# Patient Record
Sex: FEMALE | Race: WHITE | NOT HISPANIC OR LATINO | Employment: FULL TIME | URBAN - METROPOLITAN AREA
[De-identification: names, ages, dates, MRNs, and addresses within clinical notes are randomized per-mention and may not be internally consistent; named-entity substitution may affect disease eponyms.]

---

## 2017-01-03 ENCOUNTER — OFFICE VISIT - HEALTHEAST (OUTPATIENT)
Dept: PHYSICAL THERAPY | Facility: CLINIC | Age: 42
End: 2017-01-03

## 2017-01-03 DIAGNOSIS — G44.321 INTRACTABLE CHRONIC POST-TRAUMATIC HEADACHE: ICD-10-CM

## 2017-01-03 DIAGNOSIS — M54.2 NECK PAIN: ICD-10-CM

## 2017-01-03 DIAGNOSIS — R60.9 EDEMA, UNSPECIFIED TYPE: ICD-10-CM

## 2017-01-03 DIAGNOSIS — R51.9 HEAD ACHE: ICD-10-CM

## 2017-01-03 DIAGNOSIS — M54.2 CERVICALGIA: ICD-10-CM

## 2017-01-03 DIAGNOSIS — G89.29 CHRONIC BILATERAL LOW BACK PAIN WITHOUT SCIATICA: ICD-10-CM

## 2017-01-03 DIAGNOSIS — M54.50 CHRONIC BILATERAL LOW BACK PAIN WITHOUT SCIATICA: ICD-10-CM

## 2017-01-12 ENCOUNTER — RECORDS - HEALTHEAST (OUTPATIENT)
Dept: ADMINISTRATIVE | Facility: OTHER | Age: 42
End: 2017-01-12

## 2017-01-18 ENCOUNTER — OFFICE VISIT - HEALTHEAST (OUTPATIENT)
Dept: PHYSICAL THERAPY | Facility: CLINIC | Age: 42
End: 2017-01-18

## 2017-01-18 DIAGNOSIS — G44.321 INTRACTABLE CHRONIC POST-TRAUMATIC HEADACHE: ICD-10-CM

## 2017-01-18 DIAGNOSIS — M54.2 NECK PAIN: ICD-10-CM

## 2017-01-18 DIAGNOSIS — M54.2 CERVICALGIA: ICD-10-CM

## 2017-01-25 ENCOUNTER — OFFICE VISIT - HEALTHEAST (OUTPATIENT)
Dept: PHYSICAL THERAPY | Facility: CLINIC | Age: 42
End: 2017-01-25

## 2017-01-25 DIAGNOSIS — G44.321 INTRACTABLE CHRONIC POST-TRAUMATIC HEADACHE: ICD-10-CM

## 2017-01-25 DIAGNOSIS — M54.2 NECK PAIN: ICD-10-CM

## 2017-01-25 DIAGNOSIS — M54.2 CERVICALGIA: ICD-10-CM

## 2017-02-01 ENCOUNTER — OFFICE VISIT - HEALTHEAST (OUTPATIENT)
Dept: PHYSICAL THERAPY | Facility: CLINIC | Age: 42
End: 2017-02-01

## 2017-02-01 DIAGNOSIS — M54.2 NECK PAIN: ICD-10-CM

## 2017-02-01 DIAGNOSIS — M54.2 CERVICALGIA: ICD-10-CM

## 2017-02-01 DIAGNOSIS — G44.321 INTRACTABLE CHRONIC POST-TRAUMATIC HEADACHE: ICD-10-CM

## 2017-02-06 ENCOUNTER — OFFICE VISIT - HEALTHEAST (OUTPATIENT)
Dept: PHYSICAL THERAPY | Facility: CLINIC | Age: 42
End: 2017-02-06

## 2017-02-06 ENCOUNTER — COMMUNICATION - HEALTHEAST (OUTPATIENT)
Dept: FAMILY MEDICINE | Facility: CLINIC | Age: 42
End: 2017-02-06

## 2017-02-06 DIAGNOSIS — M54.2 CERVICALGIA: ICD-10-CM

## 2017-02-06 DIAGNOSIS — G89.29 CHRONIC BILATERAL LOW BACK PAIN WITHOUT SCIATICA: ICD-10-CM

## 2017-02-06 DIAGNOSIS — F41.9 ANXIETY: ICD-10-CM

## 2017-02-06 DIAGNOSIS — G44.221 CHRONIC TENSION-TYPE HEADACHE, INTRACTABLE: ICD-10-CM

## 2017-02-06 DIAGNOSIS — G44.321 INTRACTABLE CHRONIC POST-TRAUMATIC HEADACHE: ICD-10-CM

## 2017-02-06 DIAGNOSIS — M54.50 CHRONIC BILATERAL LOW BACK PAIN WITHOUT SCIATICA: ICD-10-CM

## 2017-02-06 DIAGNOSIS — M54.2 NECK PAIN: ICD-10-CM

## 2017-02-08 ENCOUNTER — OFFICE VISIT - HEALTHEAST (OUTPATIENT)
Dept: PHYSICAL THERAPY | Facility: CLINIC | Age: 42
End: 2017-02-08

## 2017-02-08 DIAGNOSIS — M54.2 CERVICALGIA: ICD-10-CM

## 2017-02-08 DIAGNOSIS — G44.321 INTRACTABLE CHRONIC POST-TRAUMATIC HEADACHE: ICD-10-CM

## 2017-02-08 DIAGNOSIS — M54.2 NECK PAIN: ICD-10-CM

## 2017-02-15 ENCOUNTER — OFFICE VISIT - HEALTHEAST (OUTPATIENT)
Dept: PHYSICAL THERAPY | Facility: CLINIC | Age: 42
End: 2017-02-15

## 2017-02-15 DIAGNOSIS — M54.2 NECK PAIN: ICD-10-CM

## 2017-02-15 DIAGNOSIS — G44.321 INTRACTABLE CHRONIC POST-TRAUMATIC HEADACHE: ICD-10-CM

## 2017-02-15 DIAGNOSIS — M54.2 CERVICALGIA: ICD-10-CM

## 2017-02-17 ENCOUNTER — RECORDS - HEALTHEAST (OUTPATIENT)
Dept: ADMINISTRATIVE | Facility: OTHER | Age: 42
End: 2017-02-17

## 2017-02-20 ENCOUNTER — OFFICE VISIT - HEALTHEAST (OUTPATIENT)
Dept: PHYSICAL THERAPY | Facility: CLINIC | Age: 42
End: 2017-02-20

## 2017-02-20 DIAGNOSIS — G44.221 CHRONIC TENSION-TYPE HEADACHE, INTRACTABLE: ICD-10-CM

## 2017-02-20 DIAGNOSIS — G44.321 INTRACTABLE CHRONIC POST-TRAUMATIC HEADACHE: ICD-10-CM

## 2017-02-20 DIAGNOSIS — M54.2 NECK PAIN: ICD-10-CM

## 2017-02-20 DIAGNOSIS — M54.2 CERVICALGIA: ICD-10-CM

## 2017-02-20 DIAGNOSIS — R60.9 EDEMA, UNSPECIFIED TYPE: ICD-10-CM

## 2017-02-27 ENCOUNTER — RECORDS - HEALTHEAST (OUTPATIENT)
Dept: ADMINISTRATIVE | Facility: OTHER | Age: 42
End: 2017-02-27

## 2017-02-27 ENCOUNTER — OFFICE VISIT - HEALTHEAST (OUTPATIENT)
Dept: PHYSICAL THERAPY | Facility: CLINIC | Age: 42
End: 2017-02-27

## 2017-02-27 DIAGNOSIS — G44.221 CHRONIC TENSION-TYPE HEADACHE, INTRACTABLE: ICD-10-CM

## 2017-02-27 DIAGNOSIS — R60.9 EDEMA, UNSPECIFIED TYPE: ICD-10-CM

## 2017-02-27 DIAGNOSIS — M54.2 CERVICALGIA: ICD-10-CM

## 2017-02-27 DIAGNOSIS — M54.2 NECK PAIN: ICD-10-CM

## 2017-02-28 ENCOUNTER — OFFICE VISIT - HEALTHEAST (OUTPATIENT)
Dept: FAMILY MEDICINE | Facility: CLINIC | Age: 42
End: 2017-02-28

## 2017-02-28 DIAGNOSIS — F32.4 MAJOR DEPRESSION SINGLE EPISODE, IN PARTIAL REMISSION (H): ICD-10-CM

## 2017-02-28 DIAGNOSIS — F41.1 ANXIETY STATE: ICD-10-CM

## 2017-02-28 DIAGNOSIS — M54.2 NECK PAIN: ICD-10-CM

## 2017-02-28 DIAGNOSIS — J30.9 ALLERGIC RHINITIS: ICD-10-CM

## 2017-02-28 DIAGNOSIS — Z86.79 HISTORY OF INTRACRANIAL ANEURYSM: ICD-10-CM

## 2017-02-28 DIAGNOSIS — G43.009 MIGRAINE WITHOUT AURA: ICD-10-CM

## 2017-02-28 ASSESSMENT — MIFFLIN-ST. JEOR: SCORE: 1466.14

## 2017-03-19 ENCOUNTER — RECORDS - HEALTHEAST (OUTPATIENT)
Dept: ADMINISTRATIVE | Facility: OTHER | Age: 42
End: 2017-03-19

## 2017-03-20 ENCOUNTER — OFFICE VISIT - HEALTHEAST (OUTPATIENT)
Dept: FAMILY MEDICINE | Facility: CLINIC | Age: 42
End: 2017-03-20

## 2017-03-20 DIAGNOSIS — R11.0 NAUSEA: ICD-10-CM

## 2017-03-20 DIAGNOSIS — R10.9 ABDOMINAL PAIN: ICD-10-CM

## 2017-03-21 ENCOUNTER — HOSPITAL ENCOUNTER (OUTPATIENT)
Dept: CT IMAGING | Facility: CLINIC | Age: 42
Discharge: HOME | End: 2017-03-21
Attending: FAMILY MEDICINE

## 2017-03-21 ENCOUNTER — COMMUNICATION - HEALTHEAST (OUTPATIENT)
Dept: FAMILY MEDICINE | Facility: CLINIC | Age: 42
End: 2017-03-21

## 2017-03-21 ENCOUNTER — OFFICE VISIT - HEALTHEAST (OUTPATIENT)
Dept: FAMILY MEDICINE | Facility: CLINIC | Age: 42
End: 2017-03-21

## 2017-03-21 DIAGNOSIS — R10.9 LEFT FLANK PAIN: ICD-10-CM

## 2017-03-21 DIAGNOSIS — R10.32 LLQ ABDOMINAL PAIN: ICD-10-CM

## 2017-03-21 ASSESSMENT — MIFFLIN-ST. JEOR: SCORE: 1482.29

## 2017-04-03 ENCOUNTER — COMMUNICATION - HEALTHEAST (OUTPATIENT)
Dept: FAMILY MEDICINE | Facility: CLINIC | Age: 42
End: 2017-04-03

## 2017-04-03 DIAGNOSIS — K42.9 UMBILICAL HERNIA: ICD-10-CM

## 2017-04-10 ENCOUNTER — OFFICE VISIT - HEALTHEAST (OUTPATIENT)
Dept: SURGERY | Facility: CLINIC | Age: 42
End: 2017-04-10

## 2017-04-10 DIAGNOSIS — R10.9 ABDOMINAL PAIN: ICD-10-CM

## 2017-04-10 ASSESSMENT — MIFFLIN-ST. JEOR: SCORE: 1491.37

## 2017-04-11 ENCOUNTER — HOSPITAL ENCOUNTER (OUTPATIENT)
Dept: RADIOLOGY | Facility: CLINIC | Age: 42
Discharge: HOME | End: 2017-04-11
Attending: SURGERY

## 2017-04-11 DIAGNOSIS — R10.9 ABDOMINAL PAIN: ICD-10-CM

## 2017-04-12 ENCOUNTER — COMMUNICATION - HEALTHEAST (OUTPATIENT)
Dept: SURGERY | Facility: CLINIC | Age: 42
End: 2017-04-12

## 2017-04-13 ENCOUNTER — AMBULATORY - HEALTHEAST (OUTPATIENT)
Dept: SURGERY | Facility: CLINIC | Age: 42
End: 2017-04-13

## 2017-04-13 DIAGNOSIS — K21.00 REFLUX ESOPHAGITIS: ICD-10-CM

## 2017-04-14 ENCOUNTER — AMBULATORY - HEALTHEAST (OUTPATIENT)
Dept: SURGERY | Facility: CLINIC | Age: 42
End: 2017-04-14

## 2017-04-24 ENCOUNTER — OFFICE VISIT - HEALTHEAST (OUTPATIENT)
Dept: SURGERY | Facility: CLINIC | Age: 42
End: 2017-04-24

## 2017-04-24 DIAGNOSIS — K21.9 HIATAL HERNIA WITH GERD: ICD-10-CM

## 2017-04-24 DIAGNOSIS — K44.9 HIATAL HERNIA WITH GERD: ICD-10-CM

## 2017-04-26 ENCOUNTER — AMBULATORY - HEALTHEAST (OUTPATIENT)
Dept: SURGERY | Facility: CLINIC | Age: 42
End: 2017-04-26

## 2017-04-26 ENCOUNTER — RECORDS - HEALTHEAST (OUTPATIENT)
Dept: ADMINISTRATIVE | Facility: OTHER | Age: 42
End: 2017-04-26

## 2017-05-02 ENCOUNTER — RECORDS - HEALTHEAST (OUTPATIENT)
Dept: ADMINISTRATIVE | Facility: OTHER | Age: 42
End: 2017-05-02

## 2017-05-02 ENCOUNTER — AMBULATORY - HEALTHEAST (OUTPATIENT)
Dept: SURGERY | Facility: CLINIC | Age: 42
End: 2017-05-02

## 2017-05-15 ENCOUNTER — COMMUNICATION - HEALTHEAST (OUTPATIENT)
Dept: FAMILY MEDICINE | Facility: CLINIC | Age: 42
End: 2017-05-15

## 2017-05-15 DIAGNOSIS — F41.9 ANXIETY: ICD-10-CM

## 2017-05-27 ENCOUNTER — COMMUNICATION - HEALTHEAST (OUTPATIENT)
Dept: FAMILY MEDICINE | Facility: CLINIC | Age: 42
End: 2017-05-27

## 2017-05-27 DIAGNOSIS — F32.4 MAJOR DEPRESSION SINGLE EPISODE, IN PARTIAL REMISSION (H): ICD-10-CM

## 2017-06-08 ENCOUNTER — RECORDS - HEALTHEAST (OUTPATIENT)
Dept: ADMINISTRATIVE | Facility: OTHER | Age: 42
End: 2017-06-08

## 2017-06-19 ENCOUNTER — COMMUNICATION - HEALTHEAST (OUTPATIENT)
Dept: SCHEDULING | Facility: CLINIC | Age: 42
End: 2017-06-19

## 2017-06-20 ENCOUNTER — RECORDS - HEALTHEAST (OUTPATIENT)
Dept: ADMINISTRATIVE | Facility: OTHER | Age: 42
End: 2017-06-20

## 2017-06-21 ENCOUNTER — COMMUNICATION - HEALTHEAST (OUTPATIENT)
Dept: FAMILY MEDICINE | Facility: CLINIC | Age: 42
End: 2017-06-21

## 2017-06-21 DIAGNOSIS — F32.4 MAJOR DEPRESSION SINGLE EPISODE, IN PARTIAL REMISSION (H): ICD-10-CM

## 2017-07-11 ENCOUNTER — COMMUNICATION - HEALTHEAST (OUTPATIENT)
Dept: FAMILY MEDICINE | Facility: CLINIC | Age: 42
End: 2017-07-11

## 2017-07-11 ENCOUNTER — OFFICE VISIT - HEALTHEAST (OUTPATIENT)
Dept: FAMILY MEDICINE | Facility: CLINIC | Age: 42
End: 2017-07-11

## 2017-07-11 ENCOUNTER — COMMUNICATION - HEALTHEAST (OUTPATIENT)
Dept: SCHEDULING | Facility: CLINIC | Age: 42
End: 2017-07-11

## 2017-07-11 DIAGNOSIS — F41.9 ANXIETY: ICD-10-CM

## 2017-07-11 DIAGNOSIS — F32.4 MAJOR DEPRESSION SINGLE EPISODE, IN PARTIAL REMISSION (H): ICD-10-CM

## 2017-07-11 ASSESSMENT — MIFFLIN-ST. JEOR: SCORE: 1461.88

## 2017-07-12 ENCOUNTER — AMBULATORY - HEALTHEAST (OUTPATIENT)
Dept: MULTI SPECIALTY CLINIC | Facility: CLINIC | Age: 42
End: 2017-07-12

## 2017-07-12 LAB — PAP SMEAR - HIM PATIENT REPORTED: NORMAL

## 2017-10-04 ENCOUNTER — AMBULATORY - HEALTHEAST (OUTPATIENT)
Dept: NURSING | Facility: CLINIC | Age: 42
End: 2017-10-04

## 2017-10-04 DIAGNOSIS — Z23 NEED FOR IMMUNIZATION AGAINST INFLUENZA: ICD-10-CM

## 2017-10-19 ENCOUNTER — RECORDS - HEALTHEAST (OUTPATIENT)
Dept: ADMINISTRATIVE | Facility: OTHER | Age: 42
End: 2017-10-19

## 2017-11-08 ENCOUNTER — COMMUNICATION - HEALTHEAST (OUTPATIENT)
Dept: FAMILY MEDICINE | Facility: CLINIC | Age: 42
End: 2017-11-08

## 2017-12-13 ENCOUNTER — HOSPITAL ENCOUNTER (OUTPATIENT)
Dept: CT IMAGING | Facility: CLINIC | Age: 42
Discharge: HOME | End: 2017-12-13
Attending: FAMILY MEDICINE

## 2017-12-13 ENCOUNTER — OFFICE VISIT - HEALTHEAST (OUTPATIENT)
Dept: FAMILY MEDICINE | Facility: CLINIC | Age: 42
End: 2017-12-13

## 2017-12-13 DIAGNOSIS — R10.9 ABDOMINAL PAIN: ICD-10-CM

## 2017-12-27 ENCOUNTER — OFFICE VISIT - HEALTHEAST (OUTPATIENT)
Dept: FAMILY MEDICINE | Facility: CLINIC | Age: 42
End: 2017-12-27

## 2017-12-27 DIAGNOSIS — G89.29 CHRONIC BILATERAL LOW BACK PAIN WITHOUT SCIATICA: ICD-10-CM

## 2017-12-27 DIAGNOSIS — M54.50 CHRONIC BILATERAL LOW BACK PAIN WITHOUT SCIATICA: ICD-10-CM

## 2017-12-27 DIAGNOSIS — R10.9 ABDOMINAL PAIN, UNSPECIFIED ABDOMINAL LOCATION: ICD-10-CM

## 2017-12-27 ASSESSMENT — MIFFLIN-ST. JEOR: SCORE: 1477.76

## 2017-12-29 ENCOUNTER — COMMUNICATION - HEALTHEAST (OUTPATIENT)
Dept: SURGERY | Facility: CLINIC | Age: 42
End: 2017-12-29

## 2018-01-03 ENCOUNTER — OFFICE VISIT - HEALTHEAST (OUTPATIENT)
Dept: PHYSICAL THERAPY | Facility: REHABILITATION | Age: 43
End: 2018-01-03

## 2018-01-03 DIAGNOSIS — M54.50 CHRONIC BILATERAL LOW BACK PAIN WITHOUT SCIATICA: ICD-10-CM

## 2018-01-03 DIAGNOSIS — R19.8 ABDOMINAL WEAKNESS: ICD-10-CM

## 2018-01-03 DIAGNOSIS — M79.18 PAIN IN ABDOMINAL MUSCLE OF LEFT FLANK: ICD-10-CM

## 2018-01-03 DIAGNOSIS — M79.18 MUSCULAR ABDOMINAL PAIN IN RIGHT FLANK: ICD-10-CM

## 2018-01-03 DIAGNOSIS — G89.29 CHRONIC BILATERAL LOW BACK PAIN WITHOUT SCIATICA: ICD-10-CM

## 2018-01-09 ENCOUNTER — OFFICE VISIT - HEALTHEAST (OUTPATIENT)
Dept: PHYSICAL THERAPY | Facility: REHABILITATION | Age: 43
End: 2018-01-09

## 2018-01-09 DIAGNOSIS — G89.29 CHRONIC BILATERAL LOW BACK PAIN WITHOUT SCIATICA: ICD-10-CM

## 2018-01-09 DIAGNOSIS — M79.18 PAIN IN ABDOMINAL MUSCLE OF LEFT FLANK: ICD-10-CM

## 2018-01-09 DIAGNOSIS — R60.9 EDEMA, UNSPECIFIED TYPE: ICD-10-CM

## 2018-01-09 DIAGNOSIS — R19.8 ABDOMINAL WEAKNESS: ICD-10-CM

## 2018-01-09 DIAGNOSIS — M79.18 MUSCULAR ABDOMINAL PAIN IN RIGHT FLANK: ICD-10-CM

## 2018-01-09 DIAGNOSIS — M54.2 CERVICALGIA: ICD-10-CM

## 2018-01-09 DIAGNOSIS — G44.321 INTRACTABLE CHRONIC POST-TRAUMATIC HEADACHE: ICD-10-CM

## 2018-01-09 DIAGNOSIS — M54.50 CHRONIC BILATERAL LOW BACK PAIN WITHOUT SCIATICA: ICD-10-CM

## 2018-01-09 DIAGNOSIS — G44.221 CHRONIC TENSION-TYPE HEADACHE, INTRACTABLE: ICD-10-CM

## 2018-01-09 DIAGNOSIS — M54.2 NECK PAIN: ICD-10-CM

## 2018-01-16 ENCOUNTER — OFFICE VISIT - HEALTHEAST (OUTPATIENT)
Dept: PHYSICAL THERAPY | Facility: REHABILITATION | Age: 43
End: 2018-01-16

## 2018-01-16 DIAGNOSIS — K42.9 UMBILICAL HERNIA: ICD-10-CM

## 2018-01-16 DIAGNOSIS — Z86.79 HISTORY OF INTRACRANIAL ANEURYSM: ICD-10-CM

## 2018-01-16 DIAGNOSIS — G43.009 MIGRAINE WITHOUT AURA: ICD-10-CM

## 2018-01-16 DIAGNOSIS — N92.0 MENORRHAGIA: ICD-10-CM

## 2018-01-16 DIAGNOSIS — F32.4 MAJOR DEPRESSION SINGLE EPISODE, IN PARTIAL REMISSION (H): ICD-10-CM

## 2018-01-16 DIAGNOSIS — Z98.890 S/P CRANIOTOMY: ICD-10-CM

## 2018-01-18 ENCOUNTER — OFFICE VISIT - HEALTHEAST (OUTPATIENT)
Dept: PHYSICAL THERAPY | Facility: REHABILITATION | Age: 43
End: 2018-01-18

## 2018-01-18 DIAGNOSIS — M54.50 CHRONIC BILATERAL LOW BACK PAIN WITHOUT SCIATICA: ICD-10-CM

## 2018-01-18 DIAGNOSIS — G89.29 CHRONIC BILATERAL LOW BACK PAIN WITHOUT SCIATICA: ICD-10-CM

## 2018-01-23 ENCOUNTER — RECORDS - HEALTHEAST (OUTPATIENT)
Dept: ADMINISTRATIVE | Facility: OTHER | Age: 43
End: 2018-01-23

## 2018-01-24 ENCOUNTER — COMMUNICATION - HEALTHEAST (OUTPATIENT)
Dept: FAMILY MEDICINE | Facility: CLINIC | Age: 43
End: 2018-01-24

## 2018-01-29 ENCOUNTER — OFFICE VISIT - HEALTHEAST (OUTPATIENT)
Dept: SURGERY | Facility: CLINIC | Age: 43
End: 2018-01-29

## 2018-01-29 DIAGNOSIS — K21.00 GASTROESOPHAGEAL REFLUX DISEASE WITH ESOPHAGITIS: ICD-10-CM

## 2018-01-29 DIAGNOSIS — Z48.89 POSTOPERATIVE VISIT: ICD-10-CM

## 2018-01-29 ASSESSMENT — MIFFLIN-ST. JEOR: SCORE: 1468.69

## 2018-02-06 ENCOUNTER — COMMUNICATION - HEALTHEAST (OUTPATIENT)
Dept: SURGERY | Facility: CLINIC | Age: 43
End: 2018-02-06

## 2018-02-06 DIAGNOSIS — K21.00 REFLUX ESOPHAGITIS: ICD-10-CM

## 2018-02-26 ENCOUNTER — COMMUNICATION - HEALTHEAST (OUTPATIENT)
Dept: SURGERY | Facility: CLINIC | Age: 43
End: 2018-02-26

## 2018-03-01 ENCOUNTER — ANESTHESIA - HEALTHEAST (OUTPATIENT)
Dept: SURGERY | Facility: HOSPITAL | Age: 43
End: 2018-03-01

## 2018-03-01 ENCOUNTER — SURGERY - HEALTHEAST (OUTPATIENT)
Dept: SURGERY | Facility: HOSPITAL | Age: 43
End: 2018-03-01

## 2018-03-01 ASSESSMENT — MIFFLIN-ST. JEOR
SCORE: 1486.83
SCORE: 1486.83

## 2018-03-02 ENCOUNTER — AMBULATORY - HEALTHEAST (OUTPATIENT)
Dept: SURGERY | Facility: CLINIC | Age: 43
End: 2018-03-02

## 2018-03-19 ENCOUNTER — OFFICE VISIT - HEALTHEAST (OUTPATIENT)
Dept: SURGERY | Facility: CLINIC | Age: 43
End: 2018-03-19

## 2018-03-19 DIAGNOSIS — Z48.89 POSTOPERATIVE VISIT: ICD-10-CM

## 2018-04-13 ENCOUNTER — OFFICE VISIT - HEALTHEAST (OUTPATIENT)
Dept: SURGERY | Facility: CLINIC | Age: 43
End: 2018-04-13

## 2018-04-13 DIAGNOSIS — Z48.89 POSTOPERATIVE VISIT: ICD-10-CM

## 2018-04-27 ENCOUNTER — COMMUNICATION - HEALTHEAST (OUTPATIENT)
Dept: SCHEDULING | Facility: CLINIC | Age: 43
End: 2018-04-27

## 2018-05-31 ENCOUNTER — OFFICE VISIT - HEALTHEAST (OUTPATIENT)
Dept: FAMILY MEDICINE | Facility: CLINIC | Age: 43
End: 2018-05-31

## 2018-05-31 DIAGNOSIS — K44.9 HIATAL HERNIA WITH GERD: ICD-10-CM

## 2018-05-31 DIAGNOSIS — J35.1 SWOLLEN TONSIL: ICD-10-CM

## 2018-05-31 DIAGNOSIS — Z98.890 S/P CRANIOTOMY: ICD-10-CM

## 2018-05-31 DIAGNOSIS — K21.9 HIATAL HERNIA WITH GERD: ICD-10-CM

## 2018-05-31 LAB
BASOPHILS # BLD AUTO: 0.1 THOU/UL (ref 0–0.2)
BASOPHILS NFR BLD AUTO: 1 % (ref 0–2)
DEPRECATED S PYO AG THROAT QL EIA: NORMAL
EOSINOPHIL # BLD AUTO: 0.2 THOU/UL (ref 0–0.4)
EOSINOPHIL NFR BLD AUTO: 2 % (ref 0–6)
ERYTHROCYTE [DISTWIDTH] IN BLOOD BY AUTOMATED COUNT: 13 % (ref 11–14.5)
HCT VFR BLD AUTO: 35.6 % (ref 35–47)
HGB BLD-MCNC: 11.8 G/DL (ref 12–16)
LYMPHOCYTES # BLD AUTO: 2.8 THOU/UL (ref 0.8–4.4)
LYMPHOCYTES NFR BLD AUTO: 29 % (ref 20–40)
MCH RBC QN AUTO: 27.5 PG (ref 27–34)
MCHC RBC AUTO-ENTMCNC: 33.1 G/DL (ref 32–36)
MCV RBC AUTO: 83 FL (ref 80–100)
MONOCYTES # BLD AUTO: 0.5 THOU/UL (ref 0–0.9)
MONOCYTES NFR BLD AUTO: 6 % (ref 2–10)
MONOCYTES NFR BLD AUTO: NEGATIVE %
NEUTROPHILS # BLD AUTO: 6 THOU/UL (ref 2–7.7)
NEUTROPHILS NFR BLD AUTO: 63 % (ref 50–70)
PLATELET # BLD AUTO: 318 THOU/UL (ref 140–440)
PMV BLD AUTO: 6.9 FL (ref 7–10)
RBC # BLD AUTO: 4.28 MILL/UL (ref 3.8–5.4)
WBC: 9.5 THOU/UL (ref 4–11)

## 2018-06-02 LAB — GROUP A STREP BY PCR: NORMAL

## 2018-06-07 ENCOUNTER — COMMUNICATION - HEALTHEAST (OUTPATIENT)
Dept: FAMILY MEDICINE | Facility: CLINIC | Age: 43
End: 2018-06-07

## 2018-06-07 DIAGNOSIS — D64.9 ANEMIA: ICD-10-CM

## 2018-06-08 ENCOUNTER — AMBULATORY - HEALTHEAST (OUTPATIENT)
Dept: LAB | Facility: CLINIC | Age: 43
End: 2018-06-08

## 2018-06-08 DIAGNOSIS — D64.9 ANEMIA: ICD-10-CM

## 2018-06-08 LAB
FERRITIN SERPL-MCNC: 64 NG/ML (ref 10–130)
HGB BLD-MCNC: 12.1 G/DL (ref 12–16)
IRON SATN MFR SERPL: 22 % (ref 20–50)
IRON SERPL-MCNC: 56 UG/DL (ref 42–175)
TIBC SERPL-MCNC: 259 UG/DL (ref 313–563)
TRANSFERRIN SERPL-MCNC: 207 MG/DL (ref 212–360)

## 2018-06-11 ENCOUNTER — COMMUNICATION - HEALTHEAST (OUTPATIENT)
Dept: FAMILY MEDICINE | Facility: CLINIC | Age: 43
End: 2018-06-11

## 2018-06-14 ENCOUNTER — OFFICE VISIT - HEALTHEAST (OUTPATIENT)
Dept: OTOLARYNGOLOGY | Facility: CLINIC | Age: 43
End: 2018-06-14

## 2018-06-14 DIAGNOSIS — J35.8 ASYMMETRIC TONSILS: ICD-10-CM

## 2018-06-26 ENCOUNTER — RECORDS - HEALTHEAST (OUTPATIENT)
Dept: ADMINISTRATIVE | Facility: OTHER | Age: 43
End: 2018-06-26

## 2018-08-02 ENCOUNTER — OFFICE VISIT - HEALTHEAST (OUTPATIENT)
Dept: OTOLARYNGOLOGY | Facility: CLINIC | Age: 43
End: 2018-08-02

## 2018-08-02 DIAGNOSIS — J35.8 ASYMMETRIC TONSILS: ICD-10-CM

## 2018-08-13 ENCOUNTER — COMMUNICATION - HEALTHEAST (OUTPATIENT)
Dept: FAMILY MEDICINE | Facility: CLINIC | Age: 43
End: 2018-08-13

## 2018-08-13 DIAGNOSIS — F41.9 ANXIETY: ICD-10-CM

## 2018-08-17 ENCOUNTER — RECORDS - HEALTHEAST (OUTPATIENT)
Dept: ADMINISTRATIVE | Facility: OTHER | Age: 43
End: 2018-08-17

## 2018-08-30 ENCOUNTER — OFFICE VISIT - HEALTHEAST (OUTPATIENT)
Dept: OTOLARYNGOLOGY | Facility: CLINIC | Age: 43
End: 2018-08-30

## 2018-08-30 DIAGNOSIS — J35.8 ASYMMETRIC TONSILS: ICD-10-CM

## 2018-09-19 ENCOUNTER — RECORDS - HEALTHEAST (OUTPATIENT)
Dept: ADMINISTRATIVE | Facility: OTHER | Age: 43
End: 2018-09-19

## 2018-09-25 ENCOUNTER — OFFICE VISIT - HEALTHEAST (OUTPATIENT)
Dept: FAMILY MEDICINE | Facility: CLINIC | Age: 43
End: 2018-09-25

## 2018-09-25 DIAGNOSIS — B96.89 BV (BACTERIAL VAGINOSIS): ICD-10-CM

## 2018-09-25 DIAGNOSIS — N76.0 BV (BACTERIAL VAGINOSIS): ICD-10-CM

## 2018-09-25 DIAGNOSIS — N39.0 UTI (URINARY TRACT INFECTION): ICD-10-CM

## 2018-09-25 LAB
ALBUMIN UR-MCNC: NEGATIVE MG/DL
APPEARANCE UR: CLEAR
BACTERIA #/AREA URNS HPF: ABNORMAL HPF
BILIRUB UR QL STRIP: NEGATIVE
CLUE CELLS: ABNORMAL
COLOR UR AUTO: YELLOW
GLUCOSE UR STRIP-MCNC: NEGATIVE MG/DL
HGB UR QL STRIP: ABNORMAL
KETONES UR STRIP-MCNC: NEGATIVE MG/DL
LEUKOCYTE ESTERASE UR QL STRIP: ABNORMAL
NITRATE UR QL: NEGATIVE
PH UR STRIP: 5.5 [PH] (ref 5–8)
RBC #/AREA URNS AUTO: ABNORMAL HPF
SP GR UR STRIP: <=1.005 (ref 1–1.03)
SQUAMOUS #/AREA URNS AUTO: ABNORMAL LPF
TRICHOMONAS, WET PREP: ABNORMAL
UROBILINOGEN UR STRIP-ACNC: ABNORMAL
WBC #/AREA URNS AUTO: ABNORMAL HPF
YEAST, WET PREP: ABNORMAL

## 2018-09-26 LAB
C TRACH DNA SPEC QL PROBE+SIG AMP: NEGATIVE
N GONORRHOEA DNA SPEC QL NAA+PROBE: NEGATIVE

## 2018-09-27 ENCOUNTER — COMMUNICATION - HEALTHEAST (OUTPATIENT)
Dept: FAMILY MEDICINE | Facility: CLINIC | Age: 43
End: 2018-09-27

## 2018-09-28 ENCOUNTER — RECORDS - HEALTHEAST (OUTPATIENT)
Dept: ADMINISTRATIVE | Facility: OTHER | Age: 43
End: 2018-09-28

## 2018-09-28 LAB — BACTERIA SPEC CULT: ABNORMAL

## 2018-10-01 ENCOUNTER — OFFICE VISIT - HEALTHEAST (OUTPATIENT)
Dept: FAMILY MEDICINE | Facility: CLINIC | Age: 43
End: 2018-10-01

## 2018-10-01 ENCOUNTER — COMMUNICATION - HEALTHEAST (OUTPATIENT)
Dept: SCHEDULING | Facility: CLINIC | Age: 43
End: 2018-10-01

## 2018-10-01 DIAGNOSIS — R11.0 NAUSEA: ICD-10-CM

## 2018-10-01 DIAGNOSIS — R11.10 VOMITING, INTRACTABILITY OF VOMITING NOT SPECIFIED, PRESENCE OF NAUSEA NOT SPECIFIED, UNSPECIFIED VOMITING TYPE: ICD-10-CM

## 2018-10-01 LAB
ALBUMIN UR-MCNC: NEGATIVE MG/DL
APPEARANCE UR: CLEAR
BILIRUB UR QL STRIP: NEGATIVE
COLOR UR AUTO: YELLOW
GLUCOSE UR STRIP-MCNC: NEGATIVE MG/DL
HGB UR QL STRIP: NEGATIVE
KETONES UR STRIP-MCNC: NEGATIVE MG/DL
LEUKOCYTE ESTERASE UR QL STRIP: NEGATIVE
NITRATE UR QL: NEGATIVE
PH UR STRIP: 6 [PH] (ref 5–8)
SP GR UR STRIP: 1.01 (ref 1–1.03)
UROBILINOGEN UR STRIP-ACNC: NORMAL

## 2018-10-02 LAB — BACTERIA SPEC CULT: NO GROWTH

## 2018-10-29 ENCOUNTER — HOSPITAL ENCOUNTER (OUTPATIENT)
Dept: MAMMOGRAPHY | Facility: CLINIC | Age: 43
Discharge: HOME | End: 2018-10-29
Attending: FAMILY MEDICINE

## 2018-10-29 DIAGNOSIS — Z12.31 ENCOUNTER FOR SCREENING MAMMOGRAM FOR BREAST CANCER: ICD-10-CM

## 2018-11-14 ENCOUNTER — OFFICE VISIT - HEALTHEAST (OUTPATIENT)
Dept: FAMILY MEDICINE | Facility: CLINIC | Age: 43
End: 2018-11-14

## 2018-11-14 ENCOUNTER — COMMUNICATION - HEALTHEAST (OUTPATIENT)
Dept: SCHEDULING | Facility: CLINIC | Age: 43
End: 2018-11-14

## 2018-11-14 DIAGNOSIS — T14.8XXA BRUISING: ICD-10-CM

## 2018-11-16 ENCOUNTER — HOSPITAL ENCOUNTER (OUTPATIENT)
Dept: MRI IMAGING | Facility: CLINIC | Age: 43
Discharge: HOME | End: 2018-11-16
Attending: FAMILY MEDICINE

## 2018-11-16 ENCOUNTER — COMMUNICATION - HEALTHEAST (OUTPATIENT)
Dept: FAMILY MEDICINE | Facility: CLINIC | Age: 43
End: 2018-11-16

## 2018-11-16 DIAGNOSIS — T14.8XXA BRUISING: ICD-10-CM

## 2018-11-19 ENCOUNTER — RECORDS - HEALTHEAST (OUTPATIENT)
Dept: ADMINISTRATIVE | Facility: OTHER | Age: 43
End: 2018-11-19

## 2018-11-28 ENCOUNTER — COMMUNICATION - HEALTHEAST (OUTPATIENT)
Dept: FAMILY MEDICINE | Facility: CLINIC | Age: 43
End: 2018-11-28

## 2018-12-22 ENCOUNTER — COMMUNICATION - HEALTHEAST (OUTPATIENT)
Dept: SCHEDULING | Facility: CLINIC | Age: 43
End: 2018-12-22

## 2018-12-23 ENCOUNTER — OFFICE VISIT - HEALTHEAST (OUTPATIENT)
Dept: FAMILY MEDICINE | Facility: CLINIC | Age: 43
End: 2018-12-23

## 2018-12-23 DIAGNOSIS — R42 DIZZINESS: ICD-10-CM

## 2018-12-23 DIAGNOSIS — G43.809 OTHER MIGRAINE WITHOUT STATUS MIGRAINOSUS, NOT INTRACTABLE: ICD-10-CM

## 2018-12-23 LAB
ANION GAP SERPL CALCULATED.3IONS-SCNC: 14 MMOL/L (ref 5–18)
BUN SERPL-MCNC: 16 MG/DL (ref 8–22)
CHLORIDE BLD-SCNC: 100 MMOL/L (ref 98–107)
CO2 SERPL-SCNC: 26 MMOL/L (ref 22–31)
CREAT SERPL-MCNC: 0.8 MG/DL (ref 0.7–1.3)
ERYTHROCYTE [DISTWIDTH] IN BLOOD BY AUTOMATED COUNT: 12.4 % (ref 11–14.5)
GLUCOSE BLD-MCNC: 88 MG/DL (ref 70–125)
HCT VFR BLD AUTO: 41.4 % (ref 35–47)
HGB BLD-MCNC: 13.7 G/DL (ref 12–16)
MCH RBC QN AUTO: 27.7 PG (ref 27–34)
MCHC RBC AUTO-ENTMCNC: 33 G/DL (ref 32–36)
MCV RBC AUTO: 84 FL (ref 80–100)
PLATELET # BLD AUTO: 296 THOU/UL (ref 140–440)
PMV BLD AUTO: 7.3 FL (ref 7–10)
POTASSIUM BLD-SCNC: 4.3 MMOL/L (ref 3.5–5.5)
RBC # BLD AUTO: 4.93 MILL/UL (ref 3.8–5.4)
SODIUM SERPL-SCNC: 140 MMOL/L (ref 136–145)
WBC: 8.6 THOU/UL (ref 4–11)

## 2019-03-28 ENCOUNTER — OFFICE VISIT - HEALTHEAST (OUTPATIENT)
Dept: FAMILY MEDICINE | Facility: CLINIC | Age: 44
End: 2019-03-28

## 2019-03-28 DIAGNOSIS — R10.2 PELVIC PAIN IN FEMALE: ICD-10-CM

## 2019-03-28 DIAGNOSIS — S29.019D ACUTE THORACIC MYOFASCIAL STRAIN, SUBSEQUENT ENCOUNTER: ICD-10-CM

## 2019-03-28 DIAGNOSIS — N64.4 BREAST TENDERNESS IN FEMALE: ICD-10-CM

## 2019-03-28 DIAGNOSIS — G43.019 INTRACTABLE MIGRAINE WITHOUT AURA AND WITHOUT STATUS MIGRAINOSUS: ICD-10-CM

## 2019-03-28 LAB
ESTRADIOL SERPL-MCNC: 433 PG/ML
FSH SERPL-ACNC: <3 MIU/ML
T4 FREE SERPL-MCNC: 1 NG/DL (ref 0.7–1.8)
TSH SERPL DL<=0.005 MIU/L-ACNC: 1.35 UIU/ML (ref 0.3–5)

## 2019-03-28 RX ORDER — DIAZEPAM 5 MG
5 TABLET ORAL EVERY 6 HOURS PRN
Qty: 30 TABLET | Refills: 1 | Status: SHIPPED | OUTPATIENT
Start: 2019-03-28 | End: 2021-10-12

## 2019-03-28 ASSESSMENT — MIFFLIN-ST. JEOR: SCORE: 1488.82

## 2019-04-05 ENCOUNTER — COMMUNICATION - HEALTHEAST (OUTPATIENT)
Dept: FAMILY MEDICINE | Facility: CLINIC | Age: 44
End: 2019-04-05

## 2019-04-12 ENCOUNTER — RECORDS - HEALTHEAST (OUTPATIENT)
Dept: ADMINISTRATIVE | Facility: OTHER | Age: 44
End: 2019-04-12

## 2019-07-02 ENCOUNTER — COMMUNICATION - HEALTHEAST (OUTPATIENT)
Dept: SCHEDULING | Facility: CLINIC | Age: 44
End: 2019-07-02

## 2019-07-02 ENCOUNTER — OFFICE VISIT - HEALTHEAST (OUTPATIENT)
Dept: FAMILY MEDICINE | Facility: CLINIC | Age: 44
End: 2019-07-02

## 2019-07-02 DIAGNOSIS — F32.4 MAJOR DEPRESSION SINGLE EPISODE, IN PARTIAL REMISSION (H): ICD-10-CM

## 2019-07-02 DIAGNOSIS — G43.019 INTRACTABLE MIGRAINE WITHOUT AURA AND WITHOUT STATUS MIGRAINOSUS: ICD-10-CM

## 2019-07-02 DIAGNOSIS — B37.0 THRUSH: ICD-10-CM

## 2019-07-02 ASSESSMENT — MIFFLIN-ST. JEOR: SCORE: 1482.29

## 2019-07-22 ENCOUNTER — COMMUNICATION - HEALTHEAST (OUTPATIENT)
Dept: SCHEDULING | Facility: CLINIC | Age: 44
End: 2019-07-22

## 2019-07-22 ENCOUNTER — OFFICE VISIT - HEALTHEAST (OUTPATIENT)
Dept: FAMILY MEDICINE | Facility: CLINIC | Age: 44
End: 2019-07-22

## 2019-07-22 ENCOUNTER — AMBULATORY - HEALTHEAST (OUTPATIENT)
Dept: FAMILY MEDICINE | Facility: CLINIC | Age: 44
End: 2019-07-22

## 2019-07-22 DIAGNOSIS — B37.0 THRUSH: ICD-10-CM

## 2019-07-22 LAB
BASOPHILS # BLD AUTO: 0 THOU/UL (ref 0–0.2)
BASOPHILS NFR BLD AUTO: 1 % (ref 0–2)
EOSINOPHIL # BLD AUTO: 0.1 THOU/UL (ref 0–0.4)
EOSINOPHIL NFR BLD AUTO: 2 % (ref 0–6)
ERYTHROCYTE [DISTWIDTH] IN BLOOD BY AUTOMATED COUNT: 12.2 % (ref 11–14.5)
HCT VFR BLD AUTO: 38.8 % (ref 35–47)
HGB BLD-MCNC: 13 G/DL (ref 12–16)
LYMPHOCYTES # BLD AUTO: 2.2 THOU/UL (ref 0.8–4.4)
LYMPHOCYTES NFR BLD AUTO: 33 % (ref 20–40)
MCH RBC QN AUTO: 28.2 PG (ref 27–34)
MCHC RBC AUTO-ENTMCNC: 33.6 G/DL (ref 32–36)
MCV RBC AUTO: 84 FL (ref 80–100)
MONOCYTES # BLD AUTO: 0.5 THOU/UL (ref 0–0.9)
MONOCYTES NFR BLD AUTO: 7 % (ref 2–10)
NEUTROPHILS # BLD AUTO: 3.9 THOU/UL (ref 2–7.7)
NEUTROPHILS NFR BLD AUTO: 58 % (ref 50–70)
PLATELET # BLD AUTO: 274 THOU/UL (ref 140–440)
PMV BLD AUTO: 7.6 FL (ref 7–10)
RBC # BLD AUTO: 4.62 MILL/UL (ref 3.8–5.4)
WBC: 6.8 THOU/UL (ref 4–11)

## 2019-07-22 ASSESSMENT — MIFFLIN-ST. JEOR: SCORE: 1491.37

## 2019-07-31 ENCOUNTER — COMMUNICATION - HEALTHEAST (OUTPATIENT)
Dept: FAMILY MEDICINE | Facility: CLINIC | Age: 44
End: 2019-07-31

## 2019-07-31 ENCOUNTER — AMBULATORY - HEALTHEAST (OUTPATIENT)
Dept: FAMILY MEDICINE | Facility: CLINIC | Age: 44
End: 2019-07-31

## 2019-07-31 DIAGNOSIS — B37.0 THRUSH: ICD-10-CM

## 2019-08-26 ENCOUNTER — AMBULATORY - HEALTHEAST (OUTPATIENT)
Dept: FAMILY MEDICINE | Facility: CLINIC | Age: 44
End: 2019-08-26

## 2019-08-26 DIAGNOSIS — B37.0 THRUSH: ICD-10-CM

## 2019-08-26 DIAGNOSIS — K13.79 MOUTH SORE: ICD-10-CM

## 2019-08-28 ENCOUNTER — OFFICE VISIT - HEALTHEAST (OUTPATIENT)
Dept: OTOLARYNGOLOGY | Facility: CLINIC | Age: 44
End: 2019-08-28

## 2019-08-28 DIAGNOSIS — B37.0 THRUSH: ICD-10-CM

## 2019-10-15 ENCOUNTER — RECORDS - HEALTHEAST (OUTPATIENT)
Dept: ADMINISTRATIVE | Facility: OTHER | Age: 44
End: 2019-10-15

## 2019-10-17 ENCOUNTER — COMMUNICATION - HEALTHEAST (OUTPATIENT)
Dept: FAMILY MEDICINE | Facility: CLINIC | Age: 44
End: 2019-10-17

## 2019-10-17 DIAGNOSIS — F41.1 ANXIETY STATE: ICD-10-CM

## 2020-01-15 ENCOUNTER — RECORDS - HEALTHEAST (OUTPATIENT)
Dept: ADMINISTRATIVE | Facility: OTHER | Age: 45
End: 2020-01-15

## 2020-01-20 ENCOUNTER — RECORDS - HEALTHEAST (OUTPATIENT)
Dept: ADMINISTRATIVE | Facility: OTHER | Age: 45
End: 2020-01-20

## 2020-01-30 ENCOUNTER — RECORDS - HEALTHEAST (OUTPATIENT)
Dept: ADMINISTRATIVE | Facility: OTHER | Age: 45
End: 2020-01-30

## 2020-02-17 ENCOUNTER — OFFICE VISIT - HEALTHEAST (OUTPATIENT)
Dept: FAMILY MEDICINE | Facility: CLINIC | Age: 45
End: 2020-02-17

## 2020-02-17 DIAGNOSIS — R42 DIZZINESS: ICD-10-CM

## 2020-02-17 DIAGNOSIS — Z12.31 VISIT FOR SCREENING MAMMOGRAM: ICD-10-CM

## 2020-02-17 DIAGNOSIS — R53.83 FATIGUE, UNSPECIFIED TYPE: ICD-10-CM

## 2020-02-17 LAB
BASOPHILS # BLD AUTO: 0 THOU/UL (ref 0–0.2)
BASOPHILS NFR BLD AUTO: 0 % (ref 0–2)
EOSINOPHIL # BLD AUTO: 0.2 THOU/UL (ref 0–0.4)
EOSINOPHIL NFR BLD AUTO: 3 % (ref 0–6)
ERYTHROCYTE [DISTWIDTH] IN BLOOD BY AUTOMATED COUNT: 12.4 % (ref 11–14.5)
HCT VFR BLD AUTO: 37.1 % (ref 35–47)
HGB BLD-MCNC: 12.4 G/DL (ref 12–16)
LYMPHOCYTES # BLD AUTO: 2.6 THOU/UL (ref 0.8–4.4)
LYMPHOCYTES NFR BLD AUTO: 32 % (ref 20–40)
MCH RBC QN AUTO: 28.5 PG (ref 27–34)
MCHC RBC AUTO-ENTMCNC: 33.4 G/DL (ref 32–36)
MCV RBC AUTO: 85 FL (ref 80–100)
MONOCYTES # BLD AUTO: 0.5 THOU/UL (ref 0–0.9)
MONOCYTES NFR BLD AUTO: 6 % (ref 2–10)
NEUTROPHILS # BLD AUTO: 4.7 THOU/UL (ref 2–7.7)
NEUTROPHILS NFR BLD AUTO: 59 % (ref 50–70)
PLATELET # BLD AUTO: 306 THOU/UL (ref 140–440)
PMV BLD AUTO: 7.2 FL (ref 7–10)
RBC # BLD AUTO: 4.35 MILL/UL (ref 3.8–5.4)
WBC: 7.9 THOU/UL (ref 4–11)

## 2020-02-17 ASSESSMENT — MIFFLIN-ST. JEOR: SCORE: 1523.69

## 2020-02-21 ENCOUNTER — COMMUNICATION - HEALTHEAST (OUTPATIENT)
Dept: ADMINISTRATIVE | Facility: CLINIC | Age: 45
End: 2020-02-21

## 2020-03-31 ENCOUNTER — NURSE TRIAGE (OUTPATIENT)
Dept: NURSING | Facility: CLINIC | Age: 45
End: 2020-03-31

## 2020-03-31 NOTE — TELEPHONE ENCOUNTER
"Pt has had a cough since Tuesday 3/24/20. Starting Saturday 3/28/20 started with nausea and gagging at times. Feels like there is mucous going into her stomach causing this. She had hiatal surgery 1 1/2 to 2 years ago and can't actually vomit anything, but goes through the motions of vomiting. A couple of hours ago felt more nausea and had an empty stomach so though she would eat a sandwich to see if that would help, but became more nausea this past hour. Denies abdominal pain. I advised she speak with a  now to try to get her a tele-visit or virtual visit with her clinic this afternoon. She wants to try and rest for awhile and will call back if not feeling better.     Janneth Mendez RN  [facility]  Triage Nurse Advisors      Additional Information    Negative: Shock suspected (e.g., cold/pale/clammy skin, too weak to stand, low BP, rapid pulse)    Negative: Difficult to awaken or acting confused (e.g., disoriented, slurred speech)    Negative: Sounds like a life-threatening emergency to the triager    Negative: Vomiting occurs only while coughing    Negative: [1] Pregnant < 20 Weeks AND [2] nausea/vomiting began in early pregnancy (i.e., 4-8 weeks pregnant)    Negative: Chest pain    Negative: Headache is main symptom    Negative: Vomiting (or Nausea) in a cancer patient who is currently (or recently) receiving chemotherapy or radiation therapy, or cancer patient who has metastatic or end-stage cancer and is receiving palliative care    Negative: [1] Vomiting AND [2] contains red blood or black (\"coffee ground\") material  (Exception: few red streaks in vomit that only happened once)    Negative: Severe pain in one eye    Negative: Recent head injury (within last 3 days)    Negative: Recent abdominal injury (within last 3 days)    Negative: [1] Insulin-dependent diabetes (Type I) AND [2] glucose > 400 mg/dl (22 mmol/l)    Negative: [1] SEVERE vomiting (e.g., 6 or more times/day) AND [2] present > 8 " hours    Negative: [1] MODERATE vomiting (e.g., 3 - 5 times/day) AND [2] age > 60    Negative: Severe headache (e.g., excruciating) (Exception: similar to previous migraines)    Negative: High-risk adult (e.g., diabetes mellitus, brain tumor, V-P shunt, hernia)    Negative: [1] Drinking very little AND [2] dehydration suspected (e.g., no urine > 12 hours, very dry mouth, very lightheaded)    Negative: Patient sounds very sick or weak to the triager    Negative: [1] Vomiting AND [2] abdomen looks much more swollen than usual    Negative: [1] Vomiting AND [2] contains bile (green color)    Negative: [1] Constant abdominal pain AND [2] present > 2 hours    Negative: [1] Fever > 103 F (39.4 C) AND [2] not able to get the fever down using Fever Care Advice    Negative: [1] Fever > 100.0 F (37.8 C) AND [2] bedridden (e.g., nursing home patient, CVA, chronic illness, recovering from surgery)    Negative: [1] Fever > 100.0 F (37.8 C) AND [2] weak immune system (e.g., HIV positive, cancer chemo, splenectomy, organ transplant, chronic steroids)    Negative: Taking any of the following medications: digoxin (Lanoxin), lithium, theophylline, phenytoin (Dilantin)    Negative: [1] Fever > 101 F (38.3 C) AND [2] age > 60    Negative: [1] MILD or MODERATE vomiting AND [2] present > 48 hours (2 days) (Exception: mild vomiting with associated diarrhea)    Negative: Fever present > 3 days (72 hours)    Negative: Vomiting a prescription medication    Negative: [1] MILD vomiting with diarrhea AND [2] present > 5 days    Negative: Alcohol or drug abuse, known or suspected    Negative: Vomiting is a chronic symptom (recurrent or ongoing AND present > 4 weeks)    Negative: [1] SEVERE vomiting (e.g., 6 or more times/day, vomits everything) BUT [2] hydrated    MILD or MODERATE vomiting (e.g., 1 - 5 times / day)    Protocols used: VOMITING-A-AH

## 2020-04-01 ENCOUNTER — OFFICE VISIT - HEALTHEAST (OUTPATIENT)
Dept: FAMILY MEDICINE | Facility: CLINIC | Age: 45
End: 2020-04-01

## 2020-04-01 DIAGNOSIS — R11.0 NAUSEA: ICD-10-CM

## 2020-04-01 DIAGNOSIS — R06.2 WHEEZING: ICD-10-CM

## 2020-04-01 DIAGNOSIS — Z20.822 SUSPECTED COVID-19 VIRUS INFECTION: ICD-10-CM

## 2020-04-01 DIAGNOSIS — R05.9 COUGH: ICD-10-CM

## 2020-04-01 ASSESSMENT — ANXIETY QUESTIONNAIRES
1. FEELING NERVOUS, ANXIOUS, OR ON EDGE: SEVERAL DAYS
6. BECOMING EASILY ANNOYED OR IRRITABLE: NOT AT ALL
2. NOT BEING ABLE TO STOP OR CONTROL WORRYING: NOT AT ALL
IF YOU CHECKED OFF ANY PROBLEMS ON THIS QUESTIONNAIRE, HOW DIFFICULT HAVE THESE PROBLEMS MADE IT FOR YOU TO DO YOUR WORK, TAKE CARE OF THINGS AT HOME, OR GET ALONG WITH OTHER PEOPLE: NOT DIFFICULT AT ALL
GAD7 TOTAL SCORE: 2
5. BEING SO RESTLESS THAT IT IS HARD TO SIT STILL: NOT AT ALL
4. TROUBLE RELAXING: NOT AT ALL
7. FEELING AFRAID AS IF SOMETHING AWFUL MIGHT HAPPEN: SEVERAL DAYS
3. WORRYING TOO MUCH ABOUT DIFFERENT THINGS: NOT AT ALL

## 2020-04-01 ASSESSMENT — PATIENT HEALTH QUESTIONNAIRE - PHQ9: SUM OF ALL RESPONSES TO PHQ QUESTIONS 1-9: 2

## 2020-04-21 ENCOUNTER — OFFICE VISIT - HEALTHEAST (OUTPATIENT)
Dept: FAMILY MEDICINE | Facility: CLINIC | Age: 45
End: 2020-04-21

## 2020-04-21 ENCOUNTER — COMMUNICATION - HEALTHEAST (OUTPATIENT)
Dept: SCHEDULING | Facility: CLINIC | Age: 45
End: 2020-04-21

## 2020-04-21 DIAGNOSIS — Z98.890 S/P CRANIOTOMY: ICD-10-CM

## 2020-04-21 DIAGNOSIS — J01.00 ACUTE NON-RECURRENT MAXILLARY SINUSITIS: ICD-10-CM

## 2020-04-21 DIAGNOSIS — G43.009 MIGRAINE WITHOUT AURA AND WITHOUT STATUS MIGRAINOSUS, NOT INTRACTABLE: ICD-10-CM

## 2020-05-04 ENCOUNTER — COMMUNICATION - HEALTHEAST (OUTPATIENT)
Dept: FAMILY MEDICINE | Facility: CLINIC | Age: 45
End: 2020-05-04

## 2020-05-05 ENCOUNTER — OFFICE VISIT - HEALTHEAST (OUTPATIENT)
Dept: FAMILY MEDICINE | Facility: CLINIC | Age: 45
End: 2020-05-05

## 2020-05-05 DIAGNOSIS — Z98.890 STATUS POST LAPAROSCOPIC NISSEN FUNDOPLICATION: ICD-10-CM

## 2020-05-05 DIAGNOSIS — R05.9 COUGH: ICD-10-CM

## 2020-05-05 DIAGNOSIS — R09.A2 GLOBUS SENSATION: ICD-10-CM

## 2020-05-05 DIAGNOSIS — Z20.822 SUSPECTED COVID-19 VIRUS INFECTION: ICD-10-CM

## 2020-05-05 DIAGNOSIS — R13.10 DYSPHAGIA, UNSPECIFIED TYPE: ICD-10-CM

## 2020-05-11 ENCOUNTER — COMMUNICATION - HEALTHEAST (OUTPATIENT)
Dept: SCHEDULING | Facility: CLINIC | Age: 45
End: 2020-05-11

## 2020-05-11 ENCOUNTER — COMMUNICATION - HEALTHEAST (OUTPATIENT)
Dept: SURGERY | Facility: CLINIC | Age: 45
End: 2020-05-11

## 2020-05-11 ENCOUNTER — VIRTUAL VISIT (OUTPATIENT)
Dept: FAMILY MEDICINE | Facility: OTHER | Age: 45
End: 2020-05-11

## 2020-05-11 NOTE — PROGRESS NOTES
"Date: 2020 10:54:00  Clinician: Richie Rhodes  Clinician NPI: [id number]  Patient: Angelia Stallings  Patient : 1975  Patient Address: [address] [place] [address]  Patient Phone: [phone]  Visit Protocol: URI  Patient Summary:  Angelia is a 44 year old ( : 1975 ) female who initiated a Visit for COVID-19 (Coronavirus) evaluation and screening. When asked the question \"Please sign me up to receive news, health information and promotions from [employer].\", Angelia responded \"No\".    Angelia states her symptoms started suddenly 1 month or more ago. After her symptoms started, they improved and then got worse again.   Her symptoms consist of myalgia, facial pain or pressure, a cough, nasal congestion, ear pain, a headache, and malaise. She is experiencing mild difficulty breathing with activities but can speak normally in full sentences. Angelia also feels feverish.   Symptom details     Nasal secretions: The color of her mucus is white.    Cough: Angelia coughs a few times an hour and her cough is not more bothersome at night. Phlegm comes into her throat when she coughs. She does not believe her cough is caused by post-nasal drip. The color of the phlegm is white.     Temperature: Her current temperature is 99.3 degrees Fahrenheit.     Facial pain or pressure: She has not had the facial pain or pressure for 12 weeks or more. The facial pain or pressure feels worse when bending over or leaning forward.     Headache: Angelia has not had the headache for 12 weeks or more. She states the headache is severe (7-9 on a 10 point pain scale).      Angelia denies having rhinitis, sore throat, vomiting, nausea, teeth pain, ageusia, anosmia, diarrhea, wheezing, enlarged lymph nodes, and chills. She also denies having a sinus infection within the past year and having recent facial or sinus surgery in the past 60 days.   Precipitating events  She has not recently been exposed to " someone with influenza. Angelia has not been in close contact with any high risk individuals.   Pertinent COVID-19 (Coronavirus) information  Angelia does not work or volunteer as healthcare worker or a  and does not work or volunteer in a healthcare facility.   She does not live with a healthcare worker.   Angelia has not had a close contact with a laboratory-confirmed COVID-19 patient within 14 days of symptom onset. She also has not had a close contact with a suspected COVID-19 patient within 14 days of symptom onset.   Pertinent medical history  Angelia has taken an antibiotic medication in the past month. Antibiotic details as reported by the patient (free text): Azithromycin 250 mg 6 tablet, taken two prescriptions over the past month.  The last dose taken on Thursday, 5/7/2020.  I was prescribed the medication because I've had an ongoing cough with chest pain, upper back pain, occasional wheezing, and feeling as though something is stuck in my esophagus or chest, but food is still going down as well as liquids.   Angelia does not get yeast infections when she takes antibiotics.   Angelia needs a return to work/school note.   Weight: 182 lbs   Angelia does not smoke or use smokeless tobacco.   She denies pregnancy and denies breastfeeding. She does not menstruate.   Additional information as reported by the patient (free text): I've had 3 over-the-phone visits with [facility] regarding the symptoms referenced in this e-visit.  They did recommend that I be seen for testing of COVID.  I've had symptoms of a cough for 6 weeks, when I'm on antibiotic I seem to start to feel a bit better and then go backwards.  The last dose of Azithromycin was last Thurs.  I had terrible neck and head pain starting last Wed, then had a fever of 100 Sat &amp; Sun. and was tired.  Now feeling a little bit better but now cough is back.   Weight: 182 lbs    MEDICATIONS: tizanidine oral, diazepam oral,  oxycodone-acetaminophen oral, Tylenol Extra Strength oral, Advil oral, ALLERGIES: Penicillins, Sulfa (Sulfonamide Antibiotics)  Clinician Response:  Dear Angelia,   Dear Angelia  Your symptoms show that you may have coronavirus (COVID-19). This illness can cause fever, cough and trouble breathing. Many people get a mild case and get better on their own. Some people can get very sick.   Will I be tested for COVID-19?  Because we have limited testing supplies, we do not test everyone who is at low risk. We are testing if:    You are very ill. For example, you're on chemotherapy, dialysis or home hospice care. (Contact your specialty clinic or program.)   You live in a nursing home or other long-term care facility. (Talk to your nurse manager or medical director.)   You're a health care worker. ([facility] employees contact our employee health office for testing.)   We are performing limited curbside testing for healthcare/first responders and people with medical problems that put them at increased risk. It does not appear by the [facility] information you submitted that you meet any of these criteria. If there are medical problems that we did not know about, please repeat an [facility] visit and let us know what medical conditions you have.   While you don't qualify for testing at [facility], you may find testing at another site. To look for a testing location, go to https://[place].gov/covid19/for-[place]/if-sick/testing-locations/index.jsp.    How can I protect others?  Without a test, we can't know for sure that you have COVID-19. For safety, it's very important to follow these rules.  First, stay home and away from others (self-isolate) until:   You've had no fever---and no medicine that reduces fever---for 3 full days (72 hours). And...    Your other symptoms have gotten better. For example, your cough or breathing has improved. And...   At least 10 days have passed since your symptoms started.   During  this time:   Don't go to work, school or anywhere else.    Stay away from others in your home. No hugging, kissing or shaking hands.   Don't let anyone visit.   Cover your mouth and nose with a mask, tissue or wash cloth to avoid spreading germs.   Wash your hands and face often. Use soap and water.   How can I take care of myself?   1.Take Tylenol (acetaminophen) for fever or pain. If you have liver or kidney problems, ask your family doctor if it's okay to take Tylenol.        Adults can take either:    650 mg (two 325 mg pills) every 4 to 6 hours, or...   1,000 mg (two 500 mg pills) every 8 hours as needed.    Note: Don't take more than 3,000 mg in one day.   For children, check the Tylenol bottle for the right dose. The dose is based on the child's age or weight.   2.If you have other health problems (like cancer, heart failure, an organ transplant or severe kidney disease): Call your specialty clinic if you don't feel better in the next 2 days.       3.Know when to call 911: If your breathing is so bad that it keeps you from doing normal activities, call 911 or go to the emergency room. Tell them that you've been staying home and may have COVID-19.   Where can I get more information?  To learn more about COVID-19 and how to care for yourself at home, please visit the CDC website at https://www.cdc.gov/coronavirus/2019-ncov/about/steps-when-sick.html.  For more about your care at [facility], please visit [email or web address]   If you are interested in becoming part of a [facility] clinic trial related to COVID19 please go to [email or web address] for information, if you qualify.     Diagnosis: Acute upper respiratory infection, unspecified  Diagnosis ICD: J06.9

## 2020-05-12 ENCOUNTER — OFFICE VISIT - HEALTHEAST (OUTPATIENT)
Dept: FAMILY MEDICINE | Facility: CLINIC | Age: 45
End: 2020-05-12

## 2020-05-12 ENCOUNTER — NURSE TRIAGE (OUTPATIENT)
Dept: NURSING | Facility: CLINIC | Age: 45
End: 2020-05-12

## 2020-05-12 DIAGNOSIS — R50.9 FEVER, UNSPECIFIED FEVER CAUSE: ICD-10-CM

## 2020-05-12 DIAGNOSIS — R05.8 DRY COUGH: ICD-10-CM

## 2020-05-12 DIAGNOSIS — R09.A2 GLOBUS SENSATION: ICD-10-CM

## 2020-05-12 NOTE — TELEPHONE ENCOUNTER
Has had 2 courses of zithromax/ cough X 6 weeks/ she thought she was getting better but still coughing/ does cough up phlegm/no blood/ not colored/ also has headache and feels some mild chest discomfort but she thinks it is epigastric/ also headache/ says [facility] did not recommend testing but [facility] staff did/ sent for a phone visit from her dr for today. Temp 100  As of 2 days ago. Alex Tate RN [phone]    Additional Information    Negative: Bluish (or gray) lips or face    Negative: Severe difficulty breathing (e.g., struggling for each breath, speaks in single words)    Negative: Rapid onset of cough and has hives    Negative: Coughing started suddenly after medicine, an allergic food or bee sting    Negative: Difficulty breathing after exposure to flames, smoke, or fumes    Negative: Sounds like a life-threatening emergency to the triager    Negative: Previous asthma attacks and this feels like asthma attack    Negative: Chest pain present when not coughing    Negative: Difficulty breathing    Negative: Passed out (i.e., fainted, collapsed and was not responding)    Negative: Patient sounds very sick or weak to the triager    Negative: Coughed up > 1 tablespoon (15 ml) blood (Exception: blood-tinged sputum)    Negative: Fever > 103 F (39.4 C)    Negative: Fever > 101 F (38.3 C) and over 60 years of age    Negative: Fever > 100.0 F (37.8 C) and has diabetes mellitus or a weak immune system (e.g., HIV positive, cancer chemotherapy, organ transplant, splenectomy, chronic steroids)    Negative: Fever > 100.0 F (37.8 C) and bedridden (e.g., nursing home patient, stroke, chronic illness, recovering from surgery)    Negative: Increasing ankle swelling    Negative: Wheezing is present    Negative: SEVERE coughing spells (e.g., whooping sound after coughing, vomiting after coughing)    Negative: Coughing up cari-colored (reddish-brown) or blood-tinged sputum    Negative: Fever present > 3 days (72  hours)    Negative: Fever returns after gone for over 24 hours and symptoms worse or not improved    Negative: Using nasal washes and pain medicine > 24 hours and sinus pain persists    Negative: Known COPD or other severe lung disease (i.e., bronchiectasis, cystic fibrosis, lung surgery) and worsening symptoms (i.e., increased sputum purulence or amount, increased breathing difficulty)    Negative: Continuous (nonstop) coughing interferes with work or school and no improvement using cough treatment per Care Advice    Patient wants to be seen    Protocols used: COUGH-A-OH

## 2020-05-13 ENCOUNTER — OFFICE VISIT - HEALTHEAST (OUTPATIENT)
Dept: FAMILY MEDICINE | Facility: CLINIC | Age: 45
End: 2020-05-13

## 2020-05-13 ENCOUNTER — COMMUNICATION - HEALTHEAST (OUTPATIENT)
Dept: SURGERY | Facility: CLINIC | Age: 45
End: 2020-05-13

## 2020-05-13 DIAGNOSIS — R05.3 CHRONIC COUGH: ICD-10-CM

## 2020-05-13 DIAGNOSIS — R13.10 DYSPHAGIA, UNSPECIFIED TYPE: ICD-10-CM

## 2020-05-13 DIAGNOSIS — J32.9 CHRONIC SINUSITIS, UNSPECIFIED LOCATION: ICD-10-CM

## 2020-05-13 DIAGNOSIS — J30.2 SEASONAL ALLERGIC RHINITIS, UNSPECIFIED TRIGGER: ICD-10-CM

## 2020-05-14 ENCOUNTER — AMBULATORY - HEALTHEAST (OUTPATIENT)
Dept: SURGERY | Facility: CLINIC | Age: 45
End: 2020-05-14

## 2020-05-14 ENCOUNTER — COMMUNICATION - HEALTHEAST (OUTPATIENT)
Dept: SCHEDULING | Facility: CLINIC | Age: 45
End: 2020-05-14

## 2020-05-14 DIAGNOSIS — R13.10 DYSPHAGIA, UNSPECIFIED TYPE: ICD-10-CM

## 2020-06-01 ENCOUNTER — HOSPITAL ENCOUNTER (OUTPATIENT)
Dept: RADIOLOGY | Facility: CLINIC | Age: 45
Discharge: HOME | End: 2020-06-01
Attending: SURGERY

## 2020-06-01 DIAGNOSIS — R13.10 DYSPHAGIA, UNSPECIFIED TYPE: ICD-10-CM

## 2020-06-04 ENCOUNTER — COMMUNICATION - HEALTHEAST (OUTPATIENT)
Dept: FAMILY MEDICINE | Facility: CLINIC | Age: 45
End: 2020-06-04

## 2020-06-04 DIAGNOSIS — Z98.890 STATUS POST LAPAROSCOPIC NISSEN FUNDOPLICATION: ICD-10-CM

## 2020-06-04 DIAGNOSIS — K21.9 HIATAL HERNIA WITH GERD: ICD-10-CM

## 2020-06-04 DIAGNOSIS — J30.9 ALLERGIC RHINITIS, UNSPECIFIED SEASONALITY, UNSPECIFIED TRIGGER: ICD-10-CM

## 2020-06-04 DIAGNOSIS — K44.9 HIATAL HERNIA WITH GERD: ICD-10-CM

## 2020-06-04 DIAGNOSIS — R05.3 CHRONIC COUGH: ICD-10-CM

## 2020-06-08 ENCOUNTER — OFFICE VISIT - HEALTHEAST (OUTPATIENT)
Dept: PULMONOLOGY | Facility: OTHER | Age: 45
End: 2020-06-08

## 2020-06-08 DIAGNOSIS — R05.3 CHRONIC COUGH: ICD-10-CM

## 2020-06-08 RX ORDER — CETIRIZINE HYDROCHLORIDE 10 MG/1
10 TABLET ORAL DAILY
Status: SHIPPED | COMMUNITY
Start: 2020-06-08 | End: 2021-10-12

## 2020-06-08 ASSESSMENT — MIFFLIN-ST. JEOR: SCORE: 1504.97

## 2020-06-10 ENCOUNTER — AMBULATORY - HEALTHEAST (OUTPATIENT)
Dept: FAMILY MEDICINE | Facility: CLINIC | Age: 45
End: 2020-06-10

## 2020-06-10 DIAGNOSIS — R05.3 CHRONIC COUGH: ICD-10-CM

## 2020-06-12 ENCOUNTER — COMMUNICATION - HEALTHEAST (OUTPATIENT)
Dept: FAMILY MEDICINE | Facility: CLINIC | Age: 45
End: 2020-06-12

## 2020-07-01 ENCOUNTER — HOSPITAL ENCOUNTER (OUTPATIENT)
Dept: MAMMOGRAPHY | Facility: CLINIC | Age: 45
Discharge: HOME | End: 2020-07-01
Attending: FAMILY MEDICINE

## 2020-07-01 DIAGNOSIS — Z12.31 VISIT FOR SCREENING MAMMOGRAM: ICD-10-CM

## 2020-10-27 ENCOUNTER — OFFICE VISIT - HEALTHEAST (OUTPATIENT)
Dept: FAMILY MEDICINE | Facility: CLINIC | Age: 45
End: 2020-10-27

## 2020-10-27 DIAGNOSIS — F41.1 ANXIETY STATE: ICD-10-CM

## 2020-10-27 DIAGNOSIS — R10.2 PELVIC PAIN IN FEMALE: ICD-10-CM

## 2020-10-27 DIAGNOSIS — G43.009 MIGRAINE WITHOUT AURA AND WITHOUT STATUS MIGRAINOSUS, NOT INTRACTABLE: ICD-10-CM

## 2020-10-27 LAB
ALBUMIN UR-MCNC: NEGATIVE MG/DL
APPEARANCE UR: CLEAR
BASOPHILS # BLD AUTO: 0 THOU/UL (ref 0–0.2)
BASOPHILS NFR BLD AUTO: 0 % (ref 0–2)
BILIRUB UR QL STRIP: NEGATIVE
COLOR UR AUTO: YELLOW
EOSINOPHIL # BLD AUTO: 0.2 THOU/UL (ref 0–0.4)
EOSINOPHIL NFR BLD AUTO: 2 % (ref 0–6)
ERYTHROCYTE [DISTWIDTH] IN BLOOD BY AUTOMATED COUNT: 12.4 % (ref 11–14.5)
ERYTHROCYTE [SEDIMENTATION RATE] IN BLOOD BY WESTERGREN METHOD: 9 MM/HR (ref 0–20)
GLUCOSE UR STRIP-MCNC: NEGATIVE MG/DL
HCT VFR BLD AUTO: 37.3 % (ref 35–47)
HGB BLD-MCNC: 12.6 G/DL (ref 12–16)
HGB UR QL STRIP: ABNORMAL
KETONES UR STRIP-MCNC: NEGATIVE MG/DL
LEUKOCYTE ESTERASE UR QL STRIP: NEGATIVE
LYMPHOCYTES # BLD AUTO: 2.5 THOU/UL (ref 0.8–4.4)
LYMPHOCYTES NFR BLD AUTO: 32 % (ref 20–40)
MCH RBC QN AUTO: 28.1 PG (ref 27–34)
MCHC RBC AUTO-ENTMCNC: 33.8 G/DL (ref 32–36)
MCV RBC AUTO: 83 FL (ref 80–100)
MONOCYTES # BLD AUTO: 0.5 THOU/UL (ref 0–0.9)
MONOCYTES NFR BLD AUTO: 6 % (ref 2–10)
NEUTROPHILS # BLD AUTO: 4.7 THOU/UL (ref 2–7.7)
NEUTROPHILS NFR BLD AUTO: 60 % (ref 50–70)
NITRATE UR QL: NEGATIVE
PH UR STRIP: 5.5 [PH] (ref 5–8)
PLATELET # BLD AUTO: 303 THOU/UL (ref 140–440)
PMV BLD AUTO: 7.3 FL (ref 7–10)
RBC # BLD AUTO: 4.48 MILL/UL (ref 3.8–5.4)
SP GR UR STRIP: <=1.005 (ref 1–1.03)
UROBILINOGEN UR STRIP-ACNC: ABNORMAL
WBC: 7.9 THOU/UL (ref 4–11)

## 2020-10-27 ASSESSMENT — ANXIETY QUESTIONNAIRES
6. BECOMING EASILY ANNOYED OR IRRITABLE: NOT AT ALL
1. FEELING NERVOUS, ANXIOUS, OR ON EDGE: MORE THAN HALF THE DAYS
7. FEELING AFRAID AS IF SOMETHING AWFUL MIGHT HAPPEN: SEVERAL DAYS
IF YOU CHECKED OFF ANY PROBLEMS ON THIS QUESTIONNAIRE, HOW DIFFICULT HAVE THESE PROBLEMS MADE IT FOR YOU TO DO YOUR WORK, TAKE CARE OF THINGS AT HOME, OR GET ALONG WITH OTHER PEOPLE: SOMEWHAT DIFFICULT
3. WORRYING TOO MUCH ABOUT DIFFERENT THINGS: MORE THAN HALF THE DAYS
2. NOT BEING ABLE TO STOP OR CONTROL WORRYING: MORE THAN HALF THE DAYS
5. BEING SO RESTLESS THAT IT IS HARD TO SIT STILL: NOT AT ALL
GAD7 TOTAL SCORE: 7
4. TROUBLE RELAXING: NOT AT ALL

## 2020-10-27 ASSESSMENT — PATIENT HEALTH QUESTIONNAIRE - PHQ9: SUM OF ALL RESPONSES TO PHQ QUESTIONS 1-9: 12

## 2020-10-27 ASSESSMENT — MIFFLIN-ST. JEOR: SCORE: 1537.29

## 2020-10-28 ENCOUNTER — HOSPITAL ENCOUNTER (OUTPATIENT)
Dept: CT IMAGING | Facility: CLINIC | Age: 45
Discharge: HOME | End: 2020-10-28
Attending: FAMILY MEDICINE

## 2020-10-28 DIAGNOSIS — R10.2 PELVIC PAIN IN FEMALE: ICD-10-CM

## 2020-10-28 LAB
BACTERIA SPEC CULT: NO GROWTH
C REACTIVE PROTEIN LHE: 0.1 MG/DL (ref 0–0.8)

## 2020-11-09 ENCOUNTER — VIRTUAL VISIT (OUTPATIENT)
Dept: FAMILY MEDICINE | Facility: OTHER | Age: 45
End: 2020-11-09
Payer: COMMERCIAL

## 2020-11-09 PROCEDURE — 99421 OL DIG E/M SVC 5-10 MIN: CPT | Performed by: PHYSICIAN ASSISTANT

## 2020-11-09 NOTE — PROGRESS NOTES
"Date: 2020 09:25:16  Clinician: Joseph Garcia  Clinician NPI: [id number]  Patient: Angelia Stallings  Patient : 1975  Patient Address: [address] [place] [address]  Patient Phone: [phone]  Visit Protocol: URI  Patient Summary:  Angelia is a 45 year old ( : 1975 ) female who initiated a [facility] Visit for COVID-19 (Coronavirus) evaluation and screening. When asked the question \"Please sign me up to receive news, health information and promotions from [facility].\", Angelia responded \"No\".    Angelia states her symptoms started suddenly 3-4 days ago.   Her symptoms consist of a sore throat and a headache.   Symptom details     Sore throat: Angelia reports having mild throat pain (1-3 on a 10 point pain scale), does not have exudate on her tonsils, and can swallow liquids. The lymph nodes in her neck are not enlarged. A rash has not appeared on the skin since the sore throat started.     Headache: She states the headache is mild (1-3 on a 10 point pain scale).      Angelia denies having vomiting, rhinitis, facial pain or pressure, myalgias, chills, malaise, teeth pain, ageusia, diarrhea, ear pain, wheezing, fever, enlarged lymph nodes, cough, nasal congestion, nausea, and anosmia. She also denies taking antibiotic medication in the past month, having recent facial or sinus surgery in the past 60 days, and double sickening (worsening symptoms after initial improvement). She is not experiencing dyspnea.   Precipitating events  Within the past week, Angelia has not been exposed to someone with strep throat.   Pertinent COVID-19 (Coronavirus) information  Angelia does not work or volunteer as healthcare worker or a . In the past 14 days, Angelia has not worked or volunteered at a healthcare facility or group living setting.   In the past 14 days, she also has not lived in a congregate living setting.   Angelia has had a close contact with a " laboratory-confirmed COVID-19 patient within 14 days of symptom onset. She was exposed at her work. Date Angelia was exposed to the laboratory-confirmed COVID-19 patient: 11/6/2020   Additional information about contact with COVID-19 (Coronavirus) patient as reported by the patient (free text): Three co-workers have tested positive for COVID. We wear masks but could've had close contact throughout the day    Since December 2019, Angelia has been tested for COVID-19 and has had upper respiratory infection (URI) or influenza-like illness.      Result of COVID-19 test: Negative     Date of her COVID-19 test: 06/15/2020     Date(s) of previous URI or influenza-like illness (free-text): April and May 2020     Symptoms Angelia experienced during previous URI or influenza-like illness as reported by the patient (free-text): Cough, trouble breathing, fatigue        Pertinent medical history  Angelia does not get yeast infections when she takes antibiotics.   Angelia does not need a return to work/school note.   Weight: 182 lbs   Angelia does not smoke or use smokeless tobacco.   She denies pregnancy and denies breastfeeding. She does not menstruate.   Weight: 182 lbs    MEDICATIONS: sertraline oral, oxycodone-acetaminophen oral, diazepam oral, Tylenol Extra Strength oral, Advil oral, tizanidine oral, ALLERGIES: Sulfa (Sulfonamide Antibiotics), Penicillins  Clinician Response:  Dear Angelia,   Your symptoms show that you may have coronavirus (COVID-19). This illness can cause fever, cough and trouble breathing. Many people get a mild case and get better on their own. Some people can get very sick.  What should I do?  We would like to test you for this virus.   1. Please call [phone] to schedule your visit. Explain that you were referred by [facility] to have a COVID-19 test. Be ready to share your [facility] visit ID number.  * If you need to schedule in [facility] please call [phone] or for [facility] employees  "please call [phone].  * If you need to schedule in the [place] area please call [phone]. [employer] employees call [phone].  The following will serve as your written order for this COVID Test, ordered by me, for the indication of suspected COVID [Z20.828]: The test will be ordered in [employer], our electronic health record, after you are scheduled. It will show as ordered and authorized by Gregory Rojas MD.  Order: COVID-19 (Coronavirus) PCR for SYMPTOMATIC testing from [facility].   2. When it's time for your COVID test:  Stay at least 6 feet away from others. (If someone will drive you to your test, stay in the backseat, as far away from the  as you can.)   Cover your mouth and nose with a mask, tissue or washcloth.  Go straight to the testing site. Don't make any stops on the way there or back.      3.Starting now: Stay home and away from others (self-isolate) until:   You've had no fever---and no medicine that reduces fever---for one full day (24 hours). And...   Your other symptoms have gotten better. For example, your cough or breathing has improved. And...   At least 10 days have passed since your symptoms started.       During this time, don't leave the house except for testing or medical care.   Stay in your own room, even for meals. Use your own bathroom if you can.   Stay away from others in your home. No hugging, kissing or shaking hands. No visitors.  Don't go to work, school or anywhere else.    Clean \"high touch\" surfaces often (doorknobs, counters, handles, etc.). Use a household cleaning spray or wipes. You'll find a full list of  on the EPA website: www.epa.gov/pesticide-registration/list-n-disinfectants-use-against-sars-cov-2.   Cover your mouth and nose with a mask, tissue or washcloth to avoid spreading germs.  Wash your hands and face often. Use soap and water.  Caregivers in these groups are at risk for severe illness due to COVID-19:  o People 65 years and older  o People who live " in a nursing home or long-term care facility  o People with chronic disease (lung, heart, cancer, diabetes, kidney, liver, immunologic)  o People who have a weakened immune system, including those who:   Are in cancer treatment  Take medicine that weakens the immune system, such as corticosteroids  Had a bone marrow or organ transplant  Have an immune deficiency  Have poorly controlled HIV or AIDS  Are obese (body mass index of 40 or higher)  Smoke regularly   o Caregivers should wear gloves while washing dishes, handling laundry and cleaning bedrooms and bathrooms.  o Use caution when washing and drying laundry: Don't shake dirty laundry, and use the warmest water setting that you can.  o For more tips, go to www.cdc.gov/coronavirus/2019-ncov/downloads/10Things.pdf.    4.Sign up for [employer]. We know it's scary to hear that you might have COVID-19. We want to track your symptoms to make sure you're okay over the next 2 weeks. Please look for an email from [employer]---this is a free, online program that we'll use to keep in touch. To sign up, follow the link in the email. Learn more at [email or web address]  How can I take care of myself?   Get lots of rest. Drink extra fluids (unless a doctor has told you not to).   Take Tylenol (acetaminophen) for fever or pain. If you have liver or kidney problems, ask your family doctor if it's okay to take Tylenol.   Adults can take either:    650 mg (two 325 mg pills) every 4 to 6 hours, or...   1,000 mg (two 500 mg pills) every 8 hours as needed.    Note: Don't take more than 3,000 mg in one day. Acetaminophen is found in many medicines (both prescribed and over-the-counter medicines). Read all labels to be sure you don't take too much.   For children, check the Tylenol bottle for the right dose. The dose is based on the child's age or weight.    If you have other health problems (like cancer, heart failure, an organ transplant or severe kidney disease):  Call your specialty clinic if you don't feel better in the next 2 days.       Know when to call 911. Emergency warning signs include:    Trouble breathing or shortness of breath Pain or pressure in the chest that doesn't go away Feeling confused like you haven't felt before, or not being able to wake up Bluish-colored lips or face.  Where can I get more information?   [facility] -- About COVID-19: [email or web address]   CDC -- What to Do If You're Sick: www.cdc.gov/coronavirus/2019-ncov/about/steps-when-sick.html   [facility] -- Ending Home Isolation: www.cdc.gov/coronavirus/2019-ncov/hcp/disposition-in-home-patients.html   [facility] -- Caring for Someone: www.cdc.gov/coronavirus/2019-ncov/if-you-are-sick/care-for-someone.html   [facility] -- Interim Guidance for Hospital Discharge to Home: www.[email or web address].[email or web address]./diseases/coronavirus/hcp/hospdischarge.pdf   [facility] clinical trials (COVID-19 research studies): [email or web address]-trials    Below are the COVID-19 hotlines at the [facility] ([facility]). Interpreters are available.    For health questions: Call 092-147-9357 or 1-802.642.4667 (7 a.m. to 7 p.m.) For questions about schools and childcare: Call 287-100-0851 or 1-183.679.5017 (7 a.m. to 7 p.m.)    Diagnosis: Contact with and (suspected) exposure to other viral communicable diseases  Diagnosis ICD: Z20.828  Additional Clinician Notes:   If your symptoms are not improving or worsen, please go to one of our urgent care locations for evaluation and possible lab work.

## 2020-11-12 ENCOUNTER — AMBULATORY - HEALTHEAST (OUTPATIENT)
Dept: FAMILY MEDICINE | Facility: CLINIC | Age: 45
End: 2020-11-12

## 2020-11-12 ENCOUNTER — COMMUNICATION - HEALTHEAST (OUTPATIENT)
Dept: FAMILY MEDICINE | Facility: CLINIC | Age: 45
End: 2020-11-12

## 2020-11-12 DIAGNOSIS — F41.1 ANXIETY STATE: ICD-10-CM

## 2020-11-12 DIAGNOSIS — Z20.822 SUSPECTED COVID-19 VIRUS INFECTION: ICD-10-CM

## 2020-11-14 ENCOUNTER — COMMUNICATION - HEALTHEAST (OUTPATIENT)
Dept: SCHEDULING | Facility: CLINIC | Age: 45
End: 2020-11-14

## 2021-01-23 ENCOUNTER — COMMUNICATION - HEALTHEAST (OUTPATIENT)
Dept: SCHEDULING | Facility: CLINIC | Age: 46
End: 2021-01-23

## 2021-02-24 LAB
HPV ABSTRACT: NORMAL
PAP-ABSTRACT: NORMAL

## 2021-03-02 LAB
CHOLESTEROL (EXTERNAL): 234 MG/DL
GLUCOSE (EXTERNAL): 79 MG/DL (ref 50–100)
HDLC SERPL-MCNC: 52 MG/DL
LDL CHOLESTEROL (EXTERNAL): 158 MG/DL
TRIGLYCERIDES (EXTERNAL): 118 MG/DL
TSH SERPL-ACNC: 1.86 UIU/ML (ref 0.36–3.74)

## 2021-03-23 ENCOUNTER — COMMUNICATION - HEALTHEAST (OUTPATIENT)
Dept: SCHEDULING | Facility: CLINIC | Age: 46
End: 2021-03-23

## 2021-03-23 ENCOUNTER — OFFICE VISIT - HEALTHEAST (OUTPATIENT)
Dept: FAMILY MEDICINE | Facility: CLINIC | Age: 46
End: 2021-03-23

## 2021-03-23 DIAGNOSIS — H69.92 DYSFUNCTION OF LEFT EUSTACHIAN TUBE: ICD-10-CM

## 2021-03-23 DIAGNOSIS — G43.109 MIGRAINE WITH AURA AND WITHOUT STATUS MIGRAINOSUS, NOT INTRACTABLE: ICD-10-CM

## 2021-03-29 ENCOUNTER — OFFICE VISIT - HEALTHEAST (OUTPATIENT)
Dept: FAMILY MEDICINE | Facility: CLINIC | Age: 46
End: 2021-03-29

## 2021-03-29 DIAGNOSIS — J01.00 ACUTE NON-RECURRENT MAXILLARY SINUSITIS: ICD-10-CM

## 2021-03-29 DIAGNOSIS — G43.009 MIGRAINE WITHOUT AURA AND WITHOUT STATUS MIGRAINOSUS, NOT INTRACTABLE: ICD-10-CM

## 2021-04-12 ENCOUNTER — AMBULATORY - HEALTHEAST (OUTPATIENT)
Dept: FAMILY MEDICINE | Facility: CLINIC | Age: 46
End: 2021-04-12

## 2021-04-12 ENCOUNTER — OFFICE VISIT - HEALTHEAST (OUTPATIENT)
Dept: FAMILY MEDICINE | Facility: CLINIC | Age: 46
End: 2021-04-12

## 2021-04-12 DIAGNOSIS — Z20.822 SUSPECTED COVID-19 VIRUS INFECTION: ICD-10-CM

## 2021-04-13 ENCOUNTER — COMMUNICATION - HEALTHEAST (OUTPATIENT)
Dept: SCHEDULING | Facility: CLINIC | Age: 46
End: 2021-04-13

## 2021-04-13 LAB
SARS-COV-2 PCR COMMENT: NORMAL
SARS-COV-2 RNA SPEC QL NAA+PROBE: NEGATIVE
SARS-COV-2 VIRUS SPECIMEN SOURCE: NORMAL

## 2021-04-26 ENCOUNTER — OFFICE VISIT - HEALTHEAST (OUTPATIENT)
Dept: FAMILY MEDICINE | Facility: CLINIC | Age: 46
End: 2021-04-26

## 2021-04-26 ENCOUNTER — AMBULATORY - HEALTHEAST (OUTPATIENT)
Dept: FAMILY MEDICINE | Facility: CLINIC | Age: 46
End: 2021-04-26

## 2021-04-26 DIAGNOSIS — Z20.822 SUSPECTED COVID-19 VIRUS INFECTION: ICD-10-CM

## 2021-04-28 ENCOUNTER — COMMUNICATION - HEALTHEAST (OUTPATIENT)
Dept: SCHEDULING | Facility: CLINIC | Age: 46
End: 2021-04-28

## 2021-05-07 ENCOUNTER — COMMUNICATION - HEALTHEAST (OUTPATIENT)
Dept: SCHEDULING | Facility: CLINIC | Age: 46
End: 2021-05-07

## 2021-05-07 ENCOUNTER — OFFICE VISIT - HEALTHEAST (OUTPATIENT)
Dept: FAMILY MEDICINE | Facility: CLINIC | Age: 46
End: 2021-05-07

## 2021-05-07 DIAGNOSIS — R05.9 COUGH: ICD-10-CM

## 2021-05-07 DIAGNOSIS — J40 BRONCHITIS: ICD-10-CM

## 2021-05-07 RX ORDER — OXYCODONE AND ACETAMINOPHEN 5; 325 MG/1; MG/1
1-2 TABLET ORAL 4 TIMES DAILY PRN
Status: SHIPPED | COMMUNITY
Start: 2021-03-31 | End: 2021-10-12

## 2021-05-07 RX ORDER — LORATADINE, PSEUDOEPHEDRINE SULFATE 5; 120 MG/1; MG/1
1 TABLET, FILM COATED, EXTENDED RELEASE ORAL EVERY 12 HOURS
Status: SHIPPED | COMMUNITY
Start: 2021-05-07 | End: 2021-10-12

## 2021-05-07 RX ORDER — BENZONATATE 100 MG/1
100 CAPSULE ORAL 3 TIMES DAILY PRN
Qty: 20 CAPSULE | Refills: 0 | Status: SHIPPED | OUTPATIENT
Start: 2021-05-07 | End: 2021-10-12

## 2021-05-25 ENCOUNTER — RECORDS - HEALTHEAST (OUTPATIENT)
Dept: ADMINISTRATIVE | Facility: CLINIC | Age: 46
End: 2021-05-25

## 2021-05-26 ENCOUNTER — RECORDS - HEALTHEAST (OUTPATIENT)
Dept: ADMINISTRATIVE | Facility: CLINIC | Age: 46
End: 2021-05-26

## 2021-05-26 ASSESSMENT — PATIENT HEALTH QUESTIONNAIRE - PHQ9: SUM OF ALL RESPONSES TO PHQ QUESTIONS 1-9: 2

## 2021-05-27 ENCOUNTER — OFFICE VISIT - HEALTHEAST (OUTPATIENT)
Dept: FAMILY MEDICINE | Facility: CLINIC | Age: 46
End: 2021-05-27

## 2021-05-27 ENCOUNTER — COMMUNICATION - HEALTHEAST (OUTPATIENT)
Dept: SCHEDULING | Facility: CLINIC | Age: 46
End: 2021-05-27

## 2021-05-27 ENCOUNTER — RECORDS - HEALTHEAST (OUTPATIENT)
Dept: ADMINISTRATIVE | Facility: CLINIC | Age: 46
End: 2021-05-27

## 2021-05-27 DIAGNOSIS — M25.572 PAIN IN JOINT INVOLVING ANKLE AND FOOT, LEFT: ICD-10-CM

## 2021-05-27 DIAGNOSIS — M54.16 LUMBAR BACK PAIN WITH RADICULOPATHY AFFECTING LEFT LOWER EXTREMITY: ICD-10-CM

## 2021-05-27 LAB
C REACTIVE PROTEIN LHE: 0.5 MG/DL (ref 0–0.8)
ERYTHROCYTE [SEDIMENTATION RATE] IN BLOOD BY WESTERGREN METHOD: 15 MM/HR (ref 0–20)
RHEUMATOID FACT SERPL-ACNC: <15 IU/ML (ref 0–30)

## 2021-05-27 RX ORDER — CYCLOBENZAPRINE HCL 5 MG
5 TABLET ORAL 3 TIMES DAILY PRN
Qty: 30 TABLET | Refills: 0 | Status: SHIPPED | OUTPATIENT
Start: 2021-05-27 | End: 2022-03-03 | Stop reason: SINTOL

## 2021-05-27 RX ORDER — ACETAMINOPHEN 500 MG
1000 TABLET ORAL EVERY 6 HOURS PRN
Status: SHIPPED | COMMUNITY
Start: 2021-05-27 | End: 2021-10-12

## 2021-05-27 ASSESSMENT — PATIENT HEALTH QUESTIONNAIRE - PHQ9: SUM OF ALL RESPONSES TO PHQ QUESTIONS 1-9: 12

## 2021-05-27 NOTE — TELEPHONE ENCOUNTER
Medication Question or Clarification  Who is calling: Pharmacy: [facility]  What medication are you calling about? (include dose and sig)    Disp Refills Start End    nabumetone (RELAFEN) 500 MG tablet 100 tablet 1 3/28/2019     Sig - Route: Take 1-2 tablets (500-1,000 mg total) by mouth 2 (two) times a day as needed for pain. - Oral    Sent to pharmacy as: nabumetone (RELAFEN) 500 MG tablet    E-Prescribing Status: Receipt confirmed by pharmacy (3/28/2019  3:25 PM CDT)        Who prescribed the medication?: Isabela Bell MD   What is your question/concern?: Fax stated this Rx is on back order and they are requesting an alternative.  Pharmacy: [facility]  Okay to leave a detailed message?: No  Site CMT - Please call the pharmacy to obtain any additional needed information.

## 2021-05-27 NOTE — PROGRESS NOTES
"Assessment & Plan:       1. Acute thoracic myofascial strain, subsequent encounter  diazePAM (VALIUM) 5 MG tablet    nabumetone (RELAFEN) 500 MG tablet   2. Pelvic pain in female  Thyroid Stimulating Hormone (TSH)    T4, Free    Follicle Stimulating Hormone (FSH)    Estradiol   3. Breast tenderness in female  Follicle Stimulating Hormone (FSH)    Estradiol   4. Intractable migraine without aura and without status migrainosus  diazePAM (VALIUM) 5 MG tablet         We reviewed the likely/potential etiology(ies) for her back symptoms and we will try relafen as needed and I renewed the diazepam to be taken as needed. She is aware of the potential for sedation. We reviewed activity as tolerated and use of heat and/or ice tid-qid for comfort. She will call or return to clinic with any ongoing or worsening symptoms. As far as her other symptoms, we reviewed potential etiologies and we will check labs as noted. She will otherwise will f/u in 6-12 mos for routine evaluation.          Subjective:         Angelia Stallings is a 43 y.o. female who presents for evaluation of mid-upper back pain. Symptoms have been present for a few weeks and are unchanged. The patient has had recurrent self limited episodes of low back pain in the past and some intermittent neck pain. Onset was related to / precipitated by lifting a heavy object. She had gotten locked out and picked up her dog to help warm her up. The pain is located in the left mid to upper back and does not radiate. The pain is described as aching and burning and occurs all day and keeps her awake. Symptoms are exacerbated by standing and walking for more than a few minutes. Symptoms are improved by heat and rest. She has also tried chiropractic manipulation and muscle relaxants which provided little symptom relief. She has no other symptoms associated with the back pain. The patient has no \"red flag\" history indicative of complicated back pain.       She also reports concerns " "about gaining weight, breast tenderness, some pain in area of ovaries. She is S/P ablation 2 yrs ago so is not getting periods. She denies hot flushes or night sweats recently. No hair dryness or loss recently.         Her mood symptoms have been up/down. She stopped sertraline few mos ago. She denies anxiety. Her migraines have been stable. She has tried amovig x 2 and had flushing with that.     The following portions of the patient's history were reviewed and updated as appropriate: allergies, current medications, past family history, past medical history, past social history, past surgical history and problem list.    Review of Systems  A 12 point comprehensive review of systems was negative except as noted.      Objective:      Vitals:    03/28/19 1451   BP: 138/74   Pulse: 64   Weight: 176 lb 7 oz (80 kg)   Height: 5' 8\" (1.727 m)     GEN: Alert and oriented, NAD, well nourished  SKIN:  Normal skin turgor, no lesions/rashes   HEENT: NC/AT, moist mucous membranes, no rhinorrhea.    NECK: Normal.  No adenopathy or thyromegaly.  CV: Regular rate and rhythm, no murmurs.   LUNGS: Clear to auscultation bilaterally.    ABDOMEN: Soft, non-tender, non-distended, no masses   BACK: Normal  EXTREMITY: No edema, cyanosis  NEURO: Grossly normal.              "

## 2021-05-27 NOTE — TELEPHONE ENCOUNTER
[facility] pharmacy does not Relafen in stock. Message left regarding this on pt's cell phone voicemail. She is welcome to transfer the Rx if she wishes to change pharmacies.     If she wants to stay with [facility] and would like an alternative Rx, she is to let us know this. H.DeGree, CMA

## 2021-05-28 ENCOUNTER — RECORDS - HEALTHEAST (OUTPATIENT)
Dept: ADMINISTRATIVE | Facility: CLINIC | Age: 46
End: 2021-05-28

## 2021-05-28 ASSESSMENT — ANXIETY QUESTIONNAIRES
GAD7 TOTAL SCORE: 7
GAD7 TOTAL SCORE: 2

## 2021-05-29 ENCOUNTER — RECORDS - HEALTHEAST (OUTPATIENT)
Dept: ADMINISTRATIVE | Facility: CLINIC | Age: 46
End: 2021-05-29

## 2021-05-30 VITALS — HEIGHT: 68 IN | WEIGHT: 175 LBS | BODY MASS INDEX: 26.52 KG/M2

## 2021-05-30 VITALS — HEIGHT: 68 IN | BODY MASS INDEX: 26.83 KG/M2 | WEIGHT: 177 LBS

## 2021-05-30 VITALS — HEIGHT: 68 IN | BODY MASS INDEX: 25.98 KG/M2 | WEIGHT: 171.44 LBS

## 2021-05-30 VITALS — BODY MASS INDEX: 26.67 KG/M2 | WEIGHT: 175.4 LBS

## 2021-05-30 VITALS — BODY MASS INDEX: 26.53 KG/M2 | WEIGHT: 174.5 LBS

## 2021-05-30 NOTE — PROGRESS NOTES
Chief Complaint   Patient presents with     Mouth Lesions     Sore on tongue and inside of mouth. Sores in the mouth have been about a week. Sores on tongue started on Friday.     Migraine     Started last Wednesday.      HPI: Patient presents today with complaints of oral lesions.  About 2 weeks ago she went to the OB/GYN and was diagnosed with bacterial vaginosis.  She was given an antibiotic (she cannot remember which one, but it sounds like it may be metronidazole) and then started to develop a white film and pain in her mouth.  She was given a prescription for an antifungal (once again unsure of what kind, but may be fluconazole) and that seemed to clear things up until a few days ago when symptoms returned again.  She now feels a little sensation along the back of her mouth that is uncomfortable.  There is a bad taste in her mouth.  She has some oral pain associated with eating now.  Patient has a history of significantly large tonsils and was evaluated by ENT who mentioned that she could be a candidate for tonsillectomy due to their size and potential for creating pockets of abscesses, but she has declined for now.  She wonders if she should get this done.    Patient also complains of a migraine present for the past week.  This is not new for her and she does have a appointment coming up with neurology next week.  It is causing tension in her neck.  She has tried ibuprofen and some Percocet, all of which failed to provide meaningful relief.  Cyclobenzaprine in the past has provided mild-moderate relief.  Tizanidine which she is on now does not.  Unfortunately she is not a good candidate for triptan therapy as she is status post MCA aneurysm clipping x2.  No numbness or tingling.      Patient has a history of major depressive disorder.  She notes that in the presence of this headache, her mood has started to sour.  She finds she is becoming more irritable and wonders if this may be related to her history of  depression.  She has been on sertraline in the past which she was able to successfully wean down on and wonders if she should go back on it.    PHQ-9 Total Score: 6 (7/2/2019 12:00 PM)  JONES 7 Total Score: 6 (7/2/2019 12:00 PM)    ROS:Review of Systems - History obtained from the patient  General ROS: negative  Psychological: Increased irritability  Ophthalmic ROS: negative  ENT ROS: positive for - mouth pain. White film in mouth. Bad taste  Allergy and Immunology ROS: negative  Hematological and Lymphatic ROS: negative  Respiratory ROS: negative  Musculoskeletal ROS: positive for - neck stiffness  Neurological ROS: positive for - headaches    SH: The Patient's  reports that she quit smoking about 10 years ago. She has never used smokeless tobacco. She reports that she drinks alcohol. She reports that she does not use drugs.      FH: The Patient's family history includes Breast cancer (age of onset: 90) in her paternal grandmother; Cancer in her maternal uncle; Depression in her brother; Heart disease in her father and paternal grandfather; Hyperlipidemia in her father; Hypertension in her father and paternal grandfather; No Medical Problems in her mother; Stroke in her father.     Meds:    Current Outpatient Medications on File Prior to Visit   Medication Sig Dispense Refill     multivitamin (ONE A DAY) per tablet Take 1 tablet by mouth daily. 90 tablet 3     diazePAM (VALIUM) 5 MG tablet Take 1 tablet (5 mg total) by mouth every 6 (six) hours as needed for anxiety. 30 tablet 1     ibuprofen (ADVIL,MOTRIN) 200 MG tablet Take 600 mg by mouth every 6 (six) hours as needed for pain.       nortriptyline (PAMELOR) 10 MG capsule Take 10 mg by mouth at bedtime.       tiZANidine (ZANAFLEX) 4 MG tablet START WITH 1/2 T HS.  INCREASE BY 1/2 T QHS UP TO 4 TS HS AS TOLERATED  2     [DISCONTINUED] nabumetone (RELAFEN) 500 MG tablet Take 1-2 tablets (500-1,000 mg total) by mouth 2 (two) times a day as needed for pain. 100 tablet  "1     No current facility-administered medications on file prior to visit.        O:  /86   Pulse 86   Temp 98.2  F (36.8  C) (Oral)   Ht 5' 8\" (1.727 m)   Wt 175 lb (79.4 kg)   SpO2 99%   BMI 26.61 kg/m      Physical Examination:   General appearance - alert, well appearing, and in no distress  Mental status - alert, oriented to person, place, and time  Eyes - pupils equal and reactive, extraocular eye movements intact  Ears - bilateral TM's and external ear canals normal  Mouth -thin white film on the tongue that is agreeable.  No overt large lesions or ulcerations noted.  Tonsils +3 without erythema or exudate.  Neck - supple, no significant adenopathy  Lymphatics - no palpable lymphadenopathy, no hepatosplenomegaly  Chest - clear to auscultation, no wheezes, rales or rhonchi, symmetric air entry  Heart - normal rate and regular rhythm, S1 and S2 normal, no murmurs noted  Neurological - alert, oriented, normal speech, no focal findings or movement disorder noted, neck supple without rigidity, cranial nerves II through XII intact, motor and sensory grossly normal bilaterally, normal muscle tone, no tremors, strength 5/5  Musculoskeletal -full range of motion in the neck.  Negative Brudzinski's sign.  Able to touch chin to chest.  Extremities - peripheral pulses normal, no pedal edema, no clubbing or cyanosis  Skin - normal coloration and turgor, no rashes, no suspicious skin lesions noted      A/P:     Problem List Items Addressed This Visit        ENT/CARD/PULM/ENDO Problems    Migraine without aura    Relevant Medications    cyclobenzaprine (FLEXERIL) 10 MG tablet       Other    Major depression single episode, in partial remission (H)      Other Visit Diagnoses     Thrush    -  Primary    Relevant Medications    nystatin (MYCOSTATIN) 100,000 unit/mL suspension            1. Thrush  Recurrence of disease after oral fluconazole.  Trial of nystatin mouthwash.    - nystatin (MYCOSTATIN) 100,000 unit/mL " suspension; Take 5 mL (500,000 Units total) by mouth 4 (four) times a day for 10 days.  Dispense: 200 mL; Refill: 0    2. Intractable migraine without aura and without status migrainosus  No signs of meningitis.  She had more relief with cyclobenzaprine and tizanidine.  Try this instead of the tizanidine.  Do not take at the same time as diazepam.  No alcohol or driving after taking    - cyclobenzaprine (FLEXERIL) 10 MG tablet; Take 0.5-1 tablets (5-10 mg total) by mouth 3 (three) times a day as needed for muscle spasms.  Dispense: 20 tablet; Refill: 0    3. Major depression single episode, in partial remission (H)  Her PHQ and lucina scores are not terrible.  Increased irritability may be secondary to frustration and fatigue surrounding her headache.  Informed her to follow-up with neurology and if she is still having irritability symptoms once the headaches get under control, let me know and then we can discuss restarting sertraline.        Damaso Tenorio, CNP

## 2021-05-30 NOTE — TELEPHONE ENCOUNTER
"Pt had clinic eval 7/2/19 for mouth lesions.  \"Symptoms got better with the nystatin mouthwash.\"  However \"accidentally spilled a bunch of the mouthwash into the sink.\"  \"Now having sore throat again with a filmy layer on tongue.\"    All these symptoms originated after completing antibiotic regimen for BV.  Pt agrees to clinical eval for these issues.  Warm transferred to a  for this purpose now.    Sarai Lester RN BSBA  [facility] RN Triage     Reason for Disposition    White patches that stick to tongue or inner cheek    Protocols used: MOUTH SYMPTOMS-A-AH      "
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
## 2021-05-30 NOTE — TELEPHONE ENCOUNTER
"RN Triage:   Patient is calling in stating that she has a a migraine for the last week. Was treated for thrush by OB/GYN 2 weeks ago. She still has bumps on the back of her throat and a creamy white film on tongue. Headache is similar to previous headaches. NO fever. She was on a probiotic but no longer taking. Patient wants to be seen today. She \"googled the symptoms and I am concerned about caner\". Patient warm transferred to scheduling.     Dennise Stern RN, BSN [facility] Triage Nurse      Reason for Disposition    Patient wants to be seen    White patches that stick to tongue or inner cheek    Protocols used: HEADACHE-A-OH, MOUTH SYMPTOMS-A-AH      "

## 2021-05-30 NOTE — PATIENT INSTRUCTIONS - HE
I have sent in a prescription for nystatin mouthwash to swish and spit 4 times a day.  Try to cover all the areas in your mouth that are affected.    I have also sent in a refill of the cyclobenzaprine.  You cannot use this with the tizanidine or diazepam.  No alcohol or driving after taking this.    Like we talked about, if you are interested in restarting the sertraline, let me know after your appointment with neurology or once the headaches get under control.  You can send me a my chart message or call me.      Thank you for coming in today!    If you receive a survey from [employer] about your experience today, it would be very helpful if you could fill it out to let us know what went well and what we can improve!    General Information:    Today you had your appointment with Damaso Tenorio NP    My hours are:    Monday : Out of clinic  Tuesday : 8:00AM - 5:00 PM  Wednesday: 8:00AM - 5:00 PM  Thursday: 8:00AM - 5:00 PM  Friday: 8:00AM - 5:00 PM    I am not in the office Mondays. Therefore non-urgent calls and medical messages received on Monday will be addressed when I am back in the office on Tuesday. Urgent matters will be reviewed and addressed by one of my partners in the office as needed.    If lab work was done today as part of your evaluation you will generally be contacted via [employer], mail, or phone with the results within 1-5 days. If there is an alarming result we will contact you by phone. Lab results come back at varying times, I generally wait until all lab results are available before making comments on the results.     If you need refills please contact your pharmacy. They will send a refill request to me to review. Please allow 3-5 business days for us to process all refill requests.     My Clinical Assistant is Tawny. Please call us at [phone] or send a medical message with any questions or concerns.

## 2021-05-31 ENCOUNTER — RECORDS - HEALTHEAST (OUTPATIENT)
Dept: ADMINISTRATIVE | Facility: CLINIC | Age: 46
End: 2021-05-31

## 2021-05-31 VITALS — WEIGHT: 174 LBS | HEIGHT: 68 IN | BODY MASS INDEX: 26.37 KG/M2

## 2021-05-31 VITALS — HEIGHT: 68 IN | WEIGHT: 176 LBS | BODY MASS INDEX: 26.67 KG/M2

## 2021-05-31 VITALS — WEIGHT: 170.5 LBS | HEIGHT: 68 IN | BODY MASS INDEX: 25.84 KG/M2

## 2021-05-31 VITALS — WEIGHT: 175.5 LBS | BODY MASS INDEX: 26.68 KG/M2

## 2021-05-31 VITALS — HEIGHT: 68 IN | BODY MASS INDEX: 26.07 KG/M2 | WEIGHT: 172 LBS

## 2021-05-31 NOTE — TELEPHONE ENCOUNTER
Who is calling:  Patient    Reason for Call:  Patient states that she had not been given an alert on from the [employer] message that was sent on 8/9/2019. She just noticed this message today since she had logged into her [employer] account.     She states that she was given another 14 day supply of Diflucan RX from her OB/GYN doctor on 8/12/2019. She states that she just took the last tablet today. She is not getting any relief from the 2nd round of medication. She is also using throat lozenges that were prescribed by her dentist, and those provide minimal to no relief.     Patient would like to know if she needs to be seen again? By her PCP? OB/GYN? Dentist? Or should she be referred to a specialist at this point since it has been ongoing for 3 months.       Date of last appointment with primary care: 7/22/2019  Okay to leave a detailed message: [phone]. If she is not at her desk, please dial [phone] and ask that she be paged

## 2021-05-31 NOTE — PROGRESS NOTES
HPI: This patient is a 42yo F who presents for evaluation of thrush.  She has had bumps in the back of her tongue and coating on her tongue for the past 3 months. She has been treated multiple times for thrush and does not feel her symptoms are improving. She denies sore throat, fever, cough, dysphagia, weight loss, odynophagia, dysphagia, hemoptysis, shortness of breath, or other major symptoms. Patient has been evaluated for asymmetric tonsils in the past , but has not noticed any significant changes since her last visit.      Past medical history, surgical history, social history, family history, medications, and allergies have been reviewed with the patient and are documented above.      Review of Systems: a 10-system review was performed. Pertinent positives are noted in the HPI and on a separate scanned document in the chart.      PHYSICAL EXAMINATION:  GEN: no acute distress, normocephalic  EYES: extraocular movements are intact, pupils are equal and round. Sclera clear.   EARS: auricles are normally formed. The external auditory canals are clear with minimal to no cerumen. Tympanic membranes are intact bilaterally with no signs of infection, effusion, retractions, or perforations.  NOSE: anterior nares are patent. There are no masses or lesions. The septum is non-obstructing.  OC/OP: clear, dentition is in good repair. The tongue appears dry, film on tongue does not scrape. Bumps of concern in back of tongue are taste buds. Tonsils are 2+ bilaterally with the right tonsil being more exophytic, the left is more endophytic. There is no surface irregularity on either and they feel texturally the same today   NECK: soft and supple. No lymphadenopathy or masses. Airway is midline.   PULM: breathing comfortably on room air, normal chest expansion with respiration      MEDICAL DECISION-MAKING: Angelia is a 42yo F here for thrush.  Discussed with patient that she does not have thrush on exam.  Recommend she rinse  her mouth out with warm salt water to reset mouth alok.  Reassured patient the bumps on her tongue are salivary glands and of normal anatomy. The tonsils are fully symmetric and unchanged from last visit.

## 2021-06-01 VITALS — WEIGHT: 166 LBS | BODY MASS INDEX: 25.24 KG/M2

## 2021-06-01 VITALS — BODY MASS INDEX: 24.71 KG/M2 | WEIGHT: 162.5 LBS

## 2021-06-01 VITALS — BODY MASS INDEX: 25.24 KG/M2 | WEIGHT: 166 LBS

## 2021-06-02 VITALS — WEIGHT: 176.44 LBS | HEIGHT: 68 IN | BODY MASS INDEX: 26.74 KG/M2

## 2021-06-02 VITALS — WEIGHT: 167.19 LBS | BODY MASS INDEX: 25.42 KG/M2

## 2021-06-02 VITALS — WEIGHT: 170.1 LBS | BODY MASS INDEX: 25.86 KG/M2

## 2021-06-03 ENCOUNTER — HOSPITAL ENCOUNTER (EMERGENCY)
Dept: EMERGENCY MEDICINE | Facility: CLINIC | Age: 46
Discharge: HOME | End: 2021-06-03
Attending: EMERGENCY MEDICINE
Payer: COMMERCIAL

## 2021-06-03 VITALS — HEIGHT: 68 IN | BODY MASS INDEX: 26.83 KG/M2 | WEIGHT: 177 LBS

## 2021-06-03 VITALS — HEIGHT: 68 IN | BODY MASS INDEX: 26.52 KG/M2 | WEIGHT: 175 LBS

## 2021-06-03 DIAGNOSIS — M54.32 SCIATICA OF LEFT SIDE: ICD-10-CM

## 2021-06-03 DIAGNOSIS — R03.0 ELEVATED BP WITHOUT DIAGNOSIS OF HYPERTENSION: ICD-10-CM

## 2021-06-03 RX ORDER — OXYCODONE HYDROCHLORIDE 5 MG/1
5 TABLET ORAL EVERY 6 HOURS PRN
Qty: 12 TABLET | Refills: 0 | Status: SHIPPED | OUTPATIENT
Start: 2021-06-03 | End: 2021-10-12

## 2021-06-04 VITALS — BODY MASS INDEX: 27.83 KG/M2 | WEIGHT: 183 LBS

## 2021-06-04 VITALS — HEART RATE: 105 BPM | BODY MASS INDEX: 27.67 KG/M2 | OXYGEN SATURATION: 98 % | TEMPERATURE: 99.9 F | WEIGHT: 182 LBS

## 2021-06-04 VITALS
DIASTOLIC BLOOD PRESSURE: 88 MMHG | HEIGHT: 68 IN | HEART RATE: 89 BPM | BODY MASS INDEX: 27.91 KG/M2 | OXYGEN SATURATION: 99 % | SYSTOLIC BLOOD PRESSURE: 110 MMHG | WEIGHT: 184.13 LBS

## 2021-06-04 VITALS
HEART RATE: 93 BPM | WEIGHT: 187.13 LBS | BODY MASS INDEX: 28.36 KG/M2 | HEIGHT: 68 IN | DIASTOLIC BLOOD PRESSURE: 82 MMHG | OXYGEN SATURATION: 96 % | SYSTOLIC BLOOD PRESSURE: 112 MMHG

## 2021-06-04 VITALS — WEIGHT: 180 LBS | BODY MASS INDEX: 27.28 KG/M2 | HEIGHT: 68 IN

## 2021-06-05 VITALS
TEMPERATURE: 98.6 F | BODY MASS INDEX: 28.43 KG/M2 | OXYGEN SATURATION: 96 % | RESPIRATION RATE: 18 BRPM | WEIGHT: 187 LBS | HEART RATE: 100 BPM | DIASTOLIC BLOOD PRESSURE: 89 MMHG | SYSTOLIC BLOOD PRESSURE: 132 MMHG

## 2021-06-05 VITALS
WEIGHT: 188.7 LBS | HEART RATE: 85 BPM | DIASTOLIC BLOOD PRESSURE: 85 MMHG | BODY MASS INDEX: 28.69 KG/M2 | SYSTOLIC BLOOD PRESSURE: 128 MMHG | OXYGEN SATURATION: 97 %

## 2021-06-06 NOTE — PROGRESS NOTES
"Assessment:     1. Dizziness  meclizine (ANTIVERT) 25 mg tablet    HM1(CBC and Differential)    HM1 (CBC with Diff)   2. Fatigue, unspecified type  HM1(CBC and Differential)    HM1 (CBC with Diff)   3. Visit for screening mammogram  Mammo Screening Bilateral         Plan:         We reviewed the potential etiologies for her dizziness symptoms and we will check labs as noted and cover with oral meclizine to be taken as needed. We discussed potential for sedation with that. She will call or return to clinic with any ongoing or worsening symptoms.         Subjective:             Angelia Stallings is a 44 y.o. female who presents for evaluation of dizziness. The symptoms started 2 weeks ago and are intermittent. The patient describes the symptoms as lightheadedness. Symptoms are exacerbated by rising from squatting or sitting position and positions that worsen symptoms: bending over and turning head.  Associated ear symptoms: none. Associated CNS symptoms: foggy feeling and eye fatigue. Recent infections: none. Head trauma: denied. She does have a history of craniotomy for an aneurysm. She had a migraine headache a few days ago and she has Diazepam to take as needed for those.     The following portions of the patient's history were reviewed and updated as appropriate: allergies, current medications, past family history, past medical history, past social history, past surgical history and problem list.    Review of Systems  A 12 point comprehensive review of systems was negative except as noted.      Objective:        Vitals:    02/17/20 1626   BP: 110/88   Patient Position: Sitting   Cuff Size: Adult Regular   Pulse: 89   SpO2: 99%   Weight: 184 lb 2 oz (83.5 kg)   Height: 5' 8\" (1.727 m)      General     Alert, cooperative, no distress   Head:    Normocephalic, without obvious abnormality, atraumatic   Eyes:    PERRL, conjunctiva/corneas clear, EOM's intact   Ears:    Normal TM's and external ear canals, both ears "   Nose:   Nares normal, mucosa normal, no drainage or sinus tenderness   Throat:   mild oropharyngeal erythema    Neck:   Supple, no adenopathy and supple, symmetrical, trachea midline    Lungs:     clear to auscultation bilaterally   Heart:    Regular rate and rhythm, no murmur, rub or gallop   Abdomen:     Soft, non-tender, no masses

## 2021-06-07 NOTE — PROGRESS NOTES
"Angelia Stallings is a 44 y.o. female who is being evaluated via a billable telephone visit.      The patient has been notified of following:     \"This telephone visit will be conducted via a call between you and your physician/provider. We have found that certain health care needs can be provided without the need for a physical exam.  This service lets us provide the care you need with a short phone conversation.  If a prescription is necessary we can send it directly to your pharmacy.  If lab work is needed we can place an order for that and you can then stop by our lab to have the test done at a later time.    Telephone visits are billed at different rates depending on your insurance coverage. During this emergency period, for some insurers they may be billed the same as an in-person visit.  Please reach out to your insurance provider with any questions.    If during the course of the call the physician/provider feels a telephone visit is not appropriate, you will not be charged for this service.\"    Patient has given verbal consent to a Telephone visit? Yes    What phone number would you like to be contacted at? [phone]    Patient would like to receive their AVS by AVS Preference: Sharon.    Additional provider notes:     Angelia Stallings was contacted for evaluation of ongoing cough and dyspnea. Symptoms began several weeks ago and have been intermittent. Cough is described as non-productive, despite starting a second round of azithromycin. Her chest pain symptoms have resolved. Symptoms are associated with shortness of breath and some dysphagia. She reports a frequent FB sensation in her chest and throat and she is not sure if it is due to anxiety or something else. She is s/p a lap nissen for a hiatal hernia. She denies significant heartburn or acid brash. She denies chills, fever, hemoptysis and wheezing. 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    Some increased anxiety recently. She increased her sertraline to 50 from 25 mg few days ago. She would like to see what kind or response she gets from that.     As far as her work, I advised her to continue to self-isolate until her symptoms have largely resolved.     The following portions of the patient's history were reviewed and updated as appropriate: allergies, current medications, past family history, past medical history, past social history, past surgical history and problem list.     Assessment/Plan:  1. Cough     2. Suspected Covid-19 Virus Infection     3. Dysphagia, unspecified type     4. Globus sensation     5. Status post laparoscopic Nissen fundoplication          Recommend continue to work from home until cough is controlled/resolved. Recommend that she contact Dr Quezada's office regarding swallowing/chest/globus symptoms. Could consider GI eval, but he may be able to perform an EGD to evaluate as well. Recommend start a trial of prilosec in the meantime in case there is some esophagitis present. This could also be anxiety related and she will see how things go with the dose increase and we discussed potentially increasing further if needed. She was given a work note for ongoing isolation, and will let me know if she has an ongoing issues.     Phone call duration:  16 minutes    Isabela Bell MD

## 2021-06-07 NOTE — TELEPHONE ENCOUNTER
"\"I have had nasal issues for the past month.\"   Patient had virtual visit on 4/1/20 suspected Covid-19. Patient reporting symptoms increasing since yesterday with increased cough. Chest pain following coughing. Temp ranging to 99.5 Oral yesterday. Reporting shortness of breath with exertion.   Patient attempted to do an [email or web address] visit. Stating she was unable to access.    Connected to Central Scheduling.    Emily Laboy RN  [facility]    COVID 19 Nurse Triage Plan/Patient Instructions    Please be aware that novel coronavirus (COVID-19) may be circulating in the community. If you develop symptoms such as fever, cough, or SOB or if you have concerns about the presence of another infection including coronavirus (COVID-19), please contact your health care provider or visit [email or web address].     Disposition/Instructions    Patient to have scheduled Telephone Visit with a provider. Follow System Ambulatory Workflow for COVID 19.     The clinic staff will assist you to schedule an appointment to complete the Telephone Visit with a provider during normal clinic hours.       Call Back If: Your symptoms worsen before you are able to complete your Telephone Visit with a provider.        Thank you for limiting contact with others, wearing a simple mask to cover your cough, practice good hand hygiene habits and accessing our virtual services where possible to limit the spread of this virus.    For more information about COVID19 and options for caring for yourself at home, please visit the CDC website at https://www.cdc.gov/coronavirus/2019-ncov/about/steps-when-sick.html  For more options for care at [facility], please visit our website at [email or web address]    For more information, please use the [place] Department of Health COVID-19 Website: https://www.health.[facility].[place]/diseases/coronavirus/index.html  [place] Department of Health ([facility]) COVID-19 Hotlines " (Interpreters available):      Health questions: Phone Number: [phone] or [phone] and Hours: 7 a.m. to 7 p.m.    Schools and  questions: Phone Number: [phone] or [phone] and Hours 7 a.m. to 7 p.m.                        Reason for Disposition    MILD difficulty breathing (e.g., minimal/no SOB at rest, SOB with walking, pulse <100)    Protocols used: CORONAVIRUS (COVID-19) DIAGNOSED OR [id number] 3.30.20

## 2021-06-07 NOTE — PROGRESS NOTES
"Angelia Stallings is a 44 y.o. female who is being evaluated via a billable telephone visit.      The patient has been notified of following:     \"This telephone visit will be conducted via a call between you and your physician/provider. We have found that certain health care needs can be provided without the need for a physical exam.  This service lets us provide the care you need with a short phone conversation.  If a prescription is necessary we can send it directly to your pharmacy.  If lab work is needed we can place an order for that and you can then stop by our lab to have the test done at a later time.    Telephone visits are billed at different rates depending on your insurance coverage. During this emergency period, for some insurers they may be billed the same as an in-person visit.  Please reach out to your insurance provider with any questions.    If during the course of the call the physician/provider feels a telephone visit is not appropriate, you will not be charged for this service.\"    Patient has given verbal consent to a Telephone visit? Yes    Patient would like to receive their AVS by AVS Preference: Sharon.    Additional provider notes:         Angelia Stallings was contacted for evaluation of nasal congestion and chills and low grade fever. Symptoms began 1 month ago, and have been intermittent since that time. Cough is described as mostly non-productive and mild. Symptoms are associated with intermittent chest congestion, fatigue, frequent clearing of the throat and headaches. She reports that her typical headaches are in the cheeks and eyes. She denies runny nose, shortness of breath, wheezing and sore throat. She had an episode of pain in the central chest, R>L after eating some carrots and celery. She had associated belching and frequent coughing and throat clearing. The symptoms lasted hours with belching, coughing and clearing of the throat. She was then able to eat her dinner without " difficulty.        Past history is significant for allergic rhinitis and she is s/p craniotomy for clipping of an MCA aneurysm.      The following portions of the patient's history were reviewed and updated as appropriate: allergies, current medications, past family history, past medical history, past social history, past surgical history and problem list.     Assessment/Plan:  1. Acute non-recurrent maxillary sinusitis  azithromycin (ZITHROMAX Z-JAIR) 250 MG tablet   2. Migraine without aura and without status migrainosus, not intractable     3. S/P craniotomy, MCA aneurysm clipping x 2          We will cover with Azithromycin as directed for acute sinusitis. We reviewed use of OTC analgesics, decongestants, and/or mucinex, as well as increased fluids, rest and humidity, etc. She will continue her same allergy medications and follow up with neurology as scheduled, and will or return to clinic with any ongoing or worsening symptoms.       Phone call duration: 15 minutes    Isabela Bell MD

## 2021-06-07 NOTE — TELEPHONE ENCOUNTER
Patient scheduled for a phone visit with PCP today, 4/21/20.     Name: Angelia Stallings MRN: [id number]   Date: 4/21/2020 Status: Arrived   Time: 10:50 AM Length: 30   Visit Type: TELEPHONE VISIT [[id number]] Copay: $0.00   Provider: Isabela Bell MD Department: [facility]   Referring Provider: ISABELA BELL

## 2021-06-07 NOTE — PROGRESS NOTES
"Angelia Stallings is a 44 y.o. female who is being evaluated via a billable telephone visit.      The patient has been notified of following:     \"This telephone visit will be conducted via a call between you and your physician/provider. We have found that certain health care needs can be provided without the need for a physical exam.  This service lets us provide the care you need with a short phone conversation.  If a prescription is necessary we can send it directly to your pharmacy.  If lab work is needed we can place an order for that and you can then stop by our lab to have the test done at a later time.    If during the course of the call the physician/provider feels a telephone visit is not appropriate, you will not be charged for this service.\"     Patient has given verbal consent to a Telephone visit? Yes    Angelia Stallings complains of    Chief Complaint   Patient presents with     Cough     Started last Tuesday. Did speak with triage nurse yesterday and they recommended virtual visit.      Nausea     Started on Saturday.      Emesis     Was dry heaving yesterday.      Fever     Low grade last week. 99.5. But nothing else.      Headache     Chills       I have reviewed and updated the patient's Past Medical History, Social History, Family History and Medication List.    ALLERGIES  [id number] antimigraine agents; Penicillins; and Sulfa (sulfonamide antibiotics)    Additional provider notes: Patient is doing this phone visit with concerns of 1 week of symptoms.  Initially started as a stuffy nose which then progressed to a dry nonproductive cough.  She did have a low-grade temperature of 99.5 with this.  She notes mild sinus congestion with clear discharge.  There is mild intermittent shortness of breath with some wheezing.  When she coughs, she gets an achiness that traverses her entire chest.  No known sick contacts, however 1 of her children is displaying similar symptoms.  Yesterday she did feel " nauseated and had some dry heaving without emesis.  Appetite is down today, but no recurrence dry heaving.  Overall she feels like systemically she is feeling mildly better, but she is still experiencing a fair amount of fatigue and coughing.    No persistent chest pain.  Denies diaphoresis.  She quit smoking >10 years ago.  No personal cardiac history.  No body aches.  No known asthma history.  Denies any rashes on her body.    Assessment/Plan:    Given the persistent cough, fatigue, and low-grade fever, there is suspicion for infection with the current pandemic coronavirus.  Discussed with the patient that we are unable to test her for this, but she should assume that she has it.  We discussed symptom management for her cough including albuterol, tessalon perles, and cough syrup.  Encouraged rest and hydration.  We discussed the typical length of time that this will be going on.  She should notice improvement for sure by next week, but if not she was informed to let me know.  If still having issues, there is a greater possibility of a potential bacterial infection and we can also try antibiotics.  I did discuss with the patient that should she develop persistent shortness of breath, chest pain, or her fever spikes, then she needs evaluation in the emergency department.  So far she is stable but did express understanding.      1. Cough  - benzonatate (TESSALON PERLES) 100 MG capsule; Take 1 capsule (100 mg total) by mouth 3 (three) times a day as needed for cough.  Dispense: 30 capsule; Refill: 0  - codeine-guaiFENesin (CODEINE-GUAIFENESIN)  mg/5 mL liquid; Take 5 mL by mouth 3 (three) times a day as needed.  Dispense: 236 mL; Refill: 0    2. Suspected Covid-19 Virus Infection    3. Wheezing  - albuterol (PROAIR HFA;PROVENTIL HFA;VENTOLIN HFA) 90 mcg/actuation inhaler; Inhale 2 puffs every 6 (six) hours as needed for wheezing.  Dispense: 1 each; Refill: 0    4. Nausea  - ondansetron (ZOFRAN-ODT) 8 MG  disintegrating tablet; Take 1 tablet (8 mg total) by mouth every 8 (eight) hours as needed for nausea.  Dispense: 15 tablet; Refill: 0        Phone call duration:  14 minutes    Damaso Tenorio, CNP

## 2021-06-08 NOTE — PROGRESS NOTES
[facility] Daily Progress     Patient Name: Angelia Stallings  Date: 2018  Visit #:     Referral Diagnosis: HA's Neck pain  Referring provider: Isabela Bell,*  Visit Diagnosis:     ICD-10-CM    1. Cervicalgia M54.2    2. Intractable chronic post-traumatic headache G44.321    3. Neck pain M54.2    4. Chronic tension-type headache, intractable G44.221          Assessment:     Patient is benefitting from skilled physical therapy and is making steady progress toward functional goals.  Patient is appropriate to continue with skilled physical therapy intervention, as indicated by initial plan of care.    Goal Status:  Pt. will demonstrate/verbalize independence in self-management of condition in : 6 weeks  Pt. will be independent with home exercise program in : 6 weeks  Pt. will report decreased intensity, frequency of : Headache;Pain;in 6 weeks  Pt. will decrease use of medication for pain for improved quality of life in : 12 weeks  Pt. will have improved quality of sleep: with less pain;waking less times/night;in 6 weeks;getting 75-90% of required amount  Patient Turn Head: for watching traffic;for conversation;for computer;for work;with partial ROM;wiith no pain;with less difficulty;in 6 weeks  Patient will look up / down: for drinking;for reading;for computer work;with partial ROM;with no pain;in 6 weeks  Patient will perform repetitive movement in : hand;wrist;forearm motions;for work;for home;in 6 weeks  Pt will: normalize her lymphatic flow to the R side of her body to decrease pressure on nervous and organ systems to allow normal physiological funtioning     Plan / Patient Education:     Continue with initial plan of care.    Subjective:     Pain Ratin  R side HA and Head pain      Objective:     R SI upslip,   Dizzy,   Some ringing in the ears  R sided HA and Face pain   + maapping to the Pleura bilat,  Post cranial, cervical,  R brachial plexus,   12th cranial nerve    Treatment  Today     TREATMENT MINUTES COMMENTS   Evaluation     Self-care/ Home management     Manual therapy 60 MFR with OA, L5-SI and SI joint releases, Dural Tube Pull,   Cranial release to Temporal, frontal-Nasal bones.   SCS to cranial nerves 12,  SCS  To the cranium and intra cranial lymphatic vessels   Neck lymphatics,   Med trans cervical nodes,   Ext jugular nodes    Lymphatic and cranial nerve releases achieved.   Pt to observe effects upon neck and head pain and lymphatic circulation   SDi dfysf resolved   Neuromuscular Re-education     Therapeutic Activity     Therapeutic Exercises     Gait training     Modality__________________                Total 60    Blank areas are intentional and mean the treatment did not include these items.       Jeff Rico  2/6/2017

## 2021-06-08 NOTE — TELEPHONE ENCOUNTER
Referral placed. She can schedule by calling:  [facility]  PHONE: [phone]

## 2021-06-08 NOTE — PROGRESS NOTES
[facility] Daily Progress     Patient Name: Angelia Stallings  Date: 2/8/2017  Visit #:6/12    Referral Diagnosis: HA, Neck pain  Referring provider: Darío Limon MD  Visit Diagnosis:     ICD-10-CM    1. Cervicalgia M54.2    2. Intractable chronic post-traumatic headache G44.321    3. Neck pain M54.2          Assessment:     Patient demonstrates understanding/independence with home program.  Patient is benefitting from skilled physical therapy and is making steady progress toward functional goals.  Patient is appropriate to continue with skilled physical therapy intervention, as indicated by initial plan of care.    Goal Status:  Pt. will demonstrate/verbalize independence in self-management of condition in : 6 weeks  Pt. will be independent with home exercise program in : 6 weeks  Pt. will report decreased intensity, frequency of : Headache;Pain;in 6 weeks  Pt. will decrease use of medication for pain for improved quality of life in : 12 weeks  Pt. will have improved quality of sleep: with less pain;waking less times/night;in 6 weeks;getting 75-90% of required amount  Patient Turn Head: for watching traffic;for conversation;for computer;for work;with partial ROM;wiith no pain;with less difficulty;in 6 weeks  Patient will look up / down: for drinking;for reading;for computer work;with partial ROM;with no pain;in 6 weeks  Patient will perform repetitive movement in : hand;wrist;forearm motions;for work;for home;in 6 weeks  Pt will: normalize her lymphatic flow to the R side of her body to decrease pressure on nervous and organ systems to allow normal physiological funtioning     Plan / Patient Education:     Continue with initial plan of care.    Subjective:     Pain Rating: 3/10 to the neck pain  4/10 to R sided HA      Objective:     Cranial restriction to sphenoid,  Frontal and nasal bones,  Tender points to cranial nerves    Treatment Today     TREATMENT MINUTES COMMENTS   Evaluation     Self-care/  Home management     Manual therapy 60 MFR with OA release.  Cranial nerve releases to # 3L ,6R, 9R, 4R,  8R,   12R,   SCS to supraorbital tender point L,   Trig 1,2,3 on L.   TEMP biklat,   FAC1-N R,   FAC2,3-N L,   GO-N bilat,   CDT-N bilat,   Phrenic nerve bilat,       Releases achieved.   Pt's Ha and neck pain subsiding.  R eyebrow slightly drooping but restored its muscular control after 2-3 min   Neuromuscular Re-education     Therapeutic Activity     Therapeutic Exercises     Gait training     Modality__________________                Total 60    Blank areas are intentional and mean the treatment did not include these items.       Jeff Rico  2/8/2017

## 2021-06-08 NOTE — PROGRESS NOTES
[facility] Daily Progress     Patient Name: Angelia Stallings  Date: 2017  Visit #:3/12    Referral Diagnosis: Neck pain, HA's  Referring provider: Isabela Bell,*  Visit Diagnosis:     ICD-10-CM    1. Cervicalgia M54.2    2. Intractable chronic post-traumatic headache G44.321    3. Neck pain M54.2          Assessment:     Patient is benefitting from skilled physical therapy and is making steady progress toward functional goals.  Patient is appropriate to continue with skilled physical therapy intervention, as indicated by initial plan of care.    Goal Status:  Pt. will demonstrate/verbalize independence in self-management of condition in : 6 weeks  Pt. will be independent with home exercise program in : 6 weeks  Pt. will report decreased intensity, frequency of : Headache;Pain;in 6 weeks  Pt. will decrease use of medication for pain for improved quality of life in : 12 weeks  Pt. will have improved quality of sleep: with less pain;waking less times/night;in 6 weeks;getting 75-90% of required amount  Patient Turn Head: for watching traffic;for conversation;for computer;for work;with partial ROM;wiith no pain;with less difficulty;in 6 weeks  Patient will look up / down: for drinking;for reading;for computer work;with partial ROM;with no pain;in 6 weeks  Patient will perform repetitive movement in : hand;wrist;forearm motions;for work;for home;in 6 weeks  Pt will: normalize her lymphatic flow to the R side of her body to decrease pressure on nervous and organ systems to allow normal physiological funtioning     Plan / Patient Education:     Continue with initial plan of care.    Subjective:     Pain Ratin  Saw acupuncturist and got some good results   Bad HA after wok out yesterday      Objective:     + mapping for the dural membranes    Treatment Today     TREATMENT MINUTES COMMENTS   Evaluation     Self-care/ Home management     Manual therapy 30 SCSD to LFALX-N R,   MFALX-N L,   TENT-N  R,    BLACK-N  Bilat,   SB-N R,   SCS to DC 3,7-N R,   DT1,2,3-N    Releases achieved,   Tender points resolved.   Pt to observe effects of treatment upon HA and neck pain   Neuromuscular Re-education     Therapeutic Activity     Therapeutic Exercises     Gait training     Modality__________________                Total 30    Blank areas are intentional and mean the treatment did not include these items.       Jeff Rico  1/25/2017

## 2021-06-08 NOTE — PATIENT INSTRUCTIONS - HE
Please try the singulair. If you feel better, cough better, then continue into summer. If not gone, then call my nurse Carmen [phone] or inbox. Then she will prescribe a combination inhaler to trial.     I will send the letter for your employer to your inbox. Let us know if you need a hard copy mailed to you as well.
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
## 2021-06-08 NOTE — PROGRESS NOTES
[facility] Daily Progress     Patient Name: Angelia Stallings  Date: 2/15/2017  Visit #:     Referral Diagnosis: HA's Cervicalgia  Referring provider: Darío Limon MD  Visit Diagnosis:     ICD-10-CM    1. Cervicalgia M54.2    2. Intractable chronic post-traumatic headache G44.321    3. Neck pain M54.2          Assessment:     Patient demonstrates understanding/independence with home program.  Patient is benefitting from skilled physical therapy and is making steady progress toward functional goals.  Patient is appropriate to continue with skilled physical therapy intervention, as indicated by initial plan of care.    Goal Status:  Pt. will demonstrate/verbalize independence in self-management of condition in : 6 weeks  Pt. will be independent with home exercise program in : 6 weeks  Pt. will report decreased intensity, frequency of : Headache;Pain;in 6 weeks  Pt. will decrease use of medication for pain for improved quality of life in : 12 weeks  Pt. will have improved quality of sleep: with less pain;waking less times/night;in 6 weeks;getting 75-90% of required amount  Patient Turn Head: for watching traffic;for conversation;for computer;for work;with partial ROM;wiith no pain;with less difficulty;in 6 weeks  Patient will look up / down: for drinking;for reading;for computer work;with partial ROM;with no pain;in 6 weeks  Patient will perform repetitive movement in : hand;wrist;forearm motions;for work;for home;in 6 weeks  Pt will: normalize her lymphatic flow to the R side of her body to decrease pressure on nervous and organ systems to allow normal physiological funtioning     Plan / Patient Education:     Continue with initial plan of care.    Subjective:     Pain Ratin  Pain was reduced for 3 days after last vist but bad migraine on Monday      Objective:     Neuro tenderpoints      Treatment Today     TREATMENT MINUTES COMMENTS   Evaluation     Self-care/ Home management     Manual therapy  60 MFR with OA, L5-SI and SI joint releases, Dural Tube Pull,   Cranial release sphenoid,  SCS to Illeo-cecal,  Lower esophogeal valve.  SCS to Frontal sinuses,   TRIG,2,-N bilat,   TEMP-N   FAC1 R,  SCS  GO-NR,   CDT-N L,   ANS-N bilat,  PHR-N L-N,   MYLO-N        Neuro, Cranial, Valve releases achieved achieved   Sinuses relaxed  Pt to observe effects over course of the next few days   Neuromuscular Re-education     Therapeutic Activity     Therapeutic Exercises     Gait training     Modality__________________                Total 60    Blank areas are intentional and mean the treatment did not include these items.       Jeff Rico  2/15/2017
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
## 2021-06-08 NOTE — PROGRESS NOTES
[facility] Daily Progress     Patient Name: Angelia Stallings  Date: 2/1/2017  Visit #: 4/.12    Referral Diagnosis: Chronic neck and HA poain  Referring provider: Isabela Bell,*  Visit Diagnosis:     ICD-10-CM    1. Cervicalgia M54.2    2. Intractable chronic post-traumatic headache G44.321    3. Neck pain M54.2          Assessment:     Patient is benefitting from skilled physical therapy and is making steady progress toward functional goals.  Patient is appropriate to continue with skilled physical therapy intervention, as indicated by initial plan of care.    Goal Status:  Pt. will demonstrate/verbalize independence in self-management of condition in : 6 weeks  Pt. will be independent with home exercise program in : 6 weeks  Pt. will report decreased intensity, frequency of : Headache;Pain;in 6 weeks  Pt. will decrease use of medication for pain for improved quality of life in : 12 weeks  Pt. will have improved quality of sleep: with less pain;waking less times/night;in 6 weeks;getting 75-90% of required amount  Patient Turn Head: for watching traffic;for conversation;for computer;for work;with partial ROM;wiith no pain;with less difficulty;in 6 weeks  Patient will look up / down: for drinking;for reading;for computer work;with partial ROM;with no pain;in 6 weeks  Patient will perform repetitive movement in : hand;wrist;forearm motions;for work;for home;in 6 weeks  Pt will: normalize her lymphatic flow to the R side of her body to decrease pressure on nervous and organ systems to allow normal physiological funtioning     Plan / Patient Education:     Continue with initial plan of care.    Subjective:     Pain Rating: 3 -4/10  Better the last 2 days and after the last treatment  Last Wed      Objective:     Positive mapping to the L cranial nerves and the R Cranial arterials    Treatment Today     TREATMENT MINUTES COMMENTS   Evaluation     Self-care/ Home management     Manual therapy   60  SCS to L ABD-N,   OM-N,   OLF-N,   OPT-N,   YESICA-N,   TRO-N,   VEST-N,   Temporal Bone release,   Max-A bilat,   DTEM-A bilat,   OPTH-A bilat,   FAC-A bilat,   STEM-A bilat,   CIRC-A,   PAR-LV bilat,   Ext Jugular veins    Tender points reduced / resolved  Pt's pain levels to the head and neck greatly reduced initially upon standing.  0/10.   Pt to observe her pain and HA issue over the coming week    Neuromuscular Re-education     Therapeutic Activity     Therapeutic Exercises     Gait training     Modality__________________                Total 60    Blank areas are intentional and mean the treatment did not include these items.       Jeff Rico  2/1/2017

## 2021-06-08 NOTE — TELEPHONE ENCOUNTER
RN Triage:    Spoke with 44 yr old Angelia who c/o:    3 virtual visits since 4/1/20.    Pt reports taking the last dose of her second round of a Z-Liu 4 days ago.      Pt thought cough was gone.    Pt was prepared to work again today.    Cough has returned 2 days ago despite just finishing the Z-Liu.     Feels tired again.    Pt reports new fever of 100.0, 2 evenings ago and 99.9 yesterday.    Fever this morning is 99.1    Denies chest pain or difficulty breathing.    Pt states she experienced low grade fevers up to 99.5 at the beginning of her illness.    Neck ached with massive migraine 3 days ago, but has been chewing on only one side due to filling coming out.  Pt states the tooth itself doesn't hurt.    Neck pain calmed down after taking oxycodone.    Unsure why she's getting a fever again.  Has had ingrown hair in bikini line for several weeks, but has been getting smaller.    PLAN:  Will consult with PCP per protocol for level of care or other instructions.  Please advise.  Advised patient to call back if symptoms worsening.  Advised drinking lot of liquids and breathing warm steam/use of humidifier.    Ioana Rudd RN   [facility] RN Triage        Reason for Disposition    [1] Fever returns after gone for over 24 hours AND [2] symptoms worse or not improved    Protocols used: CORONAVIRUS (COVID-19) DIAGNOSED OR [id number]-OH 4.22.20

## 2021-06-08 NOTE — TELEPHONE ENCOUNTER
Laying on the couch and got up and is having severe pain in right calf region that goes into the foot  Slightly swollen at the area where it hurts pointed out by daughter  Started hurting a couple of hours ago  Rates pain 7-8/10  Starts a little lower than behind the knee and off to the right  Radiates down to foot, feels like a cramp in foot  Unable to walk on calf due to pain   Hurts to walk, sit, and lay down.   Change in position does not help    Worried about a DVT due to she has been in bed a lot with being sick.     Today raked a little and went for short walk around the block  -no known injury     Known history of chronic back pain  -Bulging disc at L5-S1  -Arthritis at L4-L5    Has not taken anything yet for pain   Tried ice and heat  Never had this pain before     Been sick for 6 weeks   -Seen yesterday, Chest X-ray negative.  -There have been talks along the way of COVID19, but does not qualify for testing so there is no way to know.   -Slight difficulty breathing, not worse than it has been     No chest pain/discomfort  No fever  No redness/hard spots  No known diagnosis of COVID19  No known exposure to COVID19  No recent travel   Hiatal hernia surgery 2 years ago, seems to be having some esophagus issues recently. General surgery said she may have damaged the repair.       Triaged to a disposition of See a physician within 4 hours. Patient is agreeable and will go to ED due to time.     COVID 19 Nurse Triage Plan/Patient Instructions    Please be aware that novel coronavirus (COVID-19) may be circulating in the community. If you develop symptoms such as fever, cough, or SOB or if you have concerns about the presence of another infection including coronavirus (COVID-19), please contact your health care provider or visit [email or web address].     Disposition/Instructions    Patient to go to ED and follow protocol based instructions. Follow System Ambulatory Workflow for COVID 19.     Bring Your Own  Device:  Please also bring your smart device(s) (smart phones, tablets, laptops) and their charging cables for your personal use and to communicate with your care team during your visit.      Thank you for limiting contact with others, wearing a simple mask to cover your cough, practice good hand hygiene habits and accessing our virtual services where possible to limit the spread of this virus.    For more information about COVID19 and options for caring for yourself at home, please visit the CDC website at https://www.cdc.gov/coronavirus/2019-ncov/about/steps-when-sick.html  For more options for care at [facility], please visit our website at [email or web address]    For more information, please use the [place] Department of Health COVID-19 Website: https://www.health.[facility].[place]/diseases/coronavirus/index.html  [place] Department of Health ([facility]) COVID-19 Hotlines (Interpreters available):      Health questions: Phone Number: [phone] or 1-587.543.8393 and Hours: 7 a.m. to 7 p.m.    Schools and  questions: Phone Number: [phone] or [phone] and Hours 7 a.m. to 7 p.m.        Tracy Griffin RN Triage Nurse Advisor

## 2021-06-08 NOTE — PROGRESS NOTES
[facility] Daily Progress     Patient Name: Angelia Stallings  Date: 2017  Visit #:    Referral Diagnosis:Neck and HA pain  Referring provider: Isabela Bell,*  Visit Diagnosis:     ICD-10-CM    1. Cervicalgia M54.2    2. Intractable chronic post-traumatic headache G44.321    3. Neck pain M54.2          Assessment:     Patient demonstrates understanding/independence with home program.  Patient is benefitting from skilled physical therapy and is making steady progress toward functional goals.  Patient is appropriate to continue with skilled physical therapy intervention, as indicated by initial plan of care.    Goal Status:  Pt. will demonstrate/verbalize independence in self-management of condition in : 6 weeks  Pt. will be independent with home exercise program in : 6 weeks  Pt. will report decreased intensity, frequency of : Headache;Pain;in 6 weeks  Pt. will decrease use of medication for pain for improved quality of life in : 12 weeks  Pt. will have improved quality of sleep: with less pain;waking less times/night;in 6 weeks;getting 75-90% of required amount  Patient Turn Head: for watching traffic;for conversation;for computer;for work;with partial ROM;wiith no pain;with less difficulty;in 6 weeks  Patient will look up / down: for drinking;for reading;for computer work;with partial ROM;with no pain;in 6 weeks  Patient will perform repetitive movement in : hand;wrist;forearm motions;for work;for home;in 6 weeks  Pt will: normalize her lymphatic flow to the R side of her body to decrease pressure on nervous and organ systems to allow normal physiological funtioning     Plan / Patient Education:     Continue with initial plan of care.    Subjective:     Pain Ratin  Pain to head and cheeks, jaw.   Had Trigger point injections last week    3 sec / cycle        Objective:     Cranial restrictions present.  Frontal HA affecting pt's eyes.  Lymphatic restrictions to the head and  neck    Treatment Today     TREATMENT MINUTES COMMENTS   Evaluation     Self-care/ Home management     Manual therapy 30 MFR with OA,   Cranial releases to Temporal, frontal, nasal, parietals, zygomas,  L Pallatine,  With instruction to pt on the temporal and TMJ releases    Releases achieved.   HA decreased and area of coverage slightly reduced.  Lymphatic restrictions to ead and neck mobilized.   Pt ind with temporal bone and TMJ releases and will use them in a home program    Neuromuscular Re-education     Therapeutic Activity     Therapeutic Exercises     Gait training     Modality__________________                Total 30    Blank areas are intentional and mean the treatment did not include these items.       Jeff Rico  1/18/2017

## 2021-06-08 NOTE — TELEPHONE ENCOUNTER
Spoke with pt today. She states that she has seen gen surgery and they need to repair her last surgery for hernia. However per pt she is unable to address this surgery because per surgeon she must figure out where this cough is coming from. She has been seen multiple times for her cough and has had a face to face with Dr. Qureshi on 5/13/2020. She is wondering if she can get a referral for for pulmonology with out having to be see again.

## 2021-06-08 NOTE — PROGRESS NOTES
"Angelia Stallings is a 44 y.o. female who is being evaluated via a billable video visit.      The patient has been notified of following:     \"This video visit will be conducted via a call between you and your physician/provider. We have found that certain health care needs can be provided without the need for an in-person physical exam.  This service lets us provide the care you need with a video conversation.  If a prescription is necessary we can send it directly to your pharmacy.  If lab work is needed we can place an order for that and you can then stop by our lab to have the test done at a later time.    Video visits are billed at different rates depending on your insurance coverage. Please reach out to your insurance provider with any questions.    If during the course of the call the physician/provider feels a video visit is not appropriate, you will not be charged for this service.\"    Patient has given verbal consent to a Video visit? Yes    Patient would like to receive their AVS by DECLINED.    Patient would like the video invitation sent by: Text to cell phone: [phone].    Will anyone else be joining your video visit? No        Video Start Time: 1202     Patient presents today with concerns of continued respiratory symptoms which have been present now for 6 weeks.  In early April it was thought that she was suffering from possible bronchitis versus COVID-19.  She was given prescriptions for cough suppressants and albuterol.      She notes that she started to feel better until April 21 when she started to spike low-grade temperatures along with a nonproductive cough, nasal congestion, and overall malaise.  There is a bit of a headache as well between her eyes with episodes of chest pain and belching.  She was given a prescription for azithromycin.    Once again she started to feel better for about a week and then she started to feel ill again in prompting a third virtual visit on 5/5/2020.  " Unfortunately that note is not yet completed. From what I can gather the patient had been given a second round of azithromycin and was complaining of new symptoms such as difficulty with increased anxiety and a sensation of food getting stuck in her esophagus.  Her sertraline was increased from 25 mg to 50 mg and she was instructed to start over-the-counter omeprazole.  Of note, this patient had Nissen fundoplication years ago.  Note mentions that the patient should contact her surgeon Dr. Quezada regarding her sensation of food getting stuck/traveling slowly through her esophagus.  I can see the message sent to her surgeon's office which has not been responded to yet.  Patient has not picked up omeprazole yet.  Last virtual visit note makes mention of a potential for an EGD.    Several days ago now, patient started to develop the respiratory symptoms again including a dry cough.  She notes intermittent feelings of fevers and chills.  No reflux symptoms.  She does still have a persistent globus sensation particularly with eating.  No wheezing.  No sinus congestion.  No chest pain.    Patient underwent an e-visit for COVID-19 and was told that she might have the virus given the symptoms that she has, however if fails to mention that she has been having the symptoms on and off for the past 6 weeks.          Additional provider notes: GENERAL: Healthy, alert and no distress  EYES: Eyes grossly normal to inspection. No discharge or erythema, or obvious scleral/conjunctival abnormalities.  RESP: No audible wheeze.  No distress.  Frequent dry coughing.  NEURO: Cranial nerves grossly intact. Mentation and speech appropriate for age.  PSYCH: Mentation appears normal, affect normal/bright, judgement and insight intact, normal speech and appearance well-groomed    1. Dry cough     2. Fever, unspecified fever cause     3. Globus sensation       Discussed with the patient that her situation is difficult.  She seems to derive  benefit from azithromycin but fails to find long-lasting relief of symptoms.  In addition, she has symptoms consistent with an esophageal motility issue which I wouldn't anticipate improvement with azithromycin.  Anxiety is within the differential, however patient has had documented low-grade fevers and is coughing somewhat frequently over the virtual visit. Discussed with the patient that at this point, she needs a face-to-face evaluation in clinic since symptoms have been present for 1 month. She expressed understanding and was scheduled.    Video-Visit Details    Type of service:  Video Visit    Video End Time (time video stopped): 1232  Originating Location (pt. Location): Home    Distant Location (provider location):  [facility]/OB     Platform used for Video Visit: Justina Tenorio, CNP

## 2021-06-09 NOTE — PROGRESS NOTES
ASSESSMENT:  1. Abdominal pain  XR Abdomen Flat and Upright    ondansetron (ZOFRAN, AS HYDROCHLORIDE,) 4 MG tablet    CANCELED: XR Chest PA and Lateral   2. Nausea  ondansetron (ZOFRAN, AS HYDROCHLORIDE,) 4 MG tablet     I personally reviewed the xray, no constipation or obstruction present.   Xr Abdomen Flat And Upright    Result Date: 3/20/2017  XR ABDOMEN FLAT AND UPRIGHT3/20/2017 8:04 PMINDICATION: Unspecified abdominal painCOMPARISON: None.FINDINGS: On the upright view the diaphragms are not visible and so free air cannot be excluded, the diaphragms are only seen on the supine imaging. There are scattered air-fluid levels but they appear to be primarily in normal caliber colon. A bowel obstruction is not seenThis report was electronically interpreted by: Dr. Jeff Sol MD ON 03/20/2017 at 20:17    Not an acute abdomen. Labs and US were negative yesterday and UR and results reviewed. I do not feel it is necessary to repeat any of them at this time. Etiology may be muscular in the low back area. I suspect the LLQ pain may be r/t initial constipation and now aggravated by very aggressive bowel regiment that is not needed. LLQ could also represent an ovarian cyst although less given length of pain, may also consider other pelvic etiologies. Would consider CT if pain ongoing to ensure no diverticulitis or colitis. Would also consider a pelvic US    PLAN:  Abd pain with nausea  Take Zofran as needed for nausea  Take Tylenol or Ibuprofen with food as needed for pain  Ice or heat for comfort  Do not take miralax at that time  Push fluids, bland diet as tolerated  Recommend increasing fiber in the diet with fresh fruits and veggies or fiber supplement  Keep scheduled appt with Dr. Bell tomorrow. If symptoms getting worse between now and then, go to the ER if worsening.     SUBJECTIVE:  Angelia TRISTEN Stallings 41 y.o. female  who complains of abdominal pain.  This started about 1 wk ago in LLQ abd and low back and  has been worse for past 2 days. She was seen in the UR yesterday with a thorough workup that was neg. She was given percocet for pain but has not taken this    Quality: aching, occasional sharp  Severity: 7/10  Timing: constant  She has taken Advil 600mg at noon today and 2 valium with some improvement.   Also had Nausea, low grade temp yesterday    Has had ongoing issues with constipation. When pain was not improving over the week, she felt it was maybe constipation and she has been taking miralax with a lot stooling. 2 days ago she took miralax with natural calm and had 7 loose stools yesterday. Today since she has not had a BM, she repeated the same regimen but has been unable to have BM. She states she takes miralax nearly every day to have a BM. Denies melena or hemaochezia.     Started on wellbutrin a few weeks ago. No other medication changes.     LMP was beginning of march. Had neg UPT yesterday. Has had occasional ovarian cyst pain previously.     All UR labs and US were reviewed and negative.     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     History of hemolysis, elevated liver enzymes, and low platelet (HELLP) syndrome      History of miscarriage 2004      (normal spontaneous vaginal delivery)      PONV (postoperative nausea and vomiting)        Past Surgical History:   Procedure Laterality Date     COMBINED HYSTEROSCOPY DIAGNOSTIC / D&C N/A 2015    Procedure: DILATION AND CURETTAGE WITH HYSTEROSCOPY ;  Surgeon: Catarina Lehman DO;  Location: [facility] OR;  Service:      CRANIOTOMY FOR ANEURYSM / VERTEBROBASILAR / CAROTID CIRCULATION  2/19/15    MCA aneurysm clipping x 2     HYSTEROSCOPY W/ ENDOMETRIAL ABLATION  12/15/2015     MOUTH SURGERY       [place] CRYOCAUTERY OF CERVIX      Description: Cervix Cryosurgery;  Recorded: 2008;   Comments: TRUE I     [place] DILATION/CURETTAGE,DIAGNOSTIC      Description: Dilation And Curettage;  Proc Date: 09/01/2004;  Comments: FOR MISCARRIAGE       Current Outpatient Prescriptions   Medication Sig Dispense Refill     buPROPion (WELLBUTRIN XL) 150 MG 24 hr tablet Take 1 tablet (150 mg total) by mouth daily. 30 tablet 2     diazePAM (VALIUM) 5 MG tablet TK 2-3 TS PO QID PRN FOR TIGHT MUSCLES.       sertraline (ZOLOFT) 50 MG tablet TAKE 1 TABLET(50 MG) BY MOUTH DAILY 90 tablet 0     tiZANidine (ZANAFLEX) 4 MG tablet START WITH 1/2 T AT NIGHT AND INCREASE BY 1/2 T QHS UPTO 4 TS QHS AS TOLERATED       UNABLE TO FIND Med Name:       cetirizine-pseudoephedrine (ZYRTEC-D) 5-120 mg per tablet Take 1 tablet by mouth 2 (two) times a day. 60 tablet 5     diazePAM (VALIUM) 5 MG tablet Take 5 mg by mouth every 6 (six) hours as needed for anxiety.       fluticasone (FLONASE) 50 mcg/actuation nasal spray 2 sprays into each nostril daily. 18 g 5     multivitamin (ONE A DAY) per tablet Take 1 tablet by mouth daily. 90 tablet 3     ondansetron (ZOFRAN, AS HYDROCHLORIDE,) 4 MG tablet Take 2 tablets (8 mg total) by mouth every 8 (eight) hours as needed for nausea. 12 tablet 0     oxyCODONE-acetaminophen (PERCOCET) 5-325 mg per tablet Take 1 tablet by mouth every 6 (six) hours as needed for pain. 30 tablet 0     tiZANidine (ZANAFLEX) 4 MG tablet Take 4 mg by mouth bedtime.       No current facility-administered medications for this visit.        Allergies as of 03/20/2017 - [place] as Reviewed 03/20/2017   Allergen Reaction Noted     [id number] antimigraine agents  09/10/2015     Penicillins Hives and Rash 01/11/2008     Sulfa (sulfonamide antibiotics) Hives and Rash 01/11/2008         Family History   Problem Relation Age of Onset     Hypertension Father      Stroke Father      Heart disease Father      Hyperlipidemia Father      Depression Brother      Cancer Maternal Uncle      cervical     Heart disease Paternal Grandfather       Hypertension Paternal Grandfather      Breast cancer Paternal Grandmother 90     No Medical Problems Mother        Social History   Substance Use Topics     Smoking status: Former Smoker     Quit date: 1/1/2009     Smokeless tobacco: Never Used      Comment: No exposure     Alcohol use Yes      Comment: rare          OBJECTIVE:     Visit Vitals     /88     Pulse 98     Temp 98.2  F (36.8  C) (Oral)     Resp 14     Wt 175 lb 6.4 oz (79.6 kg)     LMP 03/03/2017 (Exact Date)     SpO2 99%     BMI 26.67 kg/m2     General appearance: alert, appears stated age and cooperative  Lungs: clear to auscultation bilaterally  Heart: regular rate and rhythm, S1, S2 normal, no murmur, click, rub or gallop  Abdomen: round, soft, TTP in LLQ. No masses, organomegaly, rebound tenderness, mcburneys point or CVA tenderness or guarding  Skin: Skin color, texture, turgor normal. No rashes or lesions   Musc: left lumbar pain with palpation, full ROM of back and BLE.

## 2021-06-09 NOTE — PROGRESS NOTES
HPI:  Angelia Stallings is a 41 y.o. female who was referred to me by Isabela Bell MD for an umbilical hernia.  She presents with an incidental finding of a bulge at the umbilical region with no pain or dicomfort.   She initially underwent CT imaging for left flank pain was noted have this finding of an incidental umbilical hernia.  She has never noted a bulge or any other symptoms whatsoever relating to this.  However, she does complain of epigastric discomfort after eating with difficulty passing food and states that she has to drink liquids in order to feel as though the food passes into her stomach.  She denies any emesis or nausea.  She denies any obvious reflux symptoms no water brash or nighttime cough.    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     History of hemolysis, elevated liver enzymes, and low platelet (HELLP) syndrome      History of miscarriage 2004      (normal spontaneous vaginal delivery)      PONV (postoperative nausea and vomiting)        Past Surgical History:   Procedure Laterality Date     COMBINED HYSTEROSCOPY DIAGNOSTIC / D&C N/A 2015    Procedure: DILATION AND CURETTAGE WITH HYSTEROSCOPY ;  Surgeon: Catarina Lehman DO;  Location: [facility];  Service:      CRANIOTOMY FOR ANEURYSM / VERTEBROBASILAR / CAROTID CIRCULATION  2/19/15    MCA aneurysm clipping x 2     HYSTEROSCOPY W/ ENDOMETRIAL ABLATION  12/15/2015     MOUTH SURGERY       [place] CRYOCAUTERY OF CERVIX      Description: Cervix Cryosurgery;  Recorded: 2008;  Comments: TRUE I     [place] DILATION/CURETTAGE,DIAGNOSTIC      Description: Dilation And Curettage;  Proc Date: 2004;  Comments: FOR MISCARRIAGE       CURRENT MEDS:  Current Outpatient Prescriptions   Medication Sig Dispense Refill     buPROPion  (WELLBUTRIN XL) 150 MG 24 hr tablet Take 1 tablet (150 mg total) by mouth daily. 30 tablet 2     cetirizine-pseudoephedrine (ZYRTEC-D) 5-120 mg per tablet Take 1 tablet by mouth 2 (two) times a day. 60 tablet 5     diazePAM (VALIUM) 5 MG tablet Take 5 mg by mouth every 6 (six) hours as needed for anxiety.       diazePAM (VALIUM) 5 MG tablet TK 2-3 TS PO QID PRN FOR TIGHT MUSCLES.       fluticasone (FLONASE) 50 mcg/actuation nasal spray 2 sprays into each nostril daily. 18 g 5     multivitamin (ONE A DAY) per tablet Take 1 tablet by mouth daily. 90 tablet 3     oxyCODONE-acetaminophen (PERCOCET) 5-325 mg per tablet Take 1 tablet by mouth every 6 (six) hours as needed for pain. 30 tablet 0     sertraline (ZOLOFT) 50 MG tablet TAKE 1 TABLET(50 MG) BY MOUTH DAILY 90 tablet 0     tiZANidine (ZANAFLEX) 4 MG tablet Take 4 mg by mouth bedtime.       tiZANidine (ZANAFLEX) 4 MG tablet START WITH 1/2 T AT NIGHT AND INCREASE BY 1/2 T QHS UPTO 4 TS QHS AS TOLERATED       UNABLE TO FIND Med Name:       No current facility-administered medications for this visit.        Family History   Problem Relation Age of Onset     Hypertension Father      Stroke Father      Heart disease Father      Hyperlipidemia Father      Depression Brother      Cancer Maternal Uncle      cervical     Heart disease Paternal Grandfather      Hypertension Paternal Grandfather      Breast cancer Paternal Grandmother 90     No Medical Problems Mother         reports that she quit smoking about 8 years ago. She has never used smokeless tobacco. She reports that she drinks alcohol. She reports that she does not use illicit drugs.    Review of Systems -   The 12 point review of systems  is within normal limits except for as mentioned above in the HPI.  General ROS: No complaints or constitutional symptoms  Ophthalmic ROS: No complaints of visual changes  Skin: No complaints or symptoms   Endocrine: No complaints or symptoms  Hematologic/Lymphatic: No symptoms  "or complaints  Psychiatric: No symptoms or complaints  Respiratory ROS: no cough, shortness of breath, or wheezing  Cardiovascular ROS: no chest pain or dyspnea on exertion  Gastrointestinal ROS: As per HPI  Genito-Urinary ROS: no dysuria, trouble voiding, or hematuria  Musculoskeletal ROS: no joint or muscle pain  Neurological ROS: no TIA or stroke symptoms      /72 (Patient Site: Left Arm, Patient Position: Sitting, Cuff Size: Adult Regular)  Pulse 87  Temp 97.8  F (36.6  C) (Oral)   Resp 18  Ht 5' 8\" (1.727 m)  Wt 177 lb (80.3 kg)  LMP 03/03/2017 (Exact Date)  SpO2 100%  BMI 26.91 kg/m2  Body mass index is 26.91 kg/(m^2).    EXAM:  GENERAL: Well developed female  HEENT: Extra ocular muscles intact, pupils are round and reactive, sclera is anicteric,   NECK:  No obvious masses or deformities  CARDIAC: RRR w/out murmur   CHEST/LUNG: Clear to auscultation bilaterally  ABDOMEN: Soft, tender to palpation in epigastric and right upper quadrant pain, small umbilical hernia with no incarcerated adipose tissue  NEURO: No obvious defects noted.  EXT: No edema, no obvious deformities or any other abnormalities    IMAGES:     CT ABDOMEN PELVIS WO ORAL WO IV CONTRAST  3/21/2017 4:52 PM     INDICATION: Left flank pain.  TECHNIQUE: Routine CT abdomen and pelvis without oral or IV contrast. Multiplanar reformation images (MPR). Dose reduction techniques were used.  COMPARISON: None.     FINDINGS:  LUNG BASES: Normal.     ABDOMEN: Noncontrast images of the liver, spleen, pancreas, gallbladder, adrenal glands in both kidneys are normal. No calculi or hydronephrosis. Aorta is normal caliber. Small bowel loops are unremarkable.     PELVIS: There is a 1 cm umbilical hernia containing only fat. Interval I'm and appendix are normal. No free fluid or inflammatory changes. Pelvic organs are unremarkable. No distal ureteral or bladder calculi     MUSCULOSKELETAL: No fractures.     IMPRESSION:   CONCLUSION:  1. No " abnormalities are seen to explain left flank pain. Specifically, no evidence of calculi, inflammatory changes or mass.  Assessment/Plan:    Angelia Stallings is a 41 y.o. female with an umbilical hernia.  The pathophysiology of umbilical hernias was discussed as were the surgical and non-operative management strategies.  She has no symptoms with regard to her umbilical hernia and is very small.  At this point I think it is reasonable to observe this for the time being.  She understands the rationale behind this and is agreeable.  With regards to her epigastric pain and esophageal discomfort, she may have a small hiatal hernia noted on her CT scan.  At this point I'll have her undergo esophagram for better characterization.  I will call her with the results and we'll plan for the next step once this is complete.     Ivan Quezada D.O. [facility]  [phone]  [facility] Department of Surgery

## 2021-06-09 NOTE — PROGRESS NOTES
"     Assessment & Plan:     1. LLQ abdominal pain  CT Abdomen Pelvis Without Oral Without IV Contrast    Urinalysis Macroscopic    Culture, Urine    HM2(CBC w/o Differential)    Urinalysis Macroscopic   2. Left flank pain         We reviewed the potential etiologies for her back and abdominal pain symptoms and we will check labs as noted and proceed with  a CT of the abdomen and pelvis. We reviewed use of OTC analgesics as well as increased fluids and rest, and she will call or return to clinic with any ongoing or worsening symptoms.     Subjective:           Angelia Stallings is a 41 y.o. female who presents for evaluation of pain in left lower back radiating around to the abdominal. Onset was 10 days ago, and symptoms have been unchanged. The pain is described as a constant aching, and is moderate in intensity. Pain is located in the L>R lower abdomen without radiation.  Aggravating factors: sitting up.  Alleviating factors: none. Associated symptoms: nausea, urinary urgency, incomplete emptying. The patient denies chills, dysuria, fever, frequency, hematuria and myalgias. She is mid-cycle and her partner has had a vasectomy.     Review of Systems  Pertinent items are noted in HPI.       Objective:     Visit Vitals     /74     Pulse 76     Ht 5' 8\" (1.727 m)     Wt 175 lb (79.4 kg)     LMP 03/03/2017 (Exact Date)     BMI 26.61 kg/m2     Physical Exam:  GEN: Alert and oriented, NAD,  well nourished  SKIN:  Normal skin turgor, no lesions/rashes   HEENT: moist mucous membranes, no rhinorrhea.    NECK: Normal.  No adenopathy or thyromegaly.  CV: Regular rate and rhythm, no murmurs.   LUNGS: Clear to auscultation bilaterally.    ABDOMEN: Soft, non-distended, no masses. Mild lower abdominal tenderness L>R.   EXTREMITY: No edema, cyanosis  NEURO: Grossly normal.        "

## 2021-06-09 NOTE — PROGRESS NOTES
[facility] Daily Progress     [facility] Discharge Summary    Patient Name: Angelia Stallings  Date: 3/29/2017  Referral Diagnosis: [unfilled]  Referring provider: Isabela Bell,*  Visit Diagnosis:   1. Cervicalgia     2. Neck pain     3. Chronic tension-type headache, intractable     4. Edema, unspecified type         Goals:  Pt. will demonstrate/verbalize independence in self-management of condition in : Partially Met  Pt. will be independent with home exercise program in : Met  Pt. will report decreased intensity, frequency of : Partially met  Pt. will decrease use of medication for pain for improved quality of life in : Not Met  Pt. will have improved quality of sleep: Progressing toward  Patient Turn Head: Partially met  Patient will look up / down: Partially met  Patient will perform repetitive movement in : Partially met  Pt will: Progressing toward    Patient was seen for 9 visits from 1/3/17 to 2/27/17 with 0 missed appointments.  The patient attended therapy initially, but did not finish the therapy sessions prescribed.  Goals were not fully achieved. Explanation for goals not achieved: Pt's HA and head pain have been reduced 5-10/10 to 5 /10,  Pt D/Ruiz therapy and did not follow up to address the remaining pain issue  Patient received a home program for cranial bone self releases  The patient discontinued therapy, did not return.    Therapy will be discontinued at this time.  The patient will need a new referral to resume.    Thank you for your referral.  Jeff Rico  3/29/2017  8:45 AM  Patient Name: Angelia Stallings  Date: 3/29/2017  Visit #: 9/12    Referral Diagnosis: Chronic neck pain and HA  Referring provider: Isabela Bell,*  Visit Diagnosis:     ICD-10-CM    1. Cervicalgia M54.2    2. Neck pain M54.2    3. Chronic tension-type headache, intractable G44.221    4. Edema, unspecified type R60.9          Assessment:     Patient demonstrates  understanding/independence with home program.  Patient is benefitting from skilled physical therapy and is making steady progress toward functional goals.  Patient is appropriate to continue with skilled physical therapy intervention, as indicated by initial plan of care.    Goal Status:  Pt. will demonstrate/verbalize independence in self-management of condition in : Partially Met  Pt. will be independent with home exercise program in : Met  Pt. will report decreased intensity, frequency of : Partially met  Pt. will decrease use of medication for pain for improved quality of life in : Not Met  Pt. will have improved quality of sleep: Progressing toward  Patient Turn Head: Partially met  Patient will look up / down: Partially met  Patient will perform repetitive movement in : Partially met  Pt will: Progressing toward    Plan / Patient Education:     Continue with initial plan of care.    Subjective:     Pain Ratin  Pain to Neck cheeks and R eye      Objective:      6 sec,   Maxilla restriction,       Treatment Today     TREATMENT MINUTES COMMENTS   Evaluation     Self-care/ Home management     Manual therapy 60 MFR with OA release,  Cranial bone release to Maxilla, frontal, nasal, zygoma bones.   CS to TRIG 1, 3 R,   TEMP-N R,   FAC1-NR,   FAC2,3 bilat - N   MFALX-N R,   TENT-N bilat.   SC S to the Internal carotid  Art bilat   ANT Cerebral ART bilat.     Releases achieved,   Cranial and facial pain greatly reduced.  Dural membranes relaxed  Fascia of the Facial, Trigeminal Temporal nerves  normalizing       Neuromuscular Re-education     Therapeutic Activity     Therapeutic Exercises     Gait training     Modality__________________                Total 60    Blank areas are intentional and mean the treatment did not include these items.       Jeff Rico  3/29/2017

## 2021-06-09 NOTE — PROGRESS NOTES
OV  2/28/2017  Assessment:        1. Migraine without aura  diazePAM (VALIUM) 5 MG tablet    oxyCODONE-acetaminophen (PERCOCET) 5-325 mg per tablet   2. Neck pain  tiZANidine (ZANAFLEX) 4 MG tablet   3. Allergic rhinitis  fluticasone (FLONASE) 50 mcg/actuation nasal spray    cetirizine-pseudoephedrine (ZYRTEC-D) 5-120 mg per tablet   4. Major depression single episode, in partial remission  buPROPion (WELLBUTRIN XL) 150 MG 24 hr tablet   5. Anxiety  diazePAM (VALIUM) 5 MG tablet   6. History of intracranial aneurysm          Plan:        We reviewed the etiology and natural history for depression/anxiety and options for treatment. We elected to add  buproprion, and we discussed potential side effects. We reviewed the potential side effects, need to taper the medications gradually, and indications for urgent evaluation. For the headaches, I would like her to try adding flonase and zyrtec D. She has diazepam for both the anxiety and neck pain, an I will send a new Rx for oxycodone/acetaminophen to take for more severe headaches. She will let me know if she has any significant side effects or concerns.  Should f/u in 2-3 mos for recheck.       Subjective:           Angelia Stallings is a 41 y.o. female who presents for follow up of anxiety and depression. She has the following anxiety symptoms: feelings of losing control, irritable, racing thoughts and excessive worry. Onset of symptoms was several mos ago. Symptoms have beengradually worsening over the last several mos. Current symptoms include depressed mood, anhedonia, insomnia, fatigue, feelings of worthlessness/guilt and difficulty concentrating and irritable and excessive worry, difficulty relaxing. Patient denies hopelessness and recurrent thoughts of death and panic attacks. She denies current suicidal and homicidal ideation. She is currently taking effexor without much change.          Patient also presents for follow-up of mixed tension and migraine headaches.  "She's been experiencing neck pain as well and has been seeing neurology, chiropractic and PT - most recently craniosacral tx. She does have some ongoing allergy sx and has been doing intermittent otc treatment. Headaches are occurring several times per month. Home treatment has included tizanidine and NSAIDs with some improvement. Work attendance or other daily activities are affected by the headaches.     Little interest or pleasure in doing things: Several days  Feeling down, depressed, or hopeless: Several days  Trouble falling or staying asleep, or sleeping too much: Nearly every day  Feeling tired or having little energy: More than half the days  Poor appetite or overeating: Not at all  Feeling bad about yourself - or that you are a failure or have let yourself or your family down: Several days  Trouble concentrating on things, such as reading the newspaper or watching television: Not at all  Moving or speaking so slowly that other people could have noticed. Or the opposite - being so fidgety or restless that you have been moving around a lot more than usual: Not at all  Thoughts that you would be better off dead, or of hurting yourself in some way: Not at all  PHQ-9 Total Score: 8  If you checked off any problems, how difficult have these problems made it for you to do your work, take care of things at home, or get along with other people?: Very difficult     Review of Systems  Pertinent items are noted in HPI.      Objective:     Visit Vitals     /66     Pulse 60     Ht 5' 8\" (1.727 m)     Wt 171 lb 7 oz (77.8 kg)     BMI 26.07 kg/m2     Physical Exam:  GEN: Alert and oriented, NAD,  well nourished  SKIN:  Normal skin turgor, no lesions/rashes   HEENT: moist mucous membranes, no rhinorrhea.    NECK: Normal.  No adenopathy or thyromegaly.  CV: Regular rate and rhythm, no murmurs.   LUNGS: Clear to auscultation bilaterally.    ABDOMEN: Soft, non-tender, non-distended, no masses   EXTREMITY: No edema, " cyanosis  NEURO: Grossly normal.     Mental Status Examination:  Dress, grooming, personal hygiene: Appropriate  Speech: Appropriate  Mood: Appropriate

## 2021-06-09 NOTE — PROGRESS NOTES
[facility] Daily Progress     Patient Name: Angelia Stallings  Date: 2/20/2017  Visit #:8/12    Referral Diagnosis: Neck and HA Pain  Referring provider: Darío Limon MD  Visit Diagnosis:     ICD-10-CM    1. Cervicalgia M54.2    2. Intractable chronic post-traumatic headache G44.321    3. Neck pain M54.2    4. Chronic tension-type headache, intractable G44.221    5. Edema, unspecified type R60.9          Assessment:     Patient demonstrates understanding/independence with home program.  Patient is benefitting from skilled physical therapy and is making steady progress toward functional goals.  Patient is appropriate to continue with skilled physical therapy intervention, as indicated by initial plan of care.    Goal Status:  Pt. will demonstrate/verbalize independence in self-management of condition in : Progressing toward  Pt. will be independent with home exercise program in : Progressing toward  Pt. will report decreased intensity, frequency of : Partially met  Pt. will decrease use of medication for pain for improved quality of life in : 12 weeks  Pt. will have improved quality of sleep: with less pain;waking less times/night;in 6 weeks;getting 75-90% of required amount  Patient Turn Head: Partially met  Patient will look up / down: Partially met  Patient will perform repetitive movement in : Progressing toward  Pt will: Progressing toward    Plan / Patient Education:     Continue with initial plan of care.    Subjective:     Pain Rating: 3 /10 eye pain  4/10 Neck pain      Objective:     HA's were minor  1 on Sat,   Dural restrictions at C5,6, T3   Cranial nerve  L + Mapping    Treatment Today     TREATMENT MINUTES COMMENTS   Evaluation     Self-care/ Home management     Manual therapy 45 MFR with OA release and Dural tube pull,   SCS to TRIG1,2,3-N L,  Temporal bone release,   FAC1-NFAC2,3-N bilat    Releases achieved  Pain to head decreasing   Neuromuscular Re-education     Therapeutic Activity      Therapeutic Exercises     Gait training     Modality__________________                Total 45    Blank areas are intentional and mean the treatment did not include these items.       Jeff Rico  2/20/2017

## 2021-06-10 NOTE — PROGRESS NOTES
Angelia Stallings presents for discussion of possible Nissen fundoplication after her upper GI evaluation.  She has started taking her over-the-counter PPI with good relief of her reflux symptoms.  She continues to have discomfort in the epigastric region and also notes mild right mid abdominal pain which she feels is radiation from her back muscle spasms.  She denies any fevers, chills or any other symptoms at present.  EXAM:  /82 (Patient Site: Left Arm, Patient Position: Sitting, Cuff Size: Adult Regular)  Pulse 91  Wt 174 lb 8 oz (79.2 kg)  SpO2 98%  BMI 26.53 kg/m2  GENERAL: Well developed female, No acute distress, pleasant and conversant   EYES: Pupils equal, round and reactive, no scleral icterus  ABDOMEN: Soft, mild tenderness to palpation in the epigastric region and right mid abdomen.  SKIN: Pink, warm and dry, no obvious rashes or lesions   NEURO:No focal deficits, ambulatory  MUSCULOSKELETAL:No obvious deformities, no swelling, normal appearing     RESULTS:  XR ESOPHAGRAM  4/11/2017 9:59 AM     INDICATION: Pain while swallowing solid foods.  TECHNIQUE: Routine.  COMPARISON: None.     FINDINGS:  FLUOROSCOPIC TIME: 1.6 minutes  NUMBER OF IMAGES: 25     ESOPHAGUS: Normal peristalsis. No strictures, masses, or inflammatory changes. There is a small hiatal hernia. Reflux is noted to the thoracic inlet. A 12 mm tablet passed into the stomach uneventfully.     IMPRESSION:   CONCLUSION:  1. Small hiatal hernia.  2. Reflux to the thoracic inlet.    ASSESSMENT AND PLAN:  Angelia Stallings demonstrates a small hiatal hernia with reflux on her esophagram.  I have discussed the findings with her in detail.  She seems to have good relief from her PPI medication.  However, she has had the symptoms for several years including the epigastric discomfort with a sensation of food getting stuck and she is leaning toward surgery.  At this point we will plan on an EGD for evaluation of her esophagus as well as her  small hiatal hernia.  She will also require manometry which we may discuss at a later date.  For the meantime, she will continue with her PPI and I will schedule her for an EGD.    Ivan Quezada D.O. FACS  [phone]  [facility] Department of Surgery

## 2021-06-10 NOTE — PROGRESS NOTES
Angelia is schedule for an EGD with Dr. Quezada on 5/2/17 at [facility]. Patient was given the instructions on arrival time, NPO after midnight and a . Patient verbalized understanding.    Bhavna Tejada [facility]  Physician    [facility]   [phone]

## 2021-06-10 NOTE — PROGRESS NOTES
Prior auth completed for Nexium 20 mg DR Bob: Take 1 capsule BID before meals. I have asked this to be expedited with a 72 hour response. I have called the pt to inform her of this and will call her once I receive an answer via fax by Luqit. Pt states she is also interested in making a follow up appointment to discuss surgery with Dr. Quezada. She will wait to trial the Nexium first, however.

## 2021-06-11 NOTE — PROGRESS NOTES
OV    7/11/2017  Assessment:     1. Major depression single episode, in partial remission     2. Anxiety  sertraline (ZOLOFT) 100 MG tablet          Plan:      We reviewed the etiology and natural history of depression/anxiety and options for treatment. She would like to increase the sertraline as directed. We reviewed the potential side effects and indications for urgent evaluation. She will let me know if she has any significant side effects or concerns. Should f/u in 6-12 mos for recheck..       Subjective:         Angelia Stallings is an 41 y.o. female who presents for follow up of of depression and anxiety. Onset approximately >1 year ago, and symptoms have been well-controlled since that time. Current symptoms include: depressed mood, anhedonia and fatigue and fatigue. Patient denies difficulty concentrating, recurrent thoughts of death and suicidal thoughts with specific plan.      Current treatment for depression: Medication: sertraline. She complains of the following side effects from the treatment: none.         Little interest or pleasure in doing things: Several days  Feeling down, depressed, or hopeless: Several days  Trouble falling or staying asleep, or sleeping too much: More than half the days  Feeling tired or having little energy: More than half the days  Poor appetite or overeating: Several days  Feeling bad about yourself - or that you are a failure or have let yourself or your family down: Several days  Trouble concentrating on things, such as reading the newspaper or watching television: Not at all  Moving or speaking so slowly that other people could have noticed. Or the opposite - being so fidgety or restless that you have been moving around a lot more than usual: Not at all  Thoughts that you would be better off dead, or of hurting yourself in some way: Not at all  PHQ-9 Total Score: 8   The following portions of the patient's history were reviewed and updated as appropriate: allergies,  "current medications, past family history, past medical history, past social history, past surgical history and problem list.    Review of Systems  Pertinent items are noted in HPI.     Objective:     /80  Pulse 80  Ht 5' 8\" (1.727 m)  Wt 170 lb 8 oz (77.3 kg)   BMI 25.92 kg/m2  Physical Exam:  GEN: Alert and oriented, NAD,  well nourished  SKIN:  Normal skin turgor, no lesions/rashes   HEENT: moist mucous membranes, no rhinorrhea.    NECK: Normal.  No adenopathy or thyromegaly.  CV: Regular rate and rhythm, no murmurs.   LUNGS: Clear to auscultation bilaterally.    ABDOMEN: Soft, non-tender, non-distended, no masses   EXTREMITY: No edema, cyanosis  NEURO: Grossly normal.       Mental Status Examination:  Dress, grooming, personal hygiene: Appropriate  Speech: Appropriate  Mood: Appropriate        "

## 2021-06-12 NOTE — PROGRESS NOTES
Assessment & Plan:       1. Pelvic pain in female  Urinalysis Macroscopic    Culture, Urine    HM1(CBC and Differential)    Sedimentation Rate    C-Reactive Protein(CRP)    CT Abdomen Pelvis With Oral With IV Contrast     2. Anxiety    3. Migraine without aura and without status migrainosus, not intractable          We reviewed the potential etiologies for her pelvic pain symptoms and we will check labs as noted, proceed with a CT abdomen and pelvis to rule out pathology. We reviewed use of OTC analgesics as well as increased fluids and rest, and she will continue her same medications otherwise. She will call or return to clinic with any ongoing or worsening symptoms.        Subjective:           Angelia Stallings is a 45 y.o. female who presents for  evaluation of lower abdominal pain. Onset was a few weeks ago. Symptoms have gradually worsened. The pain is described as aching and cramping, and is moderate in intensity. Pain is located in the suprapubic area with vaginal pain/pressure. Aggravating factors: activity.  Alleviating factors: recumbency. Associated symptoms: bloating, dyspepsia, nausea and feeling poorly. The patient denies anorexia, chills, fever and hematochezia.       She saw OB/Gyn and had a pelvic u/s last week that showed adenomyosis, a fibroid, and right-sided cyst. She has a follow up with Dr Lehman on 11/16/20.         She does note some urinary frequency and discomfort with bladder filling, but no dysuria or urgency. No hematuria.        She is under a lot of stress as her best friend is actively dying of cancer. She is managing ok with her current sertraline dosing.     The following portions of the patient's history were reviewed and updated as appropriate: allergies, current medications, past family history, past medical history, past social history, past surgical history and problem list.    Review of Systems  A 12 point comprehensive review of systems was negative except as noted.      "  Objective:     Vitals:    10/27/20 1459   BP: 112/82   Patient Position: Sitting   Cuff Size: Adult Regular   Pulse: 93   SpO2: 96%   Weight: 187 lb 2 oz (84.9 kg)   Height: 5' 8\" (1.727 m)      Body mass index is 28.45 kg/m .   Physical Exam:  GEN: Alert and oriented, NAD,  well nourished  SKIN:  Normal skin turgor, no lesions/rashes   HEENT: moist mucous membranes, no rhinorrhea.    NECK: Normal.  No adenopathy or thyromegaly.  CV: Regular rate and rhythm, no murmurs.   LUNGS: Clear to auscultation bilaterally.    ABDOMEN: Soft, non-distended, no masses   EXTREMITY: No edema, cyanosis  NEURO: Grossly normal.      Results for orders placed or performed in visit on 10/27/20   Culture, Urine    Specimen: Urine   Result Value Ref Range    Culture No Growth    Urinalysis Macroscopic   Result Value Ref Range    Color, UA Yellow Colorless, Yellow, Straw, Light Yellow    Clarity, UA Clear Clear    Glucose, UA Negative Negative    Bilirubin, UA Negative Negative    Ketones, UA Negative Negative    Specific Gravity, UA <=1.005 1.005 - 1.030    Blood, UA Trace (!) Negative    pH, UA 5.5 5.0 - 8.0    Protein, UA Negative Negative mg/dL    Urobilinogen, UA 0.2 E.U./dL 0.2 E.U./dL, 1.0 E.U./dL    Nitrite, UA Negative Negative    Leukocytes, UA Negative Negative   Sedimentation Rate   Result Value Ref Range    Sed Rate 9 0 - 20 mm/hr   C-Reactive Protein(CRP)   Result Value Ref Range    CRP 0.1 0.0 - 0.8 mg/dL   HM1 (CBC with Diff)   Result Value Ref Range    WBC 7.9 4.0 - 11.0 thou/uL    RBC 4.48 3.80 - 5.40 mill/uL    Hemoglobin 12.6 12.0 - 16.0 g/dL    Hematocrit 37.3 35.0 - 47.0 %    MCV 83 80 - 100 fL    MCH 28.1 27.0 - 34.0 pg    MCHC 33.8 32.0 - 36.0 g/dL    RDW 12.4 11.0 - 14.5 %    Platelets 303 140 - 440 thou/uL    MPV 7.3 7.0 - 10.0 fL    Neutrophils % 60 50 - 70 %    Lymphocytes % 32 20 - 40 %    Monocytes % 6 2 - 10 %    Eosinophils % 2 0 - 6 %    Basophils % 0 0 - 2 %    Neutrophils Absolute 4.7 2.0 - 7.7 thou/uL "    Lymphocytes Absolute 2.5 0.8 - 4.4 thou/uL    Monocytes Absolute 0.5 0.0 - 0.9 thou/uL    Eosinophils Absolute 0.2 0.0 - 0.4 thou/uL    Basophils Absolute 0.0 0.0 - 0.2 thou/uL

## 2021-06-13 NOTE — TELEPHONE ENCOUNTER
Who is calling:  Patient   Reason for Call:  Patient stated that her best friend just passed away and she is out of her medication and would like this to be addressed as soon as possible.  Date of last appointment with primary care: n/a  Okay to leave a detailed message: Yes

## 2021-06-14 NOTE — TELEPHONE ENCOUNTER
RN Triage:     Patient calling in stating that   Neurologist, migraines for the last 6 weeks.   She was prescribed prednisone, finished it last week.   Missed work Thursday and Friday. The neurologist was talking about an infusion versus another prednisone trial Her neurologist is Dr. Rosario at [facility]  She was told by her TMJ provider to take Advil 800mg not helping. She took 1/2 a percocet and reported this helps  Using heating pad.    Stressed induced headache.   Complex migraines in the past.   NO fever.   Waiting on botox approval.   Advised to be seen within 24 hours. .  [facility] hours and location discussed.   Advised she can ty calling the oncall provider for the [facility].     Dennise Stern RN, BSN [facility] Triage Nurse            Reason for Disposition    [1] MODERATE headache (e.g., interferes with normal activities) AND [2] present > 24 hours AND [3] unexplained  (Exceptions: analgesics not tried, typical migraine, or headache part of viral illness)    Additional Information    Negative: Difficult to awaken or acting confused (e.g., disoriented, slurred speech)    Negative: [1] Weakness of the face, arm or leg on one side of the body AND [2] new onset    Negative: [1] Numbness of the face, arm or leg on one side of the body AND [2] new onset    Negative: [1] Loss of speech or garbled speech AND [2] new onset    Negative: Passed out (i.e., lost consciousness, collapsed and was not responding)    Negative: Sounds like a life-threatening emergency to the triager    Negative: Followed a head injury    Negative: Pregnant    Negative: Postpartum (from 0 to 6 weeks after delivery)    Negative: Traumatic Brain Injury (TBI) is suspected    Negative: Unable to walk, or can only walk with assistance (e.g., requires support)    Negative: Stiff neck (can't touch chin to chest)    Negative: Severe pain in one eye    Negative: [1] Other family members (or roommates) with headaches AND [2] possibility of  "carbon monoxide exposure    Negative: [1] SEVERE headache (e.g., excruciating) AND [2] \"worst headache\" of life    Negative: [1] SEVERE headache AND [2] sudden-onset (i.e., reaching maximum intensity within seconds)    Negative: [1] SEVERE headache AND [2] fever    Negative: Loss of vision or double vision (Exception: same as prior migraines)    Negative: [1] Fever > 100.0 F (37.8 C) AND [2] diabetes mellitus or weak immune system (e.g., HIV positive, cancer chemo, splenectomy, organ transplant, chronic steroids)    Negative: Patient sounds very sick or weak to the triager    Negative: [1] SEVERE headache (e.g., excruciating) AND [2] not improved after 2 hours of pain medicine    Negative: [1] Vomiting AND [2] 2 or more times (Exception: similar to previous migraines)    Negative: Fever > 104 F (40 C)    Protocols used: HEADACHE-A-AH      "

## 2021-06-14 NOTE — PROGRESS NOTES
ASSESSMENT/PLAN:   42 y.o.  female presents with acute on chronic back and abdominal pain.  First episode was earlier this year whereby she had a normal workup including unremarkable CT abd/pelvis.  Examination was significant for intense tenderness on palpation of the entire abdomen and particularly on the left side.  Due to this, I opted to get another abdominal/pelvis CT scan.  Thus far, CMP, CBC, urinalysis, lipase were normal.  The first episode and present episode were related to taking miralax; hence, I asked her to stop miralax as there may be a correlation.  Musculoskeletal, nephrolithiasis, gynecological, anxiety are all differential diagnoses.  The patient may continue with OTC symptomatic treatment.  Rest, hydration, nutritional support, and time were counseled.  Follow up with primary care provider if the patient in 1-2 weeks.  The patient verbalized understanding and agreed with the plan.    Abdominal pain  -     Comprehensive Metabolic Panel  -     HM2(CBC w/o Differential)  -     Urinalysis  -     Culture, Urine  -     Cancel: CT Abdomen Pelvis Without Oral With Without IV Contrast; Future; Expected date: 12/13/17  -     Lipase  -     CT Abdomen Pelvis With Oral With IV Contrast; Future; Expected date: 12/13/17    Orders Placed This Encounter   Procedures     Culture, Urine     CT Abdomen Pelvis With Oral With IV Contrast     Per Dr. Silva  CT Abdomen Pelvis w/oral w/iv  [facility] 12/13/17 @ 1153 - [facility]       Standing Status:   Future     Number of Occurrences:   1     Standing Expiration Date:   12/13/2018     Order Specific Question:   Reason for Exam (Describe Symptoms):     Answer:   [phone]     Order Specific Question:   Is the patient pregnant?     Answer:   No     Order Specific Question:   Can the procedure be changed per Radiologist protocol?     Answer:   Yes     Order Specific Question:   If this is a diagnostic procedure, have the patient's age and recent imaging history been  considered?     Answer:   Yes     Comprehensive Metabolic Panel     HM2(CBC w/o Differential)     Urinalysis     Lipase           CHIEF COMPLAINT:  Chief Complaint   Patient presents with     Back Pain     mid back  radiates to the front x 6 days     Abdominal Pain     right and left side of stomach not at same time x 6 days       HISTORY OF PRESENT ILLNESS:  Angelia is a 42 y.o. female presenting to the clinic today for back and abdominal pain. 6 days ago, she had onset of mid back pain that started on right side, and moved to the left side. She has also had associated abdominal pain and pelvic pain. She feels like the back pain radiates to the front. She rates the back pain at a 5/10, and rates the abdominal pain at a 5/10. The pain is constant. This morning, the pain felt worse for her after eating. Moving does not make it better or worse. She has had some nausea. No vomiting. She tried Miralax because she thought her symptoms were constipated related. She had looser stools this morning. She has had more burping and increased gas. Denies dysuria. She is having some urinary frequency. Denies hematuria. No periods in the past six months due to endometrial ablation. Denies fevers, chills, myalgias. She has felt some weakness in her legs, but she has not been sleeping well. She does have a history of the back pain for the past 6 months; she has had a CT scan that showed an umbilical hernia and hiatal hernia. Current medications include sertraline and omeprazole in the morning; she takes tizanidine at night for migraines and neck pain. She did take diazepam last night when she got home from work.     REVIEW OF SYSTEMS:   Constitutional: Negative.   HENT: Negative.   Eyes: Negative.   Respiratory: Negative.   Cardiovascular: Negative.   Endocrine: Negative.   Musculoskeletal: Negative.   Skin: Negative.   Allergic/Immunologic: Negative.   Neurological: Negative.   Hematological: Negative.   Psychiatric/Behavioral:  Negative.   All other systems are negative.    PFSH:  No new history.     TOBACCO USE:  History   Smoking Status     Former Smoker     Quit date: 1/1/2009   Smokeless Tobacco     Never Used     Comment: No exposure       VITALS:  Vitals:    12/13/17 1008   BP: 130/86   Pulse: 76   Weight: 175 lb 8 oz (79.6 kg)     Wt Readings from Last 3 Encounters:   12/13/17 175 lb 8 oz (79.6 kg)   07/11/17 170 lb 8 oz (77.3 kg)   06/19/17 170 lb (77.1 kg)       PHYSICAL EXAM:  Constitutional: Patient is oriented to person, place, and time. Patient appears well-developed and well-nourished. No distress.    Cardiovascular: Normal rate, regular rhythm, normal heart sounds and intact distal pulses. No murmur heard.   Pulmonary/Chest: Effort normal and breath sounds normal. No stridor. No respiratory distress. Patient has no wheezes, no rales, exhibits no tenderness.   Abdominal: Soft. Bowel sounds are normal. Patient exhibits no distension and no mass. There is no tenderness. There is no rebound and no guarding. Diffuse discomfort to palpation of abdomen.   Skin: Skin is warm and dry. No rash noted. Patient is not diaphoretic. No erythema. No pallor.  Back: No tenderness to percussion to the CVA.     Results for orders placed or performed in visit on 12/13/17   Comprehensive Metabolic Panel   Result Value Ref Range    Sodium 138 136 - 145 mmol/L    Potassium 4.3 3.5 - 5.0 mmol/L    Chloride 103 98 - 107 mmol/L    CO2 28 22 - 31 mmol/L    Anion Gap, Calculation 7 5 - 18 mmol/L    Glucose 71 70 - 125 mg/dL    BUN 14 8 - 22 mg/dL    Creatinine 0.84 0.60 - 1.10 mg/dL    GFR MDRD Af Amer >60 >60 mL/min/1.73m2    GFR MDRD Non Af Amer >60 >60 mL/min/1.73m2    Bilirubin, Total 0.6 0.0 - 1.0 mg/dL    Calcium 9.2 8.5 - 10.5 mg/dL    Protein, Total 7.3 6.0 - 8.0 g/dL    Albumin 3.7 3.5 - 5.0 g/dL    Alkaline Phosphatase 51 45 - 120 U/L    AST 20 0 - 40 U/L    ALT 21 0 - 45 U/L   HM2(CBC w/o Differential)   Result Value Ref Range    WBC 5.8 4.0  - 11.0 thou/uL    RBC 4.66 3.80 - 5.40 mill/uL    Hemoglobin 13.0 12.0 - 16.0 g/dL    Hematocrit 38.2 35.0 - 47.0 %    MCV 82 80 - 100 fL    MCH 28.0 27.0 - 34.0 pg    MCHC 34.1 32.0 - 36.0 g/dL    RDW 12.0 11.0 - 14.5 %    Platelets 310 140 - 440 thou/uL    MPV 7.3 7.0 - 10.0 fL   Urinalysis   Result Value Ref Range    Color, UA Yellow Colorless, Yellow, Straw, Light Yellow    Clarity, UA Clear Clear    Glucose, UA Negative Negative    Bilirubin, UA Negative Negative    Ketones, UA Negative Negative    Specific Gravity, UA <=1.005 1.005 - 1.030    Blood, UA Negative Negative    pH, UA 6.5 5.0 - 8.0    Protein, UA Negative Negative mg/dL    Urobilinogen, UA 0.2 E.U./dL 0.2 E.U./dL, 1.0 E.U./dL    Nitrite, UA Negative Negative    Leukocytes, UA Negative Negative   Lipase   Result Value Ref Range    Lipase 42 0 - 52 U/L           ADDITIONAL HISTORY SUMMARIZED (2): Reviewed note from [facility] from 3/19/2017 regarding abdominal and back pain.  DECISION TO OBTAIN EXTRA INFORMATION (1): None.   RADIOLOGY TESTS (1): Reviewed abdominal CT from 3/21/2017. Ordered abdominal CT.   LABS (1): Ordered labs today.  MEDICINE TESTS (1): None.  INDEPENDENT REVIEW (2 each): None.     ICaty, am scribing for and in the presence of, Dr. Torres.    IDebby MD , personally performed the services described in this documentation, as scribed by Caty Mckeon in my presence, and it is both accurate and complete.    MEDICATIONS:  Current Outpatient Prescriptions   Medication Sig Dispense Refill     diazePAM (VALIUM) 5 MG tablet TK 2-3 TS PO QID PRN FOR MIGRAINE       methylPREDNISolone (MEDROL DOSEPACK) 4 mg tablet Take as directed. 21 tablet 0     multivitamin (ONE A DAY) per tablet Take 1 tablet by mouth daily. 90 tablet 3     sertraline (ZOLOFT) 100 MG tablet Take 1 tablet (100 mg total) by mouth daily. TAKE 1 TABLET(50 MG) BY MOUTH DAILY 90 tablet 3     tiZANidine (ZANAFLEX) 4 MG  tablet START WITH 1/2 T AT NIGHT AND INCREASE BY 1/2 T QHS UPTO 4 TS QHS AS TOLERATED       esomeprazole (NEXIUM) 20 MG capsule Take 1 capsule (20 mg total) by mouth 2 (two) times a day before meals. 60 capsule 2     omeprazole (PRILOSEC) 40 MG capsule TK 1 C PO QD. START 1 DAY BEFORE PREDNISONE AND CONTINUE UNTIL PREDNISONE GONE. PRN THEREAFTER  0     oxyCODONE-acetaminophen (PERCOCET) 5-325 mg per tablet Take 1 tablet by mouth every 6 (six) hours as needed for pain. 30 tablet 0     No current facility-administered medications for this visit.        Total data points: 4

## 2021-06-15 PROBLEM — K42.9 UMBILICAL HERNIA: Status: ACTIVE | Noted: 2017-04-03

## 2021-06-15 NOTE — PROGRESS NOTES
"[facility] Daily Progress     Patient Name: Angelia Stallings  Date: 2018  Visit #: 4  PTA visit #:  3  Referral Diagnosis: Chronic bilateral low back pain without sciatica  Referring provider: Isabela Bell,*  Visit Diagnosis:     ICD-10-CM    1. Chronic bilateral low back pain without sciatica M54.5     G89.29          Assessment:   Pt has been compliant with her HEP.    Patient is benefitting from skilled physical therapy and is making steady progress toward functional goals.  Patient is appropriate to continue with skilled physical therapy intervention, as indicated by initial plan of care.    Goal Status: Making progress  Pt. will demonstrate/verbalize independence in self-management of condition in : 4 weeks  Pt will: be able to stand greater than 5 minutes without increased pain in 4 weeks  Pt will: be able to return to her previous walking routine in 4 weeks    Plan / Patient Education:     Continue with initial plan of care.  Progress with home program as tolerated.   Go over HEP. Progress core and LE strength.  Re assess manual therapy    Subjective:   Pt reports she is having more pain in central low back.   Pt states her R hip has been \"popping\" when doing the marching.  Pt states she felt good after the last treatment.  Pt was able to do the Eliptical for 10 minutes.  Pain Ratin pain is intermittant        Objective:   Lumbar ROM:  Flexion: mod loss with report of mild low back pain  Extension: moderate loss panfree  Side bending: WNL B felt pull in B QL  Rotation: WNL    Tender B ASIS and lumbar paraspinals.  Pelvis and leg length equal supine to sit.  Pt demonstrates decreased hip rotator ROM R>L    Treatment Today     TREATMENT MINUTES COMMENTS   Evaluation     Self-care/ Home management     Manual therapy 15 TPR B hip flexors, manual hip flexor stretch B  MFR/STM to lumbar paraspinals and B gluts   Neuromuscular Re-education     Therapeutic Activity     Therapeutic " Exercises 15 See flow sheet  Added to HEP:  Pelvic clock 12-6 supine and on 75cm ex ball   Gait training     Modality__________________                Total 30    Blank areas are intentional and mean the treatment did not include these items.       Alba Diaz,CLT  1/18/2018

## 2021-06-15 NOTE — PROGRESS NOTES
1. Chronic bilateral low back pain without sciatica  MR Lumbar Spine Without Contrast    Ambulatory referral to PT/OT    cyclobenzaprine (FLEXERIL) 10 MG tablet   2. Abdominal pain, unspecified abdominal location       Med list reconciled    ASSESSMENT/PLAN:     The following high BMI interventions were performed this visit: exercise promotion: stretching    1. Chronic bilateral low back pain without sciatica    - MR Lumbar Spine Without Contrast; Future  - Ambulatory referral to PT/OT  - cyclobenzaprine (FLEXERIL) 10 MG tablet; Take 1 tablet (10 mg total) by mouth 3 (three) times a day as needed for muscle spasms.  Dispense: 30 tablet; Refill: 0    2. Abdominal pain, unspecified abdominal location    -known right ovarian cyst, recent uterine ablation    There are no Patient Instructions on file for this visit.  Medications Discontinued During This Encounter   Medication Reason     tiZANidine (ZANAFLEX) 4 MG tablet Therapy completed     oxyCODONE-acetaminophen (PERCOCET) 5-325 mg per tablet Therapy completed     methylPREDNISolone (MEDROL DOSEPACK) 4 mg tablet Therapy completed     diazePAM (VALIUM) 5 MG tablet Therapy completed     Return to clinic if abdominal pain and low back pain persist in the next several weeks.  Patient referred to physical therapy for evaluation and treatment.  Orders placed for repeat MRI of lumbar spine, last completed in 2008.    The visit lasted a total of 25 minutes face to face with the patient.  Over 50% of the time spent counseling and educating the patient about above content.      Jenni Ferrera NP          SUBJECTIVE:  Angelia Stallings is a 42 y.o. female who presents reporting abdominal pain and low back pain.  Her abdominal pain started on December 9 and has been intermittent since that time.  She describes her pain as a sharp sensation in the right and left lower quadrant and pelvis area.  She does take Nexium for chronic esophageal reflux which has been effective.  She  informs me that she recently had a uterine ablation performed and was informed the summer that she has a right ovarian cyst.  She recently had to CT of the abdomen and pelvis performed this year once in March and lastly in December 2017.  Both scans were unremarkable at that time, nothing to indicate why she is having abdominal discomfort.  She is rating her abdominal discomfort a 7 out of 10 today.  She denies any fevers, ongoing diarrhea, dysuria, hematuria, cloudy urine, urinary frequency, weight loss, chills or body aches today.  She does suffer from chronic low back pain.  Reviewed previous imaging studies, last MRI was in 2008 revealed a foraminal disc protrusion of the L5 through S1.  There was slight displacement at that time of the S1 nerve root.  MRI did show mild degenerative changes of the lumbar spine.  She tells me that her back pain started 2 weeks ago and is localized to the low mid back.  She states the pain comes and goes and describes it as a sharp sensation.  Aggravating factors: Sitting too long.  Relieving factors: Ice and Aleve twice daily.  She is rating her back pain a 6 out of 10 today.  I feel it is appropriate for her to repeat her MRI imaging since it has been almost 10 years in order to follow-up with the protrusion in the L5-S1 area.  Did discuss pain control today, patient has been taking tizanidine at night for 2 years for her migraine headaches.  She feels that it is no longer working for her I did offer her Flexeril today for her back spasms.  I did inform her that narcotic therapy is not indicated for acute back pain therefore I did refer her to physical therapy for range of motion evaluation and treatment.  Patient recalls using Flexeril in the past with relief of symptoms, she agrees to discontinuing the tizanidine.   Chief Complaint   Patient presents with     Pain     ongoing back and abdominal pain, muscle spasms no known injury         Patient Active Problem List   Diagnosis      Allergic Rhinitis     Lower Back Pain     Anxiety     Neck pain     S/P craniotomy, MCA aneurysm clipping x 2     Menorrhagia     Major depression single episode, in partial remission     History of intracranial aneurysm     Migraine without aura     Umbilical hernia       Current Outpatient Prescriptions   Medication Sig Dispense Refill     esomeprazole (NEXIUM) 20 MG capsule Take 1 capsule (20 mg total) by mouth 2 (two) times a day before meals. 60 capsule 2     multivitamin (ONE A DAY) per tablet Take 1 tablet by mouth daily. 90 tablet 3     omeprazole (PRILOSEC) 40 MG capsule TK 1 C PO QD. START 1 DAY BEFORE PREDNISONE AND CONTINUE UNTIL PREDNISONE GONE. PRN THEREAFTER  0     sertraline (ZOLOFT) 100 MG tablet Take 1 tablet (100 mg total) by mouth daily. TAKE 1 TABLET(50 MG) BY MOUTH DAILY 90 tablet 3     cyclobenzaprine (FLEXERIL) 10 MG tablet Take 1 tablet (10 mg total) by mouth 3 (three) times a day as needed for muscle spasms. 30 tablet 0     No current facility-administered medications for this visit.        History   Smoking Status     Former Smoker     Quit date: 1/1/2009   Smokeless Tobacco     Never Used     Comment: No exposure       REVIEW OF SYSTEMS: Denies radiation, saddle anesthesia, numbness/weakness, loss of bowel/bladder control.      TOBACCO USE:  History   Smoking Status     Former Smoker     Quit date: 1/1/2009   Smokeless Tobacco     Never Used     Comment: No exposure     Social History     Social History     Marital status: Single     Spouse name: N/A     Number of children: 1     Years of education: N/A     Occupational History      [employer]     Social History Main Topics     Smoking status: Former Smoker     Quit date: 1/1/2009     Smokeless tobacco: Never Used      Comment: No exposure     Alcohol use Yes      Comment: rare     Drug use: No     Sexual activity: Yes     Partners: Male     Other Topics Concern     Not on file     Social History  Narrative         OBJECTIVE:            Vitals:    12/27/17 0913   BP: 124/82   Pulse: 100   Resp: 16   Temp: 97.8  F (36.6  C)   SpO2: 98%     Weight: 174 lb (78.9 kg)  Wt Readings from Last 3 Encounters:   12/27/17 174 lb (78.9 kg)   12/13/17 175 lb 8 oz (79.6 kg)   07/11/17 170 lb 8 oz (77.3 kg)     Body mass index is 26.46 kg/(m^2).        Physical Exam:  GENERAL APPEARANCE: A&A, NAD, well hydrated, well nourished  NECK: Supple, without lymphadenopathy, no thyroid mass, no JVD, no bruit  CV: RRR, no M/G/R , no edema  LUNGS: CTAB, normal respiratory effort  ABDOMEN: Soft, tender with palpation in RLQ, LLQ and suprapubic region, no masses, no organomegaly, BS present x4, no CVA tenderness  BACK: Patient with full range of motion with forward spinal flexion and extension, hyperextension and lateral extension bilaterally, increased back pain with left spinal rotation.  SKIN:  Normal skin turgor, no lesions/rashes, warm and dry   NEURO: no gross deficits, DTR's intact, full sensation

## 2021-06-15 NOTE — PROGRESS NOTES
[facility]   [place]-Pelvic Initial Evaluation    Patient Name: Angelia Stallings  Date of evaluation: 1/3/2018  Referral Diagnosis: chronic low back pain  Referring provider: Jenni Ferrera NP  Visit Diagnosis:     ICD-10-CM    1. Chronic bilateral low back pain without sciatica M54.5     G89.29    2. Pain in abdominal muscle of left flank R10.9    3. Muscular abdominal pain in right flank M79.1    4. Abdominal weakness R19.8        Assessment:      Pt. is appropriate for skilled PT intervention as outlined in the Plan of Care (POC).  Pt. is a good candidate for skilled PT services to improve pain levels and function.    Goals:  Pt. will demonstrate/verbalize independence in self-management of condition in : 4 weeks  Pt will: be able to stand greater than 5 minutes without increased pain in 4 weeks  Pt will: be able to return to her previous walking routine in 4 weeks    Patient's expectations/goals are realistic.    Barriers to Learning or Achieving Goals:  No Barriers.    Goals and plan or care were set up in collaboration with the patient.    Patient presents with a recent increase in chronic low back pain.  Patient presents with hypermobility and would benefit from core strengthening.  Also has hyper reaction to light touch in areas of pain.         Plan / Patient Instructions:        Plan of Care:   Communication with: Referral Source  Patient Related Instruction: Nature of Condition;Body mechanics;Posture;Treatment plan and rationale;Precautions;Next steps;Self Care instruction;Expected outcome;Basis of treatment  Discharge Planning: to include self management  Therapeutic Exercise: ROM;Stretching;Strengthening  Manual Therapy: soft tissue mobilization;myofascial release;joint mobilization    Plan for next visit: Continue with core strengthening and manual work to decrease pain and calm down nervous system. Add hip hiking.     Subjective:           History of Present Illness:    Angelia is a  42 y.o. female who presents to therapy today with complaints of chronic low back pain.  Most recent onset of pain was 2017.  Unsure why. Feels it may have been from yoga.   Previous MRI showed a disc bulge.  Pain wraps around the back to her abdomen.   Has been using a low back brace which has helped.  Denies having any numbness or tingling but does report having cramping in both of her feet.  Sleeps fairly well at night.  describes their previous level of function as not limited     Pain is increased with:  Standing still for less than 5 minutes.    Pain is worse as the day goes on.   Not able to walk or exercise consistently at this point.     Pain is decreased with:  Lying down      Pain Ratin    Functional limitations are described as occurring with:   standing 5 minutes, participating in exercse           Objective:      Note: Items left blank indicates the item was not performed or not indicated at the time of the evaluation.    Patient Outcome Measures :    Modified Oswestry Low Back Pain Disablity Questionnaire  in %: 40   Scores range from 0-100%, where a score of 0% represents minimal pain and maximal function. The minimal clinically important difference is a score reduction of 12%.    Examination  1. Chronic bilateral low back pain without sciatica     2. Pain in abdominal muscle of left flank     3. Muscular abdominal pain in right flank     4. Abdominal weakness       Precautions/Restrictions: None  Involved side: Bilateral  Posture Observation:      General sitting posture is  normal.  General standing posture is normal.  Lumbopelvic complex: Moderately increased lumbar lordosis    Lumbar ROM:    Date:      *Indicate scale AROM AROM AROM   Lumbar Flexion Max limited with left low back pain     Lumbar Extension Mod limited with low back pain      Right Left Right Left Right Left   Lumbar Sidebending wnl with ipsilateral pain wnl with ipsilateral pain       Lumbar Rotation         Thoracic  Flexion      Thoracic Extension      Thoracic Sidebending         Thoracic Rotation           Lower Extremity Strength:     Date:      LE strength/5 Right Left Right Left Right Left   Hip Flexion (L1-3) 5/5 5/5       Hip Extension (L5-S1)         Hip Abduction (L4-5)         Hip Adduction (L2-3)         Hip External Rotation 4/5 4/5       Hip Internal Rotation         Knee Extension (L3-4) 5/5 5/5       Knee Flexion 5/5 5/5       Ankle Dorsiflexion (L4-5) 5/5 5/5       Great Toe Extension (L5)         Ankle Plantar flexion (S1)         Abdominals        Sensation        Reflex Testing  Lumbar Dermatomes Right Left UE Reflexes Right Left   Iliac Crest and Groin (L1)   Biceps (C5-6)     Anterior Medial Thigh (L2)   Brachioradialis (C5-6)     Anterior Thigh, Medial Epicondyle Femur (L3)   Triceps (C7-8)     Lateral Thigh, Anterior Knee, Medial Leg/Malleolus (L4)   Kvng s test     Lateral Leg, Dorsal Foot (L5)   LE Reflexes     Lateral Foot (S1)   Patellar (L3-4)     Posterior Leg (S2)   Achilles (S1-2)     Other:   Babinski Response       Palpation:  Multiple areas of pain.  Point tender over bilateral iliac crest, and multiple areas throughout the low back  Increased muscle tone in bilateral upper lumbar area.    Lumbar Special Tests:     Lumbar Special Tests Right Left SI Tests Right  Left   Quadrant test   SI Compression     Straight leg raise negative negative SI Distraction     Crossover response   POSH Test     Slump   Sacral Thrust     Sit-up test  FADIR     Trunk extensor endurance test  Resisted Abduction     Prone instability test  Other:     Pubic shotgun  Other:       Repeated Motion Testing:  Does not centralize  Does not peripheralize    Passive Mobility - Joint Integrity:  Hypermobile    LE Screen:  bilateral hip and knee ROM is WNL    Treatment Today     TREATMENT MINUTES COMMENTS   Evaluation 25 Low complexity   Self-care/ Home management     Manual therapy 15 STM to bilateral upper lumbar  paraspinals in right sidelying   Neuromuscular Re-education     Therapeutic Activity     Therapeutic Exercises 20 Exercises:  Exercise #1: diaphramatic breathing 3 times every hour  Exercise #2: abdominal strengthening sets 5 second hold 5 times every hour in sitting, standing and supine  Exercise #3: hip flexor stretch kneeling 30 second hold 2-3 times a day     Gait training     Modality__________________                Total 60    Blank areas are intentional and mean the treatment did not include these items.     PT Evaluation Code: (Please list factors)  Patient History/Comorbidities: chronic low back pain, umbilical hernia, history of migranes  Examination: low back pain, abdominal pain, abdominal weakness  Clinical Presentation: stable  Clinical Decision Making: low complexity    Patient History/  Comorbidities Examination  (body structures and functions, activity limitations, and/or participation restrictions) Clinical Presentation Clinical Decision Making (Complexity)   No documented Comorbidities or personal factors 1-2 Elements Stable and/or uncomplicated Low   1-2 documented comorbidities or personal factor 3 Elements Evolving clinical presentation with changing characteristics Moderate   3-4 documented comorbidities or personal factors 4 or more Unstable and unpredictable High                Angelica Adhikari, PT  1/3/2018  2:52 PM

## 2021-06-15 NOTE — PROGRESS NOTES
[facility] Daily Progress     Patient Name: Angelia Stallings  Date: 2018  Visit #: 3  PTA visit #:  2  Referral Diagnosis: Chronic bilateral low back pain without sciatica  Referring provider: Isabela Bell,*  Visit Diagnosis:     ICD-10-CM    1. Umbilical hernia K42.9    2. Migraine without aura G43.009    3. Menorrhagia N92.0    4. 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 History of intracranial aneurysm Z86.79    6. S/P craniotomy, MCA aneurysm clipping x 2 Z98.890          Assessment:   Pt has been compliant with her HEP.  Patient is benefitting from skilled physical therapy and is making steady progress toward functional goals.  Patient is appropriate to continue with skilled physical therapy intervention, as indicated by initial plan of care.    Goal Status: On going  Pt. will demonstrate/verbalize independence in self-management of condition in : 4 weeks  Pt will: be able to stand greater than 5 minutes without increased pain in 4 weeks  Pt will: be able to return to her previous walking routine in 4 weeks    Plan / Patient Education:     Continue with initial plan of care.  Progress with home program as tolerated.   Go over HEP.  Re assess manual therapy    Subjective:   Pt reports low back pain with bending over and returning to upright.   Pt reports anterior hip pain. Pt states she did have some sharp pain recently in her L anterior hip.  Pt has used a foam roller on her hips.  Pain Ratin pain is intermittant        Objective:   Lumbar ROM:  Flexion: mod loss with report of mild low back pain  Extension: moderate loss panfree  Side bending: WNL B felt pull in B QL  Rotation: WNL    Tender B ASIS.  Pelvis and leg length equal supine to sit.    Treatment Today     TREATMENT MINUTES COMMENTS   Evaluation     Self-care/ Home management     Manual therapy 15 TPR B hip flexors,  MFR/STM to lumbar paraspinals and B gluts   Neuromuscular Re-education      Therapeutic Activity     Therapeutic Exercises 15 See flow sheet  Added to HEP:  Supine and seated nerve glides   Gait training     Modality__________________                Total 30    Blank areas are intentional and mean the treatment did not include these items.       Alba Diaz,CLT  1/16/2018

## 2021-06-15 NOTE — PROGRESS NOTES
" HPI: Angelia Stallings is here for follow up to discuss repair of her hiatal hernia and correction of her reflux.  She continues to have worsening esophageal reflux with a nighttime cough which wakes her up in the middle the night.  She can feel as though her stomach contents extend up to her upper esophagus when she lays supine.  She is very tired of feeling this way and does not want to continue with medications as a way to treat this.  Allergies, Medications, Social History, Past Medical History and Past Surgical History were reviewed and are noted in the chart.    BP (!) 156/101 (Patient Site: Right Arm, Patient Position: Sitting, Cuff Size: Adult Regular)  Pulse 98  Ht 5' 8\" (1.727 m)  Wt 172 lb (78 kg)  SpO2 98%  BMI 26.15 kg/m2  Body mass index is 26.15 kg/(m^2).      EXAM:   GENERAL: Appears well           Assessment/Plan: Angelia Stallings continues to have severe esophageal reflux secondary to her hiatal hernia.  We discussed the findings of the EGD as well as the esophagram in detail once again.  It certainly appears as though her esophageal reflux is progressing and she is very unhappy.  I discussed the surgical options versus continued medical management which would entail escalating PPI doses.  She does not want to entertain this option and prefers to have surgery.  I discussed the dietary restrictions after surgery as well as the surgery in detail.  All risks and benefits were explained.  She understands and would like to proceed.  We will plan with a robotic Nissen fundoplication.    Leo Quezada  DO [facility] Department of Surgery  "

## 2021-06-15 NOTE — PROGRESS NOTES
"[facility] Daily Progress     Patient Name: Angelia Stallings  Date: 2018  Visit #: 2  PTA visit #:  1  Referral Diagnosis: Chronic bilateral low back pain without sciatica  Referring provider: Isabela Bell,*  Visit Diagnosis:     ICD-10-CM    1. Chronic bilateral low back pain without sciatica M54.5     G89.29    2. Pain in abdominal muscle of left flank R10.9    3. Muscular abdominal pain in right flank M79.1    4. Abdominal weakness R19.8    5. Cervicalgia M54.2    6. Neck pain M54.2    7. Chronic tension-type headache, intractable G44.221    8. Edema, unspecified type R60.9    9. Intractable chronic post-traumatic headache G44.321          Assessment:   Pt was seen today for her first follow up appointment. She has been compliant with her HEP and walks her dog.  Pt demonstrates increased lumbar flexion and decreased lumbar extension.  Patient is benefitting from skilled physical therapy and is making steady progress toward functional goals.  Patient is appropriate to continue with skilled physical therapy intervention, as indicated by initial plan of care.    Goal Status: On going  Pt. will demonstrate/verbalize independence in self-management of condition in : 4 weeks  Pt will: be able to stand greater than 5 minutes without increased pain in 4 weeks  Pt will: be able to return to her previous walking routine in 4 weeks    Plan / Patient Education:     Continue with initial plan of care.  Progress with home program as tolerated.   Go over HEP. Manual therapy next visit.    Subjective:   \" Much better, I could bend down last night.\"   Pt reports she will have increased pain with sit to stand transfers.  Pain Ratin        Objective:   Lumbar ROM:  Flexion: mod-minimal loss  Extension: moderate loss increased pian at end range  Side bending: R WNL, L minimal loss  Rotation: Minimal loss        Treatment Today     TREATMENT MINUTES COMMENTS   Evaluation     Self-care/ Home management   "   Manual therapy     Neuromuscular Re-education     Therapeutic Activity     Therapeutic Exercises 25 See flow sheet  Added to HEP:  -clamshell  -supine march with TA sets  -mini bridge   Gait training     Modality__________________                Total 25    Blank areas are intentional and mean the treatment did not include these items.       Alba Diaz,CLT  1/9/2018

## 2021-06-16 PROBLEM — O34.00: Status: ACTIVE | Noted: 2021-06-03

## 2021-06-16 PROBLEM — Z98.890 STATUS POST LAPAROSCOPIC NISSEN FUNDOPLICATION: Status: ACTIVE | Noted: 2020-05-05

## 2021-06-16 PROBLEM — Z87.42 HISTORY OF ABNORMAL CERVICAL PAPANICOLAOU SMEAR: Status: ACTIVE | Noted: 2021-06-03

## 2021-06-16 PROBLEM — D25.9 UTERINE LEIOMYOMA: Status: ACTIVE | Noted: 2021-06-03

## 2021-06-16 PROBLEM — M79.18 MYOFASCIAL PAIN: Status: ACTIVE | Noted: 2021-01-16

## 2021-06-16 PROBLEM — R87.619 ATYPICAL GLANDULAR CELLS ON CERVICAL PAP SMEAR: Status: ACTIVE | Noted: 2021-06-03

## 2021-06-16 NOTE — PATIENT INSTRUCTIONS - HE
1.  There are no current signs of external or internal ear infection.  There appears to be some clear fluid behind your left eardrum.  This may be signs of eustachian tube dysfunction.  For treatment I recommend starting nightly Zyrtec, taking Sudafed every 12 hours, and using Flonase before bed.  2.  Seek emergency medical attention if you develop weakness, confusion, ongoing vision changes, vomiting that cannot be controlled, with the worst headache of your life.  3.  Follow-up if you have not had any improvement in your ear discomfort over the course of the next 5 to 7 days.

## 2021-06-16 NOTE — ANESTHESIA CARE TRANSFER NOTE
Last vitals:   Vitals:    03/01/18 1340   BP: (!) 139/96   Pulse: (!) 107   Resp:    Temp: 36.9  C (98.5  F)   SpO2: 100%     Patient's level of consciousness is drowsy  Spontaneous respirations: yes  Maintains airway independently: yes  Dentition unchanged: yes  Oropharynx: oropharynx clear of all foreign objects    QCDR Measures:  ASA# 20 - Surgical Safety Checklist: WHO surgical safety checklist completed prior to induction  PQRS# 430 - Adult PONV Prevention: 4558F - Pt received => 2 anti-emetic agents (different classes) preop & intraop  ASA# 8 - Peds PONV Prevention: NA - Not pediatric patient, not GA or 2 or more risk factors NOT present  PQRS# 424 - Mariah-op Temp Management: 4559F - At least one body temp DOCUMENTED => 35.5C or 95.9F within required timeframe  PQRS# 426 - PACU Transfer Protocol: - Transfer of care checklist used  ASA# 14 - Acute Post-op Pain: ASA14B - Patient did NOT experience pain >= 7 out of 10

## 2021-06-16 NOTE — PROGRESS NOTES
Angelia Stallings is status post robotic Nissen fundoplication.  She is doing well and tolerating soft foods at this point.  She has no reflux.  She does complain of intermittent upper throat discomfort but is otherwise doing well with no complaints of any reflux.    EXAM:  /85 (Patient Site: Right Arm, Patient Position: Sitting, Cuff Size: Adult Large)  Pulse 97  SpO2 98%  Breastfeeding? No  GENERAL: Well developed female, No acute distress, pleasant and conversant   EYES: Pupils equal, round and reactive, no scleral icterus  ABDOMEN: Well-healed incisions, abdomen soft  SKIN: Pink, warm and dry, no obvious rashes or lesions   NEURO:No focal deficits, ambulatory  MUSCULOSKELETAL:No obvious deformities, no swelling, normal appearing      ASSESSMENT AND PLAN:  Angelia Stallings is doing well postoperatively.  She will continue with her Nissen diet until tolerating regular food.  She has no complaints of pain whatsoever at this point.  She has no reflux.  I will have her follow-up with me in 4 weeks for final evaluation and to discuss her diet going forward.  vIan Quezada D.O., FACS  [phone]  [facility] Department of Surgery

## 2021-06-16 NOTE — TELEPHONE ENCOUNTER
Patient calling complaining of having ear ache and headache. Rates ear ache at 4/10. Rates headache at 8/10. Reports having unsteady gait occasionally. Temperature of 99.7 Tympanic. Per guideline, advised patient to be seen at the emergency department. Verbalized understanding.     Silver Romo RN  [facility]     COVID 19 Nurse Triage Plan/Patient Instructions    Please be aware that novel coronavirus (COVID-19) may be circulating in the community. If you develop symptoms such as fever, cough, or SOB or if you have concerns about the presence of another infection including coronavirus (COVID-19), please contact your health care provider or visit  [email or web address].    Disposition/Instructions    ED Visit recommended. Follow protocol based instructions.      Bring Your Own Device:  Please also bring your smart device(s) (smart phones, tablets, laptops) and their charging cables for your personal use and to communicate with your care team during your visit.      Thank you for taking steps to prevent the spread of this virus.  o Limit your contact with others.  o Wear a simple mask to cover your cough.  o Wash your hands well and often.    Resources    [facility]: About COVID-19: [email or web address]    CDC: What to Do If You're Sick: www.cdc.gov/coronavirus/2019-ncov/about/steps-when-sick.html    CDC: Ending Home Isolation: www.cdc.gov/coronavirus/2019-ncov/hcp/disposition-in-home-patients.html     CDC: Caring for Someone: www.cdc.gov/coronavirus/2019-ncov/if-you-are-sick/care-for-someone.html     [facility]: Interim Guidance for Hospital Discharge to Home: www.[email or web address]/coronavirus/hcp/hospdischarge.pdf    [facility] clinical trials (COVID-19 research studies): [email or web address]     Below are the COVID-19 hotlines at the [place] Department of Health ([facility]). Interpreters are available.   o For health questions: Call  "533.448.7568 or 1-225.323.4789 (7 a.m. to 7 p.m.)  o For questions about schools and childcare: Call 088-181-4483 or 1-964.868.7440 (7 a.m. to 7 p.m.)       Reason for Disposition    Loss of vision or double vision (Exception: same as prior migraines)    Additional Information    Negative: [1] Stiff neck (unable to touch chin to chest) AND [2] fever    Negative: [1] Bony area of skull behind the ear is pink or swollen AND [2] fever    Negative: Fever > 104 F (40 C)    Negative: Patient sounds very sick or weak to the triager    Negative: [1] SEVERE pain AND [2] not improved 2 hours after taking analgesic medication (e.g., ibuprofen or acetaminophen)    Walking is very unsteady    Negative: Difficult to awaken or acting confused (e.g., disoriented, slurred speech)    Negative: [1] Weakness of the face, arm or leg on one side of the body AND [2] new onset    Negative: [1] Numbness of the face, arm or leg on one side of the body AND [2] new onset    Negative: [1] Loss of speech or garbled speech AND [2] new onset    Negative: Passed out (i.e., lost consciousness, collapsed and was not responding)    Negative: Sounds like a life-threatening emergency to the triager    Negative: Unable to walk, or can only walk with assistance (e.g., requires support)    Negative: Stiff neck (can't touch chin to chest)    Negative: Severe pain in one eye    Negative: [1] Other family members (or roommates) with headaches AND [2] possibility of carbon monoxide exposure    Negative: [1] SEVERE headache (e.g., excruciating) AND [2] \"worst headache\" of life    Negative: [1] SEVERE headache AND [2] sudden-onset (i.e., reaching maximum intensity within seconds)    Negative: [1] SEVERE headache AND [2] fever    Protocols used: HEADACHE-A-AH, EARACHE-A-AH      "

## 2021-06-16 NOTE — ANESTHESIA POSTPROCEDURE EVALUATION
Patient: Angelia Stallings  ROBOTIC NISSEN FUNDOPLICATION  Anesthesia type: general    Patient location: PACU  Last vitals:   Vitals:    03/01/18 1603   BP: 121/72   Pulse: 88   Resp: 16   Temp: 36.8  C (98.2  F)   SpO2: 95%     Post vital signs: stable  Level of consciousness: awake and responds to simple questions  Post-anesthesia pain: pain controlled  Post-anesthesia nausea and vomiting: no  Pulmonary: unassisted, return to baseline  Cardiovascular: stable and blood pressure at baseline  Hydration: adequate  Anesthetic events: no    QCDR Measures:  ASA# 11 - Mariah-op Cardiac Arrest: ASA11B - Patient did NOT experience unanticipated cardiac arrest  ASA# 12 - Mariah-op Mortality Rate: ASA12B - Patient did NOT die  ASA# 13 - PACU Re-Intubation Rate: ASA13B - Patient did NOT require a new airway mgmt  ASA# 10 - Composite Anes Safety: ASA10A - No serious adverse event    Additional Notes:    Vitals:    03/01/18 1425 03/01/18 1500 03/01/18 1530 03/01/18 1603   BP: 127/69 130/75 119/78 121/72   Patient Position:  Lying Lying Lying   Pulse: 69 78 83 88   Resp: 11 14 16 16   Temp: 36.3  C (97.3  F) 36.7  C (98  F) 36.7  C (98.1  F) 36.8  C (98.2  F)   TempSrc:  Oral Oral Oral   SpO2: 99% 96% 93% 95%   Weight:  181 lb (82.1 kg)

## 2021-06-16 NOTE — PROGRESS NOTES
ASSESSMENT/PLAN:       1. Migraine without aura and without status migrainosus, not intractable     - ketorolac injection 30 mg (TORADOL)  And hopeful that she will get some additional benefit from the Toradol injection  The patient can continue with her other regular medications  Follow-up with her neurology group for Botox injection April 23, 2021  No other red flag symptoms today other than just the chronicity of this particular headache    2. Acute non-recurrent maxillary sinusitis     - doxycycline (VIBRAMYCIN) 100 MG capsule; Take 1 capsule (100 mg total) by mouth 2 (two) times a day for 10 days.  Dispense: 20 capsule; Refill: 0  Long hot showers  Increase fluid intake  Can use nasal saline irrigation or [place] pot  Try to limit analgesics as much as possible to prevent medication overuse headaches    Office visit E/M total time 30 minutes  25 minutes spent total face-to-face with the patient and an additional 5 minutes spent looking at [facility] neurology records as well as the urgent care visit from March 23/2021    Jonathan Qureshi MD      PROGRESS NOTE   3/30/2021    SUBJECTIVE:  Angelia Stallings is a 45 y.o. female  who presents for   Chief Complaint   Patient presents with     Migraine     constant migraine x 1 week      The patient is seen today because she has had a migraine headache for 8 days.  She was seen at the urgent care on March 23 and she was given a Toradol injection which helped some.  She also was told that she had some fluid behind her ear on the right side.  The patient has had initially cough and now more sinus drainage and some pain into her upper teeth and under the eyes.  She notes that sometimes with her migraine headache she will get facial pain.  The patient has felt that there often is a tension headache component to her migraines.  Has been under a lot of stress recently with the death of her best friend and also her dad has advancing dementia.  Today the symptoms are more on  the left side throbbing headache light sensitivity and also sensitivity to sound.  Has had some nausea but no vomiting.  Neurology care is at [facility] and she is scheduled to have her first set of Botox injections on April 23.  She uses tizanidine at nighttime to help with muscle relaxation and sleep  She will use diazepam during the daytime if needed for tension headache pain that she feels is related to anxiety  Has had prescriptions for oxycodone and also Percocet has been used for her headaches.  Currently does not have either of those medications.  The patient has been using some Sudafed for her sinus congestion.  Triptans are contraindicated because of a history of cerebral aneurysm.  The drainage from the sinuses seems thick and colored  No fever  Hearing has been fine    Patient Active Problem List   Diagnosis     Allergic Rhinitis     Lower Back Pain     Anxiety     Neck pain     S/P craniotomy, MCA aneurysm clipping x 2     Menorrhagia     Major depression single episode, in partial remission (H)     History of intracranial aneurysm     Migraine without aura     Umbilical hernia     Hiatal hernia with GERD     Status post laparoscopic Nissen fundoplication       Current Outpatient Medications   Medication Sig Dispense Refill     cetirizine (ZYRTEC) 10 MG tablet Take 10 mg by mouth daily.       cholecalciferol, vitamin D3, 1,000 unit (25 mcg) tablet Take 1,000 Units by mouth daily.       diazePAM (VALIUM) 5 MG tablet Take 1 tablet (5 mg total) by mouth every 6 (six) hours as needed for anxiety. 30 tablet 1     ibuprofen (ADVIL,MOTRIN) 200 MG tablet Take 600 mg by mouth every 6 (six) hours as needed for pain.       pseudoephedrine (SUDAFED) 120 mg 12 hr tablet Take 1 tablet (120 mg total) by mouth 2 (two) times a day as needed for congestion. 20 tablet 2     sertraline (ZOLOFT) 50 MG tablet Take 1 tablet (50 mg total) by mouth daily. 90 tablet 1     tiZANidine (ZANAFLEX) 4 MG tablet TK 1 TO 2 TS PO QHS        doxycycline (VIBRAMYCIN) 100 MG capsule Take 1 capsule (100 mg total) by mouth 2 (two) times a day for 10 days. 20 capsule 0     fluticasone propionate (FLONASE ALLERGY RELIEF) 50 mcg/actuation nasal spray 1-2 sprays in each nostril at bedtime. 16 g 0     No current facility-administered medications for this visit.        Social History     Tobacco Use   Smoking Status Former Smoker     Packs/day: 0.25     Years: 15.00     Pack years: 3.75     Quit date: 2009     Years since quittin.2   Smokeless Tobacco Never Used   Tobacco Comment    No exposure           OBJECTIVE:        No results found for this or any previous visit (from the past 240 hour(s)).    Vitals:    21 1514   BP: 128/85   Pulse: 85   SpO2: 97%   Weight: 188 lb 11.2 oz (85.6 kg)     Weight: 188 lb 11.2 oz (85.6 kg)          Physical Exam:  GENERAL APPEARANCE: 45-year-old female very pleasant, NAD, well hydrated, well nourished  SKIN:  Normal skin turgor, no lesions/rashes   HEENT: moist mucous membranes, small rhinorrhea bilateral with percussion tenderness especially over the maxillary area.  Oropharynx is normal in appearance  NECK: Normal without adenopathy or masses, the patient has some tenderness in the paraspinous muscles of the cervical region as well as over the occipital ridge especially in the distribution of the greater occipital nerve.  CV: RRR, no M/G/R   LUNGS: CTAB  ABDOMEN: S&NT, no masses or enlarged organs   EXTREMITY: no edema and full ROM of all joints  NEURO: no focal findings with the intact cranial nerves symmetric strength and normal gait

## 2021-06-16 NOTE — PROGRESS NOTES
Chief Complaint   Patient presents with     Ear Pain     Lt Ear  x 5 Days      Migraine     x 2 Days.      Blurred Vision     1 EPISODE Today lasted 1min         Clinical Decision Making: Left ear pain is likely eustachian tube dysfunction secondary to nasal congestion.  Recommend starting Zyrtec, Flonase, and Sudafed for treatment.  Patient received Toradol injection today for treatment of her migraine disorder.  Patient states that her vision is at baseline at this time, and she plans to follow-up with ophthalmology.  Patient has plan to follow-up for Botox injections for migraine treatment.  No current new neurologic deficits that are cause for concern.    1. Migraine with aura and without status migrainosus, not intractable  ketorolac injection 30 mg (TORADOL)   2. Dysfunction of left eustachian tube  fluticasone propionate (FLONASE ALLERGY RELIEF) 50 mcg/actuation nasal spray    pseudoephedrine (SUDAFED) 120 mg 12 hr tablet         Patient Instructions   1.  There are no current signs of external or internal ear infection.  There appears to be some clear fluid behind your left eardrum.  This may be signs of eustachian tube dysfunction.  For treatment I recommend starting nightly Zyrtec, taking Sudafed every 12 hours, and using Flonase before bed.  2.  Seek emergency medical attention if you develop weakness, confusion, ongoing vision changes, vomiting that cannot be controlled, with the worst headache of your life.  3.  Follow-up if you have not had any improvement in your ear discomfort over the course of the next 5 to 7 days.       HPI:  Angelia Stallings is a 45 y.o. female with PMHx of migraines, allergic rhinitis, who presents today complaining of left ear pain x 5 days. Patient also reports having migraine sxs x 2 days. Per chart review it appears that the patient has previously received Toradol injections for her migraines when they occur. Patient also reports having an episode of blurry vision that  lasted 1 min today.  She states over the past few months she has had these intermittent episodes of blurry vision associated with her migraines.  She reports that she has 2 different visions in her left versus her right eye.  She was seeing ophthalmologist, but was instructed to follow-up this spring again to start glasses.  She is not currently wearing any glasses or contacts.  She states that about a month ago she has been experiencing nasal congestion she assumed it was from cold weather or dry air.  When she wakes up she has a mild cough and postnasal drainage that then clears up throughout the day.  She denies any fevers or chills.  She has not had any abdominal pain.  Her migraines have been associated with some dizziness as of late, but she is not currently experiencing any dizziness.  She reports a past medical history of environmental allergies usually to budding of the trees, she does not feel that that has occurred yet in the season.  Over the weekend she had some vomiting, but she has not had any episodes over the past 3 days.  She is not allowed to take triptan's because of her history of previous aneurysm.  She has an upcoming appointment to consult about using Botox for migraine disorder.    History obtained from the patient.    Problem List:  2020-05: Status post laparoscopic Nissen fundoplication  2017-04: Umbilical hernia  2016-04: Migraine without aura  2015-09: Menorrhagia  2015-09: High risk HPV infection  2015-03: Headache  2015-02: Major depression single episode, in partial remission (H)  2015-02: History of intracranial aneurysm  2015-02: S/P craniotomy, MCA aneurysm clipping x 2  2015-02: Brain aneurysm  2015-02: Neck pain  2015-02: Nausea  Allergic Rhinitis  Acute Sinusitis  Lower Back Pain  Nausea  Pain During Urination (Dysuria)  Abdominal Pain  Anxiety  Hiatal hernia with GERD      Past Medical History:   Diagnosis Date     Allergic rhinitis      Anxiety      Brain aneurysm 02/12/2015     s/p clipping,      Hiatal hernia     Repaired in 2018 with Nissen.     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     History of hemolysis, elevated liver enzymes, and low platelet (HELLP) syndrome      History of miscarriage 2004      (normal spontaneous vaginal delivery)      PONV (postoperative nausea and vomiting)        Social History     Tobacco Use     Smoking status: Former Smoker     Packs/day: 0.25     Years: 15.00     Pack years: 3.75     Quit date: 2009     Years since quittin.2     Smokeless tobacco: Never Used     Tobacco comment: No exposure   Substance Use Topics     Alcohol use: Yes     Comment: rare       Review of Systems   Constitutional: Negative for chills and fever.   HENT: Positive for congestion (about a month ago), ear discharge (small amount), ear pain (left) and sinus pressure.    Eyes: Positive for photophobia and visual disturbance (blurring for one minute today).   Respiratory: Positive for cough (secondary to post nasal drainage in the morning).    Gastrointestinal: Positive for nausea and vomiting (one week ago, last bout was 3 days ago). Negative for abdominal pain.   Allergic/Immunologic: Positive for environmental allergies.   Neurological: Positive for dizziness (resolving over the past 3 days) and headaches (left sided).       Vitals:    21 1857   BP: 132/89   Patient Site: Right Arm   Patient Position: Sitting   Cuff Size: Adult Regular   Pulse: 100   Resp: 18   Temp: 98.6  F (37  C)   TempSrc: Oral   SpO2: 96%   Weight: 187 lb (84.8 kg)       Physical Exam  Vitals signs and nursing note reviewed.   Constitutional:       General: She is not in acute distress.     Appearance: She is well-developed. She is not diaphoretic.   HENT:      Head: Normocephalic and atraumatic.      Right Ear: Tympanic membrane, ear canal and external ear normal.      Left Ear: Ear canal and external ear normal. A middle ear effusion is present. Tympanic membrane is not perforated,  erythematous, retracted or bulging.   Eyes:      General:         Right eye: No discharge.         Left eye: No discharge.      Conjunctiva/sclera: Conjunctivae normal.   Cardiovascular:      Rate and Rhythm: Normal rate and regular rhythm.      Heart sounds: Normal heart sounds.   Pulmonary:      Effort: Pulmonary effort is normal. No respiratory distress.      Breath sounds: Normal breath sounds.   Neurological:      Mental Status: She is alert.   Psychiatric:         Behavior: Behavior normal.         Thought Content: Thought content normal.         Judgment: Judgment normal.         At the end of the encounter, I discussed results, diagnosis, medications. Discussed red flags for immediate return to clinic/ER, as well as indications for follow up if no improvement. Patient understood and agreed to plan. Patient was stable for discharge.

## 2021-06-16 NOTE — ANESTHESIA PREPROCEDURE EVALUATION
Anesthesia Evaluation      History of anesthetic complications     Airway   Mallampati: II  Neck ROM: full   Pulmonary - normal exam   (+) a smoker                         Cardiovascular - negative ROS and normal exam   Neuro/Psych    (+) depression, anxiety/panic attacks, chronic pain    Comments: Chronic low back pain  Frequent severe persistant migraines  Chiropractic cervical manipulation.C1-C2  Low back pain  Opioid use for pain  MCA clipping for aneurysm    Endo/Other - negative ROS      Comments: Hx HELLP with pregnancy    GI/Hepatic/Renal    (+) GERD,       Comments: Severe reflux, progressive          Dental - normal exam                        Anesthesia Plan  Planned anesthetic: general endotracheal  glidescope  ASA 3   Induction: intravenous   Anesthetic plan and risks discussed with: patient    Post-op plan: routine recovery

## 2021-06-16 NOTE — PROGRESS NOTES
[facility] Discharge Summary  Patient Name: Angelia Stallings  Date: 3/2/2018  Referral Diagnosis: chronic bilateral low back pain without sciatica  Referring provider: Isabela Bell,*  Visit Diagnosis:   1. Chronic bilateral low back pain without sciatica         Goals:  Pt. will demonstrate/verbalize independence in self-management of condition in : 4 weeks - met  Pt will: be able to stand greater than 5 minutes without increased pain in 4 weeks - ongoing  Pt will: be able to return to her previous walking routine in 4 weeks - ongoing    Patient was seen for 4 visits from 1/3/18 to 1/18/18.  The patient attended therapy initially, but did not finish the therapy sessions prescribed.  Goals were not fully achieved. Explanation for goals not achieved: Patient did not complete therapy sessions  Patient received a home program for stretching and strengthening  The patient discontinued therapy, did not return.    Therapy will be discontinued at this time.  The patient will need a new referral to resume.    Thank you for your referral.  Angelica Adhikari  3/2/2018  12:26 PM

## 2021-06-16 NOTE — PATIENT INSTRUCTIONS - HE
Dear Angelia Stallings,    Your symptoms show that you may have coronavirus (COVID-19). This illness can cause fever, cough and trouble breathing. Many people get a mild case and get better on their own. Some people can get very sick.    Will I be tested for COVID-19?  We would like to test you for Covid-19 virus. I have placed orders for this test.     To schedule: go to your [employer] home page and scroll down to the section that says  You have an appointment that needs to be scheduled  and click the large green button that says  Schedule Now  and follow the steps to find the next available openings.    If you are unable to complete these [employer] scheduling steps, please call [phone] to schedule your testing.     Return to work/school/ guidance:  Please let your workplace manager and staffing office know when your quarantine ends     We can t give you an exact date as it depends on the above. You can calculate this on your own or work with your manager/staffing office to calculate this. (For example if you were exposed on 10/4, you would have to quarantine for 14 full days. That would be through 10/18. You could return on 10/19.)      If you receive a positive COVID-19 test result, follow the guidance of the those who are giving you the results. Usually the return to work is 10 (or in some cases 20 days from symptom onset.) If you work at [facility] [employer] [employer] [employer], you must also be cleared by Employee Occupational Health and Safety to return to work.        If you receive a negative COVID-19 test result and did not have a high risk exposure to someone with a known positive COVID-19 test, you can return to work once you're free of fever for 24 hours without fever-reducing medication and your symptoms are improving or resolved.      If you receive a negative COVID-19 test and If you had a high risk exposure to someone who has tested positive for COVID-19 then you can return to work 14 days after your last contact  with the positive individual    Note: If you have ongoing exposure to the covid positive person, this quarantine period may be more than 14 days. (For example, if you are continued to be exposed to your child who tested positive and cannot isolate from them, then the quarantine of 7-14 days can't start until your child is no longer contagious. This is typically 10 days from onset of the child's symptoms. So the total duration may be 17-24 days in this case.)    Sign up for [employer].   We know it's scary to hear that you might have COVID-19. We want to track your symptoms to make sure you're okay over the next 2 weeks. Please look for an email from [employer]--this is a free, online program that we'll use to keep in touch. To sign up, follow the link in the email you will receive. Learn more at [email or web address]    How can I take care of myself?    Get lots of rest. Drink extra fluids (unless a doctor has told you not to)    Take Tylenol (acetaminophen) or ibuprofen for fever or pain. If you have liver or kidney problems, ask your family doctor if it's okay to take Tylenol o ibuprofen    If you have other health problems (like cancer, heart failure, an organ transplant or severe kidney disease): Call your specialty clinic if you don't feel better in the next 2 days.    Know when to call 911. Emergency warning signs include:  o Trouble breathing or shortness of breath  o Pain or pressure in the chest that doesn't go away  o Feeling confused like you haven't felt before, or not being able to wake up  o Bluish-colored lips or face    Where can I get more information?  [employer] [facility] - About COVID-19:   [email or web address]    CDC - What to Do If You're Sick:   www.cdc.gov/coronavirus/2019-ncov/about/steps-when-sick.html

## 2021-06-17 NOTE — PATIENT INSTRUCTIONS - HE
Dear Angelia Stallings,    Your symptoms show that you may have coronavirus (COVID-19). This illness can cause fever, cough and trouble breathing. Many people get a mild case and get better on their own. Some people can get very sick.    I do not think the Botox injections related to your symptoms. There have been reports of bronchitis after these injections but your symptoms are somewhat suggestive of bronchitis but lets get you tested for COVID and if you are not improving or worsening please be seen in the clinic or by your PCP.    Will I be tested for COVID-19?  We would like to test you for Covid-19 virus. I have placed orders for this test.     To schedule: go to your [employer] home page and scroll down to the section that says  You have an appointment that needs to be scheduled  and click the large green button that says  Schedule Now  and follow the steps to find the next available openings.    If you are unable to complete these [employer] scheduling steps, please call [phone] to schedule your testing.     Return to work/school/ guidance:  Please let your workplace manager and staffing office know when your quarantine ends     We can t give you an exact date as it depends on the above. You can calculate this on your own or work with your manager/staffing office to calculate this. (For example if you were exposed on 10/4, you would have to quarantine for 14 full days. That would be through 10/18. You could return on 10/19.)      If you receive a positive COVID-19 test result, follow the guidance of the those who are giving you the results. Usually the return to work is 10 (or in some cases 20 days from symptom onset.) If you work at [employer], you must also be cleared by Employee Occupational Health and Safety to return to work.        If you receive a negative COVID-19 test result and did not have a high risk exposure to someone with a known positive COVID-19 test, you can return to work once  you're free of fever for 24 hours without fever-reducing medication and your symptoms are improving or resolved.      If you receive a negative COVID-19 test and If you had a high risk exposure to someone who has tested positive for COVID-19 then you can return to work 14 days after your last contact with the positive individual    Note: If you have ongoing exposure to the covid positive person, this quarantine period may be more than 14 days. (For example, if you are continued to be exposed to your child who tested positive and cannot isolate from them, then the quarantine of 7-14 days can't start until your child is no longer contagious. This is typically 10 days from onset of the child's symptoms. So the total duration may be 17-24 days in this case.)    Sign up for [employer].   We know it's scary to hear that you might have COVID-19. We want to track your symptoms to make sure you're okay over the next 2 weeks. Please look for an email from [employer]--this is a free, online program that we'll use to keep in touch. To sign up, follow the link in the email you will receive. Learn more at [email or web address]    How can I take care of myself?    Get lots of rest. Drink extra fluids (unless a doctor has told you not to)    Take Tylenol (acetaminophen) or ibuprofen for fever or pain. If you have liver or kidney problems, ask your family doctor if it's okay to take Tylenol o ibuprofen    If you have other health problems (like cancer, heart failure, an organ transplant or severe kidney disease): Call your specialty clinic if you don't feel better in the next 2 days.    Know when to call 911. Emergency warning signs include:  o Trouble breathing or shortness of breath  o Pain or pressure in the chest that doesn't go away  o Feeling confused like you haven't felt before, or not being able to wake up  o Bluish-colored lips or face    Where can I get more information?  [facility] - About  COVID-19:   [email or web address]    CDC - What to Do If You're Sick:   www.cdc.gov/coronavirus/2019-ncov/about/steps-when-sick.html

## 2021-06-17 NOTE — PROGRESS NOTES
Angelia Stallings is status post robotic Nissen.  She is doing well.  Her diet has been advanced.  She does not have any reflux but she has noted that certain foods such as saltine crackers will feel as though they get stuck in her distal esophagus.  She has no emesis or any other complaints.  EXAM:  There were no vitals taken for this visit.  GENERAL: Well developed female, No acute distress, pleasant and conversant   EYES: Pupils equal, round and reactive, no scleral icterus  ABDOMEN: Soft,   SKIN: Pink, warm and dry, no obvious rashes or lesions   NEURO:No focal deficits, ambulatory  MUSCULOSKELETAL:No obvious deformities, no swelling, normal appearing      ASSESSMENT AND PLAN:  Angelia Stallings is doing well postoperatively.  She is on course for dietary advancement.  At this point I have recommended she watch for the type of food she eats including crackers.  However, she is able to tolerate  toast with no complications.  I recommended frequent smaller meals as well and chewing her food.  She may continue to advance her diet as tolerated.  I will have her continue to follow-up with me in 1 month for ongoing evaluation.  Ivan Quezada D.O., FACS  [phone]  [facility] Department of Surgery

## 2021-06-17 NOTE — PROGRESS NOTES
"Angelia Stallings is a 45 y.o. female who is being evaluated via a billable video visit.      How would you like to obtain your AVS? MyChart.  If dropped from the video visit, the video invitation should be resent by: Text to cell phone: [phone].  Will anyone else be joining your video visit? No    Video Start Time: 1223    Assessment & Plan     Bronchitis vs allergies.  Tessalon Perles and azithromycin to the pharmacy.  Patient is very hesitant about oral steroids, but has tolerated Medrol Dosepak as well in the past.  We can do this as her backup option. Pulmonary notes mentions possible breo ellipta as well so we can try that if this persists.    Cough  - methylPREDNISolone (MEDROL DOSEPACK) 4 mg tablet  Dispense: 21 tablet; Refill: 0  - benzonatate (TESSALON PERLES) 100 MG capsule  Dispense: 20 capsule; Refill: 0    Bronchitis  - azithromycin (ZITHROMAX Z-JAIR) 250 MG tablet  Dispense: 6 tablet; Refill: 0      Reviewed external notes from pulmonary medicine x1  25 minutes spent on the date of the encounter doing chart review, history and exam, documentation and further activities per the note     BMI:   Estimated body mass index is 28.69 kg/m  as calculated from the following:    Height as of 10/27/20: 5' 8\" (1.727 m).    Weight as of 3/29/21: 188 lb 11.2 oz (85.6 kg).     No follow-ups on file.    Damaso Tenorio, CNP  [facility]   Angelia Stallings is 45 y.o. and presents today for the following health issues   HPI   Patient presents today with a productive cough for almost a month now.  When she gets her nose out she is getting significant nasal secretions.  Has had Botox injections for migraines a couple weeks ago and noticed a little bit of sweating with them.  Wonders if the Botox injections could be related to her sinus congestion.  Does notice some eye discharge.  She had similar symptoms about a year ago.  Tried Singulair at that time and did not find much " benefit with it.  The symptoms do not appear is worse.  Does get some nausea with the significant postnasal drip going into her stomach.  Still use fluticasone nasal spray.  She wants to try to avoid steroids if possible.  She did have a mild temperature of 100 Fahrenheit.  Ibuprofen keeps most of the discomfort under control.    Review of Systems  Review of Systems - Negative except cough, sinus congestion, low grade fever.        Objective       Vitals:  No vitals were obtained today due to virtual visit.    Physical Exam  General-well appearing  Neuro-intact  ENT-occasional sniffles  Respiratory-occasional cough        Video-Visit Details    Type of service:  Video Visit    Video End Time (time video stopped): 1248  Originating Location (pt. Location): Home    Distant Location (provider location):  [facility]     Platform used for Video Visit: Berny Tenorio, CNP

## 2021-06-17 NOTE — TELEPHONE ENCOUNTER
Angelia calls in to report that she has been ill for about 3 weeks and she was tested and negative for covid.  She is still sounding quite ill and reports coughing up copious white phlegm.  She also is very fatigued and her voice is hoarse.  She has been taking allergy medicine and cough medication.. Per protocol she should be seen.  Patient given home care measures per protocol.  Also told when to call back if further symptoms.  Patient agrees to this plan.     Reason for Disposition    Cough has been present for > 3 weeks    Protocols used: COUGH - ACUTE [id number]

## 2021-06-18 NOTE — PROGRESS NOTES
HPI: This patient is a 41yo F who presents for evaluation of the tonsils at the request of Dr. Bell. She has no significant history of recurrent sore throats or issues with her tonsils. She noticed one day about a few months ago that it felt like something was swollen in the back of her throat, prompting her to look. She had never noticed her tonsils really in the past, but this time it appeared that the right tonsil was quite a bit larger than the left. Not accompanied with sore throat, fever, cough, dysphagia, weight loss, odynophagia, dysphagia, hemoptysis, shortness of breath, or other major symptoms. She has taken 14 days of antibiotics for this without really any change.     Past medical history, surgical history, social history, family history, medications, and allergies have been reviewed with the patient and are documented above.    Review of Systems: a 10-system review was performed. Pertinent positives are noted in the HPI and on a separate scanned document in the chart.    PHYSICAL EXAMINATION:  GEN: no acute distress, normocephalic  EYES: extraocular movements are intact, pupils are equal and round. Sclera clear.   EARS: auricles are normally formed. The external auditory canals are clear with minimal to no cerumen. Tympanic membranes are intact bilaterally with no signs of infection, effusion, retractions, or perforations.  NOSE: anterior nares are patent. There are no masses or lesions. The septum is non-obstructing.  OC/OP: clear, dentition is in good repair. The tongue and palate are fully mobile and symmetric. Tonsils are 2+ bilaterally with the right tonsil being more exophytic, the left is more endophytic. There is no surface irregularity on either and texturally are the same  HP/L (scope): NP, BOT, vallecula, epiglottis, pyriform sinuses are clear. Bilateral TVFs are fully mobile and symmetric. There are no abnormalities noted on the backside of either tonsil.  NECK: soft and supple. No  lymphadenopathy or masses. Airway is midline. Thyroid normal.  NEURO: CN VII and XII symmetric and strong. alert and oriented. No spontaneous nystagmus. Gait is normal.  PULM: breathing comfortably on room air, normal chest expansion with respiration  HEART: normal carotid pulses, no cyanosis or clubbing    MEDICAL DECISION-MAKING: Angelia is a 41yo F that appears on visualization to have asymmetry of the tonsils. However, on exam today the tonsils are of equal size and texture without surface abnormalities. The right one is more exophytic and the left endophytic, owing to the visual difference. Do not feel that this warrants any further antibiotic treatment or workup, but will recheck in 1 month. Patient is comfortable with this plan.

## 2021-06-18 NOTE — PROGRESS NOTES
Chief Complaint   Patient presents with     Tonsil Swelling     Pt feels like her right side tonsil is swollen for the last few days. Pt has been having headaches and migraines for the last month.        HPI: Very pleasant 42-year-old female who presents today with about 1 month of feeling fullness and swelling of her right tonsil.  When she looks in the coronary she feels like she can see something swollen back there.  She has not noticed any exudate or tonsil stones.  No personal history of tonsil stones or tonsillitis.  The patient recently had hiatal hernia surgery about 2 months ago and initially had issues with weight loss, fatigue, and feeling like food was getting stuck in her esophagus, but this has since improved.  She has felt intermittently feverish but her highest temperature at home was 99.7 Fahrenheit.  She is able to swallow but it is slightly uncomfortable.  No airway constriction.  She denies cough, hemoptysis, wheezing.  She has had worsening tension headaches.  The patient does have a history of brain aneurysm clipping 2015 and follows up with [facility] neurology.    ROS:Review of Systems - History obtained from the patient  General ROS: positive for  - fatigue and weight loss  Ophthalmic ROS: negative  ENT ROS: Feels like right tonsil is swollen. Voice gets raspy sometimes  Respiratory ROS: negative  Gastrointestinal ROS: positive for - appetite loss and swallowing difficulty/pain  Neurological ROS: negative  Dermatological ROS: negative    SH: The Patient's  reports that she quit smoking about 9 years ago. She has never used smokeless tobacco. She reports that she drinks alcohol. She reports that she does not use illicit drugs.      FH: The Patient's family history includes Breast cancer (age of onset: 90) in her paternal grandmother; Cancer in her maternal uncle; Depression in her brother; Heart disease in her father and paternal grandfather; Hyperlipidemia in her father; Hypertension in her  father and paternal grandfather; No Medical Problems in her mother; Stroke in her father.     Meds:    Current Outpatient Prescriptions on File Prior to Visit   Medication Sig Dispense Refill     cyclobenzaprine (FLEXERIL) 10 MG tablet Take 1 tablet (10 mg total) by mouth 3 (three) times a day as needed for muscle spasms. 30 tablet 0     diazePAM (VALIUM) 5 MG tablet Take 10-15 mg by mouth 4 (four) times a day as needed.        ibuprofen (ADVIL,MOTRIN) 200 MG tablet Take 600 mg by mouth every 6 (six) hours as needed for pain.       multivitamin (ONE A DAY) per tablet Take 1 tablet by mouth daily. 90 tablet 3     sertraline (ZOLOFT) 100 MG tablet Take 100 mg by mouth daily.       No current facility-administered medications on file prior to visit.        O:  /62 (Patient Site: Left Arm, Patient Position: Sitting, Cuff Size: Adult Regular)  Pulse 72  Temp 98.6  F (37  C)  Wt 162 lb 8 oz (73.7 kg)  BMI 24.71 kg/m2    Physical Examination:   General appearance - alert, well appearing, and in no distress  Mental status - alert, oriented to person, place, and time  Eyes - pupils equal and reactive, extraocular eye movements intact  Ears - bilateral TM's and external ear canals normal  Nose - normal and patent, no erythema, discharge or polyps  Mouth - mucous membranes moist, pharynx without lesions. Right tonsil grade 3. No significant erythema or exudate. No stones easily identified.   Neck - supple  Lymphatics - Right tonsillary hypertrophy.  Chest - clear to auscultation, no wheezes, rales or rhonchi, symmetric air entry  Heart - normal rate and regular rhythm, S1 and S2 normal, no murmurs noted  Neurological - alert, oriented, normal speech, no focal findings or movement disorder noted, neck supple without rigidity, cranial nerves II through XII intact, motor and sensory grossly normal bilaterally, normal muscle tone, no tremors, strength 5/5  Extremities - peripheral pulses normal, no pedal edema, no  clubbing or cyanosis  Skin - normal coloration and turgor, no rashes, no suspicious skin lesions noted      A/P:     Problem List Items Addressed This Visit     Hiatal hernia with GERD      Other Visit Diagnoses     Swollen tonsil    -  Primary    Relevant Medications    clindamycin (CLEOCIN) 300 MG capsule    Other Relevant Orders    Rapid Strep A Screen-Throat (Completed)    Group A Strep, RNA Direct Detection, Throat    HM1(CBC and Differential)    Mononucleosis Screen    Ambulatory referral to ENT    HM1 (CBC with Diff)            1. Swollen tonsil  Covering patient with clindamycin for the next 14 days while we await ENT evaluation.  While I am less suspicious for mononucleosis, she has never had a known infection and is open to testing.  Strep test negative.  If symptoms rapidly worsen or she develops difficulty breathing, she needs to be evaluated emergently.  - Rapid Strep A Screen-Throat  - Group A Strep, RNA Direct Detection, Throat  - HM1(CBC and Differential)  - Mononucleosis Screen  - Ambulatory referral to ENT  - HM1 (CBC with Diff)  - clindamycin (CLEOCIN) 300 MG capsule; Take 1 capsule (300 mg total) by mouth 3 (three) times a day for 14 days.  Dispense: 42 capsule; Refill: 0    2. Hiatal hernia with GERD  Slowly recovering from surgery.  Clinically improving.    3.S/P craniotomy, MCA aneurysm clipping x2  Continue to follow with Conchita Tenorio CNP

## 2021-06-19 NOTE — PROGRESS NOTES
HPI: This patient is a 44yo F who presents for re-evaluation of the tonsils. Brief history: She presented a little over a month ago after noticing one day about a few months ago that it felt like something was swollen in the back of her throat, prompting her to look. She had never noticed her tonsils really in the past, but this time it appeared that the right tonsil was quite a bit larger than the left. Not accompanied with sore throat, fever, cough, dysphagia, weight loss, odynophagia, dysphagia, hemoptysis, shortness of breath, or other major symptoms. She has not noticed any significant changes since her last visit and still reports no symptoms.        Past medical history, surgical history, social history, family history, medications, and allergies have been reviewed with the patient and are documented above.     Review of Systems: a 10-system review was performed. Pertinent positives are noted in the HPI and on a separate scanned document in the chart.     PHYSICAL EXAMINATION:  GEN: no acute distress, normocephalic  EYES: extraocular movements are intact, pupils are equal and round. Sclera clear.   EARS: auricles are normally formed. The external auditory canals are clear with minimal to no cerumen. Tympanic membranes are intact bilaterally with no signs of infection, effusion, retractions, or perforations.  NOSE: anterior nares are patent. There are no masses or lesions. The septum is non-obstructing.  OC/OP: clear, dentition is in good repair. The tongue and palate are fully mobile and symmetric. Tonsils are 2+ bilaterally with the right tonsil being more exophytic, the left is more endophytic. There is no surface irregularity on either but the right one feels more firm today than before. This is different than the left.   NECK: soft and supple. No lymphadenopathy or masses. Airway is midline.   PULM: breathing comfortably on room air, normal chest expansion with respiration     MEDICAL DECISION-MAKING:  Angelia is a 42yo F here for a recheck of her tonsils. What appeared and felt symmetric at her last visit still appears symmetric, but texturally there has been a change on the right side. Discussed that we have been monitoring this for a little while now and that with the texture change, it would make sense to do a tonsillectomy for diagnosis/treatment. Also discussed a CT scan to further evaluate inside the tissue. She is not ready to do either.  Will come back again for a recheck.

## 2021-06-20 NOTE — PROGRESS NOTES
HPI: This patient is a 42yo F who presents for re-evaluation of the tonsils. Brief history: She presented a little over a month ago after noticing one day about a few months ago that it felt like something was swollen in the back of her throat, prompting her to look. She had never noticed her tonsils really in the past, but this time it appeared that the right tonsil was quite a bit larger than the left. Not accompanied with sore throat, fever, cough, dysphagia, weight loss, odynophagia, dysphagia, hemoptysis, shortness of breath, or other major symptoms. She has not noticed any significant changes since her last visit and still reports no symptoms.         Past medical history, surgical history, social history, family history, medications, and allergies have been reviewed with the patient and are documented above.      Review of Systems: a 10-system review was performed. Pertinent positives are noted in the HPI and on a separate scanned document in the chart.      PHYSICAL EXAMINATION:  GEN: no acute distress, normocephalic  EYES: extraocular movements are intact, pupils are equal and round. Sclera clear.   EARS: auricles are normally formed. The external auditory canals are clear with minimal to no cerumen. Tympanic membranes are intact bilaterally with no signs of infection, effusion, retractions, or perforations.  NOSE: anterior nares are patent. There are no masses or lesions. The septum is non-obstructing.  OC/OP: clear, dentition is in good repair. The tongue and palate are fully mobile and symmetric. Tonsils are 2+ bilaterally with the right tonsil being more exophytic, the left is more endophytic. There is no surface irregularity on either .and they feel texturally the same today  NECK: soft and supple. No lymphadenopathy or masses. Airway is midline.   PULM: breathing comfortably on room air, normal chest expansion with respiration      MEDICAL DECISION-MAKING: Angelia is a 42yo F here for a recheck of  her tonsils. What appeared and felt symmetric at her last visit still appears symmetric, but the textural difference appreciated at the last visit is no longer present today. Discussed that we have been monitoring this for a while now. The tonsils are fully symmetric at this juncture and she does not have symptoms. The only catch is that way this has played out is peculiar, likely secondary to something like cryptitis that will come and go, but confirmation of benign pathology via tonsillectomy may still be the best option for her. Discussed this vs CT again and she is going to weigh her options.

## 2021-06-20 NOTE — LETTER
Letter by Trista Patel RN at      Author: Trista Patel RN Service: -- Author Type: --    Filed:  Encounter Date: 6/12/2020 Status: (Other)       6/12/2020        Angelia Stallings  [address]  [place] [address]    This letter provides a written record that you were tested for COVID-19 on 6/10/2020.     Your result was negative.    This means that we didnt find the virus that causes COVID-19 in your sample. A test may show negative when you do actually have the virus. This can happen when the virus is in the early stages of infection, before you feel illness symptoms.    Even if you dont have symptoms, they may still appear. For safety, its very important to follow these rules.    Keep yourself away from others (self-isolation):      Stay home. Dont go to work, school or anywhere else.     Stay in your own room (and use your own bathroom), if you can.    Stay away from others in your home. No hugging, kissing or shaking hands. No visitors.    Clean high touch surfaces often (doorknobs, counters, handles, etc.). Use a household cleaning spray or wipes.    Cover your mouth and nose with a mask, tissue or washcloth to avoid spreading germs.    Wash your hands and face often with soap and water.    Stay in self-isolation until you meet ALL of the guidelines below:    1. You have had no fever for at least 72 hours (that is 3 full days of no fever without the use of medicine that reduces fevers), AND  2. other symptoms (such as cough, shortness of breath) have gotten better, AND  3. at least 10 days have passed since your symptoms first appeared.    Going back to work  Check with your employer for any guidelines to follow for going back to work.    Employers: This document serves as formal notice that your employee tested negative for COVID-19, as of the testing date shown above.    For questions regarding this letter or your Negative COVID-19 result, call [phone] between 8A to 6:30P (M-F) and 10A  to 6:30P (weekends).

## 2021-06-20 NOTE — PROGRESS NOTES
WALK IN CARE - VISIT NOTE    HPI    1. Nausea with occasional vomiting  - patient states she has a history of intermittent nausea with vomiting   - patient starting having nausea 2 weeks ago   - she went to urgency room on 9/19 with complaints of nausea and abdominal pain - CT abdomen w/contrast was done and was wnl   - on 9/25, patient presented to [facility] urgent care with nausea and abdominal discomfort - she was found to have a UTI + bacterial vaginosis  - patient was started on metronidazole for BV and keflex for UTI  - since starting the antibiotics, patient's nausea has gotten worse  - she has been able to hold down the antibiotics and she has been able to hold down her food but she is consistently feeling nauseous  - this AM, patient had several episodes of vomiting   - no fevers/chills/sweats  - no abnormal vaginal discharge, no hematuria noted per patient, no dysuria     ROS  Negative except as noted in HPI    OBJECTIVE    Vitals  Vitals:    10/01/18 1622   BP: (!) 132/102   Pulse: 84   Temp: 98.2  F (36.8  C)   SpO2: 97%     Physical Exam  General: No acute distress  CV: RRR, distal and peripheral pulses in tact  Resp: CTA bilaterally, no wheezing, no rhonchi, no rhales, no respiratory distress  Abdomen: soft, non-tender, non-distended  Pelvic wall: moderate TTP over anterior pelvic wall  Neuro: moves all 4 extremities no focal deficits noted  Extrem: no edema, no cyanosis, well-perfused    Labs  UA wnl      ASSESSMENT/PLAN      # Nausea   - this has been a chronic intermittent issue for patient - yesterday was the only day with vomiting  - belly exam wnl (soft, non-distended, non-tender) and she had a recent CT w/contrast that was wnl - no indication for further imaging at this point  - her nausea has mildly worsened since starting the antibiotics so this may just be an SE of the antibiotics   - will give a 3 day supply of zofran and patient should follow up with her PCP within 1 week to check for  resolution of symptoms once antibiotics have been completed    # Pelvic discomfort to palpation  - will check UA to ensure UTI is resolving  - discussed possibility of vaginal yeast infection given that she is on 2 antibiotics  - offered wet prep and patient declined    #Elevated blood pressure  - patient states she is very stressed about her nausea which may be contributing to BP  - no dizziness, no light headedness, no double vision, no paresthesias, no motor weakness  - neuro exam wnl  - follow up with PCP in 3 days for BP check

## 2021-06-20 NOTE — LETTER
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 She is currently being worked up for chronic cough. We are actively trialing therapies for this while she is working from home.       If you have any questions or concerns, please don't hesitate to call.    Sincerely,        Electronically signed by Ana Franks MD

## 2021-06-20 NOTE — LETTER
Letter by Isabela Bell MD at      Author: Isabela Bell MD Service: -- Author Type: --    Filed:  Encounter Date: 5/5/2020 Status: (Other)         May 5, 2020     Patient: Angelia Stallings   YOB: 1975   Date of Visit: 5/5/2020       To Whom It May Concern:    It is my medical opinion that Angelia Stallings continue to work from home until her cough has resolved/improved sufficiently that she can control it. Her symptoms are consistent with possible COVID-19, and although she is afebrile at this time, her cough could potentially pose an infection risk for others.    If you have any questions or concerns, please don't hesitate to call.    Sincerely,    Electronically signed by Isabela Bell MD

## 2021-06-20 NOTE — LETTER
Letter by Isabela Bell MD at      Author: Isabela Bell MD Service: -- Author Type: --    Filed:  Encounter Date: 2/21/2020 Status: (Other)         Angelia Stallings  [address]  [place] [address]               February 21, 2020    Dear Angelia:    Our records indicate that you are due for a mammogram.    In the [place], one in nine women will develop breast cancer during their lifetime. While there is no way to prevent breast cancer, early detection provides the best opportunity for curing it.    For women over the age of 40, the American Cancer Society recommends a yearly clinical breast exam and a yearly mammogram. These practices have saved thousands of lives. We need your help to ensure that you are receiving optimal medical care.    Please make an appointment for a mammogram at your earliest convenience. [phone]    Sincerely,        Isabela Bell MD

## 2021-06-21 NOTE — PROGRESS NOTES
Chief Complaint   Patient presents with     URI     Pt c/o congestion, sore throat x one week, this morning woke with a swollen Left black eye x       HPI: This is a very pleasant yet complex 43-year-old female who presents today with new onset periorbital bruising.  She notes mild ecchymosis on the left upper eyelid that started this morning.  This is in the context of having had a migraine headache this last Saturday.  She has many migraine headaches and has been treated for them in the past.  She also had an aneurysm several years ago which was clipped.  No history of trauma.    She denies vision changes, upper lower motor neuron weakness or paresthesias, syncope, memory loss or vertigo.  No headaches since last Saturday.    ROS: No rashes.  No cough cold or sore throat although she is getting over a cold.    SH:    reports that she quit smoking about 9 years ago. she has never used smokeless tobacco. She reports that she drinks alcohol. She reports that she does not use drugs.      FH: The Patient's family history includes Breast cancer (age of onset: 90) in her paternal grandmother; Cancer in her maternal uncle; Depression in her brother; Heart disease in her father and paternal grandfather; Hyperlipidemia in her father; Hypertension in her father and paternal grandfather; No Medical Problems in her mother; Stroke in her father.     Meds:  Angelia has a current medication list which includes the following prescription(s): cyclobenzaprine, ibuprofen, multivitamin, ondansetron, phenazopyridine, sertraline, sertraline, and tizanidine.    O:  /78   Pulse 82   Resp 16   Wt 167 lb 3 oz (75.8 kg)   SpO2 100%   BMI 25.42 kg/m     Constitutional:    --Vitals as above  --No acute distress  Eyes-  --Sclera noninjected  --Lids and conjunctiva normal  ENT-  --examination of the eyes show left eye is normal in every respect with anterior chamber quiet pupils equal bilaterally extraocular muscles intact x6 but  there is ecchymosis on the upper eyelid on the left.  --TMs clear  --Sclera noninjected  --Pharynx not erythematous  Neck-  --Neck supple    Lymph-  --No cervical lymphadenopathy  Lungs-  --Clear to Auscultation  Heart-  --Regular rate and rhythm  Skin-  --Pink and dry          A/P:   1. Periorbital Bruising  This is a complex concern.  Review of article from the Journal of neurology, neurosurgery and psychiatry from 2004, volume 75 pages 1192 and 1193, show that there are case reports for spontaneous periorbital bruising being associated with a rise intracranial pressure.  Although the patient is completely asymptomatic I think it is reasonable to at least image there and make sure there is nothing atypical occurring.  If bruising gets worse or symptoms develop would have her be seen urgently/emergently by neurosurgery.    - MR Brain Without Contrast; Future

## 2021-06-22 NOTE — PROGRESS NOTES
"Chief Complaint   Patient presents with     Dizziness     3 days - worse with eye movement and head movements     Headache     2 days     Migraine     amovig injection given on Tues 12/18         HPI      Patient is here for 3 days of gradual onset of dizziness, progressively worsening, mostly with eye and head movement. No dizziness at rest. Dizziness described as \"feeling off\" without head nor room spinning sensation. The last two days she also has had intermittent moderate to severe headache behind her eyes, current at 8/10. She has hx of migraine with similar symptoms. No home meds taken yet for her headache. She reported photophobia, but denied nausea, vomiting, neck pain, fever, cough, nasal congestion, chest pain, shortness of breath, extremity weakness nor numbness.    ROS: Pertinent ROS noted in HPI.     Allergies   Allergen Reactions     [id number] Antimigraine Agents      Hx cerebral aneurysms, s/p clipping     Penicillins Hives and Rash     Sulfa (Sulfonamide Antibiotics) Hives and Rash       Patient Active Problem List   Diagnosis     Allergic Rhinitis     Lower Back Pain     Anxiety     Neck pain     S/P craniotomy, MCA aneurysm clipping x 2     Menorrhagia     Major depression single episode, in partial remission (H)     History of intracranial aneurysm     Migraine without aura     Umbilical hernia     Hiatal hernia with GERD       Family History   Problem Relation Age of Onset     Hypertension Father      Stroke Father      Heart disease Father      Hyperlipidemia Father      Depression Brother      Cancer Maternal Uncle         cervical     Heart disease Paternal Grandfather      Hypertension Paternal Grandfather      Breast cancer Paternal Grandmother 90     No Medical Problems Mother        Social History     Socioeconomic History     Marital status: Single     Spouse name: Not on file     Number of children: 1     Years of education: Not on file     Highest education level: Not on file "   Social Needs     Financial resource strain: Not on file     Food insecurity - worry: Not on file     Food insecurity - inability: Not on file     Transportation needs - medical: Not on file     Transportation needs - non-medical: Not on file   Occupational History     Occupation:      Employer: [employer]   Tobacco Use     Smoking status: Former Smoker     Last attempt to quit: 2009     Years since quittin.9     Smokeless tobacco: Never Used     Tobacco comment: No exposure   Substance and Sexual Activity     Alcohol use: Yes     Comment: rare     Drug use: No     Sexual activity: Yes     Partners: Male   Other Topics Concern     Not on file   Social History Narrative     Not on file         Objective:    Vitals:    18 1323   BP: 124/80   Pulse: 86   Temp: 97.2  F (36.2  C)   SpO2: 98%       Gen:NAD  Head: normal inspection, mild tenderness to palpation frontally, normocephalic  Throat: oropharynx clear, tonsils normal  Ears: TMs clear without effusion, ear canals normal with small cerumen  Nose: no discharge  Eyes: balwinder conjunctiva, PERRLA, EOMI, no nystagmus  Neck: normal inspection, normal palpation, full ROM in all planes without pain  CV: RRR, normal S1S2, no M, R, G  Pulm: CTAB, normal effort  Cranial nerves: Normal facial movements, normal speech.  Tongue is midline.  Normal eye movement.  Shoulder shrug is strong and symmetrical. Normal gait.  Skin: dry, warm, no acute lesions    Recent Results (from the past 24 hour(s))   HM2(CBC w/o Differential)   Result Value Ref Range    WBC 8.6 4.0 - 11.0 thou/uL    RBC 4.93 3.80 - 5.40 mill/uL    Hemoglobin 13.7 12.0 - 16.0 g/dL    Hematocrit 41.4 35.0 - 47.0 %    MCV 84 80 - 100 fL    MCH 27.7 27.0 - 34.0 pg    MCHC 33.0 32.0 - 36.0 g/dL    RDW 12.4 11.0 - 14.5 %    Platelets 296 140 - 440 thou/uL    MPV 7.3 7.0 - 10.0 fL   ISTAT CHEM 7 (HE CLINIC ONLY)   Result Value Ref Range    Sodium 140 136 - 145 mmol/L     Potassium 4.3 3.5 - 5.5 mmol/L    CO2 26 22 - 31 mmol/L    Chloride 100 98 - 107 mmol/L    Anion Gap, Calculation 14 5 - 18 mmol/L    Glucose 88 70 - 125 mg/dL    BUN 16 8 - 22 mg/dL    Creatinine 0.80 0.70 - 1.30 mg/dL           Dizziness - benign exam. Suspect peripheral vertigo. Advised fluids, Meclizine, and close f/u in ER if symptoms escalate or fail to improve.   -     HM2(CBC w/o Differential)  -     ISTAT CHEM 7 (HE CLINIC ONLY)  -     meclizine (ANTIVERT) 25 mg tablet; Take 1 tablet (25 mg total) by mouth 3 (three) times a day as needed for dizziness or nausea.    Other migraine without status migrainosus, not intractable - much improved after Toradol.   -     ketorolac injection 30 mg (TORADOL); Inject 1 mL (30 mg total) into the shoulder, thigh, or buttocks once.

## 2021-06-25 NOTE — ED TRIAGE NOTES
Presents with complaint of left sided buttock pain that radiates down to the foot. She has been worked up for this before and was given gabapentin and flexeril (but had to stop because of side effects). Today has worsening pain and is unable to tolerate at home. Denies numbness or tingling.

## 2021-06-25 NOTE — TELEPHONE ENCOUNTER
Triage note    Caller name: Angelia  Relation to patient: self     Patient calling because of pain in her left foot/ankle. The pain starts at the bottom of the foot and works up the side of foot. She states the pain is a constant 9/10 and has not been able to get relief of symptoms. She has a hx of a sprain in the ankle. She also injured her left foot when getting off of an exercise bike previously.    Pt does have an upcoming appointment with ortho but is looking for advice on how to treat pain before then. Patient was tearful during call.       Disposition:   According to protocol, patient to be seen today. Warm transferred to scheduling - no appointments available. Patient plans to go to [facility] today for evaluation. She verbalized understanding of recommendation and agrees with plan.      Matilda Gilman RN   05/27/21 2:00 PM  [facility] Nurse Advisor          COVID 19 Nurse Triage Plan/Patient Instructions    Please be aware that novel coronavirus (COVID-19) may be circulating in the community. If you develop symptoms such as fever, cough, or SOB or if you have concerns about the presence of another infection including coronavirus (COVID-19), please contact your health care provider or visit  [email or web address].    Disposition/Instructions    In-Person Visit with provider recommended. Reference Visit Selection Guide.    Thank you for taking steps to prevent the spread of this virus.  o Limit your contact with others.  o Wear a simple mask to cover your cough.  o Wash your hands well and often.    Resources    [facility]: About COVID-19: [email or web address]    CDC: What to Do If You're Sick: www.cdc.gov/coronavirus/2019-ncov/about/steps-when-sick.html    CDC: Ending Home Isolation: www.cdc.gov/coronavirus/2019-ncov/hcp/disposition-in-home-patients.html     CDC: Caring for Someone: www.cdc.gov/coronavirus/2019-ncov/if-you-are-sick/care-for-someone.html     [facility]: Interim Guidance  for Hospital Discharge to Home: www.health.[facility].[place]/diseases/coronavirus/hcp/hospdischarge.pdf    [facility] clinical trials (COVID-19 research studies): [email or web address]     Below are the COVID-19 hotlines at the [place] Department of Health ([facility]). Interpreters are available.   o For health questions: Call 902-931-4544 or 1-881.580.9035 (7 a.m. to 7 p.m.)  o For questions about schools and childcare: Call 818-680-2754 or 1-483.683.8666 (7 a.m. to 7 p.m.)     Reason for Disposition    SEVERE pain (e.g., excruciating, unable to do any normal activities)    Additional Information    Negative: Followed an ankle or foot injury    Negative: Ankle pain is the main symptom    Negative: Entire foot is cool or blue in comparison to other foot    Negative: Purple or black skin on foot or toe    Negative: Red area or streak and fever    Negative: Swollen foot and fever    Negative: Patient sounds very sick or weak to the triager    Protocols used: FOOT PAIN-A-[place]

## 2021-06-25 NOTE — PROGRESS NOTES
Assessment/Plan:   Pain in joint involving ankle and foot, left  Low back pain with radiculopathy left leg  Low back pain with radiculopathy to the left great toe and left medial ankle pain. The ankle pain may actually be related to the back issue and not a separate process. Especially with the pain there not localizing to any structure specifically, getting worse while sitting at work.   Toradol injection given in the office provided mild relief.   We will add cyclobenzaprine every 8 hours to relax the spasms in the leg and back.   Medrol dose pack was prescribed though she was hesitant to take it since she just finished one for her asthma a couple weeks ago.   We will check a CRP, ESR and RF labs today for possible rheumatoid or autoimmune process.   Ice and heat  Tomorrow resume ibuprofen 800mg every 8 hours and tylenol in between as needed for pain.   Referral back to ortho for reevaluation of her back and new assessment of her ankle. Ankle xrays taken at [facility] last year.   I discussed red flag symptoms, return precautions to clinic/ER and follow up care with patient/parent.  Expected clinical course, symptomatic cares advised. Questions answered. Patient/parent amenable with plan.  - ketorolac injection 30 mg (TORADOL)  - C-Reactive Protein  - Erythrocyte Sedimentation Rate  - Rheumatoid Factor Quant  - Ambulatory referral to Orthopedics  - methylPREDNISolone (MEDROL DOSEPACK) 4 mg tablet; follow package directions  Dispense: 21 tablet; Refill: 0  - cyclobenzaprine (FLEXERIL) 5 MG tablet; Take 1 tablet (5 mg total) by mouth 3 (three) times a day as needed for muscle spasms.  Dispense: 30 tablet; Refill: 0    Toradol given tonight, do not take ibuprofen or naproxen for at least 12 hours  May take tylenol 1000mg every 6-8 hours in between ibuprofen doses.   Ibuprofen 800mg every 8 hours with food. Or 600mg every 6 hours.   Flexeril 5mg every 8 hours as needed for muscle spasm.   Change shoes during the day    Alternate ice and heat to back/buttock area for radicular pain  May do heat/ice for the ankle as well if helpful  If worse or no better, start the medrol dose pack. Take with food. Maybe back off on the ibuprofen while taking the first couple days of the pack (both are antiinflammatories and can irritate the stomach).     Referral placed to ortho for second opinion on ankle issues - as well as your back since that may be contributing to the ankle pain.   [facility]  PHONE: [phone]    Subjective:      Angelia Stallings is a 45 y.o. female who presents for evaluation of severe left ankle pain. No injury. This has bothered her off and on for 3 years. It started following an elliptical workout when she felt a pop on the plantar surface of the mid heel. Last flare was a year ago and she was seen by a podiatrist in the [facility] system who felt there was nothing localizing to inject with cortisone. She was referred to PT and they thought there was diffuse tendonosis affecting the medial ankle rather than plantar fasciitis. 2-3 weeks ago she started feeling it flare up as she slightly increased her walking regimen. Now it is excruciating and unbearable. She is having pain medial ankle as well as radiating up her leg. She gets fasciculations in her lower leg and a spasm that flexes her great toe, along with pain in the great toe, all on the left side. Calf muscles feel tight. No definite numbness or tingling. She has mild localized swelling of the medial ankle, no redness or warmth and no edema of foot or lower leg. No pain with range of motion at the ankle.    She has had a flare of her low back pain as well - which she did last year as well. At that time ortho wanted to do a nerve block but she didn't want to do it. Then her foot started hurting more so she PT for that and things all calmed down but never went away. Now she has had pain in the low back and left buttock and leg as well over the last month. She  has a known bulging disc at L4-L5.   No rash.   No benefit from various orthotics.   No definite weakness.   She has so much ankle foot and leg pain in the night that she cannot sleep. She has pain with walking all through her leg. She cannot sit too long without pain but standing increases her ankle and foot pain.   She has taken 800mg ibuprofen earlier today, last dose about 6 hours ago.    She has no fever or chills, no URI or cough or CP or shortness of breath.   She works at a desk. But increasingly cannot make it through the day without onset of severe ankle pain just while sitting.   She took a medrol dose pack starting 5/7/21 for an asthma exacerbation. It was as she felt better with her breathing that she resumed walking for exercise and that is when the increased back and ankle pain started, possibly before it finished.     Allergies   Allergen Reactions     Cat Dander Itching and Shortness Of Breath     [id number] Antimigraine Agents      Hx cerebral aneurysms, s/p clipping     Watermelon      Penicillins Hives and Rash     Sulfa (Sulfonamide Antibiotics) Hives and Rash     Current Outpatient Medications on File Prior to Visit   Medication Sig Dispense Refill     acetaminophen (TYLENOL) 500 MG tablet Take 1,000 mg by mouth every 6 (six) hours as needed for pain.       cholecalciferol, vitamin D3, 1,000 unit (25 mcg) tablet Take 1,000 Units by mouth daily.       fluticasone propionate (FLONASE ALLERGY RELIEF) 50 mcg/actuation nasal spray 1-2 sprays in each nostril at bedtime. 16 g 0     ibuprofen (ADVIL,MOTRIN) 200 MG tablet Take 600 mg by mouth every 6 (six) hours as needed for pain.       sertraline (ZOLOFT) 50 MG tablet Take 1 tablet (50 mg total) by mouth daily. 90 tablet 1     benzonatate (TESSALON PERLES) 100 MG capsule Take 1 capsule (100 mg total) by mouth 3 (three) times a day as needed for cough. 20 capsule 0     cetirizine (ZYRTEC) 10 MG tablet Take 10 mg by mouth daily.       diazePAM  (VALIUM) 5 MG tablet Take 1 tablet (5 mg total) by mouth every 6 (six) hours as needed for anxiety. 30 tablet 1     loratadine-pseudoephedrine (LORATADINE-PSEUDOEPHEDRINE) 5-120 mg Tb12 Take 1 tablet by mouth every 12 (twelve) hours.       oxyCODONE-acetaminophen (PERCOCET/ENDOCET) 5-325 mg per tablet Take 1-2 tablets by mouth 4 (four) times a day as needed.       pseudoephedrine (SUDAFED) 120 mg 12 hr tablet Take 1 tablet (120 mg total) by mouth 2 (two) times a day as needed for congestion. 20 tablet 2     tiZANidine (ZANAFLEX) 4 MG tablet TK 1 TO 2 TS PO QHS       No current facility-administered medications on file prior to visit.      Patient Active Problem List   Diagnosis     Allergic Rhinitis     Lower Back Pain     Anxiety     Neck pain     S/P craniotomy, MCA aneurysm clipping x 2     Menorrhagia     Major depression single episode, in partial remission (H)     History of intracranial aneurysm     Migraine without aura     Umbilical hernia     Hiatal hernia with GERD     Status post laparoscopic Nissen fundoplication       Objective:     BP (!) 144/93   Pulse 92   Temp 98.4  F (36.9  C)   Resp 16   Wt 193 lb (87.5 kg)   SpO2 99%   Breastfeeding No   BMI 29.35 kg/m      Physical  General Appearance: Alert, cooperative, no distress but extremely uncomfortable. She is sitting gingerly on the chair and keeps straightening her left leg and rubbing the thigh and calf due to pain. AVSS  Head: Normocephalic, without obvious abnormality, atraumatic  Eyes: Conjunctivae are normal.   Lungs: respirations unlabored  Extremities: No lower extremity edema, mild swelling without redness or warmth medial left ankle. Palpable pedal pulses, normal cap refill. Pain in left great toe with out redness or swelling or deformity. No pain with palpation of the plantar fascia at this time. There is pain medial left ankle diffusely, not specifically over the malleolus, more so the soft tissues though not localized. Fairly  normal range of motion of the ankle without pain. More pain with weight bearing. Negative ariane's. There are no palpable venous cords. She has a few fasciculations distal upper leg and in the lower leg. Intermittent spasms lateral calf trigger pain and dorsiflexion in great toe. No apparent weakness in the left leg muscles. Positive SLR left side with increased leg pain. There is some low back pain with range of motion and palpation on the left side, some SI joint pain as well. Some lumbar muscle spasm.   Skin: Skin color, texture, turgor normal, no rashes or lesions  Psychiatric: Patient has a normal mood and affect, initially tearful and overwhelmed by the discomfort and frustration. Improved after the toradol as I suspect the pain lessened.

## 2021-06-26 NOTE — PROGRESS NOTES
Progress Notes by Rosibel Connolly CNP at 9/25/2018  6:20 PM     Author: Rosibel Connolly CNP Service: -- Author Type: Nurse Practitioner    Filed: 9/25/2018  7:32 PM Encounter Date: 9/25/2018 Status: Signed    : Rosibel Connolly CNP (Nurse Practitioner)       ASSESSMENT:   1. UTI (urinary tract infection)  Urinalysis-UC if Indicated    Culture, Urine    Wet Prep, Vaginal    Chlamydia trachomatis & Neisseria gonorrhoeae, Amplified Detection    cephalexin (KEFLEX) 500 MG capsule    phenazopyridine (PYRIDIUM) 100 MG tablet   2. BV (bacterial vaginosis)  metroNIDAZOLE (FLAGYL) 500 MG tablet       PLAN:  Old female presents for evaluation of lower abdominal pain.  Really described as more pressure and discomfort than true pain.  Of note, patient was seen at the emergency room on September 19 for workup of right lower quadrant abdominal pain.  CT imaging was obtained at that time and was found to be unremarkable.  Patient notes that the symptoms seem different from that visit.  Does also note some dysuria and increased urinary frequency.  UA is obtained and results are consistent with infection.  Culture is pending.  Patient also notes some vaginal discharge with concern of getting back together with an ex, does question STI.  Pelvic exam is essentially unremarkable, does have some white discharge.  Wet prep is obtained, positive for clue cells.  GC chlamydia are pending.  No cervical motion tenderness to suggest PID.  No adnexal tenderness.  Would not treat for STI at this point without positive results.  She is prescribed a 7-day course of Keflex for the UTI as well as a 7-day course of metronidazole for the BV.  Counseled to abstain from alcohol while on metronidazole.  Will follow up with her GYN provider at her appointment next week if symptoms persist.    I discussed red flag symptoms, return precautions to clinic/ER and follow up care with patient/parent.  Expected clinical course, symptomatic cares  advised. Questions answered. Patient/parent amenable with plan.    Patient Instructions:  Patient Instructions   Your urine test shows evidence of a urinary tract infection.    We will treat you with an antibiotic, Keflex.  I sent this to your pharmacy. Please take as directed for 7 days. Please take a probiotic or eat a daily Greek yogurt while you are on the antibiotic.    If developing high fevers, vomiting, abdominal pain, or any other new, concerning symptoms, come back immediately. If no improvement in symptoms by the end of your antibiotic treatment, follow up with your primary care doctor.    Otherwise, simply follow up as needed.        Urinary Tract Infections in Women  Urinary tract infections (UTIs) are most often caused by bacteria (germs). These bacteria enter the urinary tract. The bacteria may come from outside the body. Or they may travel from the skin outside the rectum or vagina into the urethra. Female anatomy makes it easier for bacteria from the bowel to enter a womans urinary tract, which is the most common source of UTI. This means women develop UTIs more often than men. Pain in or around the urinary tract is a common UTI symptom. But the only way to know for sure if you have a UTI for the health care provider to test your urine. The two tests that may be done are the urinalysis and urine culture.  Types of UTIs    Cystitis: A bladder infection (cystitis) is the most common UTI in women. You may have urgent or frequent urination. You may also have pain, burning when you urinate, and bloody urine.    Urethritis: This is an inflamed urethra, which is the tube that carries urine from the bladder to outside the body. You may have lower stomach or back pain. You may also have urgent or frequent urination.    Pyelonephritis: This is a kidney infection. If not treated, it can be serious and damage your kidneys. In severe cases, you may be hospitalized. You may have a fever and lower back  pain.  Medications to treat a UTI  Most UTIs are treated with antibiotics. These kill the bacteria. The length of time you need to take them depends on the type of infection. It may be as short as 3 days. If you have repeated UTIs, a low-dose antibiotic may be needed for several months. Take antibiotics exactly as directed. Dont stop taking them until all of the medication is gone. If you stop taking the antibiotic too soon, the infection may not go away, and you may develop a resistance to the antibiotic. This can make it much harder to treat.  Lifestyle changes to treat and prevent UTIs  The lifestyle changes below will help get rid of your UTI. They may also help prevent future UTIs.    Drink plenty of fluids. This includes water, juice, or other caffeine-free drinks. Fluids help flush bacteria out of your body.    Empty your bladder. Always empty your bladder when you feel the urge to urinate. And always urinate before going to sleep. Urine that stays in your bladder can lead to infection. Try to urinate before and after sex as well.    Practice good personal hygiene. Wipe yourself from front to back after using the toilet. This helps keep bacteria from getting into the urethra.    Use condoms during sex. These help prevent UTIs caused by sexually transmitted bacteria. Also, avoid using spermicides during sex. These can increase the risk of UTIs. Choose other forms of birth control instead. For women who tend to get UTIs after sex, a low-dose of a preventive antibiotic may be used. Be sure to discuss this option with your health care provider.    Follow up with your health care provider as directed. He or she may test to make sure the infection has cleared. If necessary, additional treatment may be started.    6994-8580 [employer]. [address] [place] [address], [place] [address]. All rights reserved. This information is not intended as a substitute for professional medical care. Always follow your  healthcare professional's instructions.      You have bacterial vaginosis.    See below for more information regarding bacterial vaginosis.    Take metronidazole as prescribed x 7 days. Do not drink any alcohol while taking this medication or for 7 days after completing it as it can make you very ill. Take a probiotic while on the antibiotic.    Follow up with primary care provider if no improvement or worsening in 1 week.      Bacterial Vaginosis  Bacterial vaginosis is a vaginal infection that occurs when the normal balance of bacteria in the vagina is disrupted. It results from an overgrowth of certain bacteria. This is the most common vaginal infection in women of childbearing age. Treatment is important to prevent complications, especially in pregnant women, as it can cause a premature delivery.  CAUSES   Bacterial vaginosis is caused by an increase in harmful bacteria that are normally present in smaller amounts in the vagina. Several different kinds of bacteria can cause bacterial vaginosis. However, the reason that the condition develops is not fully understood.  RISK FACTORS  Certain activities or behaviors can put you at an increased risk of developing bacterial vaginosis, including:    Having a new sex partner or multiple sex partners.    Douching.    Using an intrauterine device (IUD) for contraception.  Women do not get bacterial vaginosis from toilet seats, bedding, swimming pools, or contact with objects around them.  SIGNS AND SYMPTOMS   Some women with bacterial vaginosis have no signs or symptoms. Common symptoms include:    Grey vaginal discharge.    A fishlike odor with discharge, especially after sexual intercourse.    Itching or burning of the vagina and vulva.    Burning or pain with urination.  DIAGNOSIS   Your health care provider will take a medical history and examine the vagina for signs of bacterial vaginosis. A sample of vaginal fluid may be taken. Your health care provider will look at  this sample under a microscope to check for bacteria and abnormal cells. A vaginal pH test may also be done.   TREATMENT   Bacterial vaginosis may be treated with antibiotic medicines. These may be given in the form of a pill or a vaginal cream. A second round of antibiotics may be prescribed if the condition comes back after treatment. Because bacterial vaginosis increases your risk for sexually transmitted diseases, getting treated can help reduce your risk for chlamydia, gonorrhea, HIV, and herpes.  HOME CARE INSTRUCTIONS     Only take over-the-counter or prescription medicines as directed by your health care provider.    If antibiotic medicine was prescribed, take it as directed. Make sure you finish it even if you start to feel better.    Tell all sexual partners that you have a vaginal infection. They should see their health care provider and be treated if they have problems, such as a mild rash or itching.    During treatment, it is important that you follow these instructions:    Avoid sexual activity or use condoms correctly.    Do not douche.    Avoid alcohol as directed by your health care provider.    Avoid breastfeeding as directed by your health care provider.  SEEK MEDICAL CARE IF:     Your symptoms are not improving after 3 days of treatment.    You have increased discharge or pain.    You have a fever.  MAKE SURE YOU:     Understand these instructions.    Will watch your condition.    Will get help right away if you are not doing well or get worse.  FOR MORE INFORMATION   Centers for Disease Control and Prevention, Division of STD Prevention: www.cdc.gov/std  American Sexual Health Association (HENOK): www.ashastd.org      This information is not intended to replace advice given to you by your health care provider. Make sure you discuss any questions you have with your health care provider.     Document Released: 12/18/2006 Document Revised: 10/06/2015 Document Reviewed: 07/30/2014  ExitCare  Patient  Information  2015 [employer].            SUBJECTIVE:   Angelia Stallings is a 43 y.o. female who presents today with nausea, vomiting and diarrhea last night, feels like that has resolved.  Now has pelvic pressure, dysuria, increased urinary frequency today.  No fever today, but does note a fever of 100.6 last night.  Denies vaginal discharge, itching, some mild odor.  Recently got back together with her ex significant other, sexually active with him only.  Does express some concern for STI.  She has had uterine ablation, partner has had vasectomy, low concern for pregnancy.  No low back pain.  She was seen in the emergency room on September 19 for right lower quadrant abdominal pain with an entirely normal workup including a normal CBC, chemistries and CT of the abdomen and pelvis.      ROS:  Comprehensive 12 pt ROS completed, positives noted in HPI, otherwise negative.      Past Medical History:  Patient Active Problem List   Diagnosis   ? Allergic Rhinitis   ? Lower Back Pain   ? Anxiety   ? Neck pain   ? S/P craniotomy, MCA aneurysm clipping x 2   ? Menorrhagia   ? Major depression single episode, in partial remission (H)   ? History of intracranial aneurysm   ? Migraine without aura   ? Umbilical hernia   ? Hiatal hernia with GERD       Surgical History:  Past Surgical History:   Procedure Laterality Date   ? COMBINED HYSTEROSCOPY DIAGNOSTIC / D&C N/A 9/18/2015    Procedure: DILATION AND CURETTAGE WITH HYSTEROSCOPY ;  Surgeon: Catarina Lehman DO;  Location: [facility];  Service:    ? CRANIOTOMY FOR ANEURYSM / VERTEBROBASILAR / CAROTID CIRCULATION  2/19/15    MCA aneurysm clipping x 2   ? HYSTEROSCOPY W/ ENDOMETRIAL ABLATION  12/15/2015   ? MOUTH SURGERY     ? SD CRYOCAUTERY OF CERVIX      Description: Cervix Cryosurgery;  Recorded: 01/17/2008;  Comments: TRUE I   ? SD DILATION/CURETTAGE,DIAGNOSTIC      Description: Dilation And Curettage;  Proc Date: 09/01/2004;  Comments: FOR MISCARRIAGE   ? SD  LAP,ESOPHAGOGAST FUNDOPLASTY N/A 3/1/2018    Procedure: ROBOTIC NISSEN FUNDOPLICATION;  Surgeon: Leo Quezada DO;  Location: [facility] OR;  Service: General           Family History:  Family History   Problem Relation Age of Onset   ? Hypertension Father    ? Stroke Father    ? Heart disease Father    ? Hyperlipidemia Father    ? Depression Brother    ? Cancer Maternal Uncle      cervical   ? Heart disease Paternal Grandfather    ? Hypertension Paternal Grandfather    ? Breast cancer Paternal Grandmother 90   ? No Medical Problems Mother        Reviewed; Non-contributory    History   Smoking Status   ? Former Smoker   ? Quit date: 1/1/2009   Smokeless Tobacco   ? Never Used     Comment: No exposure         Current Medications:  Current Outpatient Prescriptions on File Prior to Visit   Medication Sig Dispense Refill   ? cyclobenzaprine (FLEXERIL) 10 MG tablet Take 1 tablet (10 mg total) by mouth 3 (three) times a day as needed for muscle spasms. 30 tablet 0   ? diazePAM (VALIUM) 5 MG tablet Take 10-15 mg by mouth 4 (four) times a day as needed.      ? ibuprofen (ADVIL,MOTRIN) 200 MG tablet Take 600 mg by mouth every 6 (six) hours as needed for pain.     ? multivitamin (ONE A DAY) per tablet Take 1 tablet by mouth daily. 90 tablet 3   ? sertraline (ZOLOFT) 100 MG tablet Take 100 mg by mouth daily.     ? sertraline (ZOLOFT) 100 MG tablet Take 1 tablet (100 mg total) by mouth daily. DUE FOR VISIT. PLEASE SCHEDULE MED CHECK PRIOR TO NEXT FILL. 90 tablet 0   ? tiZANidine (ZANAFLEX) 4 MG tablet START WITH 1/2 T HS.  INCREASE BY 1/2 T QHS UP TO 4 TS HS AS TOLERATED  2     No current facility-administered medications on file prior to visit.        Allergies:   Allergies   Allergen Reactions   ? [id number] Antimigraine Agents      Hx cerebral aneurysms, s/p clipping   ? Penicillins Hives and Rash   ? Sulfa (Sulfonamide Antibiotics) Hives and Rash       OBJECTIVE:   Vitals:    09/25/18 1820   BP: 120/78   Pulse:  97   Resp: 16   Temp: 98.3  F (36.8  C)   TempSrc: Oral   SpO2: 98%   Weight: 166 lb (75.3 kg)     Physical exam reveals a pleasant 43 y.o. female.   Appears healthy, alert and cooperative. Non-toxic appearance.  Lungs: Chest is clear, no wheezing, rhonchi or rales. Symmetric air entry throughout both lung fields.  Heart: regular rate and rhythm, no murmur, rub or gallop  Abdomen: soft, nontender. No masses or organomegaly  : Normal female external genitalia without lesions, rash, or erythema.  Vaginal mucosa pink and moist, rugae intact.  Vaginal discharge is thick, white.  Cervix pink, non-friable. Cervical os closed.  No cervical motion or adnexal tenderness.  Skin: pink, warm, dry, and without lesions on limited skin exam. No rashes.     RADIOLOGY    none  LABORATORY STUDIES    Recent Results (from the past 24 hour(s))   Urinalysis-UC if Indicated   Result Value Ref Range    Color, UA Yellow Colorless, Yellow, Straw, Light Yellow    Clarity, UA Clear Clear    Glucose, UA Negative Negative    Bilirubin, UA Negative Negative    Ketones, UA Negative Negative    Specific Gravity, UA <=1.005 1.005 - 1.030    Blood, UA Moderate (!) Negative    pH, UA 5.5 5.0 - 8.0    Protein, UA Negative Negative mg/dL    Urobilinogen, UA 0.2 E.U./dL 0.2 E.U./dL, 1.0 E.U./dL    Nitrite, UA Negative Negative    Leukocytes, UA Small (!) Negative    Bacteria, UA Few (!) None Seen hpf    RBC, UA 0-2 None Seen, 0-2 hpf    WBC, UA 25-50 (!) None Seen, 0-5 hpf    Squam Epithel, UA 0-5 None Seen, 0-5 lpf   Wet Prep, Vaginal   Result Value Ref Range    Yeast Result No yeast seen No yeast seen    Trichomonas No Trichomonas seen No Trichomonas seen    Clue Cells, Wet Prep Clue cells seen (!) No Clue cells seen           Rosibel Connolly, CNP

## 2021-06-26 NOTE — PATIENT INSTRUCTIONS - HE
Toradol given tonight, do not take ibuprofen or naproxen for at least 12 hours  May take tylenol 1000mg every 6-8 hours in between ibuprofen doses.   Ibuprofen 800mg every 8 hours with food. Or 600mg every 6 hours.   Flexeril 5mg every 8 hours as needed for muscle spasm.   Change shoes during the day   Alternate ice and heat to back/buttock area for radicular pain  May do heat/ice for the ankle as well if helpful  If worse or no better, start the medrol dose pack. Take with food. Maybe back off on the ibuprofen while taking the first couple days of the pack (both are antiinflammatories and can irritate the stomach).     Referral placed to ortho for second opinion on ankle issues - as well as your back since that may be contributing to the ankle pain.   [facility]  PHONE: [phone]

## 2021-07-04 NOTE — ED PROVIDER NOTES
ED Provider Notes by Bertram Thompson CNP at 6/3/2021 12:24 PM     Author: Bertram Thompson CNP Service: Emergency Medicine Author Type: Nurse Practitioner    Filed: 6/3/2021  1:43 PM Date of Service: 6/3/2021 12:24 PM Status: Signed    : Bertram Thompson CNP (Nurse Practitioner)       EMERGENCY DEPARTMENT ENCOUNTER      NAME: Angelia Stallings  AGE: 45 y.o. female  YOB: 1975  MRN: [id number]  EVALUATION DATE & TIME: 6/3/2021 12:22 PM    PCP: Isabela Bell MD    ED PROVIDER: RIKY Porter CNP      Chief Complaint   Patient presents with   ? Leg Pain         FINAL IMPRESSION:  1. Sciatica of left side          ED COURSE & MEDICAL DECISION MAKIN:29 PM I met with the patient, obtained history, performed an initial exam, and discussed options and plan for treatment here in the ED. PPE: Provider wore gloves, face shield, N95 mask.  1:41 PM recheck. Pain improved.     Pertinent Labs & Imaging studies reviewed. (See chart for details)  45 y.o. female presents to the Emergency Department for evaluation of leg pain. Presentation is consistent with sciatic. No weakness, sensory changes, fever, incontinence, or saddle anesthesia.    At the conclusion of the encounter I discussed the results of all of the tests and the disposition. The questions were answered. The patient or family acknowledged understanding and was agreeable with the care plan.       MEDICATIONS GIVEN IN THE EMERGENCY:  Medications   HYDROmorphone injection 1 mg (DILAUDID) (1 mg Intramuscular Given 6/3/21 1242)       NEW PRESCRIPTIONS STARTED AT TODAY'S ER VISIT  Current Discharge Medication List      START taking these medications    Details   oxyCODONE (ROXICODONE) 5 MG immediate release tablet Take 1 tablet (5 mg total) by mouth every 6 (six) hours as needed.  Qty: 12 tablet, Refills: 0    Associated Diagnoses: Sciatica of left side         CONTINUE these medications which have NOT CHANGED    Details    acetaminophen (TYLENOL) 500 MG tablet Take 1,000 mg by mouth every 6 (six) hours as needed for pain.      benzonatate (TESSALON PERLES) 100 MG capsule Take 1 capsule (100 mg total) by mouth 3 (three) times a day as needed for cough.  Qty: 20 capsule, Refills: 0    Associated Diagnoses: Cough      cetirizine (ZYRTEC) 10 MG tablet Take 10 mg by mouth daily.      cholecalciferol, vitamin D3, 1,000 unit (25 mcg) tablet Take 1,000 Units by mouth daily.      cyclobenzaprine (FLEXERIL) 5 MG tablet Take 1 tablet (5 mg total) by mouth 3 (three) times a day as needed for muscle spasms.  Qty: 30 tablet, Refills: 0    Associated Diagnoses: Lumbar back pain with radiculopathy affecting left lower extremity      diazePAM (VALIUM) 5 MG tablet Take 1 tablet (5 mg total) by mouth every 6 (six) hours as needed for anxiety.  Qty: 30 tablet, Refills: 1    Associated Diagnoses: Intractable migraine without aura and without status migrainosus; Acute thoracic myofascial strain, subsequent encounter      fluticasone propionate (FLONASE ALLERGY RELIEF) 50 mcg/actuation nasal spray 1-2 sprays in each nostril at bedtime.  Qty: 16 g, Refills: 0    Associated Diagnoses: Dysfunction of left eustachian tube      ibuprofen (ADVIL,MOTRIN) 200 MG tablet Take 600 mg by mouth every 6 (six) hours as needed for pain.      loratadine-pseudoephedrine (LORATADINE-PSEUDOEPHEDRINE) 5-120 mg Tb12 Take 1 tablet by mouth every 12 (twelve) hours.      methylPREDNISolone (MEDROL DOSEPACK) 4 mg tablet follow package directions  Qty: 21 tablet, Refills: 0    Associated Diagnoses: Pain in joint involving ankle and foot, left; Lumbar back pain with radiculopathy affecting left lower extremity      oxyCODONE-acetaminophen (PERCOCET/ENDOCET) 5-325 mg per tablet Take 1-2 tablets by mouth 4 (four) times a day as needed.      pseudoephedrine (SUDAFED) 120 mg 12 hr tablet Take 1 tablet (120 mg total) by mouth 2 (two) times a day as needed for congestion.  Qty: 20 tablet,  Refills: 2    Associated Diagnoses: Dysfunction of left eustachian tube      sertraline (ZOLOFT) 50 MG tablet Take 1 tablet (50 mg total) by mouth daily.  Qty: 90 tablet, Refills: 1    Associated Diagnoses: Anxiety state      tiZANidine (ZANAFLEX) 4 MG tablet TK 1 TO 2 TS PO QHS             =================================================================    HPI    Patient information was obtained from: the patient    Use of Intrepreter: N/A        Angelia Stallings is a 45 y.o. female with a history of lower back pain, anemia, fibromyositis, hypertension, myofascial who presents leg pain.    The patient reports left sided buttocks aching pain which radiates to down to her big toe.  She presents today because it has gotten worse.  The patient denies anything that causes significant relief.  The patient notes a history of left ankle tendinitis for the past 2.5 years.  She noted she was prescribed Gabapentin, but stopped taking the medication due to muscle twitching.  The patient reports she is allergic to Penicillin and Sulfas.      The patient denies abdominal pain, urinary symptoms, fever, numbness or tingling of groin area, and any other symptoms or complaints at this time.      REVIEW OF SYSTEMS   Review of Systems   Constitutional: Negative for fever.   Gastrointestinal: Negative for abdominal pain.   Genitourinary: Negative for decreased urine volume, difficulty urinating, dysuria, enuresis, frequency, hematuria and urgency.   Musculoskeletal:        Positive for left sided buttocks pain which radiates down her leg to her big toe.   Neurological: Negative for numbness (or tingling of groin region).   All other systems reviewed and are negative.       PAST MEDICAL HISTORY:  Past Medical History:   Diagnosis Date   ? Allergic rhinitis    ? Anxiety    ? Brain aneurysm 02/12/2015    s/p clipping,    ? Hiatal hernia     Repaired in 2018 with Nissen.   ? 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   ? History of hemolysis,  elevated liver enzymes, and low platelet (HELLP) syndrome    ? History of miscarriage 2004   ? Hypertensive disorder 2015   ?  (normal spontaneous vaginal delivery)    ? PONV (postoperative nausea and vomiting)        PAST SURGICAL HISTORY:  Past Surgical History:   Procedure Laterality Date   ? COMBINED HYSTEROSCOPY DIAGNOSTIC / D&C N/A 2015    Procedure: DILATION AND CURETTAGE WITH HYSTEROSCOPY ;  Surgeon: Catarina Lehman DO;  Location: [facility] [place];  Service:    ? CRANIOTOMY FOR ANEURYSM / VERTEBROBASILAR / CAROTID CIRCULATION  2/19/15    MCA aneurysm clipping x 2   ? HYSTEROSCOPY W/ ENDOMETRIAL ABLATION  12/15/2015   ? MOUTH SURGERY     ? NM CRYOCAUTERY OF CERVIX      Description: Cervix Cryosurgery;  Recorded: 2008;  Comments: TRUE I   ? NM DILATION/CURETTAGE,DIAGNOSTIC      Description: Dilation And Curettage;  Proc Date: 2004;  Comments: FOR MISCARRIAGE   ? NM LAP,ESOPHAGOGAST FUNDOPLASTY N/A 3/1/2018    Procedure: ROBOTIC NISSEN FUNDOPLICATION;  Surgeon: Leo Quezada DO;  Location: [facility] [place];  Service: General       CURRENT MEDICATIONS:    No current facility-administered medications on file prior to encounter.      Current Outpatient Medications on File Prior to Encounter   Medication Sig   ? acetaminophen (TYLENOL) 500 MG tablet Take 1,000 mg by mouth every 6 (six) hours as needed for pain.   ? benzonatate (TESSALON PERLES) 100 MG capsule Take 1 capsule (100 mg total) by mouth 3 (three) times a day as needed for cough.   ? cetirizine (ZYRTEC) 10 MG tablet Take 10 mg by mouth daily.   ? cholecalciferol, vitamin D3, 1,000 unit (25 mcg) tablet Take 1,000 Units by mouth daily.   ? cyclobenzaprine (FLEXERIL) 5 MG tablet Take 1 tablet (5 mg total) by mouth 3 (three) times a day as needed for muscle spasms.   ? diazePAM (VALIUM) 5 MG tablet Take 1 tablet (5 mg total) by mouth every 6 (six) hours as needed for anxiety.   ? fluticasone propionate (FLONASE ALLERGY  RELIEF) 50 mcg/actuation nasal spray 1-2 sprays in each nostril at bedtime.   ? ibuprofen (ADVIL,MOTRIN) 200 MG tablet Take 600 mg by mouth every 6 (six) hours as needed for pain.   ? loratadine-pseudoephedrine (LORATADINE-PSEUDOEPHEDRINE) 5-120 mg Tb12 Take 1 tablet by mouth every 12 (twelve) hours.   ? methylPREDNISolone (MEDROL DOSEPACK) 4 mg tablet follow package directions   ? oxyCODONE-acetaminophen (PERCOCET/ENDOCET) 5-325 mg per tablet Take 1-2 tablets by mouth 4 (four) times a day as needed.   ? pseudoephedrine (SUDAFED) 120 mg 12 hr tablet Take 1 tablet (120 mg total) by mouth 2 (two) times a day as needed for congestion.   ? sertraline (ZOLOFT) 50 MG tablet Take 1 tablet (50 mg total) by mouth daily.   ? tiZANidine (ZANAFLEX) 4 MG tablet TK 1 TO 2 TS PO QHS       ALLERGIES:  Allergies   Allergen Reactions   ? Cat Dander Itching and Shortness Of Breath   ? [id number] Antimigraine Agents      Hx cerebral aneurysms, s/p clipping   ? Watermelon    ? Penicillins Hives and Rash   ? Sulfa (Sulfonamide Antibiotics) Hives and Rash       FAMILY HISTORY:  Family History   Problem Relation Age of Onset   ? Hypertension Father    ? Stroke Father    ? Heart disease Father    ? Hyperlipidemia Father    ? Depression Brother    ? Allergies Brother    ? Cancer Maternal Uncle         cervical   ? Heart disease Paternal Grandfather    ? Hypertension Paternal Grandfather    ? Breast cancer Paternal Grandmother 90   ? No Medical Problems Mother        SOCIAL HISTORY:   Social History     Socioeconomic History   ? Marital status: Single     Spouse name: None   ? Number of children: 1   ? Years of education: None   ? Highest education level: None   Occupational History   ? Occupation:      Employer: [employer]   Social Needs   ? Financial resource strain: None   ? Food insecurity     Worry: None     Inability: None   ? Transportation needs     Medical: None     Non-medical: None   Tobacco  Use   ? Smoking status: Former Smoker     Packs/day: 0.25     Years: 15.00     Pack years: 3.75     Quit date: 2009     Years since quittin.4   ? Smokeless tobacco: Never Used   ? Tobacco comment: No exposure   Substance and Sexual Activity   ? Alcohol use: Yes     Comment: rare   ? Drug use: No   ? Sexual activity: Yes     Partners: Male   Lifestyle   ? Physical activity     Days per week: None     Minutes per session: None   ? Stress: None   Relationships   ? Social connections     Talks on phone: None     Gets together: None     Attends Oriental orthodox service: None     Active member of club or organization: None     Attends meetings of clubs or organizations: None     Relationship status: None   ? Intimate partner violence     Fear of current or ex partner: None     Emotionally abused: None     Physically abused: None     Forced sexual activity: None   Other Topics Concern   ? None   Social History Narrative   ? None       VITALS:  Patient Vitals for the past 24 hrs:   BP Temp Temp src Pulse Resp SpO2 Weight   21 1330 (!) 133/91 98.5  F (36.9  C) Oral 75 18 96 % --   21 1315 136/89 -- -- 80 -- 96 % --   21 1300 135/84 -- -- 82 -- 96 % --   21 1220 (!) 190/82 97.9  F (36.6  C) Temporal 92 20 99 % 186 lb (84.4 kg)       PHYSICAL EXAM    Constitutional:  Well developed, well nourished  EYES: Conjunctivae clear  HENT:  Atraumatic, normocephalic  Respiratory:  No respiratory distress, normal breath sounds  Cardiovascular:  Normal rate, normal rhythm, no murmurs  : No CVA tenderness.    Musculoskeletal:  No edema. Tenderness in the left gluteal soft tissues.  Integument: Warm, Dry  Neurologic:  Alert & oriented x 3. Sensation intact to light touch in the femoral, lateral femoral cutaneous, tibial, superficial peroneal, deep peritoneal, sural, and saphenous nerve distributions.  Motor intact in the hip flexors, quadriceps, hamstrings, tibialis anterior, EHL/FHL, and gastrocsoleus  distributions.     LAB:  All pertinent labs reviewed and interpreted.  No results found for this visit on 06/03/21.    RADIOLOGY:  Reviewed all pertinent imaging. Please see official radiology report.  No results found.      PROCEDURES:   None    I, Poncho Cunha, am serving as a scribe to document services personally performed by Bertram Thompson CNP. based on my observation and the provider's statements to me. IBertram CNP attest that Poncho Cunha is acting in a scribe capacity, has observed my performance of the services and has documented them in accordance with my direction.    RIKY Porter CNP  Emergency Medicine  [facility] EMERGENCY ROOM  [facility] [facility] [address]  Dept: [phone]  Loc: [phone]         Bertram Thompson CNP  06/03/21 1343

## 2021-07-04 NOTE — ADDENDUM NOTE
Addendum Note by René Akbar MD at 5/27/2021  6:20 PM     Author: René Akbar MD Service: -- Author Type: Physician    Filed: 6/5/2021  4:26 PM Encounter Date: 5/27/2021 Status: Signed    : René Akbar MD (Physician)    Addended by: RENÉ AKBAR on: 6/5/2021 04:26 PM        Modules accepted: Orders

## 2021-07-06 VITALS
HEART RATE: 92 BPM | WEIGHT: 193 LBS | SYSTOLIC BLOOD PRESSURE: 144 MMHG | BODY MASS INDEX: 29.35 KG/M2 | RESPIRATION RATE: 16 BRPM | OXYGEN SATURATION: 99 % | TEMPERATURE: 98.4 F | DIASTOLIC BLOOD PRESSURE: 93 MMHG

## 2021-07-06 VITALS — WEIGHT: 186 LBS | BODY MASS INDEX: 28.28 KG/M2

## 2021-07-07 ENCOUNTER — RECORDS - HEALTHEAST (OUTPATIENT)
Dept: ADMINISTRATIVE | Facility: OTHER | Age: 46
End: 2021-07-07

## 2021-07-28 ENCOUNTER — TRANSFERRED RECORDS (OUTPATIENT)
Dept: HEALTH INFORMATION MANAGEMENT | Facility: CLINIC | Age: 46
End: 2021-07-28

## 2021-08-11 ENCOUNTER — TRANSFERRED RECORDS (OUTPATIENT)
Dept: HEALTH INFORMATION MANAGEMENT | Facility: CLINIC | Age: 46
End: 2021-08-11

## 2021-08-15 ENCOUNTER — HEALTH MAINTENANCE LETTER (OUTPATIENT)
Age: 46
End: 2021-08-15

## 2021-08-30 NOTE — PROGRESS NOTES
ASSESSMENT/PLAN:       1. Chronic cough     - XR Chest 2 Views was reviewed with the patient and shows clear lung fields with normal size heart and normal mediastinum.    2. Chronic sinusitis, unspecified location     - doxycycline (MONODOX) 100 MG capsule; Take 1 capsule (100 mg total) by mouth 2 (two) times a day for 7 days.  Dispense: 14 capsule; Refill: 0  The patient has been on 2 courses of azithromycin which has not provided any sustainable benefit and certainly with her current symptoms there could be a component of sinusitis with postnasal drainage as a cause for her persistent chronic cough.  Thus I would suggest that she continue with good amounts of fluids, steam, continued use of nasal irrigation, antihistamine and the use of doxycycline.  If in 7 days there is significant improvement of her symptoms but not resolution certainly would not be hesitant to extend that antibiotic for another 7 days.  Note that the patient has a penicillin allergy.    3. Dysphagia, unspecified type  The patient has been in touch with the general surgeon in regard to her dysphasia and some heartburn and I shared with her that if there is some acid reflux that could be contributing to her chronic cough and suggest that she continue to use the omeprazole 20 mg daily    4. Seasonal allergic rhinitis, unspecified trigger     - loratadine (CLARITIN) 10 mg tablet; Take 1 tablet (10 mg total) by mouth daily.  Dispense:  ; Refill: 0  - fluticasone propionate (FLONASE) 50 mcg/actuation nasal spray; 2 sprays each side of nose daily  Dispense: 16 g; Refill: 12    In regard to going back to work I feel that her cough needs to be controlled before would be wise to return to the work environment and be around other individuals.  I do not feel that testing for COVID-19 is indicated.  I did share with her that sometimes with a viral infection there can be a post viral bronchospastic cough and she does have albuterol inhaler but has not used  Duplicate Refill.    that much and certainly told her that it would be fine to use it as needed.  We also talked about either inhaled or oral steroids as an option although she really has no wheezing today and very minimal if any shortness of breath.  She really would prefer not to use the inhaled steroid or prednisone.    If her shortness of breath becomes more noticeable or if it is a image about 100 and certainly call or return.            Jonathan Qureshi MD      PROGRESS NOTE   5/14/2020    SUBJECTIVE:  Angelia Stallings is a 44 y.o. female  who presents for   Chief Complaint   Patient presents with     Cough     ON AND OFF FOR 6 WEEKS , LOW GRADE FEVER SAT AND  .      Over the last 6 weeks the patient has had a remitting and relapsing symptom complex primarily focused around cough.  When the patient's symptoms started it primarily started with a runny nose and then a bronchial cough never having a temperature above 100.  After about a week she was feeling well enough that she went back to work for short time and then the symptoms seem to get worse and was noticing some chest pain particularly with coughing taking a deep breath or burping.  She would have fits of coughing and occasionally would notice some wheezing.  The cough has been worse in the morning and through the daytime and does not bother her when she sleeps.  She has been doing some walking which occasionally seems to make her cough worse and she feels there may be some shortness of breath but also thinks it may just be related to deconditioning.  She would say that she feels a bit more dyspnea on exertion going up a flight of stairs.  Through this 6-week course she has had 2 different 5-day courses of azithromycin.  The last dose of azithromycin was about 6 days ago.  The patient notices sinus pressure, it is worse with bending forward but does not have any pain in her upper teeth.  It is a bit difficult to interpret this because she does have migraine headaches  which sometimes will cause pain in the frontal area  She has been prescribed Tessalon Perles which she says feels do help but she has been hesitant to use those consistently because she feels that there is benefit in having her cough some.  The mucus that she brings up from coughing is clear.  She has had increased nasal congestion and drainage but again that seems clear but does also feel that there is postnasal drainage and for the last week or so has used loratadine and also has used a nasal steroid spray with not much change in her symptoms.  She has not had any known exposure to COVID-19 and no one in her close association has had similar symptoms.  She also has not had any travel history.  No past history of asthma but has had some probable history of seasonal allergies.  About 2 years ago she had a Nissen fundoplication and with all this coughing she has noticed some issues with food getting stuck in her esophagus and some heartburn.  She has started taking omeprazole yesterday.  December plication    Patient Active Problem List   Diagnosis     Allergic Rhinitis     Lower Back Pain     Anxiety     Neck pain     S/P craniotomy, MCA aneurysm clipping x 2     Menorrhagia     Major depression single episode, in partial remission (H)     History of intracranial aneurysm     Migraine without aura     Umbilical hernia     Hiatal hernia with GERD     Status post laparoscopic Nissen fundoplication       Current Outpatient Medications   Medication Sig Dispense Refill     benzonatate (TESSALON PERLES) 100 MG capsule Take 1 capsule (100 mg total) by mouth 3 (three) times a day as needed for cough. 30 capsule 0     ibuprofen (ADVIL,MOTRIN) 200 MG tablet Take 600 mg by mouth every 6 (six) hours as needed for pain.       sertraline (ZOLOFT) 50 MG tablet Take 1 tablet (50 mg total) by mouth daily. 90 tablet 1     tiZANidine (ZANAFLEX) 4 MG tablet START WITH 1/2 T HS.  INCREASE BY 1/2 T QHS UP TO 4 TS HS AS TOLERATED  2      diazePAM (VALIUM) 5 MG tablet Take 1 tablet (5 mg total) by mouth every 6 (six) hours as needed for anxiety. 30 tablet 1     doxycycline (MONODOX) 100 MG capsule Take 1 capsule (100 mg total) by mouth 2 (two) times a day for 7 days. 14 capsule 0     fluticasone propionate (FLONASE) 50 mcg/actuation nasal spray 2 sprays each side of nose daily 16 g 12     loratadine (CLARITIN) 10 mg tablet Take 1 tablet (10 mg total) by mouth daily.  0     oxyCODONE-acetaminophen (PERCOCET/ENDOCET) 5-325 mg per tablet TK 1 TO 2 TS PO QID PRN P       No current facility-administered medications for this visit.        Social History     Tobacco Use   Smoking Status Former Smoker     Last attempt to quit: 2009     Years since quittin.3   Smokeless Tobacco Never Used   Tobacco Comment    No exposure           OBJECTIVE:        No results found for this or any previous visit (from the past 240 hour(s)).    There were no vitals filed for this visit.  Weight: 182 lb (82.6 kg) (Per pt. As of today.)          Physical Exam:  The patient was seen with full PPE  GENERAL APPEARANCE: 44-year-old female very pleasant, NAD, well hydrated, well nourished.  Occasional cough with some production of mucus but no respiratory distress.  No fits of coughing were noted.  SKIN:  Normal skin turgor, no lesions/rashes   HEENT: moist mucous membranes, no rhinorrhea, both ear canals and tympanic membranes were normal, the patient does have sinus percussion tenderness over the frontal and maxillary area and has some nasal congestion.,  Conjunctiva is clear  NECK: Normal without adenopathy or masses  CV: RRR, no M/G/R   LUNGS: CTAB  ABDOMEN: S&NT, no masses or enlarged organs   EXTREMITY: no edema and full ROM of all joints  NEURO: no focal findings

## 2021-09-02 ENCOUNTER — TRANSFERRED RECORDS (OUTPATIENT)
Dept: HEALTH INFORMATION MANAGEMENT | Facility: CLINIC | Age: 46
End: 2021-09-02

## 2021-10-06 ENCOUNTER — TRANSFERRED RECORDS (OUTPATIENT)
Dept: HEALTH INFORMATION MANAGEMENT | Facility: CLINIC | Age: 46
End: 2021-10-06

## 2021-10-10 ENCOUNTER — HEALTH MAINTENANCE LETTER (OUTPATIENT)
Age: 46
End: 2021-10-10

## 2021-10-12 ENCOUNTER — OFFICE VISIT (OUTPATIENT)
Dept: FAMILY MEDICINE | Facility: CLINIC | Age: 46
End: 2021-10-12
Payer: COMMERCIAL

## 2021-10-12 VITALS
WEIGHT: 193.31 LBS | BODY MASS INDEX: 29.3 KG/M2 | OXYGEN SATURATION: 98 % | SYSTOLIC BLOOD PRESSURE: 122 MMHG | HEART RATE: 107 BPM | DIASTOLIC BLOOD PRESSURE: 88 MMHG | HEIGHT: 68 IN

## 2021-10-12 DIAGNOSIS — F41.1 ANXIETY STATE: Primary | ICD-10-CM

## 2021-10-12 DIAGNOSIS — R42 DIZZINESS: ICD-10-CM

## 2021-10-12 DIAGNOSIS — Z23 FLU VACCINE NEED: ICD-10-CM

## 2021-10-12 DIAGNOSIS — R53.83 FATIGUE, UNSPECIFIED TYPE: ICD-10-CM

## 2021-10-12 DIAGNOSIS — Z13.1 SCREENING FOR DIABETES MELLITUS: ICD-10-CM

## 2021-10-12 DIAGNOSIS — Z13.220 SCREENING FOR HYPERLIPIDEMIA: ICD-10-CM

## 2021-10-12 DIAGNOSIS — F32.4 MAJOR DEPRESSION SINGLE EPISODE, IN PARTIAL REMISSION (H): ICD-10-CM

## 2021-10-12 DIAGNOSIS — R22.1 NECK MASS: ICD-10-CM

## 2021-10-12 LAB
ANION GAP SERPL CALCULATED.3IONS-SCNC: 10 MMOL/L (ref 5–18)
BUN SERPL-MCNC: 16 MG/DL (ref 8–22)
CALCIUM SERPL-MCNC: 9.4 MG/DL (ref 8.5–10.5)
CHLORIDE BLD-SCNC: 104 MMOL/L (ref 98–107)
CO2 SERPL-SCNC: 24 MMOL/L (ref 22–31)
CREAT SERPL-MCNC: 0.91 MG/DL (ref 0.6–1.1)
ERYTHROCYTE [DISTWIDTH] IN BLOOD BY AUTOMATED COUNT: 12.6 % (ref 10–15)
GFR SERPL CREATININE-BSD FRML MDRD: 75 ML/MIN/1.73M2
GLUCOSE BLD-MCNC: 67 MG/DL (ref 70–125)
HBA1C MFR BLD: 5.3 % (ref 0–5.6)
HCT VFR BLD AUTO: 40.2 % (ref 35–47)
HGB BLD-MCNC: 13 G/DL (ref 11.7–15.7)
MCH RBC QN AUTO: 27.5 PG (ref 26.5–33)
MCHC RBC AUTO-ENTMCNC: 32.3 G/DL (ref 31.5–36.5)
MCV RBC AUTO: 85 FL (ref 78–100)
PLATELET # BLD AUTO: 297 10E3/UL (ref 150–450)
POTASSIUM BLD-SCNC: 4.4 MMOL/L (ref 3.5–5)
RBC # BLD AUTO: 4.73 10E6/UL (ref 3.8–5.2)
SODIUM SERPL-SCNC: 138 MMOL/L (ref 136–145)
T4 FREE SERPL-MCNC: 0.9 NG/DL (ref 0.7–1.8)
WBC # BLD AUTO: 7.1 10E3/UL (ref 4–11)

## 2021-10-12 PROCEDURE — 36415 COLL VENOUS BLD VENIPUNCTURE: CPT | Performed by: FAMILY MEDICINE

## 2021-10-12 PROCEDURE — 90471 IMMUNIZATION ADMIN: CPT | Performed by: FAMILY MEDICINE

## 2021-10-12 PROCEDURE — 90686 IIV4 VACC NO PRSV 0.5 ML IM: CPT | Performed by: FAMILY MEDICINE

## 2021-10-12 PROCEDURE — 96127 BRIEF EMOTIONAL/BEHAV ASSMT: CPT | Performed by: FAMILY MEDICINE

## 2021-10-12 PROCEDURE — 84443 ASSAY THYROID STIM HORMONE: CPT | Performed by: FAMILY MEDICINE

## 2021-10-12 PROCEDURE — 80048 BASIC METABOLIC PNL TOTAL CA: CPT | Performed by: FAMILY MEDICINE

## 2021-10-12 PROCEDURE — 83036 HEMOGLOBIN GLYCOSYLATED A1C: CPT | Performed by: FAMILY MEDICINE

## 2021-10-12 PROCEDURE — 84439 ASSAY OF FREE THYROXINE: CPT | Performed by: FAMILY MEDICINE

## 2021-10-12 PROCEDURE — 99214 OFFICE O/P EST MOD 30 MIN: CPT | Mod: 25 | Performed by: FAMILY MEDICINE

## 2021-10-12 PROCEDURE — 85027 COMPLETE CBC AUTOMATED: CPT | Performed by: FAMILY MEDICINE

## 2021-10-12 RX ORDER — MULTIVITAMIN
1 TABLET ORAL
COMMUNITY
End: 2021-10-12

## 2021-10-12 RX ORDER — BUPROPION HYDROCHLORIDE 100 MG/1
100 TABLET, EXTENDED RELEASE ORAL DAILY
Qty: 30 TABLET | Refills: 3 | Status: SHIPPED | OUTPATIENT
Start: 2021-10-12 | End: 2022-02-11

## 2021-10-12 ASSESSMENT — ANXIETY QUESTIONNAIRES
IF YOU CHECKED OFF ANY PROBLEMS ON THIS QUESTIONNAIRE, HOW DIFFICULT HAVE THESE PROBLEMS MADE IT FOR YOU TO DO YOUR WORK, TAKE CARE OF THINGS AT HOME, OR GET ALONG WITH OTHER PEOPLE: SOMEWHAT DIFFICULT
1. FEELING NERVOUS, ANXIOUS, OR ON EDGE: NEARLY EVERY DAY
6. BECOMING EASILY ANNOYED OR IRRITABLE: SEVERAL DAYS
7. FEELING AFRAID AS IF SOMETHING AWFUL MIGHT HAPPEN: SEVERAL DAYS
5. BEING SO RESTLESS THAT IT IS HARD TO SIT STILL: NOT AT ALL
GAD7 TOTAL SCORE: 7
2. NOT BEING ABLE TO STOP OR CONTROL WORRYING: NOT AT ALL
3. WORRYING TOO MUCH ABOUT DIFFERENT THINGS: SEVERAL DAYS

## 2021-10-12 ASSESSMENT — PATIENT HEALTH QUESTIONNAIRE - PHQ9
5. POOR APPETITE OR OVEREATING: SEVERAL DAYS
SUM OF ALL RESPONSES TO PHQ QUESTIONS 1-9: 8

## 2021-10-12 ASSESSMENT — MIFFLIN-ST. JEOR: SCORE: 1560.36

## 2021-10-12 NOTE — PROGRESS NOTES
Assessment & Plan     Angelia was seen today for otalgia.    Diagnoses and all orders for this visit:    Anxiety  -     buPROPion (WELLBUTRIN SR) 100 MG 12 hr tablet; Take 1 tablet (100 mg) by mouth daily    Major depression single episode, in partial remission (H)  -     buPROPion (WELLBUTRIN SR) 100 MG 12 hr tablet; Take 1 tablet (100 mg) by mouth daily    Fatigue, unspecified type  -     US Thyroid; Future  -     TSH  -     T4, free  -     CBC with platelets    Dizziness  -     Basic metabolic panel    Neck mass  -     US Thyroid; Future    Screening for hyperlipidemia    Screening for diabetes mellitus  -     Hemoglobin A1c    Flu vaccine need  -     INFLUENZA VACCINE IM >6 MO VALENT IIV4 (AFLURIA/FLUZONE)    Other orders  -     REVIEW OF HEALTH MAINTENANCE PROTOCOL ORDERS      We reviewed the etiology for her dizziness and anxiety and options for treatment. For her anxiety, we elected to add low dose buproprion, and we discussed dose titration. We reviewed the potential side effects, need to taper the medications gradually, and indications for urgent evaluation. We will check labs to rule out other pathology that could be contributing to her symptoms, and she should continue with symptomatic treatment including increased fluids, rest and otc antihistamines +/- decongestant. She will let me know if she has any significant problems or concerns. She should f/u in 4-6 mos for recheck, sooner if any problems.            SUBJECTIVE  Angelia is a 46 year old who presents for the following health issues :    HPI          Angelia Stallings is a 46 year old female who presents for evaluation of recent dizziness and generally feeling unwell. She has had some issues with neck pain and underwent a steroid injection in her neck about a month ago which helped her neck pain symptoms. She has been seeing neurology for that and her chronic headaches and history of craniotomy for brain aneurysm, and she also has some chronic  "low back pain that [facility] manages. She describes the dizziness as a sensation of movement, spinning at times. She does note that she has been feeling a sensation of fluid draining in her left ear at times, and she will occasionally have pain. She has noted an area of swelling in the anterior neck as well. It is not painful, and she has not noted any significant change in size. She denies significant pressure or crackling in the ear.       She has been under a great deal of stress in the last several mos, having had her best friend pass away from cancer. She has been feeling nervous, winded and having difficulty concentrating. She notes energy is low and she is not eating very well. She has gained weight and feels as if she is retaining fluid. She worries that there may be something else contributing to her symptoms besides her mood and anxiety.        The following portions of the patient's history were reviewed and updated as appropriate: allergies, current medications, past family history, past medical history, past social history, past surgical history and problem list.    Review of Systems  12 point ROS negative except as noted above       OBJECTIVE  /88 (Patient Position: Sitting, Cuff Size: Adult Large)   Pulse 107   Ht 1.727 m (5' 8\")   Wt 87.7 kg (193 lb 5 oz)   SpO2 98%   BMI 29.39 kg/m    Body mass index is 29.39 kg/m .  Physical Exam   GENERAL: healthy, alert and no distress  HENT: ear canals and TM's normal, nose and mouth without lesions or erythema  NECK: no adenopathy, no asymmetry, masses, or scars and thyroid normal to palpation  RESP: lungs clear to auscultation - no rales, rhonchi or wheezes  CV: regular rate and rhythm, no murmur or peripheral edema and peripheral pulses strong  ABDOMEN: soft, nontender, no hepatosplenomegaly, no masses and bowel sounds normal  MS: no gross musculoskeletal defects noted, no edema  NEURO: Normal strength and tone, mentation intact and speech " normal

## 2021-10-13 ENCOUNTER — HOSPITAL ENCOUNTER (OUTPATIENT)
Dept: ULTRASOUND IMAGING | Facility: CLINIC | Age: 46
Discharge: HOME | End: 2021-10-13
Attending: FAMILY MEDICINE | Admitting: FAMILY MEDICINE
Payer: COMMERCIAL

## 2021-10-13 DIAGNOSIS — R53.83 FATIGUE, UNSPECIFIED TYPE: ICD-10-CM

## 2021-10-13 DIAGNOSIS — R22.1 NECK MASS: ICD-10-CM

## 2021-10-13 DIAGNOSIS — F41.1 ANXIETY STATE: ICD-10-CM

## 2021-10-13 LAB — TSH SERPL DL<=0.005 MIU/L-ACNC: 2.17 UIU/ML (ref 0.3–5)

## 2021-10-13 PROCEDURE — 76536 US EXAM OF HEAD AND NECK: CPT

## 2021-10-13 ASSESSMENT — ANXIETY QUESTIONNAIRES: GAD7 TOTAL SCORE: 7

## 2021-10-14 NOTE — TELEPHONE ENCOUNTER
"Routing refill request to provider for review/approval because:  A break in medication    Last Written Prescription Date:  11/12/2020  Last Fill Quantity: 90,  # refills: 1   Last office visit provider:  10/12/21     Requested Prescriptions   Pending Prescriptions Disp Refills     sertraline (ZOLOFT) 50 MG tablet [Pharmacy Med Name: SERTRALINE 50MG TABLETS] 90 tablet 1     Sig: TAKE 1 TABLET(50 MG) BY MOUTH DAILY       SSRIs Protocol Passed - 10/13/2021  6:04 PM        Passed - Recent (12 mo) or future (30 days) visit within the authorizing provider's specialty     Patient has had an office visit with the authorizing provider or a provider within the authorizing providers department within the previous 12 mos or has a future within next 30 days. See \"Patient Info\" tab in inbasket, or \"Choose Columns\" in Meds & Orders section of the refill encounter.              Passed - Medication is active on med list        Passed - Patient is age 18 or older        Passed - No active pregnancy on record        Passed - No positive pregnancy test in last 12 months             Kenzie Alcantara RN 10/14/21 12:27 PM  "

## 2021-10-26 ENCOUNTER — TRANSFERRED RECORDS (OUTPATIENT)
Dept: HEALTH INFORMATION MANAGEMENT | Facility: CLINIC | Age: 46
End: 2021-10-26
Payer: COMMERCIAL

## 2021-12-03 ENCOUNTER — TRANSFERRED RECORDS (OUTPATIENT)
Dept: HEALTH INFORMATION MANAGEMENT | Facility: CLINIC | Age: 46
End: 2021-12-03

## 2021-12-06 ENCOUNTER — HOSPITAL ENCOUNTER (OUTPATIENT)
Dept: MAMMOGRAPHY | Facility: CLINIC | Age: 46
Discharge: HOME | End: 2021-12-06
Attending: FAMILY MEDICINE | Admitting: FAMILY MEDICINE
Payer: COMMERCIAL

## 2021-12-06 DIAGNOSIS — Z12.31 VISIT FOR SCREENING MAMMOGRAM: ICD-10-CM

## 2021-12-06 PROCEDURE — 77067 SCR MAMMO BI INCL CAD: CPT

## 2021-12-15 ENCOUNTER — TRANSFERRED RECORDS (OUTPATIENT)
Dept: HEALTH INFORMATION MANAGEMENT | Facility: CLINIC | Age: 46
End: 2021-12-15
Payer: COMMERCIAL

## 2022-01-18 ENCOUNTER — NURSE TRIAGE (OUTPATIENT)
Dept: NURSING | Facility: CLINIC | Age: 47
End: 2022-01-18
Payer: COMMERCIAL

## 2022-01-18 NOTE — TELEPHONE ENCOUNTER
Triage call  Patient calling to report ear crackling on left side consistently and at times on the rt but not as often. There also is sharp pain at times.  She has been using nasal spray at night but no relief.  Her daughter has been at home and left yesterday but was diagnosed with covid on 1/3/2022.  So he has been exposed to covid and Urgent care recommended because of the ear  Issues.    Per protocol se in the office today.Care advice given.  Verbalizes understanding and agrees with plan.    Ashlee Ham RN   [facility] Nurse Advisor  10:48 AM 1/18/2022    COVID 19 Nurse Triage Plan/Patient Instructions    Please be aware that novel coronavirus (COVID-19) may be circulating in the community. If you develop symptoms such as fever, cough, or SOB or if you have concerns about the presence of another infection including coronavirus (COVID-19), please contact your health care provider or visit [email or web address].     Disposition/Instructions    In-Person Visit with provider recommended. Reference Visit Selection Guide.  ED Visit recommended. Follow protocol based instructions.     Bring Your Own Device:  Please also bring your smart device(s) (smart phones, tablets, laptops) and their charging cables for your personal use and to communicate with your care team during your visit.    Thank you for taking steps to prevent the spread of this virus.  o Limit your contact with others.  o Wear a simple mask to cover your cough.  o Wash your hands well and often.    Resources    [facility]: About COVID-19: [email or web address]    CDC: What to Do If You're Sick: www.cdc.gov/coronavirus/2019-ncov/about/steps-when-sick.html    CDC: Ending Home Isolation: www.cdc.gov/coronavirus/2019-ncov/hcp/disposition-in-home-patients.html     CDC: Caring for Someone: www.cdc.gov/coronavirus/2019-ncov/if-you-are-sick/care-for-someone.html     [facility]: Interim Guidance for Hospital Discharge to Home:  www.[email or web address].[email or web address]/diseases/coronavirus/hcp/hospdischarge.pdf    [facility] clinical trials (COVID-19 research studies): [email or web address]     Below are the COVID-19 hotlines at the [place] Department of [facility] ([facility]). Interpreters are available.   o For health questions: Call 458-859-8427 or 1-445.915.7263 (7 a.m. to 7 p.m.)  o For questions about schools and childcare: Call 650-576-9820 or 1-903.565.9043 (7 a.m. to 7 p.m.)    Reason for Disposition    Patient wants to be seen    Additional Information    Negative: Pus or cloudy discharge from ear canal    Negative: Earache lasts > 1 hour    Negative: Ear pain is the main symptom    Negative: Hearing loss (complete or partial) is the main symptom    Negative: Earwax is the main concern    Negative: Has nasal allergies and they are acting up    Protocols used: EAR - CONGESTION-A-[place]

## 2022-01-26 ENCOUNTER — OFFICE VISIT (OUTPATIENT)
Dept: FAMILY MEDICINE | Facility: CLINIC | Age: 47
End: 2022-01-26
Payer: COMMERCIAL

## 2022-01-26 ENCOUNTER — NURSE TRIAGE (OUTPATIENT)
Dept: NURSING | Facility: CLINIC | Age: 47
End: 2022-01-26

## 2022-01-26 VITALS
WEIGHT: 195.1 LBS | OXYGEN SATURATION: 100 % | DIASTOLIC BLOOD PRESSURE: 78 MMHG | BODY MASS INDEX: 29.66 KG/M2 | HEART RATE: 107 BPM | SYSTOLIC BLOOD PRESSURE: 132 MMHG | TEMPERATURE: 98.1 F

## 2022-01-26 DIAGNOSIS — G43.009 MIGRAINE WITHOUT AURA AND WITHOUT STATUS MIGRAINOSUS, NOT INTRACTABLE: ICD-10-CM

## 2022-01-26 DIAGNOSIS — G89.29 CHRONIC BILATERAL LOW BACK PAIN WITHOUT SCIATICA: ICD-10-CM

## 2022-01-26 DIAGNOSIS — M54.50 CHRONIC BILATERAL LOW BACK PAIN WITHOUT SCIATICA: ICD-10-CM

## 2022-01-26 DIAGNOSIS — M79.672 LEFT FOOT PAIN: ICD-10-CM

## 2022-01-26 DIAGNOSIS — H69.93 DYSFUNCTION OF BOTH EUSTACHIAN TUBES: Primary | ICD-10-CM

## 2022-01-26 DIAGNOSIS — H92.03 OTALGIA OF BOTH EARS: ICD-10-CM

## 2022-01-26 DIAGNOSIS — F41.1 ANXIETY STATE: ICD-10-CM

## 2022-01-26 PROCEDURE — 96372 THER/PROPH/DIAG INJ SC/IM: CPT | Performed by: PHYSICIAN ASSISTANT

## 2022-01-26 PROCEDURE — 99215 OFFICE O/P EST HI 40 MIN: CPT | Mod: 25 | Performed by: PHYSICIAN ASSISTANT

## 2022-01-26 RX ORDER — KETOROLAC TROMETHAMINE 30 MG/ML
30 INJECTION, SOLUTION INTRAMUSCULAR; INTRAVENOUS ONCE
Status: COMPLETED | OUTPATIENT
Start: 2022-01-26 | End: 2022-01-26

## 2022-01-26 RX ORDER — LORATADINE 10 MG/1
10 TABLET ORAL DAILY
COMMUNITY
End: 2022-05-24

## 2022-01-26 RX ORDER — FLUTICASONE PROPIONATE 50 MCG
1 SPRAY, SUSPENSION (ML) NASAL DAILY
Qty: 16 G | Refills: 1 | Status: CANCELLED | OUTPATIENT
Start: 2022-01-26

## 2022-01-26 RX ADMIN — KETOROLAC TROMETHAMINE 30 MG: 30 INJECTION, SOLUTION INTRAMUSCULAR; INTRAVENOUS at 10:45

## 2022-01-26 ASSESSMENT — ENCOUNTER SYMPTOMS
PALPITATIONS: 0
FATIGUE: 0
SLEEP DISTURBANCE: 1
NUMBNESS: 0
DIARRHEA: 0
DIZZINESS: 0
COUGH: 0
ACTIVITY CHANGE: 0
CHEST TIGHTNESS: 0
HEADACHES: 1
NERVOUS/ANXIOUS: 1
UNEXPECTED WEIGHT CHANGE: 0
SINUS PRESSURE: 0
RHINORRHEA: 0
EYE REDNESS: 0
HYPERACTIVE: 0
EYE ITCHING: 0
PHOTOPHOBIA: 0
FEVER: 0
ABDOMINAL PAIN: 0
DECREASED CONCENTRATION: 1
EYE DISCHARGE: 0
SINUS PAIN: 0
EYE PAIN: 0
FACIAL SWELLING: 0
WHEEZING: 0
LIGHT-HEADEDNESS: 0
SORE THROAT: 0
VOMITING: 0

## 2022-01-26 NOTE — PATIENT INSTRUCTIONS
Return home push fluids and get rest  No NSAID's for at least 6-8 hours. Can take Tylenol for headache if needed.    Make an appt with counselor for worsening anxiety. Follow up if anxiety gets worse.    Call [facility] to reschedule another back injection.     I have placed a ENT referral for ongoing ear pain.    US for lump to ankle will be placed and they will call you to scheduled this.

## 2022-01-26 NOTE — PROGRESS NOTES
Clinic Note  1/26/22     Chief Complaint   Patient presents with     Ear Problem     Hx of fluid in ears, currently crackling, ringing, bilateral, intermittent pain. x 3 weeks.            HPI    Angelia Stallings is a pleasant  46 year old year old female  who present to the clinic today to discuss multiple issues.  She been having ongoing bilateral ear pain left greater than right with crackling and muffled sound for about 3 weeks.  He has been using over-the-counter allergy nasal spray along with Claritin with minimal improvement.  Does note that her balance is off on occasion but she has not had any falls.  She has not noticed any drainage.  No other upper respiratory symptoms other than some postnasal drainage at times.  No fevers or chills or cough.  She states that she does have issues with fluid buildup in her ears in the past approximately 1 year ago.     She does have chronic pain syndrome chronic lower back pain that radiates down the left side of her leg.  She has had steroid injections in her back last time was October and this did improve with her pain.  She does note that the last few weeks her pain in her lower back and radiculopathy symptoms are slowly worsening.  She denies any bowel or bladder issues with this lower back pain.  She states that this was exacerbated after lifting about 2 weeks ago.  Overall the pain has slowly improved but is still lingering.  No new symptoms in regards to her lower back pain.  Typically sees [facility] for her injection.    She is also having increased anxiety the last 2 to 3 weeks with her ear pain, worsening lower back pain, and she has now noticed a lump to the lateral aspect of her left ankle.  She noticed this lump about 2 weeks ago.  She states that her daughter was home and developed Covid and this has also increased her anxiety.  Is currently on sertraline 50 mg daily and was more recently started on Wellbutrin about 3 months ago at 100 mg.  She feels  like the sertraline and Wellbutrin combination was working quite well up until the last couple weeks when her pain in her ears and her lower back flared up.  She does have a counselor but has not seen in the last few weeks.    She is also has chronic migraines.  She has been having a migraine on and off since Friday.  Today she did wake up with a migraine and with her ear pain and lower back pain that radiates down into her leg she has been having increasing headache.  Her pain is located on the frontal aspect of her head.  Typical location.  She denies any chest pain, shortness of breath, lightheaded or dizziness associated with the headache.  No change in the characteristic from her previous migraines.  Not worst headache of her life.  She has tried Botox injections in the past and multiple other medications for chronic migraines.  This is not new for her.    She has noticed a lump to the lateral aspect of her left ankle.  The lump itself is not truly painful but when she does palpate it caused some numbness and tingling to the lateral aspect of her foot.  Does not notice any associated swelling.  No recent injury to her foot and the location of her pain.    ROS    Review of Systems   Constitutional: Negative for activity change, fatigue, fever and unexpected weight change.   HENT: Positive for ear pain and postnasal drip. Negative for ear discharge, facial swelling, hearing loss, rhinorrhea, sinus pressure, sinus pain, sore throat and tinnitus.    Eyes: Negative for photophobia, pain, discharge, redness, itching and visual disturbance.   Respiratory: Negative for cough, chest tightness and wheezing.    Cardiovascular: Negative for chest pain and palpitations.   Gastrointestinal: Negative for abdominal pain, diarrhea and vomiting.   Musculoskeletal:        Pain and lump to left ankle. Pain/numbness radiates down the foot.    Neurological: Positive for headaches (frontal bilateral). Negative for dizziness,  light-headedness and numbness.   Psychiatric/Behavioral: Positive for decreased concentration and sleep disturbance. Negative for self-injury and suicidal ideas. The patient is nervous/anxious. The patient is not hyperactive.             Current Outpatient Medications:      buPROPion (WELLBUTRIN SR) 100 MG 12 hr tablet, Take 1 tablet (100 mg) by mouth daily, Disp: 30 tablet, Rfl: 3     fluticasone propionate (FLONASE ALLERGY RELIEF) 50 mcg/actuation nasal spray, [FLUTICASONE PROPIONATE (FLONASE ALLERGY RELIEF) 50 MCG/ACTUATION NASAL SPRAY] 1-2 sprays in each nostril at bedtime., Disp: 16 g, Rfl: 0     loratadine (CLARITIN) 10 MG tablet, Take 10 mg by mouth daily, Disp: , Rfl:      sertraline (ZOLOFT) 50 MG tablet, TAKE 1 TABLET(50 MG) BY MOUTH DAILY, Disp: 90 tablet, Rfl: 1     cyclobenzaprine (FLEXERIL) 5 MG tablet, [CYCLOBENZAPRINE (FLEXERIL) 5 MG TABLET] Take 1 tablet (5 mg total) by mouth 3 (three) times a day as needed for muscle spasms., Disp: 30 tablet, Rfl: 0     tiZANidine (ZANAFLEX) 4 MG tablet, [TIZANIDINE (ZANAFLEX) 4 MG TABLET] TK 1 TO 2 TS PO QHS, Disp: , Rfl:   No current facility-administered medications for this visit.     Patient Active Problem List   Diagnosis     Allergic Rhinitis     Lower Back Pain     Anxiety     Neck pain     S/P craniotomy, MCA aneurysm clipping x 2     Menorrhagia     Major depression single episode, in partial remission (H)     History of intracranial aneurysm     Migraine without aura     Umbilical hernia     Hiatal hernia with GERD     Status post laparoscopic Nissen fundoplication     Anemia     Atypical glandular cells on cervical Pap smear     Closed traumatic dislocation of temporomandibular joint     Congenital abnormality of uterus complicating  care, baby not yet delivered     Fibromyositis     Heartburn     History of abnormal cervical Papanicolaou smear     Hypertensive disorder     Myofascial pain     Psychogenic headache     Refractory migraine with  aura     Uterine leiomyoma     Dysfunction of both eustachian tubes     Otalgia of both ears     Left foot pain        Past Medical History:   Diagnosis Date     Allergic rhinitis      Anxiety      Brain aneurysm 2015    s/p clipping,      Hiatal hernia     Repaired in 2018 with Nissen.     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     History of hemolysis, elevated liver enzymes, and low platelet (HELLP) syndrome      History of miscarriage 2004     Hypertensive disorder 2015      (normal spontaneous vaginal delivery)      PONV (postoperative nausea and vomiting)           Social History     Socioeconomic History     Marital status: Single     Spouse name: Not on file     Number of children: 1     Years of education: Not on file     Highest education level: Not on file   Occupational History     Not on file   Tobacco Use     Smoking status: Former Smoker     Packs/day: 0.25     Years: 15.00     Pack years: 3.75     Quit date: 2009     Years since quittin.0     Smokeless tobacco: Never Used     Tobacco comment: No exposure   Substance and Sexual Activity     Alcohol use: Yes     Comment: Alcoholic Drinks/day: rare     Drug use: No     Sexual activity: Yes     Partners: Male   Other Topics Concern     Not on file   Social History Narrative     Not on file     Social Determinants of Health     Financial Resource Strain: Not on file   Food Insecurity: Not on file   Transportation Needs: Not on file   Physical Activity: Not on file   Stress: Not on file   Social Connections: Not on file   Intimate Partner Violence: Not on file   Housing Stability: Not on file           Allergies   Allergen Reactions     Cat Dander [Animal Dander] Itching and Shortness Of Breath     [id number] Antimigraine Agents [Sumatriptan] Unknown     Hx cerebral aneurysms, s/p clipping     Watermelon [Citrullus Vulgaris] Unknown     Penicillins Hives and Rash     Sulfa (Sulfonamide Antibiotics) [Sulfa Drugs] Hives and Rash         Current Outpatient Medications   Medication     buPROPion (WELLBUTRIN SR) 100 MG 12 hr tablet     fluticasone propionate (FLONASE ALLERGY RELIEF) 50 mcg/actuation nasal spray     loratadine (CLARITIN) 10 MG tablet     sertraline (ZOLOFT) 50 MG tablet     cyclobenzaprine (FLEXERIL) 5 MG tablet     tiZANidine (ZANAFLEX) 4 MG tablet     No current facility-administered medications for this visit.        IN 72 HOURS     /78   Pulse 107   Temp 98.1  F (36.7  C) (Oral)   Wt 88.5 kg (195 lb 1.6 oz)   SpO2 100%   Breastfeeding No   BMI 29.66 kg/m         Physical Exam:     Physical Exam  Vitals and nursing note reviewed.   Constitutional:       General: She is awake. She is not in acute distress.     Appearance: Normal appearance. She is well-developed and well-groomed. She is not ill-appearing, toxic-appearing or diaphoretic.   HENT:      Head: Normocephalic and atraumatic.      Right Ear: Ear canal and external ear normal. No drainage or swelling. A middle ear effusion is present. No mastoid tenderness. Tympanic membrane is not injected, perforated, erythematous or bulging.      Left Ear: Ear canal and external ear normal. No drainage or swelling. A middle ear effusion is present. No mastoid tenderness. Tympanic membrane is not injected, perforated, erythematous or bulging.      Mouth/Throat:      Lips: Pink.      Mouth: Mucous membranes are moist.      Pharynx: Oropharynx is clear. No pharyngeal swelling, oropharyngeal exudate, posterior oropharyngeal erythema or uvula swelling.      Tonsils: No tonsillar exudate or tonsillar abscesses. 0 on the right. 0 on the left.   Eyes:      General: Lids are normal.         Right eye: No discharge.         Left eye: No discharge.      Extraocular Movements: Extraocular movements intact.      Right eye: Normal extraocular motion and no nystagmus.      Left eye: Normal extraocular motion and no nystagmus.      Conjunctiva/sclera: Conjunctivae normal.      Right eye:  Right conjunctiva is not injected.      Left eye: Left conjunctiva is not injected.   Neck:      Vascular: No JVD.      Trachea: Trachea normal.   Cardiovascular:      Rate and Rhythm: Normal rate and regular rhythm.      Pulses:           Dorsalis pedis pulses are 2+ on the left side.        Posterior tibial pulses are 2+ on the left side.      Heart sounds: No murmur heard.  No friction rub. No gallop.    Pulmonary:      Effort: Pulmonary effort is normal. No accessory muscle usage, prolonged expiration or respiratory distress.      Breath sounds: No decreased breath sounds, wheezing, rhonchi or rales.   Musculoskeletal:      Cervical back: Full passive range of motion without pain and neck supple.      Lumbar back: No swelling or tenderness. Normal range of motion.      Right lower leg: No edema.      Left lower leg: No edema.      Left foot: Normal.      Comments: Small lump to the lateral aspect of r left foot just below the ankle.  Associated swelling, redness, or warmth.  Mild pain with palpation to the long.    The rest of exam to the left foot was unremarkable.    No weakness to the lower extremities.    Lymphadenopathy:      Cervical: No cervical adenopathy.   Skin:     General: Skin is warm.   Neurological:      General: No focal deficit present.      Mental Status: She is alert and oriented to person, place, and time.      GCS: GCS eye subscore is 4. GCS verbal subscore is 5. GCS motor subscore is 6.      Cranial Nerves: No cranial nerve deficit.      Motor: No weakness or tremor.      Coordination: Romberg sign negative. Finger-Nose-Finger Test normal.      Gait: Gait is intact.      Deep Tendon Reflexes:      Reflex Scores:       Patellar reflexes are 2+ on the right side and 2+ on the left side.     Comments: Negative neurological exam.  Cranial nerves II through XII intact.  Negative cerebellar testing.  Negative Romberg test negative finger-to-nose test.   Psychiatric:         Attention and  Perception: Attention normal.         Mood and Affect: Mood is anxious. Affect is tearful.         Behavior: Behavior is cooperative.         Thought Content: Thought content does not include suicidal ideation. Thought content does not include suicidal plan.         Cognition and Memory: Cognition normal.         Judgment: Judgment normal.         Assessment/Plan:     Problem List Items Addressed This Visit        Nervous and Auditory    Lower Back Pain    Relevant Medications    ketorolac (TORADOL) injection 30 mg (Completed)    Migraine without aura    Relevant Medications    ketorolac (TORADOL) injection 30 mg (Completed)    Dysfunction of both eustachian tubes - Primary    Relevant Medications    loratadine (CLARITIN) 10 MG tablet    Otalgia of both ears    Relevant Orders    Otolaryngology Referral    Left foot pain    Relevant Medications    ketorolac (TORADOL) injection 30 mg (Completed)    Other Relevant Orders    US Lower Extremity Non Vascular Left       Other    Anxiety         #1 eustachian tube dysfunction-on exam she does not have any evidence of infection or tympanic membrane perforation.  He does have bilateral serous otitis left greater than right.  She has been using a nasal allergy medication along with Claritin.  I did recommend having her switch to Flonase for the next 1 to 2 weeks and continue the Claritin.  Since this has been ongoing for 3 weeks or so I did put a ENT referral and for further evaluation.    #2 chronic lower back pain- She has a history of bugle discs in there lower back requiring steroid injections. she states that she has been having increasing lower back pain that radiates down to the left side of her leg.  This is chronic in nature.  No change in the characteristics of this pain from prior but has slowly worsened the last few weeks. No recent injury but she did state she lifted something a couple weeks ago that a related to her pain. She stated that the pain as improved a  but since.   She has done epidermal steroid injections most recently in October with [facility] with good results.   I did encourage her to contact them to repeat an injection to see if this helps with this pain.  No red flag noted on exam today.I do not think we need imaging at this time without an injury or change in the characteristic of her pain.    #3 chronic migraine-she states that she has a migraine today.  She had one on Friday which subside and then today she developed another migraine.  The pain is 6 out of 10.  Its in the frontal aspect.  Normal characteristic for the location of her headache.  No associated nausea but she does have some light sensitivity.  This is not the worse headache of her life. She has not had any NSAIDs today.  Neurological exam intact today.  No neurological deficits noted.  I did give her a Toradol injection 30 mg in the clinic today.  This did improve her headache from a 4 to a 6 out of 10.  Did encourage her not to take anymore NSAIDs for the next 6 to 8 hours but can do Tylenol.  She is to push fluids and plenty of rest.  Symptoms for prompt reevaluation were discussed in detail.    #4 anxiety-she feels like her anxiety is worsened over the last 2-3 weeks with increased lower back pain, on and off migraines, and eustachian tube dysfunction. She also feels like her anxiety is worsened with her daughter having Covid recently.  She did not get Covid .  Is currently on sertraline and Wellbutrin.  She feels like the sertraline and Wellbutrin combination  prior to her current increased anxiety has been working quite well.  She is established with a counselor and I did recommend having her follow-up with a counselor.  Do suspect that her anxiety is situational and speak with her counselor will help. She will call and make an appt for this.  After discussing her chronic health today she did feel better in regards to her anxiety when she left the clinic today. We briefly talked  about starting another medication to help with his anxiety but I think with this being situational that we can hold off at this time and if not better in the next 1-2 weeks we can start another medications to help with anxiety.  I did recommend if her anxiety worsens that she is to contact clinic and let us know.    #5 lump to left lateral ankle-she has noticed a lump to her left lateral ankle for the last 2 weeks.  Mildly painful with palpation she does have some mild numbness and tingling that radiates distally on the lateral aspect of the foot.  No recent injury.  No swelling noted.  No redness or warmth to the area. Suspect this is possibly a lipoma.  We will move forward with getting an ultrasound for further evaluation.  We will contact the patient in regards to the ultrasound results once they become available. She is to monitor things and follow up if new or worsening symptoms arise.     Patient agreed to this plan.  Questions were answered.  Will contact the clinic as needed for worsening anxiety.  Symptoms for prompt evaluation in regards to her headache were discussed today.    Calixto Leblanc PA-C     Total time spent was 45 minutes including reviewing records prior to arrival, consultation, placing orders, education, and reviewing the plan of care on the date of service.

## 2022-01-26 NOTE — TELEPHONE ENCOUNTER
"Patient calling, ear problems for 3 weeks which is causing increased anxiety.    Reason for Disposition    Patient wants to be seen    Additional Information    Negative: Sounds like a life-threatening emergency to the triager    Negative: Moving the earlobe or touching the ear clearly increases the pain    Negative: Pink or red swelling behind the ear    Negative: Stiff neck (can't touch chin to chest)    Negative: Patient sounds very sick or weak to the triager    Negative: Severe earache pain    Negative: Fever > 103 F (39.4 C)    Negative: Pointed object was inserted into the ear canal (e.g., a pencil, stick, or wire)    Negative: White, yellow, or green discharge    Negative: Diabetes mellitus or a weak immune system (e.g., HIV positive, cancer chemotherapy, transplant patient)    Negative: Bloody discharge or unexplained bleeding from ear canal    Negative: New blurred vision or vision changes    Negative: All other earaches (Exceptions: earache lasting < 1 hour, and earache from air travel)    Answer Assessment - Initial Assessment Questions  1. LOCATION: \"Which ear is involved?\"      Both, but left is worst  2. ONSET: \"When did the ear start hurting\"       3 weeks ago  3. SEVERITY: \"How bad is the pain?\"  (Scale 1-10; mild, moderate or severe)    - MILD (1-3): doesn't interfere with normal activities     - MODERATE (4-7): interferes with normal activities or awakens from sleep     - SEVERE (8-10): excruciating pain, unable to do any normal activities       Mild-severe  4. URI SYMPTOMS: \"Do you have a runny nose or cough?\"      no  5. FEVER: \"Do you have a fever?\" If so, ask: \"What is your temperature, how was it measured, and when did it start?\"      no  6. CAUSE: \"Have you been swimming recently?\", \"How often do you use Q-TIPS?\", \"Have you had any recent air travel or scuba diving?\"      ?  7. OTHER SYMPTOMS: \"Do you have any other symptoms?\" (e.g., headache, stiff neck, dizziness, vomiting, runny nose, " "decreased hearing)      Increased anxiety  8. PREGNANCY: \"Is there any chance you are pregnant?\" \"When was your last menstrual period?\"      no    Protocols used: EARACHE-A-[place]      "

## 2022-01-27 ENCOUNTER — OFFICE VISIT (OUTPATIENT)
Dept: OTOLARYNGOLOGY | Facility: CLINIC | Age: 47
End: 2022-01-27

## 2022-01-27 ENCOUNTER — OFFICE VISIT (OUTPATIENT)
Dept: AUDIOLOGY | Facility: CLINIC | Age: 47
End: 2022-01-27
Payer: COMMERCIAL

## 2022-01-27 DIAGNOSIS — H93.8X3 SENSATION OF FULLNESS IN BOTH EARS: Primary | ICD-10-CM

## 2022-01-27 DIAGNOSIS — H92.03 OTALGIA OF BOTH EARS: ICD-10-CM

## 2022-01-27 DIAGNOSIS — M26.623 BILATERAL TEMPOROMANDIBULAR JOINT PAIN: Primary | ICD-10-CM

## 2022-01-27 PROCEDURE — 92550 TYMPANOMETRY & REFLEX THRESH: CPT | Mod: 52 | Performed by: AUDIOLOGIST

## 2022-01-27 PROCEDURE — 92557 COMPREHENSIVE HEARING TEST: CPT | Performed by: AUDIOLOGIST

## 2022-01-27 PROCEDURE — 99213 OFFICE O/P EST LOW 20 MIN: CPT | Mod: 25 | Performed by: OTOLARYNGOLOGY

## 2022-01-27 PROCEDURE — 99207 PR NO CHARGE LOS: CPT | Performed by: AUDIOLOGIST

## 2022-01-27 NOTE — LETTER
1/27/2022         RE: Angelia Stallings  [address] [facility] [address]        Dear Colleague,    Thank you for referring your patient, Angelia Stallings, to the [facility]. Please see a copy of my visit note below.    HPI: This patient is a 45yo F who presents to the clinic for evaluation of episodic ear pain at the request of Dr. Bell. The pain is a pressure sensation in and around the ears that comes and goes, but has been more present for the past 3 weeks. Also has increased stress, headache, non-pulsatile tinnitus, and muffling of the hearing. She was told yesterday that she had bilateral ear effusions. She has known TMJ. She has not been wearing a bite guard and does not routinely do any of the exercises that could be helpful. Denies otorrhea, hearing loss, tinnitus, vertigo, and other major symptoms such as fever, weight loss, odynophagia, dysphagia, and hemoptysis.     Past medical history, surgical history, social history, family history, medications, and allergies have been reviewed with the patient and are documented above.    Review of Systems: a 10-system review was performed. Pertinent positives are noted in the HPI and on a separate scanned document in the chart.    PHYSICAL EXAMINATION:  GEN: no acute distress, normocephalic  EYES: extraocular movements are intact, pupils are equal and round. Sclera clear.   EARS: auricles are normally formed. The external auditory canals are clear with minimal to no cerumen. Tympanic membranes are intact bilaterally with no signs of infection, retractions, or perforations. NO effusions. Her canals have a prominent anterior bend, which limits the view of the TMs. There is some opacity variability of the TM which could give the appearance of fluid bubbles, but the audiogram confirms there is no fluid.  NOSE: anterior nares are patent. There are no masses or lesions. The septum is non-obstructing.  OC/OP: clear,  dentition is in good repair but with flattening and wear facets. The tongue and palate are fully mobile and symmetric. The floor of mouth, base of tongue, and tonsils are soft and symmetric.  NECK: soft and supple. No lymphadenopathy or masses. Airway is midline. Tenderness of the bilateral TMJ, R>L.  NEURO: CN VII and XII symmetric. alert and oriented. No spontaneous nystagmus. Gait is normal.  PULM: breathing comfortably on room air, normal chest expansion with respiration  CARDS: no cyanosis or clubbing. Normal carotid pulses.    AUDIOGRAM: normal hearing, type A tymps    MEDICAL DECISION-MAKING: This patient is a 47yo F with ear discomfort and headaches from flared up TMD. Discussed soft diet, NSAIDS, and revisiting her exercises and bite guard. She is relieved it isn't her ears.         Again, thank you for allowing me to participate in the care of your patient.        Sincerely,        Angelia Trejo MD

## 2022-01-27 NOTE — PROGRESS NOTES
AUDIOLOGY REPORT    SUMMARY: Audiology visit completed. See audiogram for results. Abuse screening not completed due to same day appt with ENT clinic, where this is addressed.      RECOMMENDATIONS: Follow-up with ENT.      Renée Sorto, [facility]  [place] Licensed Audiologist [id number]

## 2022-02-02 ENCOUNTER — TELEPHONE (OUTPATIENT)
Dept: FAMILY MEDICINE | Facility: CLINIC | Age: 47
End: 2022-02-02
Payer: COMMERCIAL

## 2022-02-02 ENCOUNTER — HOSPITAL ENCOUNTER (OUTPATIENT)
Dept: ULTRASOUND IMAGING | Facility: CLINIC | Age: 47
Discharge: HOME | End: 2022-02-02
Attending: PHYSICIAN ASSISTANT | Admitting: PHYSICIAN ASSISTANT
Payer: COMMERCIAL

## 2022-02-02 DIAGNOSIS — M79.672 LEFT FOOT PAIN: ICD-10-CM

## 2022-02-02 PROCEDURE — 76882 US LMTD JT/FCL EVL NVASC XTR: CPT | Mod: LT

## 2022-02-02 NOTE — TELEPHONE ENCOUNTER
----- Message from Calixto Leblanc PA-C sent at 2/2/2022 11:51 AM CST -----  Please call pt and let her know results of US     The US of her ankle /foot did not show any evidence of a lump or mass. This could be a tendon that she is feeling. If she continues to have the pain I would recommend she see' podiatry for evaluation. Please let me know if she would like to see them and I can place the order.     Nawaf Leblanc PA-C

## 2022-02-02 NOTE — TELEPHONE ENCOUNTER
Pt updated. Pt states that she has seen podiatry for this previously and would prefer to see a foot/ankle specialist as this has been an ongoing issue for about 3 years.

## 2022-02-09 DIAGNOSIS — F41.1 ANXIETY STATE: ICD-10-CM

## 2022-02-09 DIAGNOSIS — F32.4 MAJOR DEPRESSION SINGLE EPISODE, IN PARTIAL REMISSION (H): ICD-10-CM

## 2022-02-11 RX ORDER — BUPROPION HYDROCHLORIDE 100 MG/1
TABLET, EXTENDED RELEASE ORAL
Qty: 30 TABLET | Refills: 5 | Status: SHIPPED | OUTPATIENT
Start: 2022-02-11 | End: 2022-03-03

## 2022-02-11 NOTE — TELEPHONE ENCOUNTER
"Routing refill request to provider for review/approval because:  PHQ9 score - greater than 4.    Last Written Prescription Date:  10/12/21  Last Fill Quantity: 30,  # refills: 3   Last office visit provider:  10/12/21     Requested Prescriptions   Pending Prescriptions Disp Refills     buPROPion (WELLBUTRIN SR) 100 MG 12 hr tablet [Pharmacy Med Name: BUPROPION SR 100MG TABLETS (12 H)] 30 tablet 3     Sig: TAKE 1 TABLET(100 MG) BY MOUTH DAILY       SSRIs Protocol Failed - 2/9/2022  3:23 AM        Failed - PHQ-9 score less than 5 in past 6 months     Please review last PHQ-9 score.           Passed - Medication is Bupropion     If the medication is Bupropion (Wellbutrin), and the patient is taking for smoking cessation; OK to refill.          Passed - Medication is active on med list        Passed - Patient is age 18 or older        Passed - No active pregnancy on record        Passed - No positive pregnancy test in last 12 months        Passed - Recent (6 mo) or future (30 days) visit within the authorizing provider's specialty     Patient had office visit in the last 6 months or has a visit in the next 30 days with authorizing provider or within the authorizing provider's specialty.  See \"Patient Info\" tab in inbasket, or \"Choose Columns\" in Meds & Orders section of the refill encounter.                 Chico Larsen RN 02/11/22 7:10 AM  "

## 2022-02-23 ENCOUNTER — TRANSFERRED RECORDS (OUTPATIENT)
Dept: HEALTH INFORMATION MANAGEMENT | Facility: CLINIC | Age: 47
End: 2022-02-23
Payer: COMMERCIAL

## 2022-02-24 ENCOUNTER — TELEPHONE (OUTPATIENT)
Dept: FAMILY MEDICINE | Facility: CLINIC | Age: 47
End: 2022-02-24
Payer: COMMERCIAL

## 2022-02-24 DIAGNOSIS — M54.40 CHRONIC BILATERAL LOW BACK PAIN WITH SCIATICA, SCIATICA LATERALITY UNSPECIFIED: Primary | ICD-10-CM

## 2022-02-24 DIAGNOSIS — G89.29 CHRONIC BILATERAL LOW BACK PAIN WITH SCIATICA, SCIATICA LATERALITY UNSPECIFIED: Primary | ICD-10-CM

## 2022-02-24 RX ORDER — OXYCODONE AND ACETAMINOPHEN 5; 325 MG/1; MG/1
1 TABLET ORAL EVERY 6 HOURS PRN
Qty: 20 TABLET | Refills: 0 | Status: SHIPPED | OUTPATIENT
Start: 2022-02-24 | End: 2022-03-03

## 2022-02-24 NOTE — TELEPHONE ENCOUNTER
Today, back and leg are painful.     Managed typically by [facility];H/o epidural injections for her back. She has chronic pain but manages it herself typically without narcotics.     saw [facility] yesterday-sacroilliac injection discussed. Not scheduled yet.        Tried and failed:  lyrica  Gabapentin  Medrol dose pack- helped but caused stomach pain.    Foot and ankle visit at [place] last week. MRI looked good. Ordered EMG study.      Can she get a small supply of pain medication until she can get this injection through [facility]. She has received about 10 pills to get her through until injection date however [facility] wouldn't fill for her.

## 2022-02-24 NOTE — TELEPHONE ENCOUNTER
Rx sent for #20. It's possible pharmacy won't fill the whole thing due to opioid rules. She should let me know if they only do a partial fill, then I can send another rx for next week.

## 2022-03-03 ENCOUNTER — VIRTUAL VISIT (OUTPATIENT)
Dept: FAMILY MEDICINE | Facility: CLINIC | Age: 47
End: 2022-03-03
Payer: COMMERCIAL

## 2022-03-03 DIAGNOSIS — F32.4 MAJOR DEPRESSION SINGLE EPISODE, IN PARTIAL REMISSION (H): ICD-10-CM

## 2022-03-03 DIAGNOSIS — F41.1 ANXIETY STATE: ICD-10-CM

## 2022-03-03 DIAGNOSIS — G89.29 CHRONIC BILATERAL LOW BACK PAIN WITH SCIATICA, SCIATICA LATERALITY UNSPECIFIED: Primary | ICD-10-CM

## 2022-03-03 DIAGNOSIS — M54.2 NECK PAIN: ICD-10-CM

## 2022-03-03 DIAGNOSIS — M79.18 MYOFASCIAL PAIN: ICD-10-CM

## 2022-03-03 DIAGNOSIS — M54.40 CHRONIC BILATERAL LOW BACK PAIN WITH SCIATICA, SCIATICA LATERALITY UNSPECIFIED: Primary | ICD-10-CM

## 2022-03-03 DIAGNOSIS — G43.009 MIGRAINE WITHOUT AURA AND WITHOUT STATUS MIGRAINOSUS, NOT INTRACTABLE: ICD-10-CM

## 2022-03-03 PROCEDURE — 99214 OFFICE O/P EST MOD 30 MIN: CPT | Mod: 95 | Performed by: FAMILY MEDICINE

## 2022-03-03 RX ORDER — BUPROPION HYDROCHLORIDE 150 MG/1
150 TABLET, EXTENDED RELEASE ORAL DAILY
Qty: 30 TABLET | Refills: 5 | Status: SHIPPED | OUTPATIENT
Start: 2022-03-03 | End: 2022-10-10

## 2022-03-03 RX ORDER — DIAZEPAM 5 MG
TABLET ORAL
COMMUNITY
Start: 2022-02-01 | End: 2022-05-24

## 2022-03-03 RX ORDER — OXYCODONE HYDROCHLORIDE 5 MG/1
TABLET ORAL
COMMUNITY
End: 2022-05-24

## 2022-03-03 RX ORDER — OXYCODONE AND ACETAMINOPHEN 5; 325 MG/1; MG/1
1 TABLET ORAL EVERY 6 HOURS PRN
Qty: 30 TABLET | Refills: 0 | Status: SHIPPED | OUTPATIENT
Start: 2022-03-03 | End: 2022-03-21

## 2022-03-03 RX ORDER — TRAZODONE HYDROCHLORIDE 50 MG/1
TABLET, FILM COATED ORAL
COMMUNITY
End: 2022-05-24

## 2022-03-03 RX ORDER — ORPHENADRINE CITRATE 100 MG/1
100 TABLET, EXTENDED RELEASE ORAL 2 TIMES DAILY
Qty: 30 TABLET | Refills: 5 | Status: SHIPPED | OUTPATIENT
Start: 2022-03-03 | End: 2022-05-24

## 2022-03-03 NOTE — PROGRESS NOTES
Angelia is a 46 year old who is being evaluated via a billable video visit.      How would you like to obtain your AVS? MyChart  If the video visit is dropped, the invitation should be resent by: Text to cell phone: [phone]  Will anyone else be joining your video visit? No      Video Start Time: 5:30 PM    Assessment & Plan       ICD-10-CM    1. Chronic bilateral low back pain with sciatica, sciatica laterality unspecified  M54.40 oxyCODONE-acetaminophen (PERCOCET) 5-325 MG tablet    G89.29 orphenadrine ER (NORFLEX) 100 MG 12 hr tablet   2. Neck pain  M54.2 orphenadrine ER (NORFLEX) 100 MG 12 hr tablet   3. Myofascial pain  M79.18 orphenadrine ER (NORFLEX) 100 MG 12 hr tablet   4. Migraine without aura and without status migrainosus, not intractable  G43.009    5. Anxiety  F41.1 buPROPion (WELLBUTRIN SR) 150 MG 12 hr tablet   6. Major depression single episode, in partial remission (H)  F32.4 buPROPion (WELLBUTRIN SR) 150 MG 12 hr tablet      We reviewed the likely/potential etiology(ies) for her headache, neck and generalized pain symptoms and we will have her try norflex prn as a muscle relaxant to see how she does with that. I will also send a new Rx for oxycodone, and we discussed other options for pain control. She is not a candidate for NSAIDs given her GERD history and history of hiatal hernia repair. I don't have any concerns about her overusing the oxycodone and I think 1-2 tabs daily would be reasonable, but she would like to avoid it if possible. We reviewed use of topical analgesics and/or patches as well, and she will call or return to clinic with any ongoing or worsening symptoms. As far as her mood and anxiety symptoms, we will have her increase the buproprion to 150 mg once daily. She will otherwise f/u in 2-3 mos for routine med check/evaluation, and we will complete a CSA if her need is ongoing.        Return in about 6 months (around 9/3/2022) for Anxiety/Depression F/U, Migraine/Headaches,  Chronic Pain.    Isabela Bell MD  [facility] KANG Galan is a 46 year old who presents for the following health issues    HPI          Angelia Stallings is a 46 year old female who was contacted for follow up of chronic neck pain and headaches. She has been working with neurosurgery, chiropractic, and pain clinic for chronic headaches following craniotomy for aneurysm in 2015. She has been diagnosed with fibromyalgia and experiences pain all over at times. Most recently she has been seeing ortho and a podiatrist for foot pain, and she has had normal MRIs of both the foot and lumbar spine. She had an ELIANA at L5-S1 which didn't help, and they are considering an SI injection. She has an EMG pending. She notes muscle tightness which is worse with stress. She has tried gabapentin, but got muscle fasciculations with that, and did not tolerate lyrica at all. She does get some relief with tizanidine, but gets very tired with regular cyclobenzaprine use.She gets the most relief with oxycodone which she has been taking in the afternoons, when the pain is worse.        She has a history of anxiety and depression and she has been on sertraline 50 mg and buproprion  mg daily. She continues to struggle with both mood and anxiety level, and she has had a lot of stressors in recent mos. She lost her best friend to cancer about a year ago, and she continues to struggle with that loss, and had been very involved with hospice cares.       Review of Systems   Constitutional, HEENT, cardiovascular, pulmonary, GI, , musculoskeletal, neuro, skin, endocrine and psych systems are negative, except as otherwise noted.      Objective    Vitals - Patient Reported  Weight (Patient Reported): 86.2 kg (190 lb)    Physical Exam   GENERAL: Healthy, alert and no distress  EYES: Eyes grossly normal to inspection.  No discharge or erythema, or obvious scleral/conjunctival abnormalities.  RESP: No  audible wheeze, cough, or visible cyanosis.  No visible retractions or increased work of breathing.    SKIN: Visible skin clear. No significant rash, abnormal pigmentation or lesions.  NEURO: Cranial nerves grossly intact.  Mentation and speech appropriate for age.  PSYCH: Mentation appears normal, affect normal/bright, judgement and insight intact, normal speech and appearance well-groomed.          Video-Visit Details    Type of service:  Video Visit    Video End Time: 5:55 PM    Originating Location (pt. Location): Home    Distant Location (provider location):  [facility]     Platform used for Video Visit: [employer]

## 2022-03-21 DIAGNOSIS — G89.29 CHRONIC BILATERAL LOW BACK PAIN WITH SCIATICA, SCIATICA LATERALITY UNSPECIFIED: ICD-10-CM

## 2022-03-21 DIAGNOSIS — M54.40 CHRONIC BILATERAL LOW BACK PAIN WITH SCIATICA, SCIATICA LATERALITY UNSPECIFIED: ICD-10-CM

## 2022-03-21 RX ORDER — OXYCODONE AND ACETAMINOPHEN 5; 325 MG/1; MG/1
1 TABLET ORAL EVERY 6 HOURS PRN
Qty: 30 TABLET | Refills: 0 | Status: SHIPPED | OUTPATIENT
Start: 2022-03-21 | End: 2022-04-11

## 2022-03-21 NOTE — TELEPHONE ENCOUNTER
Taking Aleve two BID in addition this this percocet medication.   She is scheduled for back steroid injection tomorrow.  Patient is wondering if she should be taking Aleve in additional to this medication(percocet) every day or if it's bad for her stomach.     She is hoping this will be her last refill as she has an injection tomorrow.          Medication: Oxycodone Acetaminophen 5-325mg  Last Date Filled 3/3/22 #30  Last appointment addressing medication use: 3/3/2022      Taken as prescribed from physician notes?     CSA in last year: NO    Random Utox in last year: NO  (if any of the above answer NO - schedule with PCP)     Opioids + benzodiazepines? NO  (if the above answer YES - schedule with PCP every 6 months)       All responses suggest: Scheduling with PCP for further intervention

## 2022-03-27 ENCOUNTER — HEALTH MAINTENANCE LETTER (OUTPATIENT)
Age: 47
End: 2022-03-27

## 2022-04-11 ENCOUNTER — NURSE TRIAGE (OUTPATIENT)
Dept: NURSING | Facility: CLINIC | Age: 47
End: 2022-04-11
Payer: COMMERCIAL

## 2022-04-11 DIAGNOSIS — G89.29 CHRONIC BILATERAL LOW BACK PAIN WITH SCIATICA, SCIATICA LATERALITY UNSPECIFIED: ICD-10-CM

## 2022-04-11 DIAGNOSIS — M54.40 CHRONIC BILATERAL LOW BACK PAIN WITH SCIATICA, SCIATICA LATERALITY UNSPECIFIED: ICD-10-CM

## 2022-04-11 RX ORDER — OXYCODONE AND ACETAMINOPHEN 5; 325 MG/1; MG/1
1 TABLET ORAL EVERY 6 HOURS PRN
Qty: 30 TABLET | Refills: 0 | Status: SHIPPED | OUTPATIENT
Start: 2022-04-11 | End: 2022-05-02

## 2022-04-11 NOTE — TELEPHONE ENCOUNTER
Nurse Triage SBAR    Is this a 2nd Level Triage? NO    Situation:   medication question  Fatigue  Brain fog    Background:   3/3 Bupropion increased from 100 mg daily to 150 mg. Pt started taking the new dose a week after.  3/7 Pt had botox injection through [facility]. Indication: Migraine  3/22 L4 transforaminal epidural injections at [place]    Symptoms started last week.    Assessment:   Brain fog - forgetful, forgot her phone at home 3x last week  Someone handed her a highlighter and she thought it was a banana.  Interpreting things wrong when reading     Feels off balance, but not dizzy or lightheaded  Has not passed out  Fatigued especially at night    States that after starting Bupropion, her appetite has increased a bit, can now eat half of her meals.    Hot flashes  Last week, was leaning towards her left, but has improved now.  Nausea last week, but not today      Protocol Recommended Disposition:   See Today In Office    Recommendation:   Pt declined office visit, asks if these symptoms may be caused by increased Bupropion dose.  Please call pt at [phone] for recommendations    Routed to provider    Does the patient meet one of the following criteria for ADS visit consideration? 16+ years old, with an [facility] PCP     TIP  Providers, please consider if this condition is appropriate for management at one of our Acute and Diagnostic Services sites.     If patient is a good candidate, please use dotphrase <dot>triageresponse and select Refer to ADS to document.    Britany Fernandez RN/[facility] Nurse Advisor        Reason for Disposition    Taking a medicine that could cause weakness (e.g., blood pressure medications, diuretics)    Additional Information    Negative: Severe difficulty breathing (e.g., struggling for each breath, speaks in single words)    Negative: Shock suspected (e.g., cold/pale/clammy skin, too weak to stand, low BP, rapid pulse)    Negative: Difficult to awaken or acting confused (e.g.,  disoriented, slurred speech)    Negative: Fainted > 15 minutes ago and still feels too weak or dizzy to stand    Negative: SEVERE weakness (i.e., unable to walk or barely able to walk, requires support) and new onset or worsening    Negative: Sounds like a life-threatening emergency to the triager    Negative: Weakness of the face, arm or leg on one side of the body    Negative: Has diabetes and weakness from low blood sugar (i.e., < 60 mg/dL or 3.5 mmol/L)    Negative: Recent heat exposure, suspected cause of weakness    Negative: Vomiting is the main symptom    Negative: Diarrhea is the main symptom    Negative: Difficulty breathing    Negative: Heart beating < 50 beats per minute OR > 140 beats per minute    Negative: Extra heartbeats OR irregular heart beating (i.e., 'palpitations')    Negative: Follows bleeding (e.g., from vomiting, rectum, vagina) (Exception: small transient weakness from sight of a small amount blood)    Negative: Bloody, black, or tarry bowel movements (Exception: chronic-unchanged  black-grey bowel movements and is taking iron pills or Pepto-bismol)    Negative: MODERATE weakness from poor fluid intake with no improvement after 2 hours of rest and fluids    Negative: Drinking very little and dehydration suspected (e.g., no urine > 12 hours, very dry mouth, very lightheaded)    Negative: Patient sounds very sick or weak to the triager    Negative: MODERATE weakness (i.e., interferes with work, school, normal activities) and cause unknown (Exceptions: weakness with acute minor illness, or weakness from poor fluid intake)    Negative: Fever > 103 F (39.4 C) and not able to get the Fever down using CARE ADVICE    Negative: Fever > 100.0 F (37.8 C) and bedridden (e.g., nursing home patient, stroke, chronic illness, recovering from surgery)    Negative: Fever > 101 F (38.3 C) and over 60 years of age    Negative: Fever > 100.0 F (37.8 C) and diabetes mellitus or weak immune system (e.g., HIV  positive, cancer chemo, splenectomy, organ transplant, chronic steroids)    Negative: Pale skin (pallor)    Negative: MODERATE weakness (i.e., interferes with work, school, normal activities) and persists > 3 days    Protocols used: WEAKNESS (GENERALIZED) AND FATIGUE-A-OH

## 2022-04-11 NOTE — TELEPHONE ENCOUNTER
Medication: oxycodone-acetaminophen 5-325 MG tablet  Last Date Filled 3/21/22  Last appointment addressing medication use: 3/3/22      Taken as prescribed from physician notes? YES    CSA in last year: NO    Random Utox in last year: NO  (if any of the above answer NO - schedule with PCP)     Opioids + benzodiazepines? YES  (if the above answer YES - schedule with PCP every 6 months)       All responses suggest: .

## 2022-04-11 NOTE — TELEPHONE ENCOUNTER
Reason for Call:  Medication or medication refill:    Do you use a [facility]?  Name of the pharmacy and phone number for the current request:  [facility]    Name of the medication requested: oxycodone acetaminophen 5-325mg    Other request: none    Can we leave a detailed message on this number? YES    Phone number patient can be reached at: Home number on file [phone] (home)    Best Time: anytime    Call taken on 4/11/2022 at 1:33 PM by Fransisco Reilly

## 2022-04-11 NOTE — TELEPHONE ENCOUNTER
It would be unusual for the side effects to kick in this far out. Has she had any difficulties after injections previously? It might be a good idea to touch base with neurology in case they want to do any imaging.

## 2022-04-20 ENCOUNTER — TRANSFERRED RECORDS (OUTPATIENT)
Dept: HEALTH INFORMATION MANAGEMENT | Facility: CLINIC | Age: 47
End: 2022-04-20

## 2022-04-20 LAB
HPV ABSTRACT: NORMAL
PAP SMEAR - HIM PATIENT REPORTED: POSITIVE
PAP-ABSTRACT: ABNORMAL

## 2022-04-21 ENCOUNTER — NURSE TRIAGE (OUTPATIENT)
Dept: NURSING | Facility: CLINIC | Age: 47
End: 2022-04-21
Payer: COMMERCIAL

## 2022-04-21 DIAGNOSIS — F41.1 ANXIETY STATE: ICD-10-CM

## 2022-04-21 NOTE — TELEPHONE ENCOUNTER
Triage call:   Migraine all day on the right side of her head   states that her foot and leg were cramping and now her arm is cramping - all on the right side of her body    States very painful and like her muscles are pulling  States similar sciatica she has had in the past but those symptoms have normally been down her left side- not the right    States she doesn't usually get symptoms on the right  States she has had a history of hemiplegic migraines  States her migraines have had everything slow down in the past- she moved slower, talked slower ect    Patient speech is slower which she reports is abnormal for her    Mild migraine pain remains- base of her skull and above her eyes- right sided headache has mainly resolved  State she took aleve today- helped some earlier   Denies vision issue  Reports has felt off balance today at times - dizziness when she went to the bathroom     Patient states that her neurologist has been managing her headaches for her. Patient will also reach out to her neurologist today for additional advise    Per triage protocol- patient to be seen in the ER/UCC or office with PCP approval. Patient will also be calling her neurologist as well. Provider please review if patient should be seen in ER/UCC or office?     Maura Blackburn RN BSN 4/21/2022 2:21 PM           Reason for Disposition    Patient sounds very sick or weak to the triager    Additional Information    Negative: Difficult to awaken or acting confused (e.g., disoriented, slurred speech)    Negative: Weakness of the face, arm or leg on one side of the body and new onset    Negative: Numbness of the face, arm or leg on one side of the body and new onset    Negative: Loss of speech or garbled speech and new onset    Negative: Passed out (i.e., fainted, collapsed and was not responding)    Negative: Sounds like a life-threatening emergency to the triager    Negative: Followed a head injury within last 3 days    Negative:  Traumatic Brain Injury (TBI) is suspected    Negative: Sinus pain of forehead and yellow or green nasal discharge    Negative: Pregnant    Negative: Unable to walk without falling    Negative: Stiff neck (can't touch chin to chest)    Negative: Possibility of carbon monoxide exposure    Negative: SEVERE headache, states 'worst headache' of life    Negative: SEVERE headache, sudden-onset (i.e., reaching maximum intensity within seconds to 1 hour)    Negative: Severe pain in one eye    Negative: Loss of vision or double vision (Exception: same as prior migraines)    Protocols used: HEADACHE-A-OH

## 2022-04-21 NOTE — TELEPHONE ENCOUNTER
Medication: Sertraline(Zoloft) 50mg tablet  Last Date Filled: 1/16/2022  Last appointment addressing medication: 3/3/22  Last B/P:  BP Readings from Last 3 Encounters:   01/26/22 132/78   10/12/21 122/88   05/27/21 (!) 144/93         Pend medication and associate diagnosis before routing to Provider for review.       If patient has not been seen in over 1 year, pend 30 day supply and notify patient they are due for an appointment before any further refills.     Sinai Stone MA on 4/21/2022 at 1:49 PM

## 2022-05-02 ENCOUNTER — TELEPHONE (OUTPATIENT)
Dept: FAMILY MEDICINE | Facility: CLINIC | Age: 47
End: 2022-05-02
Payer: COMMERCIAL

## 2022-05-02 DIAGNOSIS — M54.40 CHRONIC BILATERAL LOW BACK PAIN WITH SCIATICA, SCIATICA LATERALITY UNSPECIFIED: ICD-10-CM

## 2022-05-02 DIAGNOSIS — G89.29 CHRONIC BILATERAL LOW BACK PAIN WITH SCIATICA, SCIATICA LATERALITY UNSPECIFIED: ICD-10-CM

## 2022-05-02 RX ORDER — OXYCODONE AND ACETAMINOPHEN 5; 325 MG/1; MG/1
1 TABLET ORAL EVERY 6 HOURS PRN
Qty: 30 TABLET | Refills: 0 | Status: SHIPPED | OUTPATIENT
Start: 2022-05-02 | End: 2022-05-20

## 2022-05-02 NOTE — TELEPHONE ENCOUNTER
Medication: oxycodone acetaminophen 5-325mg  Last Date Filled 4/11/22  Last appointment addressing medication use: 3/3/22      Taken as prescribed from physician notes?     CSA in last year: NO    Random Utox in last year: NO  (if any of the above answer NO - schedule with PCP)     Opioids + benzodiazepines? YES  (if the above answer YES - schedule with PCP every 6 months)       All responses suggest: Scheduling with PCP for further intervention

## 2022-05-02 NOTE — TELEPHONE ENCOUNTER
Reason for Call: patient is calling for a refill of    oxyCODONE-acetaminophen (PERCOCET) 5-325 MG tablet     [facility] to [facility]    Detailed comments: LAST SEEN 03/2022. Patient aware refill not due until next Wednesday, 5/11/22.    Phone Number Patient can be reached at: Home number on file [phone] (home)    Best Time: any    Can we leave a detailed message on this number? YES    Call taken on 5/2/2022 at 11:47 AM by Myla Escobedo

## 2022-05-03 NOTE — TELEPHONE ENCOUNTER
Please CALL pt and notify: should schedule follow up visit at her convenience in the next month or so to discus CSA.

## 2022-05-20 DIAGNOSIS — G89.29 CHRONIC BILATERAL LOW BACK PAIN WITH SCIATICA, SCIATICA LATERALITY UNSPECIFIED: ICD-10-CM

## 2022-05-20 DIAGNOSIS — M54.40 CHRONIC BILATERAL LOW BACK PAIN WITH SCIATICA, SCIATICA LATERALITY UNSPECIFIED: ICD-10-CM

## 2022-05-20 RX ORDER — OXYCODONE AND ACETAMINOPHEN 5; 325 MG/1; MG/1
1 TABLET ORAL EVERY 6 HOURS PRN
Qty: 30 TABLET | Refills: 0 | Status: SHIPPED | OUTPATIENT
Start: 2022-05-20 | End: 2022-06-07

## 2022-05-20 NOTE — TELEPHONE ENCOUNTER
Reason for Call:  Medication or medication refill:    Do you use a [facility]?  Name of the pharmacy and phone number for the current request:  [facility]    Name of the medication requested: oxycodone acetaminophen 5-325mg    Other request: pt knows Dr Bell is out and does have appt with her on Monday. She is just having a bad flare of hip pain and is requesting even a partial fill to get through the weekend until she see Dr Bell.     Can we leave a detailed message on this number? YES    Phone number patient can be reached at: Home number on file [phone] (home)    Best Time: anytime    Call taken on 5/20/2022 at 11:02 AM by Fransisco Reilly

## 2022-05-20 NOTE — TELEPHONE ENCOUNTER
Medication: Oxycodone-acetaminophen 5-325 MG tablet   Last Date Filled 5/2/22  Last appointment addressing medication use: 3/3/22      Taken as prescribed from physician notes? YES    CSA in last year: NO    Random Utox in last year: NO  (if any of the above answer NO - schedule with PCP)     Opioids + benzodiazepines? YES  (if the above answer YES - schedule with PCP every 6 months)       All responses suggest: .

## 2022-05-24 ENCOUNTER — VIRTUAL VISIT (OUTPATIENT)
Dept: FAMILY MEDICINE | Facility: CLINIC | Age: 47
End: 2022-05-24
Payer: COMMERCIAL

## 2022-05-24 DIAGNOSIS — M54.2 NECK PAIN: ICD-10-CM

## 2022-05-24 DIAGNOSIS — M54.50 CHRONIC BILATERAL LOW BACK PAIN WITHOUT SCIATICA: Primary | ICD-10-CM

## 2022-05-24 DIAGNOSIS — G43.009 MIGRAINE WITHOUT AURA AND WITHOUT STATUS MIGRAINOSUS, NOT INTRACTABLE: ICD-10-CM

## 2022-05-24 DIAGNOSIS — F32.4 MAJOR DEPRESSION SINGLE EPISODE, IN PARTIAL REMISSION (H): ICD-10-CM

## 2022-05-24 DIAGNOSIS — Z98.890 S/P CRANIOTOMY: ICD-10-CM

## 2022-05-24 DIAGNOSIS — F41.1 ANXIETY STATE: ICD-10-CM

## 2022-05-24 DIAGNOSIS — G89.29 CHRONIC BILATERAL LOW BACK PAIN WITHOUT SCIATICA: Primary | ICD-10-CM

## 2022-05-24 PROCEDURE — 99214 OFFICE O/P EST MOD 30 MIN: CPT | Mod: 95 | Performed by: FAMILY MEDICINE

## 2022-05-24 ASSESSMENT — PATIENT HEALTH QUESTIONNAIRE - PHQ9
10. IF YOU CHECKED OFF ANY PROBLEMS, HOW DIFFICULT HAVE THESE PROBLEMS MADE IT FOR YOU TO DO YOUR WORK, TAKE CARE OF THINGS AT HOME, OR GET ALONG WITH OTHER PEOPLE: SOMEWHAT DIFFICULT
SUM OF ALL RESPONSES TO PHQ QUESTIONS 1-9: 6
SUM OF ALL RESPONSES TO PHQ QUESTIONS 1-9: 6

## 2022-05-24 NOTE — PROGRESS NOTES
Angelia is a 46 year old who is being evaluated via a billable video visit.      How would you like to obtain your AVS? MyChart  If the video visit is dropped, the invitation should be resent by: Text to cell phone: [phone]  Will anyone else be joining your video visit? No      Video Start Time: 12:11 PM    Assessment & Plan       ICD-10-CM    1. Chronic bilateral low back pain without sciatica  M54.50     G89.29    2. Neck pain  M54.2    3. Migraine without aura and without status migrainosus, not intractable  G43.009    4. S/P craniotomy, MCA aneurysm clipping x 2  Z98.890    5. Anxiety  F41.1    6. Major depression single episode, in partial remission (H)  F32.4          We reviewed the likely/potential etiology(ies) for her headache, neck and generalized pain symptoms and we will have continue the oxycodone/acetaminophen at her current dosing of 1-2 tabs daily. She is not a candidate for NSAIDs given her GERD history and history of hiatal hernia repair. I don't have any concerns about her overusing the oxycodone and I think 1-2 tabs daily is reasonable. We reviewed use of topical analgesics and/or patches as well, and she will call or return to clinic with any ongoing or worsening symptoms. We discussed the potential for abuse or harm from misuse for the oxycodone, and we will print a new treatment agreement today and mail/MyChart it for her to review, sign and return by mail or electronically. I placed an order for a urine drug screen today as well. We discussed the fact that I will be leaving the clinic and she will schedule a follow up with Jenni (ok per my discussion with Jenni) as she had previously followed Angelia's best friend who has since passed away. That has been a big part of her mood and anxiety symptoms. For those symptoms, she will continue buproprion at 150 mg once daily and sertraline. She will again follow up         Return in about 2 months (around 7/24/2022) for Chronic Pain,  Migraine/Headaches.    Isabela Bell MD  [facility] KANG Galan is a 46 year old who presents for the following health issues     HPI         Angelia is a 47 yo female who presents for follow-up of chronic neck and low back pain and headaches. She has been working with neurosurgery, chiropractic, and pain clinic for chronic headaches following craniotomy for aneurysm in 2015. She has been diagnosed with fibromyalgia and experiences pain all over at times. Most recently she has been seeing ortho and a podiatrist for foot pain, and she has had normal MRIs of both the foot and lumbar spine. She had an ELIANA at L5-S1 which didn't help, and they are considering an SI injection. She notes muscle tightness which is worse with stress. She has been doing PT for foot and ankle pain as well as back pain. She has been trying to get out to walk regularly. She does note some cramping of toes on one side, and wonders about RLS. She has had both daytime and night time symptoms however. She has tried gabapentin, but got muscle fasciculations with that, and did not tolerate lyrica at all. She does get some relief with tizanidine, but gets very tired with regular cyclobenzaprine use. She did not get much relief with norflex. She gets the most relief with oxycodone which she has been taking in the afternoons or evenings, when the pain is worse. She denies any sedation with the oxycodone. Her headaches have been better since she had botox injections a few mos ago.        She has a history of anxiety and depression and she has been on sertraline 50 mg and buproprion xl 150 mg daily. She continues to struggle with both mood and anxiety, and she has had a lot of stressors in recent mos. She lost her best friend to cancer over a year ago, and she continues to struggle with that loss, and had been very involved with hospice cares.       Review of Systems   Constitutional, HEENT, cardiovascular,  pulmonary, GI, , musculoskeletal, neuro, skin, endocrine and psych systems are negative, except as otherwise noted.      Objective    Vitals - Patient Reported  Weight (Patient Reported): 86.2 kg (190 lb)      Vitals:  No vitals were obtained today due to virtual visit.    Physical Exam   GENERAL: Healthy, alert and no distress  EYES: Eyes grossly normal to inspection.  No discharge or erythema, or obvious scleral/conjunctival abnormalities.  RESP: No audible wheeze, cough, or visible cyanosis.  No visible retractions or increased work of breathing.    SKIN: Visible skin clear. No significant rash, abnormal pigmentation or lesions.  NEURO: Cranial nerves grossly intact.  Mentation and speech appropriate for age.  PSYCH: Mentation appears normal, affect normal/bright, judgement and insight intact, normal speech and appearance well-groomed.              Video-Visit Details    Type of service:  Video Visit    Video End Time: 12:34 PM    Originating Location (pt. Location): Home    Distant Location (provider location):  [facility]     Platform used for Video Visit: [employer]

## 2022-05-24 NOTE — LETTER
Opioid / Opioid Plus Controlled Substance Agreement    This is an agreement between you and your provider about the safe and appropriate use of controlled substance/opioids prescribed by your care team. Controlled substances are medicines that can cause physical and mental dependence (abuse).    There are strict laws about having and using these medicines. We here at [facility] are committing to working with you in your efforts to get better. To support you in this work, we ll help you schedule regular office appointments for medicine refills. If we must cancel or change your appointment for any reason, we ll make sure you have enough medicine to last until your next appointment.     As a Provider, I will:    Listen carefully to your concerns and treat you with respect.     Recommend a treatment plan that I believe is in your best interest. This plan may involve therapies other than opioid pain medication.     Talk with you often about the possible benefits, and the risk of harm of any medicine that we prescribe for you.     Provide a plan on how to taper (discontinue or go off) using this medicine if the decision is made to stop its use.    As a Patient, I understand that opioid(s):     Are a controlled substance prescribed by my care team to help me function or work and manage my condition(s).     Are strong medicines and can cause serious side effects such as:    Drowsiness, which can seriously affect my driving ability    A lower breathing rate, enough to cause death    Harm to my thinking ability     Depression     Abuse of and addiction to this medicine    Need to be taken exactly as prescribed. Combining opioids with certain medicines or chemicals (such as illegal drugs, sedatives, sleeping pills, and benzodiazepines) can be dangerous or even fatal. If I stop opioids suddenly, I may have severe withdrawal symptoms.    Do not work for all types of pain nor for all patients. If they re not helpful, I may  be asked to stop them.      The risks, benefits and side effects of these medicine(s) were explained to me. I agree that:  1. I will take part in other treatments as advised by my care team. This may be psychiatry or counseling, physical therapy, behavioral therapy, group treatment or a referral to a specialist.     2. I will keep all my appointments. I understand that this is part of the monitoring of opioids. My care team may require an office visit for EVERY opioid/controlled substance refill. If I miss appointments or don t follow instructions, my care team may stop my medicine.    3. I will take my medicines as prescribed. I will not change the dose or schedule unless my care team tells me to. There will be no refills if I run out early.     4. I may be asked to come to the clinic and complete a urine drug test or complete a pill count at any time. If I don t give a urine sample or participate in a pill count, the care team may stop my medicine.    5. I will only receive prescriptions from this clinic for chronic pain. If I am treated by another provider for acute pain issues, I will tell them that I am taking opioid pain medication for chronic pain and that I have a treatment agreement with this provider. I will inform my [facility] care team within one business day if I am given a prescription for any pain medication by another healthcare provider. My [facility] care team can contact other providers and pharmacists about my use of any medicines.    6. It is up to me to make sure that I don t run out of my medicines on weekends or holidays. If my care team is willing to refill my opioid prescription without a visit, I must request refills only during office hours. Refills may take up to 3 business days to process. I will use one pharmacy to fill all my opioid and other controlled substance prescriptions. I will notify the clinic about any changes to my insurance or medication  availability.    7. I am responsible for my prescriptions. If the medicine/prescription is lost, stolen or destroyed, it will not be replaced. I also agree not to share controlled substance medicines with anyone.    8. I am aware I should not use any illegal or recreational drugs. I agree not to drink alcohol unless my care team says I can.       9. If I enroll in the [place] [facility] Cann[facility] program, I will tell my care team prior to my next refill.     10. I will tell my care team right away if I become pregnant, have a new medical problem treated outside of my regular clinic, or have a change in my medications.    11. I understand that this medicine can affect my thinking, judgment and reaction time. Alcohol and drugs affect the brain and body, which can affect the safety of my driving. Being under the influence of alcohol or drugs can affect my decision-making, behaviors, personal safety, and the safety of others. Driving while impaired (DWI) can occur if a person is driving, operating, or in physical control of a car, motorcycle, boat, snowmobile, ATV, motorbike, off-road vehicle, or any other motor vehicle ([place] Statute 169A.20). I understand the risk if I choose to drive or operate any vehicle or machinery.    I understand that if I do not follow any of the conditions above, my prescriptions or treatment may be stopped or changed.          Opioids  What You Need to Know    What are opioids?   Opioids are pain medicines that must be prescribed by a doctor. They are also known as narcotics.     Examples are:   1. morphine (MS Contin, Izzy)  2. oxycodone (Oxycontin)  3. oxycodone and acetaminophen (Percocet)  4. hydrocodone and acetaminophen (Vicodin, Norco)   5. fentanyl patch (Duragesic)   6. hydromorphone (Dilaudid)   7. methadone  8. codeine (Tylenol #3)     What do opioids do well?   Opioids are best for severe short-term pain such as after a surgery or injury. They may work well for cancer pain. They may  help some people with long-lasting (chronic) pain.     What do opioids NOT do well?   Opioids never get rid of pain entirely, and they don t work well for most patients with chronic pain. Opioids don t reduce swelling, one of the causes of pain.                                    Other ways to manage chronic pain and improve function include:       Treat the health problem that may be causing pain    Anti-inflammation medicines, which reduce swelling and tenderness, such as ibuprofen (Advil, Motrin) or naproxen (Aleve)    Acetaminophen (Tylenol)    Antidepressants and anti-seizure medicines, especially for nerve pain    Topical treatments such as patches or creams    Injections or nerve blocks    Chiropractic or osteopathic treatment    Acupuncture, massage, deep breathing, meditation, visual imagery, aromatherapy    Use heat or ice at the pain site    Physical therapy     Exercise    Stop smoking    Take part in therapy       Risks and side effects     Talk to your doctor before you start or decide to keep taking opioids. Possible side effects include:      Lowering your breathing rate enough to cause death    Overdose, including death, especially if taking higher than prescribed doses    Worse depression symptoms; less pleasure in things you usually enjoy    Feeling tired or sluggish    Slower thoughts or cloudy thinking    Being more sensitive to pain over time; pain is harder to control    Trouble sleeping or restless sleep    Changes in hormone levels (for example, less testosterone)    Changes in sex drive or ability to have sex    Constipation    Unsafe driving    Itching and sweating    Dizziness    Nausea, throwing up and dry mouth    What else should I know about opioids?    Opioids may lead to dependence, tolerance, or addiction.      Dependence means that if you stop or reduce the medicine too quickly, you will have withdrawal symptoms. These include loose poop (diarrhea), jitters, flu-like symptoms,  nervousness and tremors. Dependence is not the same as addiction.                       Tolerance means needing higher doses over time to get the same effect. This may increase the chance of serious side effects.      Addiction is when people improperly use a substance that harms their body, their mind or their relations with others. Use of opiates can cause a relapse of addiction if you have a history of drug or alcohol abuse.      People who have used opioids for a long time may have a lower quality of life, worse depression, higher levels of pain and more visits to doctors.    You can overdose on opioids. Take these steps to lower your risk of overdose:    1. Recognize the signs:  Signs of overdose include decrease or loss of consciousness (blackout), slowed breathing, trouble waking up and blue lips. If someone is worried about overdose, they should call 911.    2. Talk to your doctor about Narcan (naloxone).   If you are at risk for overdose, you may be given a prescription for Narcan. This medicine very quickly reverses the effects of opioids.   If you overdose, a friend or family member can give you Narcan while waiting for the ambulance. They need to know the signs of overdose and how to give Narcan.     3. Don't use alcohol or street drugs.   Taking them with opioids can cause death.    4. Do not take any of these medicines unless your doctor says it s OK. Taking these with opioids can cause death:    Benzodiazepines, such as lorazepam (Ativan), alprazolam (Xanax) or diazepam (Valium)    Muscle relaxers, such as cyclobenzaprine (Flexeril)    Sleeping pills like zolpidem (Ambien)     Other opioids      How to keep you and other people safe while taking opioids:    1. Never share your opioids with others.  Opioid medicines are regulated by the Drug Enforcement Agency ([employer]). Selling or sharing medications is a criminal act.    2. Be sure to store opioids in a secure place, locked up if possible. Young children  can easily swallow them and overdose.    3. When you are traveling with your medicines, keep them in the original bottles. If you use a pill box, be sure you also carry a copy of your medicine list from your clinic or pharmacy.    4. Safe disposal of opioids    Most pharmacies have places to get rid of medicine, called disposal kiosks. Medicine disposal options are also available in every [facility]. Search your county and  medication disposal  to find more options. You can find more details at:  [email or web address]     I agree that my provider, clinic care team, and pharmacy may work with any city, state or federal law enforcement agency that investigates the misuse, sale, or other diversion of my controlled medicine. I will allow my provider to discuss my care with, or share a copy of, this agreement with any other treating provider, pharmacy or emergency room where I receive care.    I have read this agreement and have asked questions about anything I did not understand.    _______________________________________________________  Patient Signature - Angelia Stallings _____________________                   Date     _______________________________________________________  Provider Signature - Isabela Bell MD   _____________________                   Date     _______________________________________________________  Witness Signature (required if provider not present while patient signing)   _____________________                   Date

## 2022-06-02 ENCOUNTER — TRANSFERRED RECORDS (OUTPATIENT)
Dept: HEALTH INFORMATION MANAGEMENT | Facility: CLINIC | Age: 47
End: 2022-06-02
Payer: COMMERCIAL

## 2022-06-07 DIAGNOSIS — M54.40 CHRONIC BILATERAL LOW BACK PAIN WITH SCIATICA, SCIATICA LATERALITY UNSPECIFIED: ICD-10-CM

## 2022-06-07 DIAGNOSIS — G89.29 CHRONIC BILATERAL LOW BACK PAIN WITH SCIATICA, SCIATICA LATERALITY UNSPECIFIED: ICD-10-CM

## 2022-06-07 DIAGNOSIS — M54.2 NECK PAIN: Primary | ICD-10-CM

## 2022-06-07 RX ORDER — OXYCODONE AND ACETAMINOPHEN 5; 325 MG/1; MG/1
1 TABLET ORAL EVERY 6 HOURS PRN
Qty: 50 TABLET | Refills: 0 | Status: SHIPPED | OUTPATIENT
Start: 2022-06-07 | End: 2022-06-30

## 2022-06-07 NOTE — TELEPHONE ENCOUNTER
Reason for Call:  Medication or medication refill:    Do you use a [facility]?  Name of the pharmacy and phone number for the current request:  [facility]    Name of the medication requested: oxycodone acetaminophen 5-325mg    Other request: she dropped of contrct yesterday and wants to know if you need urine or not?    Can we leave a detailed message on this number? YES    Phone number patient can be reached at: Home number on file [phone] (home)    Best Time: anytime    Call taken on 6/7/2022 at 11:17 AM by Fransisco Reilly

## 2022-06-07 NOTE — TELEPHONE ENCOUNTER
Medication: Oxycodone-acetaminophen 5-325mg  Last Date Filled 5/20/22  Last appointment addressing medication use: 5/24/22    CSA in last year: YES    Random Utox in last year: NO  (if any of the above answer NO - schedule with PCP)     Opioids + benzodiazepines? NO  (if the above answer YES - schedule with PCP every 6 months)       All responses suggest:

## 2022-06-08 NOTE — TELEPHONE ENCOUNTER
Notify patient: Rx renewed for #50 tabs which should last 1 month. It might be best for her to schedule an in-person visit with Jenni to establish care. I did place an order for a urine drug test if she can come in to leave a specimen.

## 2022-06-15 LAB
ALT SERPL-CCNC: 23 U/L (ref 6–29)
AST SERPL-CCNC: 24 U/L (ref 10–35)
CREATININE (EXTERNAL): 0.92 MG/DL (ref 0.5–1.1)
GFR ESTIMATED (EXTERNAL): 74 ML/MIN/1.73M2
GFR ESTIMATED (IF AFRICAN AMERICAN) (EXTERNAL): 86 ML/MIN/1.73M2
GLUCOSE (EXTERNAL): 69 MG/DL (ref 65–99)
POTASSIUM (EXTERNAL): 4.5 MMOL/L (ref 3.5–5.3)
TSH SERPL-ACNC: 2.08 MIU/L (ref 0.4–4.5)

## 2022-06-16 ENCOUNTER — TRANSFERRED RECORDS (OUTPATIENT)
Dept: HEALTH INFORMATION MANAGEMENT | Facility: CLINIC | Age: 47
End: 2022-06-16
Payer: COMMERCIAL

## 2022-06-22 ENCOUNTER — OFFICE VISIT (OUTPATIENT)
Dept: FAMILY MEDICINE | Facility: CLINIC | Age: 47
End: 2022-06-22
Payer: COMMERCIAL

## 2022-06-22 VITALS
TEMPERATURE: 98 F | SYSTOLIC BLOOD PRESSURE: 128 MMHG | DIASTOLIC BLOOD PRESSURE: 78 MMHG | HEART RATE: 105 BPM | OXYGEN SATURATION: 98 % | RESPIRATION RATE: 16 BRPM

## 2022-06-22 DIAGNOSIS — G89.29 CHRONIC BILATERAL LOW BACK PAIN WITH SCIATICA, SCIATICA LATERALITY UNSPECIFIED: ICD-10-CM

## 2022-06-22 DIAGNOSIS — R25.3 TWITCHING: Primary | ICD-10-CM

## 2022-06-22 DIAGNOSIS — M54.2 NECK PAIN: ICD-10-CM

## 2022-06-22 DIAGNOSIS — M54.40 CHRONIC BILATERAL LOW BACK PAIN WITH SCIATICA, SCIATICA LATERALITY UNSPECIFIED: ICD-10-CM

## 2022-06-22 LAB
CANNABINOIDS UR QL SCN: NORMAL
CREAT UR-MCNC: 128 MG/DL
CREAT UR-MCNC: 128 MG/DL
MAGNESIUM SERPL-MCNC: 2 MG/DL (ref 1.8–2.6)
TSH SERPL DL<=0.005 MIU/L-ACNC: 1.41 UIU/ML (ref 0.3–5)

## 2022-06-22 PROCEDURE — 99214 OFFICE O/P EST MOD 30 MIN: CPT | Performed by: PHYSICIAN ASSISTANT

## 2022-06-22 PROCEDURE — 83735 ASSAY OF MAGNESIUM: CPT | Performed by: PHYSICIAN ASSISTANT

## 2022-06-22 PROCEDURE — 80307 DRUG TEST PRSMV CHEM ANLYZR: CPT | Performed by: PHYSICIAN ASSISTANT

## 2022-06-22 PROCEDURE — 36415 COLL VENOUS BLD VENIPUNCTURE: CPT | Performed by: PHYSICIAN ASSISTANT

## 2022-06-22 PROCEDURE — 84443 ASSAY THYROID STIM HORMONE: CPT | Performed by: PHYSICIAN ASSISTANT

## 2022-06-22 RX ORDER — DIAZEPAM 5 MG
TABLET ORAL
COMMUNITY
Start: 2022-05-28 | End: 2022-11-04

## 2022-06-22 ASSESSMENT — PAIN SCALES - GENERAL: PAINLEVEL: MODERATE PAIN (4)

## 2022-06-22 NOTE — PROGRESS NOTES
Chief Complaint   Patient presents with     Toe Pain     Left foot toe pain movement on going for 1 month. Vit D and iron has been low       Assessment & Plan       ICD-10-CM    1. Twitching  R25.3 EMG     TSH     Magnesium     TSH     Magnesium   2. Chronic bilateral low back pain with sciatica, sciatica laterality unspecified  M54.40 Drug Confirmation Panel Urine with Creat - lab collect    G89.29    3. Neck pain  M54.2 Drug Confirmation Panel Urine with Creat - lab collect     #1 twitching/involuntary movements of second and third toe on left foot-etiology unclear.  She does have altered sensation.  No abnormalities noted on exam to the left lower extremity.  She did step on her cats bowl but she is not having any pain to her foot or toes I do not think we need imaging.  She was told this may be restless legs but she is not having any discomfort in her lower extremity other than her toes curling over involuntarily.  She is tried muscle laxer's and this has not helped.  She has started to supplement her iron and vitamin D.  She does have issues with lower back herniated disc in L4-L5 although she had a injection in March and her lower back has not been giving her problems.  She is having hip pain and groin pain and is seeing orthopedics.  She did see neurology and they thought this could possibly be restless leg syndrome.  She did meet with her spine specialist and he did not think that this is due to her lower back or a pinched nerve of any sort.  Etiology unclear at this time.  We will have her continue supplementing with iron and vitamin D.  We will get an an EMG to her lower extremities.  Have her continue following Ortho [facility] with her hip and groin pain.  Once we get the EMG back we will decide further evaluation at that time.  She is to continue to monitor symptoms until we get this back.      BMI:   Estimated body mass index is 29.66 kg/m  as calculated from the following:    Height as of 10/12/21: 1.727  "marshall (5' 8\").    Weight as of 1/26/22: 88.5 kg (195 lb 1.6 oz).           No follow-ups on file.    [facility]    Prosper Galan is a 46 year old, presenting for the following health issues:  Toe Pain (Left foot toe pain movement on going for 1 month. Vit D and iron has been low)      Angelia is a pleasant 46-year-old female who presents to the clinic today for evaluation on twitching and or involuntary movements of her second and third toe on left foot.  She has noticed this for about a month.  She was evaluated by neurology and thought this was may have been a restless legs.  She states that in certain positions her second and third toe involuntary twitch and invert under her foot.  She has difficulty straightening her toes out when this happens.  It occurs multiple times a day.  She does have chronic lower back pain requiring injections in the past.  She did have an injection more recently and she has not been having any issues with her back pain.  She more recently has more hip and groin pain and has seen orthopedics regarding this.  She has full sensation in the toes.  She did step on her cat bowl a few days prior to this occurring.  Neurology did check some labs including an iron vitamin D and hemoglobin.  She states her iron was mildly low at 42 and a vitamin D at 29.  She thinks her hemoglobin was normal.  She does have muscle relaxer she has been trying going this with this has not been helping.  She does have some mild numbness and tingling in her toes at times but this is infrequent.  No other pain or discomfort in her ankle or foot.  She does have chronic ankle pain    History of Present Illness       Reason for visit:  Moving toes- confirm restless legs  Symptom onset:  More than a month  Symptoms include:  Moving toes aching  Symptom intensity:  Moderate  Symptom progression:  Worsening  Had these symptoms before:  No  What makes it worse:  " Increased activity made toe activity worse  What makes it better:  No    She eats 2-3 servings of fruits and vegetables daily.She consumes 2 sweetened beverage(s) daily.She exercises with enough effort to increase her heart rate 10 to 19 minutes per day.  She exercises with enough effort to increase her heart rate 3 or less days per week.   She is taking medications regularly.         Review of Systems   Constitutional: Negative.    HENT: Negative.    Respiratory: Negative.    Cardiovascular: Negative.    Genitourinary: Negative.    Musculoskeletal:        Twitching/involuntary movements to toes on left foot toes second and third.  Mild numbness.  No pain otherwise.          Objective    /78   Pulse 105   Temp 98  F (36.7  C)   Resp 16   SpO2 98%   Breastfeeding No   There is no height or weight on file to calculate BMI.  Physical Exam  Vitals and nursing note reviewed.   Constitutional:       Appearance: Normal appearance.   HENT:      Head: Normocephalic and atraumatic.   Eyes:      Conjunctiva/sclera: Conjunctivae normal.   Cardiovascular:      Rate and Rhythm: Normal rate and regular rhythm.      Heart sounds: No murmur heard.    No friction rub.   Pulmonary:      Effort: Pulmonary effort is normal.      Breath sounds: No wheezing, rhonchi or rales.   Musculoskeletal:      Cervical back: Neck supple.   Neurological:      Mental Status: She is alert and oriented to person, place, and time.      GCS: GCS eye subscore is 4. GCS verbal subscore is 5. GCS motor subscore is 6.      Gait: Gait is intact.      Deep Tendon Reflexes:      Reflex Scores:       Patellar reflexes are 2+ on the right side and 2+ on the left side.       Achilles reflexes are 2+ on the right side and 2+ on the left side.     Comments: Involuntary movements to second and third toes on left foot.  Nerves II lower extremities intact.  CMS to the lower extremity intact.  No weakness to the lower extremities.          .  ..  Total time  spent with patient was 25 minutes.

## 2022-06-24 ENCOUNTER — OFFICE VISIT (OUTPATIENT)
Dept: NEUROLOGY | Facility: CLINIC | Age: 47
End: 2022-06-24
Attending: PHYSICIAN ASSISTANT
Payer: COMMERCIAL

## 2022-06-24 DIAGNOSIS — R25.3 TWITCHING: ICD-10-CM

## 2022-06-24 LAB
NORDIAZEPAM UR QL CFM: PRESENT
OXAZEPAM UR QL CFM: PRESENT
OXYCODONE UR CFM-MCNC: 146 NG/ML
OXYCODONE/CREAT UR: 114 NG/MG {CREAT}
OXYMORPHONE UR CFM-MCNC: 184 NG/ML
OXYMORPHONE/CREAT UR: 144 NG/MG {CREAT}
TEMAZEPAM UR QL CFM: PRESENT

## 2022-06-24 PROCEDURE — 95909 NRV CNDJ TST 5-6 STUDIES: CPT | Performed by: PSYCHIATRY & NEUROLOGY

## 2022-06-24 PROCEDURE — 95886 MUSC TEST DONE W/N TEST COMP: CPT | Performed by: PSYCHIATRY & NEUROLOGY

## 2022-06-24 NOTE — PROGRESS NOTES
[facility]  Electrodiagnostic Laboratory                 Department of Neurology                                                                                                         Test Date:  2022    Patient: Angelia Stallings : 1975 Physician: Darío Khoury MD   Sex: Male AGE: 46 year Ref Phys:    ID#: [id number]   Technician: Mp Perea     History and Examination:  Angelia Stallings is a 46 year old woman with intermittent involuntary flexion of the toes of the left foot and suspected left L5 radiculopathy.    Techniques:  Motor conduction studies were done with surface recording electrodes. Sensory conduction studies were performed with surface electrodes, unless indicated otherwise by (n), designating the use of subdermal recording electrodes. Temperature was monitored and recorded throughout the study. Lower extremities were maintained at a temperature of 30 degrees Centigrade or higher.  EMG was done with a concentric needle electrode.     Results:  Bilateral sural sensory nerve action potentials were mildly attenuated. The left superficial fibular sensory nerve action potential was absent. Left fibular and tibial motor conduction studies were normal. Electromyography was normal, although activation of the vastus lateralis muscle was limited by pain.    Interpretation:  This is an abnormal study, demonstrating electrophysiologic evidence of the followin. 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  2. No evidence of axonal injury to left lumbosacral nerve roots. Note that normal needle electromyography does not exclude clinically symptomatic radiculopathy without substantial motor axonal injury.      _____________________________  Darío Khoury MD  Board Certified in Clinical Neurophysiology and Neuromuscular Medicine        Nerve Conduction Studies  Motor Sites      Latency Amplitude Neg. Amp Diff Segment Distance Velocity Neg. Dur Neg Area Diff Temperature Comment    Site (ms) Norm (mV) Norm %  cm m/s Norm ms %  C    Left Fibular (EDB) Motor   Ankle 4.0  < 6.0 2.7  > 2.5  Ankle-EDB 8   6.2  31.5    Bel Fib Head 10.9 - 2.4 - -11.1 Bel Fib Head-Ankle 32 46  > 38 6.7 -10.8 31.2    Pop Fossa 12.5 - 2.4 - 0 Pop Fossa-Bel Fib Head 8.5 53  > 38 6.3 0 32.2    Left Tibial (AHB) Motor   Ankle 4.1  < 6.5 9.0  > 4.4  Ankle-AHB 8   5.5  31.9    Knee 11.7 - 4.9 - -45.6 Knee-Ankle 39.4 52  > 38 7.1 -29.0 31.8      Sensory Sites      Onset Lat Peak Lat Amp (O-P) Amp (P-P) Segment Distance Velocity Temperature   Site ms ms  V Norm  V  cm m/s Norm  C   Left Superficial Fibular Sensory   14 cm-Ankle NR NR NR  > 3 NR 14 cm-Ankle 12.5 NR  > 38 31.4   Left Sural Sensory   Calf-Lat Mall 2.9 3.7 5  > 8 7 Calf-Lat Mall 14 48  > 38 30.8   Right Sural Sensory   Calf-Lat Mall 3.1 4.1 6  > 8 6 Calf-Lat Mall 14 45  > 38 30.3       Electromyography     Side Muscle Ins Act Fibs/PSW Fasc HF Amp Dur Poly Recrt Int Pat   Left Tib Anterior Nml None Nml 0 Nml Nml 0 Nml Nml   Left Gastroc MH Nml None Nml 0 Nml Nml 0 Nml Nml   Left Peroneus Tertius Nml None Nml 0 Nml Nml 0 Nml Nml   Left Vastus Lat Nml None Nml 0 Nml Nml 0 Nml 25%   Left Biceps Fem SH Nml None Nml 0 Nml Nml 0 Nml Nml   Left Gluteus Med Nml None Nml 0 Nml Nml 0 Nml Nml   Left L5 Parasp Nml None Nml 0              NCS Waveforms:    Motor         Sensory

## 2022-06-24 NOTE — LETTER
2022         RE: Angelia Stallings  [address] [facility] [address]        Dear Colleague,    Thank you for referring your patient, Angelia Stallings, to the [facility]. Please see a copy of my visit note below.                        [facility]  Electrodiagnostic Laboratory                 Department of Neurology                                                                                                         Test Date:  2022    Patient: Angelia Stallings : 1975 Physician: Darío Khoury MD   Sex: Male AGE: 46 year Ref Phys:    ID#: [id number]   Technician: Mp Perea     History and Examination:  Angelia Stallings is a 46 year old woman with intermittent involuntary flexion of the toes of the left foot and suspected left L5 radiculopathy.    Techniques:  Motor conduction studies were done with surface recording electrodes. Sensory conduction studies were performed with surface electrodes, unless indicated otherwise by (n), designating the use of subdermal recording electrodes. Temperature was monitored and recorded throughout the study. Lower extremities were maintained at a temperature of 30 degrees Centigrade or higher.  EMG was done with a concentric needle electrode.     Results:  Bilateral sural sensory nerve action potentials were mildly attenuated. The left superficial fibular sensory nerve action potential was absent. Left fibular and tibial motor conduction studies were normal. Electromyography was normal, although activation of the vastus lateralis muscle was limited by pain.    Interpretation:  This is an abnormal study, demonstrating electrophysiologic evidence of the followin. 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  2. No evidence of axonal injury to left lumbosacral nerve roots. Note that normal needle electromyography does not exclude clinically symptomatic radiculopathy without substantial motor axonal  injury.      _____________________________  Darío Khoury MD  Board Certified in Clinical Neurophysiology and Neuromuscular Medicine        Nerve Conduction Studies  Motor Sites      Latency Amplitude Neg. Amp Diff Segment Distance Velocity Neg. Dur Neg Area Diff Temperature Comment   Site (ms) Norm (mV) Norm %  cm m/s Norm ms %  C    Left Fibular (EDB) Motor   Ankle 4.0  < 6.0 2.7  > 2.5  Ankle-EDB 8   6.2  31.5    Bel Fib Head 10.9 - 2.4 - -11.1 Bel Fib Head-Ankle 32 46  > 38 6.7 -10.8 31.2    Pop Fossa 12.5 - 2.4 - 0 Pop Fossa-Bel Fib Head 8.5 53  > 38 6.3 0 32.2    Left Tibial (AHB) Motor   Ankle 4.1  < 6.5 9.0  > 4.4  Ankle-AHB 8   5.5  31.9    Knee 11.7 - 4.9 - -45.6 Knee-Ankle 39.4 52  > 38 7.1 -29.0 31.8      Sensory Sites      Onset Lat Peak Lat Amp (O-P) Amp (P-P) Segment Distance Velocity Temperature   Site ms ms  V Norm  V  cm m/s Norm  C   Left Superficial Fibular Sensory   14 cm-Ankle NR NR NR  > 3 NR 14 cm-Ankle 12.5 NR  > 38 31.4   Left Sural Sensory   Calf-Lat Mall 2.9 3.7 5  > 8 7 Calf-Lat Mall 14 48  > 38 30.8   Right Sural Sensory   Calf-Lat Mall 3.1 4.1 6  > 8 6 Calf-Lat Mall 14 45  > 38 30.3       Electromyography     Side Muscle Ins Act Fibs/PSW Fasc HF Amp Dur Poly Recrt Int Pat   Left Tib Anterior Nml None Nml 0 Nml Nml 0 Nml Nml   Left Gastroc MH Nml None Nml 0 Nml Nml 0 Nml Nml   Left Peroneus Tertius Nml None Nml 0 Nml Nml 0 Nml Nml   Left Vastus Lat Nml None Nml 0 Nml Nml 0 Nml 25%   Left Biceps Fem SH Nml None Nml 0 Nml Nml 0 Nml Nml   Left Gluteus Med Nml None Nml 0 Nml Nml 0 Nml Nml   Left L5 Parasp Nml None Nml 0              NCS Waveforms:    Motor         Sensory                       Again, thank you for allowing me to participate in the care of your patient.        Sincerely,        Darío Khoury MD

## 2022-06-27 DIAGNOSIS — R25.3 TWITCHING: Primary | ICD-10-CM

## 2022-06-27 RX ORDER — GABAPENTIN 100 MG/1
100 CAPSULE ORAL 3 TIMES DAILY
Qty: 42 CAPSULE | Refills: 0 | Status: SHIPPED | OUTPATIENT
Start: 2022-06-27 | End: 2022-07-15

## 2022-06-27 ASSESSMENT — ENCOUNTER SYMPTOMS
CARDIOVASCULAR NEGATIVE: 1
RESPIRATORY NEGATIVE: 1
CONSTITUTIONAL NEGATIVE: 1

## 2022-06-27 NOTE — PROGRESS NOTES
Talked to patient and she stated she never got a call with the results from her EMG. While on the phone Nawaf took over and discussed results with patient and she does not want to start on the gabapentin. Attempted to call the pharmacy but too many callers on hold. Will call again later to cancel med.    Jacki Partida CMA.

## 2022-06-27 NOTE — PROGRESS NOTES
Please call patient and let her know her EMG results.  Her EMG did show some mild sensory neuropathy.  Lets trial gabapentin 100 mg 3 times a day to see if this helps with the toe twitching.  She should send me a MyChart message in 2 to 3 weeks if this is not better.

## 2022-06-30 ENCOUNTER — TRANSFERRED RECORDS (OUTPATIENT)
Dept: HEALTH INFORMATION MANAGEMENT | Facility: CLINIC | Age: 47
End: 2022-06-30

## 2022-06-30 ENCOUNTER — MYC MEDICAL ADVICE (OUTPATIENT)
Dept: FAMILY MEDICINE | Facility: CLINIC | Age: 47
End: 2022-06-30

## 2022-06-30 DIAGNOSIS — M50.30 DDD (DEGENERATIVE DISC DISEASE), CERVICAL: ICD-10-CM

## 2022-06-30 DIAGNOSIS — G89.29 CHRONIC BILATERAL LOW BACK PAIN WITH SCIATICA, SCIATICA LATERALITY UNSPECIFIED: ICD-10-CM

## 2022-06-30 DIAGNOSIS — M54.16 LUMBAR BACK PAIN WITH RADICULOPATHY AFFECTING LEFT LOWER EXTREMITY: ICD-10-CM

## 2022-06-30 DIAGNOSIS — M54.40 CHRONIC BILATERAL LOW BACK PAIN WITH SCIATICA, SCIATICA LATERALITY UNSPECIFIED: ICD-10-CM

## 2022-06-30 DIAGNOSIS — R25.3 TWITCHING: ICD-10-CM

## 2022-06-30 DIAGNOSIS — Z98.890 S/P CRANIOTOMY: Primary | ICD-10-CM

## 2022-06-30 RX ORDER — OXYCODONE AND ACETAMINOPHEN 5; 325 MG/1; MG/1
1 TABLET ORAL EVERY 6 HOURS PRN
Qty: 50 TABLET | Refills: 0 | Status: SHIPPED | OUTPATIENT
Start: 2022-08-02 | End: 2022-11-04

## 2022-06-30 RX ORDER — OXYCODONE AND ACETAMINOPHEN 5; 325 MG/1; MG/1
1 TABLET ORAL EVERY 6 HOURS PRN
Qty: 50 TABLET | Refills: 0 | Status: SHIPPED | OUTPATIENT
Start: 2022-07-05 | End: 2022-09-16

## 2022-06-30 NOTE — TELEPHONE ENCOUNTER
Medication: Oxycodone- acetaminophen 5-325 MG tablet  Last Date Filled 6/7/22  Last appointment addressing medication use: 5/24/22      Taken as prescribed from physician notes? YES    CSA in last year: YES    Random Utox in last year: YES  (if any of the above answer NO - schedule with PCP)     Opioids + benzodiazepines? YES  (if the above answer YES - schedule with PCP every 6 months)       All responses suggest: .

## 2022-06-30 NOTE — TELEPHONE ENCOUNTER
Reason for Call:  Medication or medication refill:    Do you use a [facility]?  Name of the pharmacy and phone number for the current request:  [facility]    Name of the medication requested: oxycodone acetaminophen 5-325mg    Other request: none    Can we leave a detailed message on this number? YES    Phone number patient can be reached at: Home number on file [phone] (home)    Best Time: anytime    Call taken on 6/30/2022 at 11:41 AM by Fransisco Reilly

## 2022-06-30 NOTE — TELEPHONE ENCOUNTER
Rx's  sent fillable 7/5/22 and 8/2/22. Pharmacy should honor both, but it's possible that they will not with the second Rx. Recommend follow up with Jenni Ferrera as discussed in the next couple mos.

## 2022-07-01 ENCOUNTER — TRANSFERRED RECORDS (OUTPATIENT)
Dept: HEALTH INFORMATION MANAGEMENT | Facility: CLINIC | Age: 47
End: 2022-07-01

## 2022-07-14 PROBLEM — H92.03 OTALGIA OF BOTH EARS: Status: RESOLVED | Noted: 2022-01-26 | Resolved: 2022-07-14

## 2022-07-14 PROBLEM — G25.81 RESTLESS LEGS SYNDROME: Status: ACTIVE | Noted: 2022-06-15

## 2022-07-14 PROBLEM — G43.709 CHRONIC MIGRAINE WITHOUT AURA: Status: ACTIVE | Noted: 2018-09-28

## 2022-07-14 PROBLEM — G89.29 CHRONIC PAIN: Status: ACTIVE | Noted: 2019-07-12

## 2022-07-15 ENCOUNTER — ANCILLARY PROCEDURE (OUTPATIENT)
Dept: GENERAL RADIOLOGY | Facility: CLINIC | Age: 47
End: 2022-07-15
Attending: PHYSICIAN ASSISTANT
Payer: COMMERCIAL

## 2022-07-15 ENCOUNTER — OFFICE VISIT (OUTPATIENT)
Dept: FAMILY MEDICINE | Facility: CLINIC | Age: 47
End: 2022-07-15
Payer: COMMERCIAL

## 2022-07-15 VITALS
DIASTOLIC BLOOD PRESSURE: 94 MMHG | BODY MASS INDEX: 29.7 KG/M2 | HEART RATE: 89 BPM | SYSTOLIC BLOOD PRESSURE: 142 MMHG | WEIGHT: 195.3 LBS | OXYGEN SATURATION: 99 % | RESPIRATION RATE: 12 BRPM

## 2022-07-15 DIAGNOSIS — G89.29 CHRONIC BILATERAL LOW BACK PAIN WITHOUT SCIATICA: Primary | ICD-10-CM

## 2022-07-15 DIAGNOSIS — M25.572 PAIN IN JOINT INVOLVING ANKLE AND FOOT, LEFT: ICD-10-CM

## 2022-07-15 DIAGNOSIS — F32.4 MAJOR DEPRESSION SINGLE EPISODE, IN PARTIAL REMISSION (H): ICD-10-CM

## 2022-07-15 DIAGNOSIS — G43.009 MIGRAINE WITHOUT AURA AND WITHOUT STATUS MIGRAINOSUS, NOT INTRACTABLE: ICD-10-CM

## 2022-07-15 DIAGNOSIS — R63.5 WEIGHT GAIN: ICD-10-CM

## 2022-07-15 DIAGNOSIS — M54.2 NECK PAIN: ICD-10-CM

## 2022-07-15 DIAGNOSIS — M25.572 PAIN IN JOINT, ANKLE AND FOOT, LEFT: ICD-10-CM

## 2022-07-15 DIAGNOSIS — M54.50 CHRONIC BILATERAL LOW BACK PAIN WITHOUT SCIATICA: Primary | ICD-10-CM

## 2022-07-15 DIAGNOSIS — G89.4 CHRONIC PAIN SYNDROME: ICD-10-CM

## 2022-07-15 DIAGNOSIS — Z98.890 S/P CRANIOTOMY: ICD-10-CM

## 2022-07-15 DIAGNOSIS — R25.3 TWITCHING: ICD-10-CM

## 2022-07-15 DIAGNOSIS — F41.1 ANXIETY STATE: ICD-10-CM

## 2022-07-15 PROCEDURE — 73630 X-RAY EXAM OF FOOT: CPT | Mod: TC | Performed by: RADIOLOGY

## 2022-07-15 PROCEDURE — 99214 OFFICE O/P EST MOD 30 MIN: CPT | Performed by: PHYSICIAN ASSISTANT

## 2022-07-15 RX ORDER — CYCLOBENZAPRINE HCL 10 MG
10 TABLET ORAL 3 TIMES DAILY PRN
Qty: 30 TABLET | Refills: 3 | Status: SHIPPED | OUTPATIENT
Start: 2022-07-15 | End: 2022-12-02

## 2022-07-15 RX ORDER — FERROUS SULFATE 325(65) MG
325 TABLET ORAL
COMMUNITY
End: 2023-04-28

## 2022-07-15 RX ORDER — GLUCOSAMINE HCL 500 MG
TABLET ORAL
COMMUNITY
End: 2023-12-01

## 2022-07-15 ASSESSMENT — PATIENT HEALTH QUESTIONNAIRE - PHQ9
SUM OF ALL RESPONSES TO PHQ QUESTIONS 1-9: 12
10. IF YOU CHECKED OFF ANY PROBLEMS, HOW DIFFICULT HAVE THESE PROBLEMS MADE IT FOR YOU TO DO YOUR WORK, TAKE CARE OF THINGS AT HOME, OR GET ALONG WITH OTHER PEOPLE: VERY DIFFICULT
SUM OF ALL RESPONSES TO PHQ QUESTIONS 1-9: 12

## 2022-07-15 ASSESSMENT — PAIN SCALES - GENERAL: PAINLEVEL: MODERATE PAIN (5)

## 2022-07-15 NOTE — PATIENT INSTRUCTIONS
Please call the radiology dep at [facility] to schedule your test today at [phone]

## 2022-07-15 NOTE — PROGRESS NOTES
Assessment & Plan   Problem List Items Addressed This Visit        Nervous and Auditory    Lower Back Pain - Primary    Relevant Medications    cyclobenzaprine (FLEXERIL) 10 MG tablet    Neck pain    Migraine without aura    Relevant Medications    cyclobenzaprine (FLEXERIL) 10 MG tablet    Chronic pain    Relevant Medications    cyclobenzaprine (FLEXERIL) 10 MG tablet       Behavioral    Major depression single episode, in partial remission (H)       Other    Anxiety    S/P craniotomy, MCA aneurysm clipping x 2      Other Visit Diagnoses     Twitching        Pain in joint, ankle and foot, left        Relevant Medications    cyclobenzaprine (FLEXERIL) 10 MG tablet    Other Relevant Orders    XR Foot Left G/E 3 Views (Completed)    MR Ankle Left w/o Contrast    Weight gain        Relevant Orders    Nutrition Referral    Pain in joint involving ankle and foot, left        Relevant Medications    cyclobenzaprine (FLEXERIL) 10 MG tablet         #1 Chronic pain; back and neck   #2 Left ankle pain/swelling  #3 Left great toe spasms/ neuropathy   She is currently on oxycodone for this. She feels she gets good relief with this. She has been taking this as prescribed. We talked about this in detail today. We talked about tapering her off of this and trying other medications that may help with her pain. She is interested in doing this. She is not to talk the pain medications if she will be working or driving.She would like to stay on the current dose and discuss this in the future.With her ongoing ankle pain/swelling we will update an xray and get another MRI. Wondering is this is what is causing her spasms in her left great toe as well. Neurology did not thing these was anything related to this. She has tried muscle relaxers for the toe spasms but this did not help. She has had gabapentin in the past but does not tolerated it. She would like to switch back to flexeril. This was refilled today. Last drug screen was WNL. She  has seen ortho regarding back pain and has had good results with injections.     #4 Anxiety   #5 Depression  Mood is stable at this time. She is on Zoloft, Wellbutrin, and  valium and this is helping. We will continue her current medications at this time.     #6 Migraines chronic. No changes. Stable.     #7 Left Labrum tear- She is working with ortho. She will be seeing them next week.     #8 Cervical cancer screening- She sees OB/GYN for this.    #9 Breast Cancer screening- She sees OB/GYN for this and is due. She will follow up with them to get this ordered.     #10 Colon cancer screening- She is due but would like to hold off at this time.     #11 Savannah gain- She has gained some savannah over the last few months. TSH was WNL. She would like to see a dietician to help with this.     Due for annual exam and should follow up in the next 1-2 months to get this done.         No follow-ups on file.    BELEM [facility]   Angelia is a 46 year old, presenting for the following health issues:  Establish Care (Formerly Dr. Bell. Wants to talk about her pain in hips and legs and in her toes. Has chronic pain and is hoping to stay here since she has seen pain clinic previously.)      Angelia is here today to establish care. She has chronic pain that incudes pain in there left foot/ankle, back, and legs. She has done injections in her back in the past and has had good response. More recenlty she has been having left hip pain and has been seen by ortho and was found to have a labrum tear. She has a follow up with ortho next week for further evaluation. She has also been having left ankle pain and swelling for sometime and has had an MRI and was evaluated by podiatry in the past. She continues to have the fabio symptoms. More recently she has been having spasms of her left great toe. She underwent an EMG that showed mild neuropathy. She was seen by neurology as well.  This all started after stepping on her dog water dish. Exray of her foot was negative for any acute findings. She has not had any worsening pain or swelling to her ankle region since this. She has been on oxycodone for for chronic pain to her ankle and back with good improvements. She is talking up to 2 tablets a day, some days 3. She has had chronic muslce pain as well for many years.     She has has a long history of anxiety and depression and is currently on Zoloft, Wellbutrin, and valium. She feels her symptoms as well controlled but with her toe and ankle pain this has worsened things.     She has a history of a aneurysm cliping  in 2015. She dose follow with neurology and her last MRI was Jan 2022.She has another small 1mm that they are monitoring at this time.     History of Present Illness       Reason for visit:  Patient establishment    She eats 0-1 servings of fruits and vegetables daily.She consumes 2 sweetened beverage(s) daily.She exercises with enough effort to increase her heart rate 10 to 19 minutes per day.  She exercises with enough effort to increase her heart rate 3 or less days per week.   She is taking medications regularly.    Today's PHQ-9         PHQ-9 Total Score: 12    PHQ-9 Q9 Thoughts of better off dead/self-harm past 2 weeks :   Several days    How difficult have these problems made it for you to do your work, take care of things at home, or get along with other people: Very difficult     PHQ 10/12/2021 5/24/2022 7/15/2022   PHQ-9 Total Score 8 6 12   Q9: Thoughts of better off dead/self-harm past 2 weeks Not at all Not at all Several days   F/U: Thoughts of suicide or self-harm - - Yes   F/U: Self harm-plan - - No   F/U: Self-harm action - - No   F/U: Safety concerns - - No     JONES-7 SCORE 4/1/2020 10/27/2020 10/12/2021   Total Score 2 7 7           Review of Systems   Constitutional: Negative.    HENT: Negative.    Respiratory: Negative.    Cardiovascular: Negative.     Musculoskeletal:        Chronic left foot/ankle pain, back, and leg pain.   Twitching of great toe to left foot.   Left hip pain-labrum tear             Objective    BP (!) 142/94 (BP Location: Left arm, Patient Position: Sitting, Cuff Size: Adult Regular)   Pulse 89   Resp 12   Wt 88.6 kg (195 lb 4.8 oz)   LMP  (LMP Unknown)   SpO2 99%   Breastfeeding No   BMI 29.70 kg/m    Body mass index is 29.7 kg/m .  Physical Exam  Constitutional:       General: She is not in acute distress.     Appearance: Normal appearance. She is not ill-appearing, toxic-appearing or diaphoretic.   HENT:      Head: Normocephalic and atraumatic.   Eyes:      Pupils: Pupils are equal, round, and reactive to light.   Cardiovascular:      Rate and Rhythm: Normal rate.      Heart sounds: No murmur heard.    No gallop.   Pulmonary:      Effort: Pulmonary effort is normal.      Breath sounds: No wheezing, rhonchi or rales.   Skin:     General: Skin is warm and dry.      Findings: No lesion or rash.   Neurological:      General: No focal deficit present.      Mental Status: She is alert.      Motor: No weakness.                            .  ..

## 2022-07-15 NOTE — LETTER
[facility] [place]  07/15/22  Patient: Angelia Stallings  YOB: 1975  Medical Record Number: [id number]                                                                                  Non-Opioid Controlled Substance Agreement    This is an agreement between you and your provider regarding safe and appropriate use of controlled substances prescribed by your care team. Controlled substances are?medicines that can cause physical and mental dependence (abuse).     There are strict laws about having and using these medicines. We here at [facility] are  committed to working with you in your efforts to get better. To support you in this work, we'll help you schedule regular office appointments for medicine refills. If we must cancel or change your appointment for any reason, we'll make sure you have enough medicine to last until your next appointment.     As a Provider, I will:     Listen carefully to your concerns while treating you with respect.     Recommend a treatment plan that I believe is in your best interest and may involve therapies other than medicine.      Talk with you often about the possible benefits and the risk of harm of any medicine that we prescribe for you.    Assess the safety of this medicine and check how well it works.      Provide a plan on how to taper (discontinue or go off) using this medicine if the decision is made to stop its use.      ::  As a Patient, I understand controlled substances:       Are prescribed by my care provider to help me function or work and manage my condition(s).?    Are strong medicines and can cause serious side effects.       Need to be taken exactly as prescribed.?Combining controlled substances with certain medicines or chemicals (such as illegal drugs, alcohol, sedatives, sleeping pills, and benzodiazepines) can be dangerous or even fatal.? If I stop taking my medicines suddenly, I may have severe withdrawal symptoms.     The  risks, benefits, and side effects of these medicine(s) were explained to me. I agree that:    1. I will take part in other treatments as advised by my care team. This may be psychiatry or counseling, physical therapy, behavioral therapy, group treatment or a referral to specialist.    2. I will keep all my appointments and understand this is part of the monitoring of controlled substances.?My care team may require an office visit for EVERY controlled substance refill. If I miss appointments or don t follow instructions, my care team may stop my medicine    3. I will take my medicines as prescribed. I will not change the dose or schedule unless my care team tells me to. There will be no refills if I run out early.      4. I may be asked to come to the clinic and complete a urine drug test or complete a pill count. If I don t give a urine sample or participate in a pill count, the care team may stop my medicine.    5. I will only receive controlled substance prescriptions from this clinic. If I am treated by another provider, I will tell them that I am taking controlled substances and that I have a treatment agreement with this provider. I will inform my [facility] care team within one business day if I am given a prescription for any controlled substance by another healthcare provider. My [facility] care team can contact other providers and pharmacists about my use of any medicines.    6. It is up to me to make sure that I don't run out of my medicines on weekends or holidays.?If my care team is willing to refill my prescription without a visit, I must request refills only during office hours. Refills may take up to 3 business days to process. I will use one pharmacy to fill all my controlled substance prescriptions. I will notify the clinic about any changes to my insurance or medicine availability.    7. I am responsible for my prescriptions. If the medicine/prescription is lost, stolen or destroyed,  it will not be replaced.?I also agree not to share controlled substance medicines with anyone.     8. I am aware I should not use any illegal or recreational drugs. I agree not to drink alcohol unless my care team says I can.     9. If I enroll in the [place] Medical Cannabis program, I will tell my care team before my next refill.    10. I will tell my care team right away if I become pregnant, have a new medical problem treated outside of my regular clinic, or have a change in my medicines.     11. I understand that this medicine can affect my thinking, judgment and reaction time.? Alcohol and drugs affect the brain and body, which can affect the safety of my driving. Being under the influence of alcohol or drugs can affect my decision-making, behaviors, personal safety and the safety of others. Driving while impaired (DWI) can occur if a person is driving, operating or in physical control of a car, motorcycle, boat, snowmobile, ATV, motorbike, off-road vehicle or any other motor vehicle ([place] Statute 169A.20). I understand the risk if I choose to drive or operate any vehicle or machinery.    I understand that if I do not follow any of the conditions above, my prescriptions or treatment may be stopped or changed.   I agree that my provider, clinic care team and pharmacy may work with any city, state or federal law enforcement agency that investigates the misuse, sale or other diversion of my controlled medicine. I will allow my provider to discuss my care with, or share a copy of, this agreement with any other treating provider, pharmacy or emergency room where I receive care.     I have read this agreement and have asked questions about anything I did not understand.    ________________________________________________________  Patient Signature - Angelia Stallings     ___________________                   Date     ________________________________________________________  Provider Signature - Calixto Leblanc PA-C        ___________________                   Date     ________________________________________________________  Witness Signature (required if provider not present while patient signing)          ___________________                   Date

## 2022-07-15 NOTE — LETTER
Opioid / Opioid Plus Controlled Substance Agreement    This is an agreement between you and your provider about the safe and appropriate use of controlled substance/opioids prescribed by your care team. Controlled substances are medicines that can cause physical and mental dependence (abuse).    There are strict laws about having and using these medicines. We here at [facility] are committing to working with you in your efforts to get better. To support you in this work, we ll help you schedule regular office appointments for medicine refills. If we must cancel or change your appointment for any reason, we ll make sure you have enough medicine to last until your next appointment.     As a Provider, I will:    Listen carefully to your concerns and treat you with respect.     Recommend a treatment plan that I believe is in your best interest. This plan may involve therapies other than opioid pain medication.     Talk with you often about the possible benefits, and the risk of harm of any medicine that we prescribe for you.     Provide a plan on how to taper (discontinue or go off) using this medicine if the decision is made to stop its use.    As a Patient, I understand that opioid(s):     Are a controlled substance prescribed by my care team to help me function or work and manage my condition(s).     Are strong medicines and can cause serious side effects such as:    Drowsiness, which can seriously affect my driving ability    A lower breathing rate, enough to cause death    Harm to my thinking ability     Depression     Abuse of and addiction to this medicine    Need to be taken exactly as prescribed. Combining opioids with certain medicines or chemicals (such as illegal drugs, sedatives, sleeping pills, and benzodiazepines) can be dangerous or even fatal. If I stop opioids suddenly, I may have severe withdrawal symptoms.    Do not work for all types of pain nor for all patients. If they re not helpful, I may  be asked to stop them.        The risks, benefits and side effects of these medicine(s) were explained to me. I agree that:  1. I will take part in other treatments as advised by my care team. This may be psychiatry or counseling, physical therapy, behavioral therapy, group treatment or a referral to a specialist.     2. I will keep all my appointments. I understand that this is part of the monitoring of opioids. My care team may require an office visit for EVERY opioid/controlled substance refill. If I miss appointments or don t follow instructions, my care team may stop my medicine.    3. I will take my medicines as prescribed. I will not change the dose or schedule unless my care team tells me to. There will be no refills if I run out early.     4. I may be asked to come to the clinic and complete a urine drug test or complete a pill count at any time. If I don t give a urine sample or participate in a pill count, the care team may stop my medicine.    5. I will only receive prescriptions from this clinic for chronic pain. If I am treated by another provider for acute pain issues, I will tell them that I am taking opioid pain medication for chronic pain and that I have a treatment agreement with this provider. I will inform my [facility] care team within one business day if I am given a prescription for any pain medication by another healthcare provider. My [facility] care team can contact other providers and pharmacists about my use of any medicines.    6. It is up to me to make sure that I don t run out of my medicines on weekends or holidays. If my care team is willing to refill my opioid prescription without a visit, I must request refills only during office hours. Refills may take up to 3 business days to process. I will use one pharmacy to fill all my opioid and other controlled substance prescriptions. I will notify the clinic about any changes to my insurance or medication  availability.    7. I am responsible for my prescriptions. If the medicine/prescription is lost, stolen or destroyed, it will not be replaced. I also agree not to share controlled substance medicines with anyone.    8. I am aware I should not use any illegal or recreational drugs. I agree not to drink alcohol unless my care team says I can.       9. If I enroll in the [place] Medical Cannabis program, I will tell my care team prior to my next refill.     10. I will tell my care team right away if I become pregnant, have a new medical problem treated outside of my regular clinic, or have a change in my medications.    11. I understand that this medicine can affect my thinking, judgment and reaction time. Alcohol and drugs affect the brain and body, which can affect the safety of my driving. Being under the influence of alcohol or drugs can affect my decision-making, behaviors, personal safety, and the safety of others. Driving while impaired (DWI) can occur if a person is driving, operating, or in physical control of a car, motorcycle, boat, snowmobile, ATV, motorbike, off-road vehicle, or any other motor vehicle ([place] Statute 169A.20). I understand the risk if I choose to drive or operate any vehicle or machinery.    I understand that if I do not follow any of the conditions above, my prescriptions or treatment may be stopped or changed.          Opioids  What You Need to Know    What are opioids?   Opioids are pain medicines that must be prescribed by a doctor. They are also known as narcotics.     Examples are:   1. morphine (MS Contin, Izzy)  2. oxycodone (Oxycontin)  3. oxycodone and acetaminophen (Percocet)  4. hydrocodone and acetaminophen (Vicodin, Norco)   5. fentanyl patch (Duragesic)   6. hydromorphone (Dilaudid)   7. methadone  8. codeine (Tylenol #3)     What do opioids do well?   Opioids are best for severe short-term pain such as after a surgery or injury. They may work well for cancer pain. They may  help some people with long-lasting (chronic) pain.     What do opioids NOT do well?   Opioids never get rid of pain entirely, and they don t work well for most patients with chronic pain. Opioids don t reduce swelling, one of the causes of pain.                                    Other ways to manage chronic pain and improve function include:       Treat the health problem that may be causing pain    Anti-inflammation medicines, which reduce swelling and tenderness, such as ibuprofen (Advil, Motrin) or naproxen (Aleve)    Acetaminophen (Tylenol)    Antidepressants and anti-seizure medicines, especially for nerve pain    Topical treatments such as patches or creams    Injections or nerve blocks    Chiropractic or osteopathic treatment    Acupuncture, massage, deep breathing, meditation, visual imagery, aromatherapy    Use heat or ice at the pain site    Physical therapy     Exercise    Stop smoking    Take part in therapy       Risks and side effects     Talk to your doctor before you start or decide to keep taking opioids. Possible side effects include:      Lowering your breathing rate enough to cause death    Overdose, including death, especially if taking higher than prescribed doses    Worse depression symptoms; less pleasure in things you usually enjoy    Feeling tired or sluggish    Slower thoughts or cloudy thinking    Being more sensitive to pain over time; pain is harder to control    Trouble sleeping or restless sleep    Changes in hormone levels (for example, less testosterone)    Changes in sex drive or ability to have sex    Constipation    Unsafe driving    Itching and sweating    Dizziness    Nausea, throwing up and dry mouth    What else should I know about opioids?    Opioids may lead to dependence, tolerance, or addiction.      Dependence means that if you stop or reduce the medicine too quickly, you will have withdrawal symptoms. These include loose poop (diarrhea), jitters, flu-like symptoms,  nervousness and tremors. Dependence is not the same as addiction.                       Tolerance means needing higher doses over time to get the same effect. This may increase the chance of serious side effects.      Addiction is when people improperly use a substance that harms their body, their mind or their relations with others. Use of opiates can cause a relapse of addiction if you have a history of drug or alcohol abuse.      People who have used opioids for a long time may have a lower quality of life, worse depression, higher levels of pain and more visits to doctors.    You can overdose on opioids. Take these steps to lower your risk of overdose:    1. Recognize the signs:  Signs of overdose include decrease or loss of consciousness (blackout), slowed breathing, trouble waking up and blue lips. If someone is worried about overdose, they should call 911.    2. Talk to your doctor about Narcan (naloxone).   If you are at risk for overdose, you may be given a prescription for Narcan. This medicine very quickly reverses the effects of opioids.   If you overdose, a friend or family member can give you Narcan while waiting for the ambulance. They need to know the signs of overdose and how to give Narcan.     3. Don't use alcohol or street drugs.   Taking them with opioids can cause death.    4. Do not take any of these medicines unless your doctor says it s OK. Taking these with opioids can cause death:    Benzodiazepines, such as lorazepam (Ativan), alprazolam (Xanax) or diazepam (Valium)    Muscle relaxers, such as cyclobenzaprine (Flexeril)    Sleeping pills like zolpidem (Ambien)     Other opioids      How to keep you and other people safe while taking opioids:    1. Never share your opioids with others.  Opioid medicines are regulated by the Drug Enforcement Agency ([employer]). Selling or sharing medications is a criminal act.    2. Be sure to store opioids in a secure place, locked up if possible. Young children  can easily swallow them and overdose.    3. When you are traveling with your medicines, keep them in the original bottles. If you use a pill box, be sure you also carry a copy of your medicine list from your clinic or pharmacy.    4. Safe disposal of opioids    Most pharmacies have places to get rid of medicine, called disposal kiosks. Medicine disposal options are also available in every [facility]. Search your county and  medication disposal  to find more options. You can find more details at:  [email or web address]     I agree that my provider, clinic care team, and pharmacy may work with any city, state or federal law enforcement agency that investigates the misuse, sale, or other diversion of my controlled medicine. I will allow my provider to discuss my care with, or share a copy of, this agreement with any other treating provider, pharmacy or emergency room where I receive care.    I have read this agreement and have asked questions about anything I did not understand.    _______________________________________________________  Patient Signature - Angelia Stallings _____________________                   Date     _______________________________________________________  Provider Signature - Calixto Leblanc PA-C   _____________________                   Date     _______________________________________________________  Witness Signature (required if provider not present while patient signing)   _____________________                   Date

## 2022-07-19 ENCOUNTER — TELEPHONE (OUTPATIENT)
Dept: FAMILY MEDICINE | Facility: CLINIC | Age: 47
End: 2022-07-19

## 2022-07-19 DIAGNOSIS — M79.7 FIBROMYOSITIS: Primary | ICD-10-CM

## 2022-07-19 NOTE — TELEPHONE ENCOUNTER
Reason for Call:  Other rheumatology referral    Detailed comments: pt calling to inquire about referral to rheumatology. She is wondering about fibromyalgia. She did have an arthritis marker about 2 years ago.     She saw another orthopedic yesterday and they recommending starting off with injections in the hip prior to jumping to surgery.     Phone Number Patient can be reached at: Home number on file [phone] (home)    Best Time: anytime    Can we leave a detailed message on this number? YES    Call taken on 7/19/2022 at 8:24 AM by Fransisco Reilly

## 2022-07-23 PROBLEM — G62.9 POLYNEUROPATHY: Status: ACTIVE | Noted: 2022-06-30

## 2022-07-23 ASSESSMENT — ENCOUNTER SYMPTOMS
CONSTITUTIONAL NEGATIVE: 1
RESPIRATORY NEGATIVE: 1
CARDIOVASCULAR NEGATIVE: 1

## 2022-07-29 ENCOUNTER — HOSPITAL ENCOUNTER (OUTPATIENT)
Dept: MRI IMAGING | Facility: CLINIC | Age: 47
Discharge: HOME | End: 2022-07-29
Attending: PHYSICIAN ASSISTANT | Admitting: PHYSICIAN ASSISTANT
Payer: COMMERCIAL

## 2022-07-29 DIAGNOSIS — M25.572 PAIN IN JOINT, ANKLE AND FOOT, LEFT: ICD-10-CM

## 2022-07-29 PROCEDURE — 73721 MRI JNT OF LWR EXTRE W/O DYE: CPT | Mod: LT

## 2022-09-16 DIAGNOSIS — M54.40 CHRONIC BILATERAL LOW BACK PAIN WITH SCIATICA, SCIATICA LATERALITY UNSPECIFIED: ICD-10-CM

## 2022-09-16 DIAGNOSIS — G89.29 CHRONIC BILATERAL LOW BACK PAIN WITH SCIATICA, SCIATICA LATERALITY UNSPECIFIED: ICD-10-CM

## 2022-09-16 RX ORDER — OXYCODONE AND ACETAMINOPHEN 5; 325 MG/1; MG/1
1 TABLET ORAL EVERY 6 HOURS PRN
Qty: 20 TABLET | Refills: 0 | Status: SHIPPED | OUTPATIENT
Start: 2022-09-16 | End: 2022-10-10

## 2022-09-16 NOTE — TELEPHONE ENCOUNTER
Medication Question or Refill        What medication are you calling about (include dose and sig)?: oxycodone-acetaminophen. Having nerve pain. Patient is trying to taper down on prescription. Requesting for 20 pills instead of 50.      Controlled Substance Agreement on file:   CSA -- Patient Level:    Controlled Substance Agreement - Opioid - Scan on 7/15/2022 12:20 PM  Controlled Substance Agreement - Opioid - Scan on 6/7/2022  8:41 AM       Who prescribed the medication?: Bell     Do you need a refill? Yes     When did you use the medication last? About  2 weeks     Patient offered an appointment? No    Do you have any questions or concerns?  No    Preferred Pharmacy:   [id number] - [place], [place] - [address] AT [facility] [address] [facility]  [address] [facility] [facility] [address] [address]  Phone: [phone] Fax: [phone]    Could we send this information to you in [facility] or would you prefer to receive a phone call?:   Patient would prefer a phone call   Okay to leave a detailed message?: Yes at Cell number on file:    Telephone Information:   Mobile [phone]

## 2022-09-24 ENCOUNTER — HEALTH MAINTENANCE LETTER (OUTPATIENT)
Age: 47
End: 2022-09-24

## 2022-09-26 ENCOUNTER — HOSPITAL ENCOUNTER (OUTPATIENT)
Dept: NUTRITION | Facility: HOSPITAL | Age: 47
Discharge: HOME | End: 2022-09-26
Attending: PHYSICIAN ASSISTANT | Admitting: PHYSICIAN ASSISTANT
Payer: COMMERCIAL

## 2022-09-26 DIAGNOSIS — R63.5 WEIGHT GAIN: ICD-10-CM

## 2022-09-26 PROCEDURE — 97802 MEDICAL NUTRITION INDIV IN: CPT | Mod: GT,95 | Performed by: DIETITIAN, REGISTERED
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
## 2022-10-01 ENCOUNTER — NURSE TRIAGE (OUTPATIENT)
Dept: NURSING | Facility: CLINIC | Age: 47
End: 2022-10-01

## 2022-10-01 NOTE — TELEPHONE ENCOUNTER
Pt calling with concerns about;    Epidural steroid injection on 9/26/22  Pt reports that today she is feeling weak/lightheaded whenever she tries to stand and is wondering if this could be d/t steroid inj.  Also reports slight headache and 'heart pounding feeling'    Pt Denies;  Signs/symptoms of dehydration   Loss of speech  Numbness    According to the protocol, patient should go to ED now.  Care advice given. Patient verbalizes understanding and agrees with plan of care.     Nakia Zapata RN, Nurse Advisor 5:28 PM 10/1/2022  COVID 19 Nurse Triage Plan/Patient Instructions    Please be aware that novel coronavirus (COVID-19) may be circulating in the community. If you develop symptoms such as fever, cough, or SOB or if you have concerns about the presence of another infection including coronavirus (COVID-19), please contact your health care provider or visit [email or web address].     Disposition/Instructions    ED Visit recommended. Follow protocol based instructions.     Bring Your Own Device:  Please also bring your smart device(s) (smart phones, tablets, laptops) and their charging cables for your personal use and to communicate with your care team during your visit.    Thank you for taking steps to prevent the spread of this virus.  o Limit your contact with others.  o Wear a simple mask to cover your cough.  o Wash your hands well and often.      Reason for Disposition    [1] Dizziness (vertigo) present now AND [2] one or more STROKE RISK FACTORS (i.e., hypertension, diabetes, prior stroke/TIA, heart attack)  (Exception: prior physician evaluation for this AND no different/worse than usual)    Additional Information    Negative: [1] Weakness (i.e., paralysis, loss of muscle strength) of the face, arm or leg on one side of the body AND [2] sudden onset AND [3] present now    Negative: [1] Numbness (i.e., loss of sensation) of the face, arm or leg on one side of the body AND [2] sudden onset AND [3]  present now    Negative: [1] Loss of speech or garbled speech AND [2] sudden onset AND [3] present now    Negative: Difficult to awaken or acting confused (e.g., disoriented, slurred speech)    Negative: Sounds like a life-threatening emergency to the triager    Negative: Followed a head injury    Negative: Followed an ear injury    Negative: Localized weakness or numbness is main symptom    Negative: Dizziness relates to riding in a car, going to an amusement park, etc.    Negative: [1] Dizziness is main symptom AND [2] NO spinning sensation (i.e., vertigo)    Negative: SEVERE dizziness (vertigo) (e.g., unable to walk without assistance)    Negative: Severe headache (e.g., excruciating)  (Exception: similar to previous migraines)    Protocols used: DIZZINESS - VERTIGO-A-

## 2022-10-03 ENCOUNTER — NURSE TRIAGE (OUTPATIENT)
Dept: NURSING | Facility: CLINIC | Age: 47
End: 2022-10-03

## 2022-10-03 NOTE — TELEPHONE ENCOUNTER
Had called Saturday. Told to go to the ER. Went there and ended up with migraine cocktail. Had boughts of lightheadedness. Had injection and chiroprator appointment. Just scheduled a visit for physical. Had high blood pressure in the ER. She's been on iron and B vitamins, vitamin D. Hgb was 13.1, MCHC level was just below standard range. Has trouble with those levels being lower. Should she be concerned about that? What does it indicate? She doesn't have a period. Appointment for physical is Nov 4th. In ER it was 145/103, 160/98 and they didn't treat it at all. They treated her headache, toradal, nausea med and benadryl.  I connected with scheduling for an appointment within the next two weeks. She is not on blood pressure medication.  Gloria Vleoz RN  [facility]      Reason for Disposition    Systolic BP >= 130 OR Diastolic >= 80, and is not taking BP medications    Additional Information    Negative: Sounds like a life-threatening emergency to the triager    Negative: Symptom is main concern (e.g., headache, chest pain)    Negative: Low blood pressure is main concern    Negative: Systolic BP >= 160 OR Diastolic >= 100, and any cardiac or neurologic symptoms (e.g., chest pain, difficulty breathing, unsteady gait, blurred vision)    Negative: Pregnant 20 or more weeks (or postpartum < 6 weeks) with new hand or face swelling    Negative: Pregnant 20 or more weeks (or postpartum < 6 weeks) and Systolic BP >= 160 OR Diastolic >= 100    Negative: Patient sounds very sick or weak to the triager    Negative: Systolic BP >= 200 OR Diastolic >= 120 and having NO cardiac or neurologic symptoms    Negative: Pregnant 20 or more weeks (or postpartum < 6 weeks) with Systolic BP >= 140 OR Diastolic >= 90    Negative: Systolic BP >= 180 OR Diastolic >= 110, and missed most recent dose of blood pressure medication    Negative: Systolic BP >= 180 OR Diastolic >= 110    Negative: Patient wants to be seen    Negative:  Ran out of BP medications    Negative: Taking BP medications and feels is having side effects (e.g., impotence, cough, dizziness)    Negative: Systolic BP >= 160 OR Diastolic >= 100    Negative: Systolic BP >= 130 OR Diastolic >= 80, and pregnant    Negative: Systolic BP >= 130 OR Diastolic >= 80, and is taking BP medications    Protocols used: BLOOD PRESSURE - HIGH-A-OH

## 2022-10-05 ENCOUNTER — TRANSFERRED RECORDS (OUTPATIENT)
Dept: HEALTH INFORMATION MANAGEMENT | Facility: CLINIC | Age: 47
End: 2022-10-05

## 2022-10-10 ENCOUNTER — OFFICE VISIT (OUTPATIENT)
Dept: FAMILY MEDICINE | Facility: CLINIC | Age: 47
End: 2022-10-10
Payer: COMMERCIAL

## 2022-10-10 VITALS
RESPIRATION RATE: 16 BRPM | WEIGHT: 191 LBS | SYSTOLIC BLOOD PRESSURE: 146 MMHG | HEIGHT: 68 IN | OXYGEN SATURATION: 99 % | HEART RATE: 103 BPM | BODY MASS INDEX: 28.95 KG/M2 | DIASTOLIC BLOOD PRESSURE: 88 MMHG

## 2022-10-10 DIAGNOSIS — M54.40 CHRONIC BILATERAL LOW BACK PAIN WITH SCIATICA, SCIATICA LATERALITY UNSPECIFIED: ICD-10-CM

## 2022-10-10 DIAGNOSIS — F41.9 ANXIETY: ICD-10-CM

## 2022-10-10 DIAGNOSIS — Z23 IMMUNIZATION DUE: ICD-10-CM

## 2022-10-10 DIAGNOSIS — G89.29 CHRONIC BILATERAL LOW BACK PAIN WITH SCIATICA, SCIATICA LATERALITY UNSPECIFIED: ICD-10-CM

## 2022-10-10 DIAGNOSIS — I10 BENIGN ESSENTIAL HYPERTENSION: ICD-10-CM

## 2022-10-10 DIAGNOSIS — R03.0 ELEVATED BLOOD PRESSURE READING WITHOUT DIAGNOSIS OF HYPERTENSION: Primary | ICD-10-CM

## 2022-10-10 PROCEDURE — 90682 RIV4 VACC RECOMBINANT DNA IM: CPT | Performed by: FAMILY MEDICINE

## 2022-10-10 PROCEDURE — 99214 OFFICE O/P EST MOD 30 MIN: CPT | Mod: 25 | Performed by: FAMILY MEDICINE

## 2022-10-10 PROCEDURE — 0124A COVID-19,PF,PFIZER BOOSTER BIVALENT: CPT | Performed by: FAMILY MEDICINE

## 2022-10-10 PROCEDURE — 96127 BRIEF EMOTIONAL/BEHAV ASSMT: CPT | Performed by: FAMILY MEDICINE

## 2022-10-10 PROCEDURE — 90471 IMMUNIZATION ADMIN: CPT | Performed by: FAMILY MEDICINE

## 2022-10-10 PROCEDURE — 91312 COVID-19,PF,PFIZER BOOSTER BIVALENT: CPT | Performed by: FAMILY MEDICINE

## 2022-10-10 RX ORDER — METOPROLOL SUCCINATE 25 MG/1
25 TABLET, EXTENDED RELEASE ORAL DAILY
Qty: 30 TABLET | Refills: 1 | Status: SHIPPED | OUTPATIENT
Start: 2022-10-10 | End: 2022-12-05

## 2022-10-10 RX ORDER — OXYCODONE AND ACETAMINOPHEN 5; 325 MG/1; MG/1
1 TABLET ORAL EVERY 6 HOURS PRN
Qty: 20 TABLET | Refills: 0 | Status: SHIPPED | OUTPATIENT
Start: 2022-10-10 | End: 2022-10-24

## 2022-10-10 ASSESSMENT — ANXIETY QUESTIONNAIRES
5. BEING SO RESTLESS THAT IT IS HARD TO SIT STILL: SEVERAL DAYS
3. WORRYING TOO MUCH ABOUT DIFFERENT THINGS: SEVERAL DAYS
IF YOU CHECKED OFF ANY PROBLEMS ON THIS QUESTIONNAIRE, HOW DIFFICULT HAVE THESE PROBLEMS MADE IT FOR YOU TO DO YOUR WORK, TAKE CARE OF THINGS AT HOME, OR GET ALONG WITH OTHER PEOPLE: SOMEWHAT DIFFICULT
GAD7 TOTAL SCORE: 8
7. FEELING AFRAID AS IF SOMETHING AWFUL MIGHT HAPPEN: SEVERAL DAYS
7. FEELING AFRAID AS IF SOMETHING AWFUL MIGHT HAPPEN: SEVERAL DAYS
1. FEELING NERVOUS, ANXIOUS, OR ON EDGE: NEARLY EVERY DAY
2. NOT BEING ABLE TO STOP OR CONTROL WORRYING: SEVERAL DAYS
GAD7 TOTAL SCORE: 8
4. TROUBLE RELAXING: SEVERAL DAYS
GAD7 TOTAL SCORE: 8
6. BECOMING EASILY ANNOYED OR IRRITABLE: NOT AT ALL
8. IF YOU CHECKED OFF ANY PROBLEMS, HOW DIFFICULT HAVE THESE MADE IT FOR YOU TO DO YOUR WORK, TAKE CARE OF THINGS AT HOME, OR GET ALONG WITH OTHER PEOPLE?: SOMEWHAT DIFFICULT

## 2022-10-10 ASSESSMENT — PATIENT HEALTH QUESTIONNAIRE - PHQ9
SUM OF ALL RESPONSES TO PHQ QUESTIONS 1-9: 6
10. IF YOU CHECKED OFF ANY PROBLEMS, HOW DIFFICULT HAVE THESE PROBLEMS MADE IT FOR YOU TO DO YOUR WORK, TAKE CARE OF THINGS AT HOME, OR GET ALONG WITH OTHER PEOPLE: NOT DIFFICULT AT ALL
SUM OF ALL RESPONSES TO PHQ QUESTIONS 1-9: 6

## 2022-10-10 NOTE — TELEPHONE ENCOUNTER
Meds requested:     oxycodone  Last fill: 9/16/22   Qty: 20 tab / 30 days     Last CSA: 7/15/22  UDS due: no    Last med check: 7/15/22  Next med check due: pt has next visit sched with you on 11/4/22

## 2022-10-10 NOTE — PROGRESS NOTES
Elevated blood pressure reading without diagnosis of hypertension  This is her third visit with elevated blood pressures.    Benign essential hypertension  I will start her on metoprolol low-dose as I believe this will not only treat her hypertension but her anxiety.  - metoprolol succinate ER (TOPROL XL) 25 MG 24 hr tablet  Dispense: 30 tablet; Refill: 1    Anxiety  JONES-7 today is 8 and PHQ-9 score today is 6.  She is on sertraline 50 mg and was previously on bupropion  mg.  Hopefully the metoprolol will augment her treatment.  - metoprolol succinate ER (TOPROL XL) 25 MG 24 hr tablet  Dispense: 30 tablet; Refill: 1    Immunization due  Patient is willing to have her flu and new COVID vaccines today.  - INFLUENZA QUAD, PF (RIV4) (FLUBLOK)  - COVID-19,PF,PFIZER BOOSTER BIVALENT    I asked that she come in in a couple weeks for a nurse only blood pressure recheck.  She should see Nawaf back for her 6-month med check.        Prosper Galan is a 47 year old, presenting for the following health issues:  Hypertension  Patient was seen on 7/15/2022 by Joe Leblanc and at that time was having a blood pressure of 142/94.  She had not previously been diagnosed with hypertension.  She had however been seen earlier on 5/27 and at that time her blood pressures were 145/88 and 144/93.  Today her blood pressure is 146/88 and we discussed the fact that she indeed seems to be needing to be treated for hypertension.  She describes being anxious a lot as well she is on 50 mg of sertraline.  Her heart rate is quite high at 103.  We discuss alcohol intake but she claims its very moderate.  She has had a normal TSH.    History of Present Illness       Reason for visit:  High blood pressure, pain    She eats 0-1 servings of fruits and vegetables daily.She consumes 1 sweetened beverage(s) daily.She exercises with enough effort to increase her heart rate 20 to 29 minutes per day.  She exercises with enough effort to  increase her heart rate 5 days per week.   She is taking medications regularly.    Today's PHQ-9         PHQ-9 Total Score: 6    PHQ-9 Q9 Thoughts of better off dead/self-harm past 2 weeks :   Not at all    How difficult have these problems made it for you to do your work, take care of things at home, or get along with other people: Not difficult at all  Today's JONES-7 Score: 8       Depression and Anxiety Follow-Up    How are you doing with your depression since your last visit? No change    How are you doing with your anxiety since your last visit?  No change    Are you having other symptoms that might be associated with depression or anxiety? Yes:  Partially due to chronic pain    Have you had a significant life event? No     Do you have any concerns with your use of alcohol or other drugs? No    Social History     Tobacco Use     Smoking status: Former     Packs/day: 0.25     Years: 15.00     Pack years: 3.75     Types: Cigarettes     Quit date: 2009     Years since quittin.7     Smokeless tobacco: Never     Tobacco comments:     No exposure   Vaping Use     Vaping Use: Never used   Substance Use Topics     Alcohol use: Yes     Comment: Alcoholic Drinks/day: rare     Drug use: No     PHQ 2022 7/15/2022 10/10/2022   PHQ-9 Total Score 6 12 6   Q9: Thoughts of better off dead/self-harm past 2 weeks Not at all Several days Not at all   F/U: Thoughts of suicide or self-harm - Yes -   F/U: Self harm-plan - No -   F/U: Self-harm action - No -   F/U: Safety concerns - No -     JONES-7 SCORE 10/27/2020 10/12/2021 10/10/2022   Total Score - - 8 (mild anxiety)   Total Score 7 7 8     Last PHQ-9 10/10/2022   1.  Little interest or pleasure in doing things 0   2.  Feeling down, depressed, or hopeless 0   3.  Trouble falling or staying asleep, or sleeping too much 3   4.  Feeling tired or having little energy 1   5.  Poor appetite or overeating 0   6.  Feeling bad about yourself 0   7.  Trouble concentrating 2   8.  " Moving slowly or restless 0   Q9: Thoughts of better off dead/self-harm past 2 weeks 0   PHQ-9 Total Score 6   Difficulty at work, home, or with people -   In the past two weeks have you had thoughts of suicide or self harm? -   Do you have concerns about your personal safety or the safety of others? -   In the past 2 weeks have you thought about a plan or had intention to harm yourself? -   In the past 2 weeks have you acted on these thoughts in any way? -     JONES-7  10/10/2022   1. Feeling nervous, anxious, or on edge 3   2. Not being able to stop or control worrying 1   3. Worrying too much about different things 1   4. Trouble relaxing 1   5. Being so restless that it is hard to sit still 1   6. Becoming easily annoyed or irritable 0   7. Feeling afraid, as if something awful might happen 1   JONES-7 Total Score 8   If you checked any problems, how difficult have they made it for you to do your work, take care of things at home, or get along with other people? Somewhat difficult         Objective    BP (!) 146/88   Pulse 103   Resp 16   Ht 1.727 m (5' 8\")   Wt 86.6 kg (191 lb)   SpO2 99%   BMI 29.04 kg/m    Body mass index is 29.04 kg/m .  Physical Exam   GENERAL APPEARANCE: healthy, alert, no distress and over weight  RESP: lungs clear to auscultation - no rales, rhonchi or wheezes  CV: Mild tachycardia and no murmur noted  ABDOMEN: soft, non-tender  PSYCH: mentation appears normal and affect normal/bright                "

## 2022-10-14 ASSESSMENT — PATIENT HEALTH QUESTIONNAIRE - PHQ9
10. IF YOU CHECKED OFF ANY PROBLEMS, HOW DIFFICULT HAVE THESE PROBLEMS MADE IT FOR YOU TO DO YOUR WORK, TAKE CARE OF THINGS AT HOME, OR GET ALONG WITH OTHER PEOPLE: NOT DIFFICULT AT ALL
SUM OF ALL RESPONSES TO PHQ QUESTIONS 1-9: 6

## 2022-10-14 ASSESSMENT — ANXIETY QUESTIONNAIRES: GAD7 TOTAL SCORE: 8

## 2022-10-24 ENCOUNTER — MYC REFILL (OUTPATIENT)
Dept: FAMILY MEDICINE | Facility: CLINIC | Age: 47
End: 2022-10-24

## 2022-10-24 DIAGNOSIS — G89.29 CHRONIC BILATERAL LOW BACK PAIN WITH SCIATICA, SCIATICA LATERALITY UNSPECIFIED: ICD-10-CM

## 2022-10-24 DIAGNOSIS — M54.40 CHRONIC BILATERAL LOW BACK PAIN WITH SCIATICA, SCIATICA LATERALITY UNSPECIFIED: ICD-10-CM

## 2022-10-24 RX ORDER — OXYCODONE AND ACETAMINOPHEN 5; 325 MG/1; MG/1
1 TABLET ORAL EVERY 6 HOURS PRN
Qty: 20 TABLET | Refills: 0 | Status: SHIPPED | OUTPATIENT
Start: 2022-10-24 | End: 2022-11-04

## 2022-10-24 NOTE — TELEPHONE ENCOUNTER
Patient called in and also sent in medical message with refill request on her pain medication.    She was crying but calmed down throughout the phone call. She been having ongoing issues with her left toes.Pain since May in her toe only.. Diagnosed with Polyneuropathy. When walking, seems like her toes feel curled under but they aren't.She at times feels unsteady on her feet.She describes the pain as a shooting/buring sensation. She has chronic hip pain and describes this pain as a constant ache.She had an injection to her hip, which didn't seem to help. She noticed more pain today in her toes, than any other day and the pain started on her right toes.Toes are constantly moving. She denied chest pain, sob,dizziness or light headedness ,numbness/ tingling or discoloration of toes. No fever. She is out of pain medication. Takes flexeril which helps her sleep, but last night she was up a lot because of back pain.At the  Last office visit, she was suppose to start metoprolol. She wants to discuss this with Calixto Leblanc and she has has not started on the metoprolol. Please advise to refill request. Every time she takes lyrica or gabapentin, her muscles twitch. Do you want to see her back or refer out?    Emily Fernandez RN on 10/24/2022 at 2:19 PM

## 2022-10-24 NOTE — TELEPHONE ENCOUNTER
I have sent in a small refill for this. She needs to follow up with neurology fur further evaluation.

## 2022-11-04 ENCOUNTER — OFFICE VISIT (OUTPATIENT)
Dept: FAMILY MEDICINE | Facility: CLINIC | Age: 47
End: 2022-11-04
Payer: COMMERCIAL

## 2022-11-04 VITALS
BODY MASS INDEX: 28.86 KG/M2 | WEIGHT: 189.8 LBS | HEART RATE: 98 BPM | RESPIRATION RATE: 12 BRPM | OXYGEN SATURATION: 98 % | DIASTOLIC BLOOD PRESSURE: 92 MMHG | SYSTOLIC BLOOD PRESSURE: 136 MMHG

## 2022-11-04 DIAGNOSIS — M54.81 OCCIPITAL NEURALGIA, UNSPECIFIED LATERALITY: ICD-10-CM

## 2022-11-04 DIAGNOSIS — M79.7 FIBROMYOSITIS: ICD-10-CM

## 2022-11-04 DIAGNOSIS — R12 HEARTBURN: ICD-10-CM

## 2022-11-04 DIAGNOSIS — R87.619 ATYPICAL GLANDULAR CELLS ON CERVICAL PAP SMEAR: ICD-10-CM

## 2022-11-04 DIAGNOSIS — Z12.31 ENCOUNTER FOR SCREENING MAMMOGRAM FOR BREAST CANCER: ICD-10-CM

## 2022-11-04 DIAGNOSIS — M54.40 CHRONIC BILATERAL LOW BACK PAIN WITH SCIATICA, SCIATICA LATERALITY UNSPECIFIED: ICD-10-CM

## 2022-11-04 DIAGNOSIS — G89.29 CHRONIC BILATERAL LOW BACK PAIN WITH SCIATICA, SCIATICA LATERALITY UNSPECIFIED: ICD-10-CM

## 2022-11-04 DIAGNOSIS — Z12.4 CERVICAL CANCER SCREENING: ICD-10-CM

## 2022-11-04 DIAGNOSIS — E53.8 VITAMIN B12 DEFICIENCY (NON ANEMIC): ICD-10-CM

## 2022-11-04 DIAGNOSIS — E61.1 IRON DEFICIENCY: ICD-10-CM

## 2022-11-04 DIAGNOSIS — Z13.220 SCREENING FOR HYPERLIPIDEMIA: ICD-10-CM

## 2022-11-04 DIAGNOSIS — Z00.00 ANNUAL PHYSICAL EXAM: Primary | ICD-10-CM

## 2022-11-04 DIAGNOSIS — G43.009 MIGRAINE WITHOUT AURA AND WITHOUT STATUS MIGRAINOSUS, NOT INTRACTABLE: ICD-10-CM

## 2022-11-04 DIAGNOSIS — G62.9 POLYNEUROPATHY: ICD-10-CM

## 2022-11-04 DIAGNOSIS — Z98.890 S/P CRANIOTOMY: ICD-10-CM

## 2022-11-04 DIAGNOSIS — Z12.11 SCREEN FOR COLON CANCER: ICD-10-CM

## 2022-11-04 DIAGNOSIS — R00.2 PALPITATIONS: ICD-10-CM

## 2022-11-04 LAB
CHOLEST SERPL-MCNC: 254 MG/DL
FERRITIN SERPL-MCNC: 58 NG/ML (ref 6–175)
HDLC SERPL-MCNC: 63 MG/DL
IRON BINDING CAPACITY (ROCHE): 283 UG/DL (ref 240–430)
IRON SATN MFR SERPL: 29 % (ref 15–46)
IRON SERPL-MCNC: 82 UG/DL (ref 37–145)
LDLC SERPL CALC-MCNC: 172 MG/DL
NONHDLC SERPL-MCNC: 191 MG/DL
TRIGL SERPL-MCNC: 95 MG/DL
VIT B12 SERPL-MCNC: 726 PG/ML (ref 232–1245)

## 2022-11-04 PROCEDURE — 82728 ASSAY OF FERRITIN: CPT | Performed by: PHYSICIAN ASSISTANT

## 2022-11-04 PROCEDURE — 83540 ASSAY OF IRON: CPT | Performed by: PHYSICIAN ASSISTANT

## 2022-11-04 PROCEDURE — 99396 PREV VISIT EST AGE 40-64: CPT | Mod: 25 | Performed by: PHYSICIAN ASSISTANT

## 2022-11-04 PROCEDURE — 80061 LIPID PANEL: CPT | Performed by: PHYSICIAN ASSISTANT

## 2022-11-04 PROCEDURE — 82607 VITAMIN B-12: CPT | Performed by: PHYSICIAN ASSISTANT

## 2022-11-04 PROCEDURE — 36415 COLL VENOUS BLD VENIPUNCTURE: CPT | Performed by: PHYSICIAN ASSISTANT

## 2022-11-04 PROCEDURE — 90471 IMMUNIZATION ADMIN: CPT | Performed by: PHYSICIAN ASSISTANT

## 2022-11-04 PROCEDURE — 83550 IRON BINDING TEST: CPT | Performed by: PHYSICIAN ASSISTANT

## 2022-11-04 PROCEDURE — 90715 TDAP VACCINE 7 YRS/> IM: CPT | Performed by: PHYSICIAN ASSISTANT

## 2022-11-04 PROCEDURE — 99214 OFFICE O/P EST MOD 30 MIN: CPT | Mod: 25 | Performed by: PHYSICIAN ASSISTANT

## 2022-11-04 RX ORDER — OXYCODONE AND ACETAMINOPHEN 5; 325 MG/1; MG/1
1 TABLET ORAL EVERY 6 HOURS PRN
Qty: 20 TABLET | Refills: 0 | Status: SHIPPED | OUTPATIENT
Start: 2022-11-04 | End: 2022-11-15

## 2022-11-04 ASSESSMENT — ANXIETY QUESTIONNAIRES
8. IF YOU CHECKED OFF ANY PROBLEMS, HOW DIFFICULT HAVE THESE MADE IT FOR YOU TO DO YOUR WORK, TAKE CARE OF THINGS AT HOME, OR GET ALONG WITH OTHER PEOPLE?: SOMEWHAT DIFFICULT
3. WORRYING TOO MUCH ABOUT DIFFERENT THINGS: SEVERAL DAYS
IF YOU CHECKED OFF ANY PROBLEMS ON THIS QUESTIONNAIRE, HOW DIFFICULT HAVE THESE PROBLEMS MADE IT FOR YOU TO DO YOUR WORK, TAKE CARE OF THINGS AT HOME, OR GET ALONG WITH OTHER PEOPLE: SOMEWHAT DIFFICULT
7. FEELING AFRAID AS IF SOMETHING AWFUL MIGHT HAPPEN: NEARLY EVERY DAY
6. BECOMING EASILY ANNOYED OR IRRITABLE: NOT AT ALL
4. TROUBLE RELAXING: SEVERAL DAYS
7. FEELING AFRAID AS IF SOMETHING AWFUL MIGHT HAPPEN: NEARLY EVERY DAY
1. FEELING NERVOUS, ANXIOUS, OR ON EDGE: MORE THAN HALF THE DAYS
GAD7 TOTAL SCORE: 11
5. BEING SO RESTLESS THAT IT IS HARD TO SIT STILL: SEVERAL DAYS
2. NOT BEING ABLE TO STOP OR CONTROL WORRYING: NEARLY EVERY DAY
GAD7 TOTAL SCORE: 11
GAD7 TOTAL SCORE: 11

## 2022-11-04 ASSESSMENT — ENCOUNTER SYMPTOMS
PALPITATIONS: 1
DYSURIA: 0
COUGH: 0
PARESTHESIAS: 0
NAUSEA: 1
HEMATOCHEZIA: 0
FREQUENCY: 0
FEVER: 0
HEMATURIA: 0
NERVOUS/ANXIOUS: 1
HEADACHES: 1
MYALGIAS: 1
DIARRHEA: 0
ABDOMINAL PAIN: 1
SHORTNESS OF BREATH: 0
SORE THROAT: 0
HEARTBURN: 0
CONSTIPATION: 0
ARTHRALGIAS: 1
DIZZINESS: 0
JOINT SWELLING: 0
CHILLS: 0
EYE PAIN: 0
SHORTNESS OF BREATH: 1

## 2022-11-04 ASSESSMENT — PAIN SCALES - GENERAL: PAINLEVEL: NO PAIN (0)

## 2022-11-04 NOTE — PROGRESS NOTES
SUBJECTIVE:   CC: Angelia is an 47 year old who presents for preventive health visit.     Patient has been advised of split billing requirements and indicates understanding: Yes  Angelia is a pleasant 47-year-old female that presents to the clinic today for annual physical.  She has a past medical history significant for hypertension, anxiety, depression, fibromyalgia, restless leg syndrome, GERD, status postcraniotomy due to MCA aneurysm, occipital neuralgia, iron deficiency anemia and B12 deficiency anemia.    She has been working with orthopedics on lower back pain and hip pain.  She did have some imaging done which showed a labral tear.  They are treating this conservatively at this time.  She did receive a hip injection which helped for some time.  She also has chronic lower back pain and herniated disc noted on a most recent MRI.  She has done injections in her back in the past which has helped her hip pain.  She does have a follow-up with orthopedics in 3 to 4 weeks to receive another steroid injection to see if this helped.  She also has some numbness and tingling to the left calf region.    She is currently on B12 and iron supplements as she was diagnosed with iron deficiency anemia B12 deficiency anemia 1.    She has underlying fibromyalgia.  She does take oxycodone 1-1/2 to 2 tablets daily as needed.  She has been taking this as prescribed.  She has been on this for some time.  No side effects from the medication    She was most recently prescribed metoprolol during an ER visit as she was having some elevated blood pressures.  She has not started this medication.  Blood pressure today was 136/90.  She is otherwise asymptomatic.  She denies any chest pain, shortness of breath, lightheaded or dizziness.  She is reluctant to start this.  She states that her blood pressure was elevated after a steroid injection in her hip but she states that this is happened in the past.    Anxiety and depression is  stable at this time.  She has had a lot of life stressors as her father has been ill.  Currently on Zoloft.    She has taken a vitamin D supplement    She does have underlying GERD but well controlled.    Status post craniectomy secondary to a MCA aneurysm.  She does follow with neurology.  No new symptoms    Healthy Habits:     Getting at least 3 servings of Calcium per day:  Yes    Bi-annual eye exam:  Yes    Dental care twice a year:  Yes    Sleep apnea or symptoms of sleep apnea:  Daytime drowsiness    Diet:  Other    Frequency of exercise:  2-3 days/week    Duration of exercise:  Less than 15 minutes    Taking medications regularly:  Yes    Medication side effects:  Not applicable    PHQ-2 Total Score: 2    Additional concerns today:  No        Today's PHQ-2 Score:   PHQ-2 (  [employer]) 2022   Q1: Little interest or pleasure in doing things 1   Q2: Feeling down, depressed or hopeless 1   PHQ-2 Score 2   Q1: Little interest or pleasure in doing things Several days   Q2: Feeling down, depressed or hopeless Several days   PHQ-2 Score 2       Abuse: Current or Past (Physical, Sexual or Emotional) - No  Do you feel safe in your environment? Yes    Have you ever done Advance Care Planning? (For example, a Health Directive, POLST, or a discussion with a medical provider or your loved ones about your wishes): No, advance care planning information given to patient to review.  Patient plans to discuss their wishes with loved ones or provider.      Social History     Tobacco Use     Smoking status: Former     Packs/day: 0.25     Years: 15.00     Pack years: 3.75     Types: Cigarettes     Quit date: 2009     Years since quittin.8     Smokeless tobacco: Never     Tobacco comments:     No exposure   Substance Use Topics     Alcohol use: Yes     Comment: Alcoholic Drinks/day: rare     If you drink alcohol do you typically have >3 drinks per day or >7 drinks per week? No    Alcohol Use 2022   Prescreen: >3  drinks/day or >7 drinks/week? Not Applicable   Prescreen: >3 drinks/day or >7 drinks/week? -   No flowsheet data found.    Reviewed orders with patient.  Reviewed health maintenance and updated orders accordingly - Yes  Lab work is in process    Breast Cancer Screening:  Any new diagnosis of family breast, ovarian, or bowel cancer? Yes       FHS-7:   Breast CA Risk Assessment (FHS-7) 2021   Did any of your first-degree relatives have breast or ovarian cancer? No Yes   Did any of your relatives have bilateral breast cancer? No No   Did any man in your family have breast cancer? No No   Did any woman in your family have breast and ovarian cancer? No No   Did any woman in your family have breast cancer before age 50 y? No No   Do you have 2 or more relatives with breast and/or ovarian cancer? No No   Do you have 2 or more relatives with breast and/or bowel cancer? No No     click delete button to remove this line now  Mammogram Screening: Recommended annual mammography  Pertinent mammograms are reviewed under the imaging tab.    History of abnormal Pap smear: NO - age 30-65 PAP every 5 years with negative HPV co-testing recommended     Reviewed and updated as needed this visit by clinical staff   Tobacco  Allergies               Reviewed and updated as needed this visit by Provider                 Past Medical History:   Diagnosis Date     Allergic rhinitis      Anxiety      Brain aneurysm 2015    s/p clipping,      Hiatal hernia     Repaired in 2018 with Nissen.     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     History of hemolysis, elevated liver enzymes, and low platelet (HELLP) syndrome      History of miscarriage 2004     Hypertensive disorder 2015      (normal spontaneous vaginal delivery)      PONV (postoperative nausea and vomiting)       Past Surgical History:   Procedure Laterality Date     CRANIOTOMY, REPAIR ANEURYSM, COMBINED  2/19/15    MCA aneurysm clipping x 2     CRYOCAUTERY OF  CERVIX      Description: Cervix Cryosurgery;  Recorded: 2008;  Comments: TRUE I     DILATION AND CURETTAGE, OPERATIVE HYSTEROSCOPY, COMBINED N/A 2015    Procedure: DILATION AND CURETTAGE WITH HYSTEROSCOPY ;  Surgeon: Catarina Lehman DO;  Location: [facility] [place];  Service:      HC DILATION/CURETTAGE DIAG/THER NON OB      Description: Dilation And Curettage;  Proc Date: 2004;  Comments: FOR MISCARRIAGE     HYSTEROSCOPY W/ ENDOMETRIAL ABLATION  12/15/2015     MOUTH SURGERY       MI LAP,ESOPHAGOGAST FUNDOPLASTY N/A 3/1/2018    Procedure: ROBOTIC NISSEN FUNDOPLICATION;  Surgeon: Leo Quezada DO;  Location: [facility] [place];  Service: General     OB History    Para Term  AB Living   1 1 1 0 0 1   SAB IAB Ectopic Multiple Live Births   0 0 0 0 1      # Outcome Date GA Lbr Lex/2nd Weight Sex Delivery Anes PTL Lv   1 Term         NERISSA       Review of Systems   Constitutional: Negative for chills and fever.   HENT: Negative for congestion, ear pain, hearing loss and sore throat.    Eyes: Positive for visual disturbance. Negative for pain.   Respiratory: Negative for cough and shortness of breath.    Cardiovascular: Positive for palpitations. Negative for chest pain and peripheral edema.   Gastrointestinal: Positive for abdominal pain and nausea. Negative for constipation, diarrhea, heartburn and hematochezia.   Genitourinary: Negative for dysuria, frequency, genital sores, hematuria and urgency.   Musculoskeletal: Positive for arthralgias and myalgias. Negative for joint swelling.   Skin: Negative for rash.   Neurological: Positive for headaches. Negative for dizziness and paresthesias.   Psychiatric/Behavioral: Negative for mood changes. The patient is nervous/anxious.           OBJECTIVE:   BP (!) 136/90 (BP Location: Left arm, Patient Position: Sitting, Cuff Size: Adult Regular)   Pulse 98   Resp 12   Wt 86.1 kg (189 lb 12.8 oz)   LMP  (LMP Unknown)   SpO2 98%   BMI 28.86  kg/m    Physical Exam  Constitutional:       General: She is not in acute distress.     Appearance: Normal appearance. She is well-developed. She is not ill-appearing.   HENT:      Head: Normocephalic and atraumatic.      Right Ear: Tympanic membrane and external ear normal.      Left Ear: Tympanic membrane and external ear normal.      Nose: Nose normal.      Mouth/Throat:      Mouth: Mucous membranes are moist.      Pharynx: No oropharyngeal exudate.   Eyes:      General:         Right eye: No discharge.         Left eye: No discharge.      Extraocular Movements: Extraocular movements intact.      Conjunctiva/sclera: Conjunctivae normal.   Neck:      Thyroid: No thyromegaly.      Trachea: No tracheal deviation.   Cardiovascular:      Rate and Rhythm: Normal rate and regular rhythm.      Pulses: Normal pulses.      Heart sounds: Normal heart sounds, S1 normal and S2 normal. No murmur heard.    No friction rub. No S3 or S4 sounds.   Pulmonary:      Effort: Pulmonary effort is normal. No respiratory distress.      Breath sounds: Normal breath sounds. No wheezing or rales.   Abdominal:      General: Bowel sounds are normal.      Palpations: Abdomen is soft. There is no mass.      Tenderness: There is no abdominal tenderness.   Musculoskeletal:         General: Normal range of motion.      Cervical back: Neck supple.   Lymphadenopathy:      Cervical: No cervical adenopathy.   Skin:     General: Skin is warm and dry.      Findings: No rash.   Neurological:      General: No focal deficit present.      Mental Status: She is alert and oriented to person, place, and time.      Cranial Nerves: Cranial nerves 2-12 are intact.      Motor: No weakness or abnormal muscle tone.      Gait: Gait is intact.      Deep Tendon Reflexes: Reflexes are normal and symmetric.   Psychiatric:         Behavior: Behavior normal.         Thought Content: Thought content normal.         Judgment: Judgment normal.           ASSESSMENT/PLAN:        ICD-10-CM    1. Annual physical exam  Z00.00       2. Migraine without aura and without status migrainosus, not intractable  G43.009       3. S/P craniotomy, MCA aneurysm clipping x 2  Z98.890       4. Heartburn  R12       5. Atypical glandular cells on cervical Pap smear  R87.619       6. Occipital neuralgia, unspecified laterality  M54.81       7. Screening for hyperlipidemia  Z13.220 Lipid panel reflex to direct LDL Fasting      8. Screen for colon cancer  Z12.11 Colonoscopy Screening  Referral      9. Cervical cancer screening  Z12.4       10. Vitamin B12 deficiency (non anemic)  E53.8 Vitamin B12      11. Iron deficiency  E61.1 Iron and iron binding capacity     Ferritin      12. Encounter for screening mammogram for breast cancer  Z12.31 *MA Screening Digital Bilateral      13. Chronic bilateral low back pain with sciatica, sciatica laterality unspecified  M54.40 oxyCODONE-acetaminophen (PERCOCET) 5-325 MG tablet    G89.29       14. Fibromyositis  M79.7 oxyCODONE-acetaminophen (PERCOCET) 5-325 MG tablet      15. Palpitations  R00.2       16. Polyneuropathy  G62.9         #1 annual physical-we will update screening labs including a lipid, iron, ferritin, and a B12 level.  Recently had labs 10/1/2022 including a CBC and a BMP.  Hemoglobin is stable at 13.  She will update tetanus vaccine today    #2 hypertension-blood pressure stable today.  She is not monitoring at home but I did recommend having her keep an eye on this as she does not wish to start the metoprolol.  Side effects of the metoprolol were discussed and she already struggles with fatigue so does not wish to start this.  She is not having any chest pain, shortness of breath, lightheaded or dizziness.  She is to let me know if her blood pressure starts to increase and we will need to start her on blood pressure medications    #3 anxiety  #4 depression  Currently on Zoloft feels that her symptoms are stable at this time.  We will continue the  current medication    #5 fibromyalgia-she has been on oxycodone for some time from her previous primary care provider.  She is taking 1-1/2 to 2 tablets as needed.  We have discussed trying to taper off of this and try a different cocktail of medications to help but she would like to continue using the oxycodone as it has been helping and she is using it sparingly.  She is not having any side effects of the medication.  She will need 3-month follow-ups for this medication.  We will fill 20 a month.    #6 occipital neuralgia- she was on Valium in the past but has since stopped this.  Symptoms are stable at this time.    #7 GERD- intermittent symptoms.  Stable at this time    #8 B12 deficiency-she is on a B12 supplement.  This was started due to peripheral neuropathy she does not feel that it helps with starting the B12 supplements.  We will recheck a B12 level today    #9 iron deficiency anemia-she is on iron supplement.  She is taking this every couple days.  She did undergo a ablation so is not having menstrual periods that are heavy flows.  We did discuss trying to increase her iron in her diet.  We will also get a colonoscopy.    #10 labral tear  #11 chronic lower back pain  She is following with orthopedics regarding this.  She is going to be seeing them for a back injection in the next 3 to 4 weeks to see if this helps with her pain.    #12 MCA aneurysm status post craniotomy-she follows closely with neurology and she is seeing them this year.  No new symptoms.  She is to continue to follow-up with neurology    #13 cervical cancer screening-she does see [facility] and had her Pap this year.  We will call for records    #14 breast cancer screening-we will place an order for mammogram as she is due in December    #15 colon cancer screening-amatory referral for colonoscopy has been placed.    #16 palpitations-she states that she has had having intermittent palpitations for some time.  No other associated symptoms.   "She feels that this is due to anxiety.  She recently did have an EKG 10/2022 which did not show any abnormalities.  No murmur noted on exam today.  We did discuss possibly setting her up for a Holter monitor but she would like to continue to monitor her symptoms.  She is to let me know if things worsen.    # 17 polyneuropathy-she has been seeing neurology regarding this.  She did have B12 and iron deficiency.  She is currently on supplements.  She did have an EMG that shows this.  We did discuss gabapentin and Lyrica in the past but she does not wish to start these medications.  She is to continue to monitor her symptoms at this time    Patient has been advised of split billing requirements and indicates understanding: Yes      COUNSELING:  Reviewed preventive health counseling, as reflected in patient instructions       Regular exercise       Healthy diet/nutrition       Vision screening       Colorectal Cancer Screening    Estimated body mass index is 28.86 kg/m  as calculated from the following:    Height as of 10/10/22: 1.727 m (5' 8\").    Weight as of this encounter: 86.1 kg (189 lb 12.8 oz).    Weight management plan: Discussed healthy diet and exercise guidelines    She reports that she quit smoking about 13 years ago. Her smoking use included cigarettes. She has a 3.75 pack-year smoking history. She has never used smokeless tobacco.      Counseling Resources:  ATP IV Guidelines  Pooled Cohorts Equation Calculator  Breast Cancer Risk Calculator  BRCA-Related Cancer Risk Assessment: FHS-7 Tool  FRAX Risk Assessment  ICSI Preventive Guidelines  Dietary Guidelines for Americans, 2010  USDA's MyPlate  ASA Prophylaxis  Lung CA Screening    [facility]  Answers for HPI/ROS submitted by the patient on 11/4/2022  If you checked off any problems, how difficult have these problems made it for you to do your work, take care of things at home, or get along with other " people?: Somewhat difficult  PHQ9 TOTAL SCORE: 6  JONES 7 TOTAL SCORE: 11

## 2022-11-14 DIAGNOSIS — M79.7 FIBROMYOSITIS: ICD-10-CM

## 2022-11-14 DIAGNOSIS — G89.29 CHRONIC BILATERAL LOW BACK PAIN WITH SCIATICA, SCIATICA LATERALITY UNSPECIFIED: ICD-10-CM

## 2022-11-14 DIAGNOSIS — M54.40 CHRONIC BILATERAL LOW BACK PAIN WITH SCIATICA, SCIATICA LATERALITY UNSPECIFIED: ICD-10-CM

## 2022-11-14 RX ORDER — OXYCODONE AND ACETAMINOPHEN 5; 325 MG/1; MG/1
1 TABLET ORAL EVERY 6 HOURS PRN
Qty: 20 TABLET | Refills: 0 | OUTPATIENT
Start: 2022-11-14

## 2022-11-14 NOTE — TELEPHONE ENCOUNTER
Medication Question or Refill        What medication are you calling about (include dose and sig)?: Oxycodone-acetaminophen     Controlled Substance Agreement on file:   CSA -- Patient Level:     [Media Unavailable] Controlled Substance Agreement - Opioid - Scan on 7/15/2022 12:20 PM   [Media Unavailable] Controlled Substance Agreement - Opioid - Scan on 6/7/2022  8:41 AM       Who prescribed the medication?: Leers     Do you need a refill? Yes: out of medication     When did you use the medication last? Yesterday. Patient states that her father just passed away. Patient has been in an out of the hospital. Having leg and hip pain     Patient offered an appointment? No    Do you have any questions or concerns?  No    Preferred Pharmacy:   [id number] - [place], [place] - [address] AT [facility] [address]  [address] [facility] [address]  Phone: [phone] Fax: [phone]    Could we send this information to you in [facility] or would you prefer to receive a phone call?:   Patient would prefer a phone call   Okay to leave a detailed message?: Yes at Cell number on file:    Telephone Information:   Mobile [phone]

## 2022-11-15 ENCOUNTER — MYC REFILL (OUTPATIENT)
Dept: FAMILY MEDICINE | Facility: CLINIC | Age: 47
End: 2022-11-15

## 2022-11-15 DIAGNOSIS — G89.29 CHRONIC BILATERAL LOW BACK PAIN WITH SCIATICA, SCIATICA LATERALITY UNSPECIFIED: ICD-10-CM

## 2022-11-15 DIAGNOSIS — M79.7 FIBROMYOSITIS: ICD-10-CM

## 2022-11-15 DIAGNOSIS — M54.40 CHRONIC BILATERAL LOW BACK PAIN WITH SCIATICA, SCIATICA LATERALITY UNSPECIFIED: ICD-10-CM

## 2022-11-15 RX ORDER — OXYCODONE AND ACETAMINOPHEN 5; 325 MG/1; MG/1
1 TABLET ORAL EVERY 6 HOURS PRN
Qty: 20 TABLET | Refills: 0 | Status: SHIPPED | OUTPATIENT
Start: 2022-11-15 | End: 2022-12-01

## 2022-12-05 ENCOUNTER — LAB (OUTPATIENT)
Dept: LAB | Facility: CLINIC | Age: 47
End: 2022-12-05
Payer: COMMERCIAL

## 2022-12-05 ENCOUNTER — OFFICE VISIT (OUTPATIENT)
Dept: RHEUMATOLOGY | Facility: CLINIC | Age: 47
End: 2022-12-05
Attending: PHYSICIAN ASSISTANT
Payer: COMMERCIAL

## 2022-12-05 ENCOUNTER — MYC MEDICAL ADVICE (OUTPATIENT)
Dept: RHEUMATOLOGY | Facility: CLINIC | Age: 47
End: 2022-12-05

## 2022-12-05 VITALS
BODY MASS INDEX: 29.6 KG/M2 | DIASTOLIC BLOOD PRESSURE: 88 MMHG | WEIGHT: 194.7 LBS | SYSTOLIC BLOOD PRESSURE: 138 MMHG | HEART RATE: 96 BPM

## 2022-12-05 DIAGNOSIS — M25.50 POLYARTHRALGIA: ICD-10-CM

## 2022-12-05 DIAGNOSIS — M65.972 TENOSYNOVITIS OF LEFT ANKLE: ICD-10-CM

## 2022-12-05 DIAGNOSIS — M54.50 CHRONIC LEFT-SIDED LOW BACK PAIN WITHOUT SCIATICA: ICD-10-CM

## 2022-12-05 DIAGNOSIS — R53.83 OTHER FATIGUE: ICD-10-CM

## 2022-12-05 DIAGNOSIS — G89.29 CHRONIC LEFT-SIDED LOW BACK PAIN WITHOUT SCIATICA: ICD-10-CM

## 2022-12-05 DIAGNOSIS — M25.50 POLYARTHRALGIA: Primary | ICD-10-CM

## 2022-12-05 LAB
CRP SERPL-MCNC: 4.57 MG/L
DEPRECATED CALCIDIOL+CALCIFEROL SERPL-MC: 35 UG/L (ref 20–75)
ERYTHROCYTE [SEDIMENTATION RATE] IN BLOOD BY WESTERGREN METHOD: 10 MM/HR (ref 0–20)
HCV AB SERPL QL IA: NONREACTIVE
HIV 1+2 AB+HIV1 P24 AG SERPL QL IA: NONREACTIVE
RHEUMATOID FACT SER NEPH-ACNC: <6 IU/ML

## 2022-12-05 PROCEDURE — 99214 OFFICE O/P EST MOD 30 MIN: CPT | Performed by: PHYSICIAN ASSISTANT

## 2022-12-05 PROCEDURE — 85652 RBC SED RATE AUTOMATED: CPT

## 2022-12-05 PROCEDURE — 86140 C-REACTIVE PROTEIN: CPT

## 2022-12-05 PROCEDURE — 86431 RHEUMATOID FACTOR QUANT: CPT

## 2022-12-05 PROCEDURE — 86200 CCP ANTIBODY: CPT

## 2022-12-05 PROCEDURE — 82306 VITAMIN D 25 HYDROXY: CPT

## 2022-12-05 PROCEDURE — 86038 ANTINUCLEAR ANTIBODIES: CPT

## 2022-12-05 PROCEDURE — 87389 HIV-1 AG W/HIV-1&-2 AB AG IA: CPT

## 2022-12-05 PROCEDURE — 81374 HLA I TYPING 1 ANTIGEN LR: CPT

## 2022-12-05 PROCEDURE — 36415 COLL VENOUS BLD VENIPUNCTURE: CPT

## 2022-12-05 PROCEDURE — 86803 HEPATITIS C AB TEST: CPT
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
## 2022-12-05 NOTE — PATIENT INSTRUCTIONS
After Visit Instructions:     Thank you for coming to [facility] for your care. It is my goal to partner with you to help you reach your optimal state of health.       Plan:     Schedule follow-up with Jina Can PA-C as needed   Labs: KERRY, Rheumatoid factor, CCP antibody, HIV, Hep C, Vitamin D, CRP and Sed Rate, HLA-B27  Other: upload your MRI report and send to me via [employer] or have [employer] fax the report.       Jina Can PA-C  [facility]  [facility]    Contact information: [facility]  Clinic Number:  [phone]  Please call or send a [employer] message with any questions about your care

## 2022-12-06 LAB
ANA SER QL IF: NEGATIVE
CCP AB SER IA-ACNC: 1.6 U/ML

## 2022-12-08 LAB
B LOCUS: NORMAL
B27TEST METHOD: NORMAL

## 2023-02-13 DIAGNOSIS — M79.7 FIBROMYOSITIS: ICD-10-CM

## 2023-02-13 DIAGNOSIS — M54.40 CHRONIC BILATERAL LOW BACK PAIN WITH SCIATICA, SCIATICA LATERALITY UNSPECIFIED: ICD-10-CM

## 2023-02-13 DIAGNOSIS — G89.29 CHRONIC BILATERAL LOW BACK PAIN WITH SCIATICA, SCIATICA LATERALITY UNSPECIFIED: ICD-10-CM

## 2023-02-13 DIAGNOSIS — G62.9 POLYNEUROPATHY: ICD-10-CM

## 2023-02-13 RX ORDER — OXYCODONE AND ACETAMINOPHEN 5; 325 MG/1; MG/1
1 TABLET ORAL EVERY 6 HOURS PRN
Qty: 20 TABLET | Refills: 0 | Status: SHIPPED | OUTPATIENT
Start: 2023-02-13 | End: 2023-03-23

## 2023-02-13 NOTE — TELEPHONE ENCOUNTER
Refill Request  Medication name: Pending Prescriptions:                       Disp   Refills    oxyCODONE-acetaminophen (PERCOCET) 5-325 *20 tab*0            Sig: Take 1 tablet by mouth every 6 hours as needed           for pain    Requested Pharmacy: [facility] did start Cymbalta on 1/15/23 and she feels like she is doing better but is still having headaches and tight muscles but leg is feeling better. She will up to 2 pills/day mid February.

## 2023-03-16 DIAGNOSIS — F41.1 ANXIETY STATE: ICD-10-CM

## 2023-03-16 NOTE — TELEPHONE ENCOUNTER
Refill Request  Medication name: Pending Prescriptions:                       Disp   Refills    sertraline (ZOLOFT) 50 MG tablet          90 tab*3            Sig: Take 1 tablet (50 mg) by mouth daily    Requested Pharmacy: [facility]

## 2023-03-17 NOTE — TELEPHONE ENCOUNTER
Spoke with pt. She is no longer taking duloxetine I recommended not restarting this while she is on Zoloft.  She agreed to this.

## 2023-03-21 ENCOUNTER — HOSPITAL ENCOUNTER (OUTPATIENT)
Dept: MAMMOGRAPHY | Facility: CLINIC | Age: 48
Discharge: HOME | End: 2023-03-21
Attending: PHYSICIAN ASSISTANT | Admitting: PHYSICIAN ASSISTANT
Payer: COMMERCIAL

## 2023-03-21 DIAGNOSIS — Z12.31 ENCOUNTER FOR SCREENING MAMMOGRAM FOR BREAST CANCER: ICD-10-CM

## 2023-03-21 PROCEDURE — 77067 SCR MAMMO BI INCL CAD: CPT

## 2023-03-23 DIAGNOSIS — G89.4 CHRONIC PAIN SYNDROME: ICD-10-CM

## 2023-03-23 DIAGNOSIS — M54.40 CHRONIC BILATERAL LOW BACK PAIN WITH SCIATICA, SCIATICA LATERALITY UNSPECIFIED: ICD-10-CM

## 2023-03-23 DIAGNOSIS — G62.9 POLYNEUROPATHY: ICD-10-CM

## 2023-03-23 DIAGNOSIS — G89.29 CHRONIC BILATERAL LOW BACK PAIN WITH SCIATICA, SCIATICA LATERALITY UNSPECIFIED: ICD-10-CM

## 2023-03-23 DIAGNOSIS — M79.7 FIBROMYOSITIS: ICD-10-CM

## 2023-03-23 RX ORDER — OXYCODONE AND ACETAMINOPHEN 5; 325 MG/1; MG/1
1 TABLET ORAL EVERY 6 HOURS PRN
Qty: 20 TABLET | Refills: 0 | Status: SHIPPED | OUTPATIENT
Start: 2023-03-23 | End: 2023-04-24

## 2023-03-23 RX ORDER — CYCLOBENZAPRINE HCL 10 MG
10 TABLET ORAL 3 TIMES DAILY PRN
Qty: 30 TABLET | Refills: 0 | Status: SHIPPED | OUTPATIENT
Start: 2023-03-23 | End: 2023-05-01

## 2023-03-23 NOTE — TELEPHONE ENCOUNTER
Medication Question or Refill    Contacts       Type Contact Phone/Fax    03/23/2023 02:56 PM CDT Phone (Incoming) Angelia Stallings (Self) [phone] (M)          What medication are you calling about (include dose and sig)?: Oxycodone-Acetaminophen 5-325mg     Preferred Pharmacy  [id number] - [place], [place] - [address] [place] [facility] [place] [address]  [place] [facility] [address]  Phone: [phone] Fax: [phone]    Controlled Substance Agreement on file:   CSA -- Patient Level:     [Media Unavailable] Controlled Substance Agreement - Opioid - Scan on 7/15/2022 12:20 PM   [Media Unavailable] Controlled Substance Agreement - Opioid - Scan on 6/7/2022  8:41 AM       Who prescribed the medication?: Leers     Do you need a refill? Yes, out of oxycodone-acetaminophen     When did you use the medication last? Yesterday     Patient offered an appointment? No    Do you have any questions or concerns?  No      Could we send this information to you in [facility] or would you prefer to receive a phone call?:   Patient would prefer a phone call   Okay to leave a detailed message?: Yes at Cell number on file:    Telephone Information:   Mobile [phone]

## 2023-03-30 ENCOUNTER — OFFICE VISIT (OUTPATIENT)
Dept: FAMILY MEDICINE | Facility: CLINIC | Age: 48
End: 2023-03-30
Payer: COMMERCIAL

## 2023-03-30 ENCOUNTER — HOSPITAL ENCOUNTER (EMERGENCY)
Facility: CLINIC | Age: 48
Discharge: HOME | End: 2023-03-30
Attending: EMERGENCY MEDICINE | Admitting: EMERGENCY MEDICINE
Payer: COMMERCIAL

## 2023-03-30 VITALS
TEMPERATURE: 98.5 F | WEIGHT: 195 LBS | OXYGEN SATURATION: 94 % | BODY MASS INDEX: 29.55 KG/M2 | HEIGHT: 68 IN | HEART RATE: 113 BPM | DIASTOLIC BLOOD PRESSURE: 82 MMHG | SYSTOLIC BLOOD PRESSURE: 139 MMHG | RESPIRATION RATE: 16 BRPM

## 2023-03-30 VITALS
HEART RATE: 130 BPM | DIASTOLIC BLOOD PRESSURE: 100 MMHG | TEMPERATURE: 98.1 F | WEIGHT: 195.7 LBS | RESPIRATION RATE: 18 BRPM | SYSTOLIC BLOOD PRESSURE: 150 MMHG | OXYGEN SATURATION: 96 % | BODY MASS INDEX: 29.76 KG/M2

## 2023-03-30 DIAGNOSIS — R33.9 URINARY RETENTION: Primary | ICD-10-CM

## 2023-03-30 DIAGNOSIS — F11.90 CHRONIC, CONTINUOUS USE OF OPIOIDS: ICD-10-CM

## 2023-03-30 DIAGNOSIS — K59.03 DRUG-INDUCED CONSTIPATION: ICD-10-CM

## 2023-03-30 DIAGNOSIS — R33.8 ACUTE URINARY RETENTION: ICD-10-CM

## 2023-03-30 DIAGNOSIS — K59.00 CONSTIPATION, UNSPECIFIED CONSTIPATION TYPE: ICD-10-CM

## 2023-03-30 DIAGNOSIS — R30.0 DYSURIA: ICD-10-CM

## 2023-03-30 PROCEDURE — 51798 US URINE CAPACITY MEASURE: CPT

## 2023-03-30 PROCEDURE — 99284 EMERGENCY DEPT VISIT MOD MDM: CPT | Mod: 25

## 2023-03-30 PROCEDURE — 99214 OFFICE O/P EST MOD 30 MIN: CPT | Performed by: PHYSICIAN ASSISTANT

## 2023-03-30 ASSESSMENT — ACTIVITIES OF DAILY LIVING (ADL): ADLS_ACUITY_SCORE: 35

## 2023-03-31 NOTE — DISCHARGE INSTRUCTIONS
You were seen in the emergency department for constipation and urinary retention concerns.  We are glad that your symptoms seem to have resolved.  We think that your constipation is probably due to a combination of opiate use and missed doses of MiraLAX.  To that end we would recommend using MiraLAX 1 capful twice a day for the next couple of days and then can decrease to daily and get back to your usual regimen as your bowel movements regulate.  It is fairly common to see some degree of urinary retention in the setting of pain and sometimes severe constipation but we are glad that this is better now and your bladder seems to be emptying completely.  Please make sure you are drinking plenty of liquids to stay hydrated and try to stick to high-fiber diet.  Follow-up in clinic to review any ongoing concerns.  We can reevaluate in the emergency department if you have any severe increase in abdominal pain or inability to urinate.
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
## 2023-03-31 NOTE — ED PROVIDER NOTES
EMERGENCY DEPARTMENT ENCOUNTER      NAME: Angelia Stallings  AGE: 47 year old female  YOB: 1975  MRN: [id number]  EVALUATION DATE & TIME: 3/30/2023  8:12 PM    PCP: Calixto Leblanc    ED PROVIDER: Leo Pollack M.D.      Chief Complaint   Patient presents with     Urinary Retention     Constipation         FINAL IMPRESSION:  1. Constipation, unspecified constipation type    2. Acute urinary retention          ED COURSE & MEDICAL DECISION MAKIN year old female presents to the Emergency Department for evaluation of constipation and urinary retention.  She presents with constipation which started after she missed about a week of her usual MiraLAX dosing and also is using more opiates.  This afternoon felt like she could not have a bowel movement and began to get significantly constipated.  After the pain started she was unable to urinate as well.  She does have mild urinary retention after voiding here on arrival.  She was ultimately able to pass a very large bowel movement shortly after arrival and had significant relief of her constipation and rectal discomfort.  She has no abdominal tenderness and no signs of any other acute process requiring labs or imaging.  After her successful BM  and a short period of ED observation she was able to void and after her bladder completely.  I think her symptoms are related to pain and constipation primarily and she does not require any other organic medical work-up since she is doing better now.  We discussed possibility of something like an enema here but she was feeling like she was much improved and comfortable with reinitiating her MiraLAX at home and following up.  Patient was discharged in good condition    8:36 PM I met with patient for initial encounter.    At the conclusion of the encounter I discussed the results of all of the tests and the disposition. The questions were answered. The patient or family acknowledged understanding and was  agreeable with the care plan.       Medical Decision Making    History:    Supplemental history from: Documented in chart, if applicable    External Record(s) reviewed: Documented in chart, if applicable.    Work Up:    Chart documentation includes differential considered and any EKGs or imaging independently interpreted by provider, where specified.    In additional to work up documented, I considered the following work up: Documented in chart, if applicable.    External consultation:    Discussion of management with another provider: Documented in chart, if applicable    Complicating factors:    Care impacted by chronic illness: Chronic Pain    Care affected by social determinants of health: N/A    Disposition considerations: Discharge. No recommendations on prescription strength medication(s). See documentation for any additional details.            MEDICATIONS GIVEN IN THE EMERGENCY:  Medications - No data to display    NEW PRESCRIPTIONS STARTED AT TODAY'S ER VISIT  Discharge Medication List as of 3/30/2023  9:25 PM             =================================================================    HPI    Patient information was obtained from: Patient    Use of : N/A        Angelia Stallings is a 47 year old female with a pertinent history of chronic pain who presents to this ED via walk-in for evaluation of constipation, urinary retention.    Patient endorses constipation that has been ongoing for the past week but resolved in the ED, as well as urinary retention that has been ongoing since earlier today and also resolved in the ED after she voided and had a large bowel movement. Patient says she was having difficulty urinating because she could feel pressure into her vaginal area. She also endorses nausea secondary to constipation that has resolved.     Patient states she is chronically on opioids and usually takes miralax every other day, but forgot to take it this week. She did take 2 laxatives today  and was using suppositories.    Patient denies all other complaints at this time.      REVIEW OF SYSTEMS   All systems reviewed and negative except as noted in HPI.    PAST MEDICAL HISTORY:  Past Medical History:   Diagnosis Date     Allergic rhinitis      Anxiety      Brain aneurysm 2015    s/p clipping,      Hiatal hernia     Repaired in 2018 with Nissen.     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     History of hemolysis, elevated liver enzymes, and low platelet (HELLP) syndrome      History of miscarriage 2004     Hypertensive disorder 2015      (normal spontaneous vaginal delivery)      PONV (postoperative nausea and vomiting)        PAST SURGICAL HISTORY:  Past Surgical History:   Procedure Laterality Date     CRANIOTOMY, REPAIR ANEURYSM, COMBINED  2/19/15    MCA aneurysm clipping x 2     CRYOCAUTERY OF CERVIX      Description: Cervix Cryosurgery;  Recorded: 2008;  Comments: TRUE I     DILATION AND CURETTAGE, OPERATIVE HYSTEROSCOPY, COMBINED N/A 2015    Procedure: DILATION AND CURETTAGE WITH HYSTEROSCOPY ;  Surgeon: Catarina Lehman DO;  Location: [facility];  Service:      HC DILATION/CURETTAGE DIAG/THER NON OB      Description: Dilation And Curettage;  Proc Date: 2004;  Comments: FOR MISCARRIAGE     HYSTEROSCOPY W/ ENDOMETRIAL ABLATION  12/15/2015     MOUTH SURGERY       DC LAP,ESOPHAGOGAST FUNDOPLASTY N/A 3/1/2018    Procedure: ROBOTIC NISSEN FUNDOPLICATION;  Surgeon: Leo Quezada DO;  Location: [facility];  Service: General           CURRENT MEDICATIONS:    No current facility-administered medications for this encounter.     Current Outpatient Medications   Medication     Cholecalciferol (VITAMIN D3) 75 MCG (3000 UT) TABS     cyclobenzaprine (FLEXERIL) 10 MG tablet     ferrous sulfate (FEROSUL) 325 (65 Fe) MG tablet     oxyCODONE-acetaminophen (PERCOCET) 5-325 MG tablet     sertraline (ZOLOFT) 50 MG tablet     vitamin B-12 (CYANOCOBALAMIN) 1000 MCG tablet  "        ALLERGIES:  Allergies   Allergen Reactions     Cat Dander [Animal Dander] Itching and Shortness Of Breath     [id number] Antimigraine Agents [Sumatriptan] Unknown     Hx cerebral aneurysms, s/p clipping     Watermelon [Citrullus Vulgaris] Unknown     Penicillins Hives and Rash     Sulfa (Sulfonamide Antibiotics) [Sulfa Drugs] Hives and Rash       FAMILY HISTORY:  Family History   Problem Relation Age of Onset     Hypertension Father      Cerebrovascular Disease Father      Heart Disease Father      Hyperlipidemia Father      Depression Brother      Allergies Brother      Cancer Maternal Uncle         cervical     Heart Disease Paternal Grandfather      Hypertension Paternal Grandfather      Breast Cancer Paternal Grandmother 90.00     No Known Problems Mother        SOCIAL HISTORY:   Social History     Socioeconomic History     Marital status: Single     Number of children: 1   Tobacco Use     Smoking status: Former     Packs/day: 0.25     Years: 15.00     Pack years: 3.75     Types: Cigarettes     Quit date: 2009     Years since quittin.2     Smokeless tobacco: Never     Tobacco comments:     No exposure   Vaping Use     Vaping Use: Never used   Substance and Sexual Activity     Alcohol use: Yes     Comment: Alcoholic Drinks/day: rare     Drug use: No     Sexual activity: Yes     Partners: Male       VITALS:  /82   Pulse 113   Temp 98.5  F (36.9  C) (Oral)   Resp 16   Ht 1.727 m (5' 8\")   Wt 88.5 kg (195 lb)   SpO2 94%   BMI 29.65 kg/m      PHYSICAL EXAM    Constitutional: Well developed, Well nourished, mildly uncomfortable appearing but nontoxic.  HENT: Normocephalic, Atraumatic. Neck Supple.  Eyes: EOMI, Conjunctiva normal.  Respiratory: Breathing comfortably on room air. Speaks full sentences easily. Lungs clear to ascultation.  Cardiovascular: Mildly tachycardic heart rate, Regular rhythm. No peripheral edema.  Abdomen: Soft, nontender  Musculoskeletal: Good range of motion in " all major joints. No major deformities noted.  Integument: Warm, Dry.  Neurologic: Alert & awake, Normal motor function, Normal sensory function, No focal deficits noted.   Psychiatric: Cooperative. Affect appropriate.     LAB:  All pertinent labs reviewed and interpreted.  Labs Ordered and Resulted from Time of ED Arrival to Time of ED Departure - No data to display    RADIOLOGY:  Reviewed all pertinent imaging. Please see official radiology report.  No orders to display           I, Sánchez Faye, am serving as a scribe to document services personally performed by Dr. Leo Pollack, based on my observation and the provider's statements to me. I, Leo Pollack MD attest that Sánchez Faye is acting in a scribe capacity, has observed my performance of the services and has documented them in accordance with my direction.    Leo Pollack M.D.  Emergency Medicine  [facility] EMERGENCY ROOM  [address] [address]  [phone]  Dept: [phone]       Leo Pollack MD  03/30/23 9540

## 2023-03-31 NOTE — ED NOTES
Pt had a bowel movement, reported she was able to urinate. Bladder scanned pt, had 388 ml still. Provider stated to give pt water and evaluate if she is able to void.

## 2023-03-31 NOTE — ED TRIAGE NOTES
Patient presents to ED for evaluation of urinary retention. Last urine a few hours ago. Patient reports that she feels constipated. Unsure when last bowel movement was.      Triage Assessment     Row Name 03/30/23 2008       Triage Assessment (Adult)    Airway WDL WDL       Respiratory WDL    Respiratory WDL WDL       Skin Circulation/Temperature WDL    Skin Circulation/Temperature WDL WDL       Cardiac WDL    Cardiac WDL WDL       Peripheral/Neurovascular WDL    Peripheral Neurovascular WDL WDL       Cognitive/Neuro/Behavioral WDL    Cognitive/Neuro/Behavioral WDL WDL

## 2023-04-24 DIAGNOSIS — G89.29 CHRONIC BILATERAL LOW BACK PAIN WITH SCIATICA, SCIATICA LATERALITY UNSPECIFIED: ICD-10-CM

## 2023-04-24 DIAGNOSIS — M79.7 FIBROMYOSITIS: ICD-10-CM

## 2023-04-24 DIAGNOSIS — M54.40 CHRONIC BILATERAL LOW BACK PAIN WITH SCIATICA, SCIATICA LATERALITY UNSPECIFIED: ICD-10-CM

## 2023-04-24 DIAGNOSIS — G62.9 POLYNEUROPATHY: ICD-10-CM

## 2023-04-24 RX ORDER — OXYCODONE AND ACETAMINOPHEN 5; 325 MG/1; MG/1
1 TABLET ORAL EVERY 6 HOURS PRN
Qty: 20 TABLET | Refills: 0 | Status: SHIPPED | OUTPATIENT
Start: 2023-04-24 | End: 2023-05-08

## 2023-04-24 NOTE — TELEPHONE ENCOUNTER
Medication Question or Refill    Contacts       Type Contact Phone/Fax    04/24/2023 02:14 PM CDT Phone (Incoming) Angelia Stallings TRISTEN (Self) [phone] (M)          What medication are you calling about (include dose and sig)?: Oxycodone-Acetaminophen 5-325mg     Preferred Pharmacy:  [id number] - [place] - [address] AT [facility] [address]  [facility] [address]  Phone: [phone] Fax: [phone]      Controlled Substance Agreement on file:   CSA -- Patient Level:     [Media Unavailable] Controlled Substance Agreement - Opioid - Scan on 7/15/2022 12:20 PM   [Media Unavailable] Controlled Substance Agreement - Opioid - Scan on 6/7/2022  8:41 AM       Who prescribed the medication?: Leers     Do you need a refill? Yes    When did you use the medication last? Two days ago     Patient offered an appointment? Yes: appt scheduled on 4/28/23    Do you have any questions or concerns?  Yes: Patient raked leaves over the weekend. Patient has pain that goes down back of leg and at the back of her calves into her foot. She thinks she may have irritated the nerve. Legs ache when she walks. Requesting if medication can be refilled before her appointment on Friday       Could we send this information to you in [facility] or would you prefer to receive a phone call?:   Patient would prefer a phone call   Okay to leave a detailed message?: Yes at Cell number on file:    Telephone Information:   Mobile [phone]

## 2023-04-28 ENCOUNTER — OFFICE VISIT (OUTPATIENT)
Dept: FAMILY MEDICINE | Facility: CLINIC | Age: 48
End: 2023-04-28
Payer: COMMERCIAL

## 2023-04-28 VITALS
RESPIRATION RATE: 16 BRPM | TEMPERATURE: 98.3 F | BODY MASS INDEX: 29.07 KG/M2 | SYSTOLIC BLOOD PRESSURE: 130 MMHG | OXYGEN SATURATION: 98 % | DIASTOLIC BLOOD PRESSURE: 86 MMHG | HEART RATE: 84 BPM | HEIGHT: 68 IN | WEIGHT: 191.8 LBS

## 2023-04-28 DIAGNOSIS — M79.7 FIBROMYALGIA: ICD-10-CM

## 2023-04-28 DIAGNOSIS — F41.1 ANXIETY STATE: ICD-10-CM

## 2023-04-28 DIAGNOSIS — G62.9 POLYNEUROPATHY: ICD-10-CM

## 2023-04-28 DIAGNOSIS — M54.50 CHRONIC BILATERAL LOW BACK PAIN WITHOUT SCIATICA: ICD-10-CM

## 2023-04-28 DIAGNOSIS — G89.4 CHRONIC PAIN SYNDROME: Primary | ICD-10-CM

## 2023-04-28 DIAGNOSIS — G89.29 CHRONIC BILATERAL LOW BACK PAIN WITHOUT SCIATICA: ICD-10-CM

## 2023-04-28 PROCEDURE — 99214 OFFICE O/P EST MOD 30 MIN: CPT | Performed by: PHYSICIAN ASSISTANT

## 2023-04-28 RX ORDER — FLUTICASONE PROPIONATE 50 MCG
1 SPRAY, SUSPENSION (ML) NASAL DAILY
COMMUNITY
End: 2023-07-21

## 2023-04-28 RX ORDER — MULTIPLE VITAMINS W/ MINERALS TAB 9MG-400MCG
1 TAB ORAL DAILY
COMMUNITY
End: 2023-07-21

## 2023-04-28 RX ORDER — LORATADINE 10 MG/1
10 TABLET ORAL DAILY
COMMUNITY
End: 2023-07-21

## 2023-04-28 ASSESSMENT — ENCOUNTER SYMPTOMS
NEUROLOGICAL NEGATIVE: 1
SLEEP DISTURBANCE: 0
ARTHRALGIAS: 1
MYALGIAS: 1
NERVOUS/ANXIOUS: 1
BACK PAIN: 1
CONSTITUTIONAL NEGATIVE: 1

## 2023-04-28 ASSESSMENT — ANXIETY QUESTIONNAIRES
2. NOT BEING ABLE TO STOP OR CONTROL WORRYING: SEVERAL DAYS
1. FEELING NERVOUS, ANXIOUS, OR ON EDGE: SEVERAL DAYS
7. FEELING AFRAID AS IF SOMETHING AWFUL MIGHT HAPPEN: SEVERAL DAYS
GAD7 TOTAL SCORE: 7
6. BECOMING EASILY ANNOYED OR IRRITABLE: SEVERAL DAYS
3. WORRYING TOO MUCH ABOUT DIFFERENT THINGS: SEVERAL DAYS
GAD7 TOTAL SCORE: 7
5. BEING SO RESTLESS THAT IT IS HARD TO SIT STILL: SEVERAL DAYS
IF YOU CHECKED OFF ANY PROBLEMS ON THIS QUESTIONNAIRE, HOW DIFFICULT HAVE THESE PROBLEMS MADE IT FOR YOU TO DO YOUR WORK, TAKE CARE OF THINGS AT HOME, OR GET ALONG WITH OTHER PEOPLE: NOT DIFFICULT AT ALL

## 2023-04-28 ASSESSMENT — PATIENT HEALTH QUESTIONNAIRE - PHQ9
10. IF YOU CHECKED OFF ANY PROBLEMS, HOW DIFFICULT HAVE THESE PROBLEMS MADE IT FOR YOU TO DO YOUR WORK, TAKE CARE OF THINGS AT HOME, OR GET ALONG WITH OTHER PEOPLE: SOMEWHAT DIFFICULT
SUM OF ALL RESPONSES TO PHQ QUESTIONS 1-9: 6
5. POOR APPETITE OR OVEREATING: SEVERAL DAYS
SUM OF ALL RESPONSES TO PHQ QUESTIONS 1-9: 6

## 2023-04-28 ASSESSMENT — PAIN SCALES - GENERAL: PAINLEVEL: NO PAIN (0)

## 2023-04-28 NOTE — PROGRESS NOTES
"  Assessment & Plan       ICD-10-CM    1. Chronic pain syndrome  G89.4       2. Polyneuropathy  G62.9       3. Anxiety state  F41.1       4. Fibromyalgia  M79.7       5. Chronic bilateral low back pain without sciatica  M54.50     G89.29         #1 chronic pain syndrome  #2 chronic lower back pain  #3 chronic left hip pain  #4 chronic left ankle pain  Currently on oxycodone.  Getting 20 tablets a month.  She is being responsible with the medication.  She is not having any side effects of the medication.  Controlled substance agreement updated today.  We will continue medications at this time.  If symptoms worsen and she would like to do further evaluation she is to let me know.  Neck step in to be seen neurologist.  She is currently taking Flexeril as needed as well.  No side effects from the medication.  She is not having any new symptoms.  No bowel or bladder dysfunction.  Follow-up in 6 months for annual physical    #5 anxiety  Currently on Zoloft.  Mood stable at this time    #6 polyarthralgia  Recently followed up with rheumatology December 2023.  Lab work done was unremarkable.  [id number]}     BMI:   Estimated body mass index is 29.16 kg/m  as calculated from the following:    Height as of this encounter: 1.727 m (5' 8\").    Weight as of this encounter: 87 kg (191 lb 12.8 oz).   Weight management plan: Discussed healthy diet and exercise guidelines      [facility]   Angelia is a 47 year old, presenting for the following health issues:  Recheck Medication (Non fasting- Regular follow up)        4/28/2023     3:25 PM   Additional Questions   Roomed by Jina Hernandez CMA     Angelia is a pleasant 47-year-old female who presents to the clinic today for med check.  She is currently on oxycodone.  She currently gets 20 tablets a month for chronic pain.  She has chronic lower back pain, chronic left hip pain, and left lateral leg and ankle pain.  She has " "seen rheumatology and orthopedics for pain.  She does have a herniated disc that she gets steroid injections at L4-L5.  Recently saw a rheumatology 12/2023 she did have a rheumatological work-up which was negative.  She continues to have twitching of her big toe and her left foot.  She did trial gabapentin but did not tolerate the medication.  She also was on Cymbalta but this made her too sleepy so we discontinued that.  She also has a labral tear and she is following with orthopedics.    History of Present Illness       Reason for visit:  Medication review    She eats 2-3 servings of fruits and vegetables daily.She consumes 2 sweetened beverage(s) daily.She exercises with enough effort to increase her heart rate 20 to 29 minutes per day.  She exercises with enough effort to increase her heart rate 4 days per week.   She is taking medications regularly.    Today's PHQ-9         PHQ-9 Total Score: 6    PHQ-9 Q9 Thoughts of better off dead/self-harm past 2 weeks :   Not at all    How difficult have these problems made it for you to do your work, take care of things at home, or get along with other people: Somewhat difficult         Review of Systems   Constitutional: Negative.    HENT: Negative.    Musculoskeletal: Positive for arthralgias, back pain and myalgias.        Chronic back, left hip, left lateral leg, and left ankle pain twitching and left great toe.  Pins and needle feeling to hands and top of head   Neurological: Negative.    Psychiatric/Behavioral: Positive for mood changes. Negative for self-injury, sleep disturbance and suicidal ideas. The patient is nervous/anxious.             Objective    /86 (BP Location: Left arm, Patient Position: Sitting, Cuff Size: Adult Regular)   Pulse 84   Temp 98.3  F (36.8  C) (Oral)   Resp 16   Ht 1.727 m (5' 8\")   Wt 87 kg (191 lb 12.8 oz)   SpO2 98%   BMI 29.16 kg/m    Body mass index is 29.16 kg/m .  Physical Exam  Vitals and nursing note reviewed. "   Constitutional:       Appearance: Normal appearance.   HENT:      Head: Normocephalic and atraumatic.   Eyes:      Conjunctiva/sclera: Conjunctivae normal.   Cardiovascular:      Rate and Rhythm: Normal rate and regular rhythm.      Heart sounds: No murmur heard.     No friction rub. No gallop.   Pulmonary:      Effort: Pulmonary effort is normal.      Breath sounds: No wheezing, rhonchi or rales.   Musculoskeletal:      Cervical back: Neck supple.   Lymphadenopathy:      Cervical: No cervical adenopathy.   Skin:     General: Skin is warm and dry.   Neurological:      Mental Status: She is alert.   Psychiatric:         Mood and Affect: Mood normal.         Behavior: Behavior normal.         Thought Content: Thought content normal.         Judgment: Judgment normal.

## 2023-05-01 ENCOUNTER — MYC REFILL (OUTPATIENT)
Dept: FAMILY MEDICINE | Facility: CLINIC | Age: 48
End: 2023-05-01
Payer: COMMERCIAL

## 2023-05-01 DIAGNOSIS — G89.4 CHRONIC PAIN SYNDROME: ICD-10-CM

## 2023-05-02 RX ORDER — CYCLOBENZAPRINE HCL 10 MG
10 TABLET ORAL 3 TIMES DAILY PRN
Qty: 30 TABLET | Refills: 0 | OUTPATIENT
Start: 2023-05-02

## 2023-05-03 ENCOUNTER — NURSE TRIAGE (OUTPATIENT)
Dept: NURSING | Facility: CLINIC | Age: 48
End: 2023-05-03
Payer: COMMERCIAL

## 2023-05-03 DIAGNOSIS — M54.50 CHRONIC BILATERAL LOW BACK PAIN WITHOUT SCIATICA: Primary | ICD-10-CM

## 2023-05-03 DIAGNOSIS — G89.29 CHRONIC BILATERAL LOW BACK PAIN WITHOUT SCIATICA: Primary | ICD-10-CM

## 2023-05-03 RX ORDER — METHYLPREDNISOLONE 4 MG
TABLET, DOSE PACK ORAL
Qty: 21 TABLET | Refills: 0 | Status: SHIPPED | OUTPATIENT
Start: 2023-05-03 | End: 2023-07-21

## 2023-05-03 NOTE — TELEPHONE ENCOUNTER
Saw Nawaf on Friday. Flare up of nerve pain. Has oxycodone RX which she takes one in the evening. She is wondering if she can have a medrol dose pack called in? Talked with someone about steroid injection and they're 1.5 weeks out and would have to call her back. It's hard to get thru work day with this nerve pain. Can Nawaf prescribe something? Please call her at: [phone].  Gloria Veloz RN  [facility]  .     Reason for Disposition    Prescription request for new medicine (not a refill)    Additional Information    Negative: Intentional drug overdose and suicidal thoughts or ideas    Negative: Drug overdose and triager unable to answer question    Negative: Caller requesting a renewal or refill of a medicine patient is currently taking    Negative: Caller requesting information unrelated to medicine    Negative: Caller requesting information about COVID-19 Vaccine    Negative: Caller requesting information about Emergency Contraception    Negative: Caller requesting information about Combined Birth Control Pills    Negative: Caller requesting information about Progestin Birth Control Pills    Negative: Caller requesting information about Post-Op pain or medicines    Negative: Caller requesting a prescription antibiotic (such as penicillin) for Strep throat and has a positive culture result    Negative: Caller requesting a prescription anti-viral med (such as Tamiflu) and has influenza (flu) symptoms    Negative: Immunization reaction suspected    Negative: Rash while taking a medicine or within 3 days of stopping it    Negative: Asthma and having symptoms of asthma (cough, wheezing, etc.)    Negative: Symptom of illness (e.g., headache, abdominal pain, earache, vomiting) that are more than mild    Negative: Breastfeeding questions about mother's medicines and diet    Negative: MORE THAN A DOUBLE DOSE of a prescription or over-the-counter (OTC) drug    Negative: DOUBLE DOSE (an extra dose or  lesser amount) of prescription drug and any symptoms (e.g., dizziness, nausea, pain, sleepiness)    Negative: DOUBLE DOSE (an extra dose or lesser amount) of over-the-counter (OTC) drug and any symptoms (e.g., dizziness, nausea, pain, sleepiness)    Negative: Took another person's prescription drug    Negative: DOUBLE DOSE (an extra dose or lesser amount) of prescription drug and NO symptoms  (Exception: A double dose of antibiotics.)    Negative: Diabetes drug error or overdose (e.g., took wrong type of insulin or took extra dose)    Negative: Caller has medication question about med NOT prescribed by PCP and triager unable to answer question (e.g., compatibility with other med, storage)    Negative: Prescription not at pharmacy and was prescribed by PCP recently  (Exception: triager has access to EMR and prescription is recorded there. Go to Home Care and confirm for pharmacy.)    Negative: Pharmacy calling with prescription question and triager unable to answer question    Negative: Caller has URGENT medicine question about med that PCP or specialist prescribed and triager unable to answer question    Negative: Caller has NON-URGENT medicine question about med that PCP or specialist prescribed and triager unable to answer question    Negative: Caller wants to use a complementary or alternative medicine    Negative: Medicine patch causing local rash or itching    Protocols used: MEDICATION QUESTION CALL-A-OH

## 2023-05-08 ENCOUNTER — NURSE TRIAGE (OUTPATIENT)
Dept: FAMILY MEDICINE | Facility: CLINIC | Age: 48
End: 2023-05-08
Payer: COMMERCIAL

## 2023-05-08 DIAGNOSIS — G89.29 CHRONIC BILATERAL LOW BACK PAIN WITH SCIATICA, SCIATICA LATERALITY UNSPECIFIED: ICD-10-CM

## 2023-05-08 DIAGNOSIS — G62.9 POLYNEUROPATHY: ICD-10-CM

## 2023-05-08 DIAGNOSIS — M79.7 FIBROMYOSITIS: ICD-10-CM

## 2023-05-08 DIAGNOSIS — M54.40 CHRONIC BILATERAL LOW BACK PAIN WITH SCIATICA, SCIATICA LATERALITY UNSPECIFIED: ICD-10-CM

## 2023-05-08 RX ORDER — OXYCODONE AND ACETAMINOPHEN 5; 325 MG/1; MG/1
1 TABLET ORAL EVERY 6 HOURS PRN
Qty: 10 TABLET | Refills: 0 | Status: SHIPPED | OUTPATIENT
Start: 2023-05-08 | End: 2023-06-14

## 2023-05-08 NOTE — TELEPHONE ENCOUNTER
"Patient having pain 9/10 in buttocks and left leg.  Medrol Dosepak has 2 days left, Percocet is out as of Friday.  Is awaiting call from ortho to get an injection.  Is looking for something to manage pain until able to get injection.    Nurse Triage SBAR    Is this a 2nd Level Triage? No    Situation:   Patient rating pain 9/10 in buttocks and down left leg.  Has 2 days left of Medrol Dosepak. Is awaiting a call back from Ortho for steroid injection.  Last dose of Percocet was taken on Friday - when she took 1/2 tab twice that day.  Flexeril did help to sleep this past weekend. Is hoping for something to help her until she can get her injection.      Background:   From appointment with PCP 4/28 \" If symptoms worsen and she would like to do further evaluation she is to let me know.\" She is trying to work and is having difficulty.      Assessment:   Severe pain. Has not tried any tylenol since percocet is out.  Recommended trying tylenol, ice and heat for pain.  Patient verbalized understanding.     Protocol Recommended Disposition:   Go To Office Now Patient is at work and is awaiting call from Ortho.  Would like something prescribed to manage current pain.     Recommendation:   Please advise with any recommendations.    ELENA Caldwell RN  [facility]    Routed to provider    Does the patient meet one of the following criteria for ADS visit consideration? 16+ years old, with an [facility] PCP     TIP  Providers, please consider if this condition is appropriate for management at one of our Acute and Diagnostic Services sites.     If patient is a good candidate, please use dotphrase <dot>triageresponse and select Refer to ADS to document.     Reason for Disposition    SEVERE pain (e.g., excruciating, unable to do any normal activities)    Additional Information    Negative: Looks like a broken bone or dislocated joint (e.g., crooked or deformed)    Negative: Sounds like a life-threatening " "emergency to the triager    Negative: Followed a hip injury    Negative: Followed a knee injury    Negative: Followed an ankle or foot injury    Negative: Back pain radiating (shooting) into leg(s)    Negative: Foot pain is main symptom    Negative: Ankle pain is main symptom    Negative: Knee pain is main symptom    Negative: Leg swelling is main symptom    Negative: Chest pain    Negative: Difficulty breathing    Negative: Entire foot is cool or blue in comparison to other side    Negative: Unable to walk    Negative: Fever and red area (or area very tender to touch)    Negative: Swollen joint and fever    Negative: Thigh or calf pain in only one leg and present > 1 hour    Negative: Thigh, calf, or ankle swelling in only one leg    Negative: Thigh, calf, or ankle swelling in both legs, but one side is definitely more swollen    Negative: History of prior 'blood clot' in leg or lungs (i.e., deep vein thrombosis, pulmonary embolism)    Negative: History of inherited increased risk of blood clots (e.g., factor 5 Leiden, antithrombin 3, protein C or protein S deficiency, prothrombin mutation)    Negative: Major surgery in past month    Negative: Hip or leg fracture (broken bone) in past month (or had cast on leg or ankle in past month)    Negative: Illness requiring prolonged bedrest in past month (e.g., immobilization, long hospital stay)    Negative: Long-distance travel in past month (e.g., car, bus, train, plane; with trip lasting 6 or more hours)    Negative: Cancer treatment in past six months (or has cancer now)    Negative: Patient sounds very sick or weak to the triager    Answer Assessment - Initial Assessment Questions  1. ONSET: \"When did the pain start?\"       Pain eases up after steroids - It has been over 2 weeks  2. LOCATION: \"Where is the pain located?\"       Buttock and down left side leg  3. PAIN: \"How bad is the pain?\"    (Scale 1-10; or mild, moderate, severe)    -  MILD (1-3): doesn't interfere " "with normal activities     -  MODERATE (4-7): interferes with normal activities (e.g., work or school) or awakens from sleep, limping     -  SEVERE (8-10): excruciating pain, unable to do any normal activities, unable to walk      10  4. WORK OR EXERCISE: \"Has there been any recent work or exercise that involved this part of the body?\"       no  5. CAUSE: \"What do you think is causing the leg pain?\"      Chronic pain  6. OTHER SYMPTOMS: \"Do you have any other symptoms?\" (e.g., chest pain, back pain, breathing difficulty, swelling, rash, fever, numbness, weakness)      Stomach upset from prednisone, tomorrow is last day  7. PREGNANCY: \"Is there any chance you are pregnant?\" \"When was your last menstrual period?\"      no    Protocols used: LEG PAIN-A-OH      "

## 2023-05-08 NOTE — TELEPHONE ENCOUNTER
Patient calling back to inform RN that orthopedic provider's office did just call her. Their  was out of the office the past three days. She should be receiving a phone call today to schedule ariane

## 2023-05-22 ENCOUNTER — TRANSFERRED RECORDS (OUTPATIENT)
Dept: HEALTH INFORMATION MANAGEMENT | Facility: CLINIC | Age: 48
End: 2023-05-22
Payer: COMMERCIAL

## 2023-06-07 NOTE — PROGRESS NOTES
.   MRN: [id number], Alcides Goetz is a 59 year old male admitted for   Chief Complaint   Patient presents with   • Chest Pain Adult   .    Admission Dt/Tm     6/5/2023  2:49 AM  Discharge DT/TM  6/6/2023  6:48 PM    Hospital Course   49-year-old male with history of diabetes mellitus, hypertension, hyperlipidemia admitted for chest pain.  Patient had serial cardiac enzyme which was negative for acute MI patient had a nuclear stress test and had changes in the EKG during the stress test and cardiology consulted.  Cardiology increased atorvastatin to 40 mg daily and decided patient can be discharged and have a coronary CTA as outpatient and follow-up with cardiology in 1 week.  Patient will follow-up with me in 1 week patient condition is stable at the time of discharge    Lab Results  Admission on 06/05/2023, Discharged on 06/06/2023   Component Date Value Ref Range Status   • Sodium 06/05/2023 137  135 - 145 mmol/L Final   • Potassium 06/05/2023 3.8  3.4 - 5.1 mmol/L Final   • Chloride 06/05/2023 104  97 - 110 mmol/L Final   • Carbon Dioxide 06/05/2023 26  21 - 32 mmol/L Final   • Anion Gap 06/05/2023 11  7 - 19 mmol/L Final   • Glucose 06/05/2023 242 (H)  70 - 99 mg/dL Final   • BUN 06/05/2023 22 (H)  6 - 20 mg/dL Final   • Creatinine 06/05/2023 0.86  0.67 - 1.17 mg/dL Final   • Glomerular Filtration Rate 06/05/2023 >90  >=60 Final   • BUN/Cr 06/05/2023 26 (H)  7 - 25 Final   • Calcium 06/05/2023 9.1  8.4 - 10.2 mg/dL Final   • Bilirubin, Total 06/05/2023 0.5  0.2 - 1.0 mg/dL Final   • GOT/AST 06/05/2023 146 (H)  <=37 Units/L Final   • GPT/ALT 06/05/2023 221 (H)  <64 Units/L Final   • Alkaline Phosphatase 06/05/2023 162 (H)  45 - 117 Units/L Final   • Albumin 06/05/2023 3.7  3.6 - 5.1 g/dL Final   • Protein, Total 06/05/2023 8.5 (H)  6.4 - 8.2 g/dL Final   • Globulin 06/05/2023 4.8 (H)  2.0 - 4.0 g/dL Final   • A/G Ratio 06/05/2023 0.8 (L)  1.0 - 2.4 Final   • Troponin I, High Sensitivity 06/05/2023 6  <77 ng/L  Final   • Ventricular Rate EKG/Min (BPM) 06/05/2023 60   Final   • Atrial Rate (BPM) 06/05/2023 60   Final   • AR-Interval (MSEC) 06/05/2023 148   Final   • QRS-Interval (MSEC) 06/05/2023 102   Final   • QT-Interval (MSEC) 06/05/2023 422   Final   • QTc 06/05/2023 422   Final   • P Axis (Degrees) 06/05/2023 10   Final   • R Axis (Degrees) 06/05/2023 59   Final   • T Axis (Degrees) 06/05/2023 43   Final   • REPORT TEXT 06/05/2023    Final                    Value:Sinus rhythm  Confirmed by MARTIN MCELROY MD ([id number]) on 6/6/2023 9:50:16 AM     • WBC 06/05/2023 5.8  4.2 - 11.0 K/mcL Final   • RBC 06/05/2023 4.91  4.50 - 5.90 mil/mcL Final   • HGB 06/05/2023 16.0  13.0 - 17.0 g/dL Final   • HCT 06/05/2023 46.7  39.0 - 51.0 % Final   • MCV 06/05/2023 95.1  78.0 - 100.0 fl Final   • MCH 06/05/2023 32.6  26.0 - 34.0 pg Final   • MCHC 06/05/2023 34.3  32.0 - 36.5 g/dL Final   • RDW-CV 06/05/2023 12.6  11.0 - 15.0 % Final   • RDW-SD 06/05/2023 43.8  39.0 - 50.0 fL Final   • PLT 06/05/2023 222  140 - 450 K/mcL Final   • NRBC 06/05/2023 0  <=0 /100 WBC Final   • Neutrophil, Percent 06/05/2023 46  % Final   • Lymphocytes, Percent 06/05/2023 38  % Final   • Mono, Percent 06/05/2023 12  % Final   • Eosinophils, Percent 06/05/2023 3  % Final   • Basophils, Percent 06/05/2023 1  % Final   • Immature Granulocytes 06/05/2023 0  % Final   • Absolute Neutrophils 06/05/2023 2.7  1.8 - 7.7 K/mcL Final   • Absolute Lymphocytes 06/05/2023 2.2  1.0 - 4.0 K/mcL Final   • Absolute Monocytes 06/05/2023 0.7  0.3 - 0.9 K/mcL Final   • Absolute Eosinophils  06/05/2023 0.2  0.0 - 0.5 K/mcL Final   • Absolute Basophils 06/05/2023 0.1  0.0 - 0.3 K/mcL Final   • Absolute Immature Granulocytes 06/05/2023 0.0  0.0 - 0.2 K/mcL Final   • Troponin I, High Sensitivity 06/05/2023 6  <77 ng/L Final   • Ventricular Rate EKG/Min (BPM) 06/05/2023 62   Final   • Atrial Rate (BPM) 06/05/2023 61   Final   • AR-Interval (MSEC) 06/05/2023 151   Final   •  QRS-Interval (MSEC) 06/05/2023 97   Final   • QT-Interval (MSEC) 06/05/2023 413   Final   • QTc 06/05/2023 420   Final   • P Axis (Degrees) 06/05/2023 60   Final   • R Axis (Degrees) 06/05/2023 63   Final   • T Axis (Degrees) 06/05/2023 15   Final   • REPORT TEXT 06/05/2023    Final                    Value:Sinus rhythm  Non-diagnostic inferior ST depression  Confirmed by MARTIN MCELROY MD ([id number]) on 6/6/2023 9:51:09 AM     • GLUCOSE, BEDSIDE - POINT OF CARE 06/05/2023 140 (H)  70 - 99 mg/dL Final   • GLUCOSE, BEDSIDE - POINT OF CARE 06/05/2023 132 (H)  70 - 99 mg/dL Final   • Predicted HR Max 06/05/2023 160  BPM Final   • GLUCOSE, BEDSIDE - POINT OF CARE 06/05/2023 158 (H)  70 - 99 mg/dL Final   • GLUCOSE, BEDSIDE - POINT OF CARE 06/05/2023 160 (H)  70 - 99 mg/dL Final   • GLUCOSE, BEDSIDE - POINT OF CARE 06/06/2023 150 (H)  70 - 99 mg/dL Final   • GLUCOSE, BEDSIDE - POINT OF CARE 06/06/2023 128 (H)  70 - 99 mg/dL Final   • Cholesterol 06/06/2023 142  <=199 mg/dL Final   • Triglycerides 06/06/2023 153 (H)  <=149 mg/dL Final   • HDL 06/06/2023 52  >=40 mg/dL Final   • LDL 06/06/2023 59  <=129 mg/dL Final   • Non-HDL Cholesterol 06/06/2023 90  mg/dL Final   • Cholesterol/ HDL Ratio 06/06/2023 2.7  <=4.4 Final   • GLUCOSE, BEDSIDE - POINT OF CARE 06/06/2023 175 (H)  70 - 99 mg/dL Final         Imaging        Test Results Pending At Discharge  Pending Labs     Order Current Status    Lipoprotein(a) In process          Pertinent Physical Exam At Time of Discharge  Physical Exam  Vitals reviewed.   HENT:      Head: Normocephalic.      Right Ear: Tympanic membrane normal.   Eyes:      Pupils: Pupils are equal, round, and reactive to light.   Cardiovascular:      Rate and Rhythm: Normal rate and regular rhythm.      Pulses: Normal pulses.      Heart sounds: Normal heart sounds.   Abdominal:      General: Abdomen is flat.      Palpations: Abdomen is soft.   Skin:     General: Skin is warm.      Capillary Refill:  Capillary refill takes 2 to 3 seconds.   Neurological:      General: No focal deficit present.      Mental Status: He is alert.   Psychiatric:         Mood and Affect: Mood normal.         Discharge Diagnosis  Myocardial ischemia          Discharge Medications     Summary of your Discharge Medications      Take these Medications      Details   aspirin 81 MG chewable tablet   Chew 81 mg by mouth daily.     atorvastatin 40 MG tablet  Commonly known as: LIPITOR   Take 1 tablet by mouth nightly. Do not start before June 7, 2023.     Calcium 600 MG tablet   Take 1 tablet by mouth daily.     cholecalciferol 1000 UNITS tablet  Commonly known as: VITAMIN D3   Take 1,000 Units by mouth daily.     empagliflozin 10 MG tablet  Commonly known as: JARDIANCE   Take 10 mg by mouth daily (before breakfast).     glipiZIDE 5 MG tablet  Commonly known as: GLUCOTROL   Take 5 mg by mouth daily (before breakfast).     losartan-hydrochlorothiazide 100-25 MG per tablet  Commonly known as: HYZAAR   Take 1 tablet by mouth daily (at noon).     metformin 1000 MG tablet  Commonly known as: GLUCOPHAGE   TAKE 1 TABLET BY MOUTH TWO TIMES DAILY

## 2023-06-14 ENCOUNTER — MYC MEDICAL ADVICE (OUTPATIENT)
Dept: FAMILY MEDICINE | Facility: CLINIC | Age: 48
End: 2023-06-14
Payer: COMMERCIAL

## 2023-06-14 DIAGNOSIS — F32.4 MAJOR DEPRESSION SINGLE EPISODE, IN PARTIAL REMISSION (H): Primary | ICD-10-CM

## 2023-06-14 DIAGNOSIS — G89.29 CHRONIC BILATERAL LOW BACK PAIN WITH SCIATICA, SCIATICA LATERALITY UNSPECIFIED: ICD-10-CM

## 2023-06-14 DIAGNOSIS — M54.40 CHRONIC BILATERAL LOW BACK PAIN WITH SCIATICA, SCIATICA LATERALITY UNSPECIFIED: ICD-10-CM

## 2023-06-14 DIAGNOSIS — M79.7 FIBROMYOSITIS: ICD-10-CM

## 2023-06-14 DIAGNOSIS — G62.9 POLYNEUROPATHY: ICD-10-CM

## 2023-06-14 RX ORDER — OXYCODONE AND ACETAMINOPHEN 5; 325 MG/1; MG/1
1 TABLET ORAL EVERY 6 HOURS PRN
Qty: 10 TABLET | Refills: 0 | Status: SHIPPED | OUTPATIENT
Start: 2023-06-14 | End: 2023-06-22

## 2023-06-14 RX ORDER — DULOXETIN HYDROCHLORIDE 30 MG/1
30 CAPSULE, DELAYED RELEASE ORAL 2 TIMES DAILY
Qty: 30 CAPSULE | Refills: 5 | Status: SHIPPED | OUTPATIENT
Start: 2023-06-14 | End: 2023-07-21

## 2023-06-22 ENCOUNTER — MYC MEDICAL ADVICE (OUTPATIENT)
Dept: FAMILY MEDICINE | Facility: CLINIC | Age: 48
End: 2023-06-22
Payer: COMMERCIAL

## 2023-06-22 DIAGNOSIS — G89.29 CHRONIC BILATERAL LOW BACK PAIN WITH SCIATICA, SCIATICA LATERALITY UNSPECIFIED: ICD-10-CM

## 2023-06-22 DIAGNOSIS — M54.40 CHRONIC BILATERAL LOW BACK PAIN WITH SCIATICA, SCIATICA LATERALITY UNSPECIFIED: ICD-10-CM

## 2023-06-22 DIAGNOSIS — G62.9 POLYNEUROPATHY: ICD-10-CM

## 2023-06-22 DIAGNOSIS — M79.7 FIBROMYOSITIS: ICD-10-CM

## 2023-06-22 RX ORDER — OXYCODONE AND ACETAMINOPHEN 5; 325 MG/1; MG/1
1 TABLET ORAL EVERY 6 HOURS PRN
Qty: 10 TABLET | Refills: 0 | Status: SHIPPED | OUTPATIENT
Start: 2023-06-22 | End: 2023-07-12

## 2023-07-12 ENCOUNTER — LAB REQUISITION (OUTPATIENT)
Dept: LAB | Facility: CLINIC | Age: 48
End: 2023-07-12
Payer: COMMERCIAL

## 2023-07-12 DIAGNOSIS — M79.7 FIBROMYOSITIS: ICD-10-CM

## 2023-07-12 DIAGNOSIS — Z12.4 ENCOUNTER FOR SCREENING FOR MALIGNANT NEOPLASM OF CERVIX: ICD-10-CM

## 2023-07-12 DIAGNOSIS — G89.29 CHRONIC BILATERAL LOW BACK PAIN WITH SCIATICA, SCIATICA LATERALITY UNSPECIFIED: ICD-10-CM

## 2023-07-12 DIAGNOSIS — G62.9 POLYNEUROPATHY: ICD-10-CM

## 2023-07-12 DIAGNOSIS — M54.40 CHRONIC BILATERAL LOW BACK PAIN WITH SCIATICA, SCIATICA LATERALITY UNSPECIFIED: ICD-10-CM

## 2023-07-12 PROCEDURE — G0145 SCR C/V CYTO,THINLAYER,RESCR: HCPCS | Mod: ORL | Performed by: OBSTETRICS & GYNECOLOGY

## 2023-07-12 PROCEDURE — 87624 HPV HI-RISK TYP POOLED RSLT: CPT | Mod: ORL | Performed by: OBSTETRICS & GYNECOLOGY

## 2023-07-12 RX ORDER — OXYCODONE AND ACETAMINOPHEN 5; 325 MG/1; MG/1
1 TABLET ORAL EVERY 6 HOURS PRN
Qty: 20 TABLET | Refills: 0 | Status: SHIPPED | OUTPATIENT
Start: 2023-07-12 | End: 2023-07-21

## 2023-07-12 NOTE — TELEPHONE ENCOUNTER
"Nurse SBAR    Situation:  Patient was transferred to nurse regarding follow up on recent visit about pain and not sure if pt has restless leg syndrome as she wakes up in middle of the nights with leg pain. Last night she woke up with pain from right elbow to wrist and pain is gone when she wakes up this morning. She is wondering what is her next step.    Background:  Per provider Real's notes from 4/28/23:  \"#1 chronic pain syndrome  #2 chronic lower back pain  #3 chronic left hip pain  #4 chronic left ankle pain  Currently on oxycodone.  Getting 20 tablets a month.  She is being responsible with the medication.  She is not having any side effects of the medication.  Controlled substance agreement updated today.  We will continue medications at this time.  If symptoms worsen and she would like to do further evaluation she is to let me know.  Neck step in to be seen neurologist.  She is currently taking Flexeril as needed as well.  No side effects from the medication.  She is not having any new symptoms.  No bowel or bladder dysfunction.  Follow-up in 6 months for annual physical.\"    Assessment:  Patient stated that she have been having some \"medication difficulties in the past month.\" Was weaned off sertraline and started Cymbalta, which causes stomach upset, nausea, feeling full. She was taken off of Cymbalta for a couple weeks and weaned off of it as well for 2-2.5 weeks now.    Patient stated she has chronic pain every day and waking up with a tone of pain in legs. Leg muscles and legs pain every night.     Last night she woke up with pain from right elbow to wrist and pain is gone when she wakes up this morning.      For the last couple months, she will move wrist or thumb and it will cause pain.    Also having \"electric shock\" in right leg.    Was seeing a neurologist at [facility] and when she have issues with her \"moving toes then neurologist does not know what's going and she stopped seeing that " neurologist.    EMG was done with mild polyneuropathy with Dr Darío Khoury, a neurologist with [facility] denied pt per pt.    Denied chest pain, SOB, difficulty breathing.    Been trying to be more active with shorter walk and bike rides. Mood is okay; not crying every day like last week.     Out of oxycodone helps with taking the edge off. Needs more refills.    Been taking advil and tylenol around the clock, since her pain is not well controlled.    She is only taking Flexeril is at bedtime and sometimes half a table in the middle of the night with her leg pain and spasm. d/t drowsiness during the day, she is not taking it TID as prescribed.       Recommendation:  Referral pended for Neurology; please advise.  Refills pended for Percocet. Still has enough of the Flexeril per pt.  Please advise on next step and let pt know.      Routing refill request to provider for review/approval because:  Drug not on the [facility] refill protocol- controlled substance    Please fill in with dates, using N/A if not applicable:    Urine Drug Screen in the last 12 months - no, last done was 6/22/22    Controlled Substance Agreement in the last 12 months (Must be scanned to the ControlledSubstance Agreement-Opioid document type) See Controlled Substance Agreement Opioid Procedure for workflow- yes, on 4/28/23 per OV notes     PHQ-9 completed in the last 12 months - yes, on 4/28/23    JONES-7 completed in the last 12 months- yes, on 4/28/23    No prescriptions requested or ordered in this encounter    Last office visitwith prescribing provider: 4/28/2023   Future Office Visit:  None scheduled.    Evert Campos, BSN, RN  [facility]

## 2023-07-17 LAB
BKR LAB AP GYN ADEQUACY: NORMAL
BKR LAB AP GYN INTERPRETATION: NORMAL
BKR LAB AP HPV REFLEX: NORMAL
BKR LAB AP LMP: NORMAL
BKR LAB AP PREVIOUS ABNL DX: NORMAL
BKR LAB AP PREVIOUS ABNORMAL: NORMAL
PATH REPORT.COMMENTS IMP SPEC: NORMAL
PATH REPORT.COMMENTS IMP SPEC: NORMAL
PATH REPORT.RELEVANT HX SPEC: NORMAL

## 2023-07-18 LAB
HUMAN PAPILLOMA VIRUS 16 DNA: NEGATIVE
HUMAN PAPILLOMA VIRUS 18 DNA: NEGATIVE
HUMAN PAPILLOMA VIRUS FINAL DIAGNOSIS: NORMAL
HUMAN PAPILLOMA VIRUS OTHER HR: NEGATIVE

## 2023-07-20 ENCOUNTER — NURSE TRIAGE (OUTPATIENT)
Dept: NURSING | Facility: CLINIC | Age: 48
End: 2023-07-20
Payer: COMMERCIAL
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
## 2023-07-21 ENCOUNTER — OFFICE VISIT (OUTPATIENT)
Dept: FAMILY MEDICINE | Facility: CLINIC | Age: 48
End: 2023-07-21
Payer: COMMERCIAL

## 2023-07-21 VITALS
BODY MASS INDEX: 29.55 KG/M2 | RESPIRATION RATE: 18 BRPM | DIASTOLIC BLOOD PRESSURE: 74 MMHG | WEIGHT: 195 LBS | HEIGHT: 68 IN | HEART RATE: 97 BPM | OXYGEN SATURATION: 98 % | SYSTOLIC BLOOD PRESSURE: 126 MMHG | TEMPERATURE: 98 F
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
## 2023-07-21 DIAGNOSIS — S13.4XXA WHIPLASH INJURY TO NECK, INITIAL ENCOUNTER: ICD-10-CM

## 2023-07-21 PROCEDURE — 99213 OFFICE O/P EST LOW 20 MIN: CPT | Performed by: NURSE PRACTITIONER

## 2023-07-21 RX ORDER — OXYCODONE AND ACETAMINOPHEN 5; 325 MG/1; MG/1
1 TABLET ORAL EVERY 6 HOURS PRN
Qty: 20 TABLET | Refills: 0 | Status: SHIPPED | OUTPATIENT
Start: 2023-07-21 | End: 2023-09-08

## 2023-07-21 RX ORDER — NAPROXEN 500 MG/1
500 TABLET ORAL 2 TIMES DAILY WITH MEALS
Qty: 60 TABLET | Refills: 0 | Status: SHIPPED | OUTPATIENT
Start: 2023-07-21 | End: 2023-11-13

## 2023-07-21 ASSESSMENT — PATIENT HEALTH QUESTIONNAIRE - PHQ9
SUM OF ALL RESPONSES TO PHQ QUESTIONS 1-9: 5
SUM OF ALL RESPONSES TO PHQ QUESTIONS 1-9: 5
10. IF YOU CHECKED OFF ANY PROBLEMS, HOW DIFFICULT HAVE THESE PROBLEMS MADE IT FOR YOU TO DO YOUR WORK, TAKE CARE OF THINGS AT HOME, OR GET ALONG WITH OTHER PEOPLE: SOMEWHAT DIFFICULT

## 2023-07-21 ASSESSMENT — PAIN SCALES - GENERAL: PAINLEVEL: SEVERE PAIN (7)
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
## 2023-07-21 NOTE — TELEPHONE ENCOUNTER
Talked to patient. Scheduled her to see Erma Waite this afternoon. No further questions.    Jacki Partida CMA.

## 2023-08-09 ENCOUNTER — OFFICE VISIT (OUTPATIENT)
Dept: FAMILY MEDICINE | Facility: CLINIC | Age: 48
End: 2023-08-09
Payer: COMMERCIAL

## 2023-08-09 VITALS
RESPIRATION RATE: 14 BRPM | BODY MASS INDEX: 29.25 KG/M2 | OXYGEN SATURATION: 98 % | HEIGHT: 68 IN | SYSTOLIC BLOOD PRESSURE: 124 MMHG | WEIGHT: 193 LBS | TEMPERATURE: 98.4 F | DIASTOLIC BLOOD PRESSURE: 86 MMHG | HEART RATE: 98 BPM

## 2023-08-09 DIAGNOSIS — M25.572 PAIN IN JOINT INVOLVING ANKLE AND FOOT, LEFT: ICD-10-CM

## 2023-08-09 DIAGNOSIS — G62.9 POLYNEUROPATHY: ICD-10-CM

## 2023-08-09 DIAGNOSIS — M79.7 FIBROMYALGIA: Primary | ICD-10-CM

## 2023-08-09 PROCEDURE — 99214 OFFICE O/P EST MOD 30 MIN: CPT | Performed by: PHYSICIAN ASSISTANT

## 2023-08-09 RX ORDER — AMITRIPTYLINE HYDROCHLORIDE 10 MG/1
10 TABLET ORAL AT BEDTIME
Qty: 30 TABLET | Refills: 5 | Status: SHIPPED | OUTPATIENT
Start: 2023-08-09 | End: 2023-11-13

## 2023-08-09 ASSESSMENT — ENCOUNTER SYMPTOMS
PALPITATIONS: 0
WHEEZING: 0
VOMITING: 0
FATIGUE: 0
EYES NEGATIVE: 1
NUMBNESS: 0
NAUSEA: 1
DIZZINESS: 0
CHILLS: 0
ABDOMINAL DISTENTION: 1
COUGH: 0
HEADACHES: 1
PARESTHESIAS: 1
SHORTNESS OF BREATH: 0

## 2023-08-09 ASSESSMENT — PATIENT HEALTH QUESTIONNAIRE - PHQ9
SUM OF ALL RESPONSES TO PHQ QUESTIONS 1-9: 7
10. IF YOU CHECKED OFF ANY PROBLEMS, HOW DIFFICULT HAVE THESE PROBLEMS MADE IT FOR YOU TO DO YOUR WORK, TAKE CARE OF THINGS AT HOME, OR GET ALONG WITH OTHER PEOPLE: SOMEWHAT DIFFICULT
SUM OF ALL RESPONSES TO PHQ QUESTIONS 1-9: 7

## 2023-08-09 NOTE — LETTER
Opioid / Opioid Plus Controlled Substance Agreement    This is an agreement between you and your provider about the safe and appropriate use of controlled substance/opioids prescribed by your care team. Controlled substances are medicines that can cause physical and mental dependence (abuse).    There are strict laws about having and using these medicines. We here at [facility] are committing to working with you in your efforts to get better. To support you in this work, we ll help you schedule regular office appointments for medicine refills. If we must cancel or change your appointment for any reason, we ll make sure you have enough medicine to last until your next appointment.     As a Provider, I will:  Listen carefully to your concerns and treat you with respect.   Recommend a treatment plan that I believe is in your best interest. This plan may involve therapies other than opioid pain medication.   Talk with you often about the possible benefits, and the risk of harm of any medicine that we prescribe for you.   Provide a plan on how to taper (discontinue or go off) using this medicine if the decision is made to stop its use.    As a Patient, I understand that opioid(s):   Are a controlled substance prescribed by my care team to help me function or work and manage my condition(s).   Are strong medicines and can cause serious side effects such as:  Drowsiness, which can seriously affect my driving ability  A lower breathing rate, enough to cause death  Harm to my thinking ability   Depression   Abuse of and addiction to this medicine  Need to be taken exactly as prescribed. Combining opioids with certain medicines or chemicals (such as illegal drugs, sedatives, sleeping pills, and benzodiazepines) can be dangerous or even fatal. If I stop opioids suddenly, I may have severe withdrawal symptoms.  Do not work for all types of pain nor for all patients. If they re not helpful, I may be asked to stop  them.        The risks, benefits and side effects of these medicine(s) were explained to me. I agree that:  I will take part in other treatments as advised by my care team. This may be psychiatry or counseling, physical therapy, behavioral therapy, group treatment or a referral to a specialist.     I will keep all my appointments. I understand that this is part of the monitoring of opioids. My care team may require an office visit for EVERY opioid/controlled substance refill. If I miss appointments or don t follow instructions, my care team may stop my medicine.    I will take my medicines as prescribed. I will not change the dose or schedule unless my care team tells me to. There will be no refills if I run out early.     I may be asked to come to the clinic and complete a urine drug test or complete a pill count at any time. If I don t give a urine sample or participate in a pill count, the care team may stop my medicine.    I will only receive prescriptions from this clinic for chronic pain. If I am treated by another provider for acute pain issues, I will tell them that I am taking opioid pain medication for chronic pain and that I have a treatment agreement with this provider. I will inform my [facility] care team within one business day if I am given a prescription for any pain medication by another healthcare provider. My [facility] care team can contact other providers and pharmacists about my use of any medicines.    It is up to me to make sure that I don t run out of my medicines on weekends or holidays. If my care team is willing to refill my opioid prescription without a visit, I must request refills only during office hours. Refills may take up to 3 business days to process. I will use one pharmacy to fill all my opioid and other controlled substance prescriptions. I will notify the clinic about any changes to my insurance or medication availability.    I am responsible for my  prescriptions. If the medicine/prescription is lost, stolen or destroyed, it will not be replaced. I also agree not to share controlled substance medicines with anyone.    I am aware I should not use any illegal or recreational drugs. I agree not to drink alcohol unless my care team says I can.       If I enroll in the [place] [facility] program, I will tell my care team prior to my next refill.     I will tell my care team right away if I become pregnant, have a new medical problem treated outside of my regular clinic, or have a change in my medications.    I understand that this medicine can affect my thinking, judgment and reaction time. Alcohol and drugs affect the brain and body, which can affect the safety of my driving. Being under the influence of alcohol or drugs can affect my decision-making, behaviors, personal safety, and the safety of others. Driving while impaired (DWI) can occur if a person is driving, operating, or in physical control of a car, motorcycle, boat, snowmobile, ATV, motorbike, off-road vehicle, or any other motor vehicle ([place] Statute 169A.20). I understand the risk if I choose to drive or operate any vehicle or machinery.    I understand that if I do not follow any of the conditions above, my prescriptions or treatment may be stopped or changed.          Opioids  What You Need to Know    What are opioids?   Opioids are pain medicines that must be prescribed by a doctor. They are also known as narcotics.     Examples are:   morphine (MS Contin, Izzy)  oxycodone (Oxycontin)  oxycodone and acetaminophen (Percocet)  hydrocodone and acetaminophen (Vicodin, Norco)   fentanyl patch (Duragesic)   hydromorphone (Dilaudid)   methadone  codeine (Tylenol #3)     What do opioids do well?   Opioids are best for severe short-term pain such as after a surgery or injury. They may work well for cancer pain. They may help some people with long-lasting (chronic) pain.     What do opioids NOT do  well?   Opioids never get rid of pain entirely, and they don t work well for most patients with chronic pain. Opioids don t reduce swelling, one of the causes of pain.                                    Other ways to manage chronic pain and improve function include:     Treat the health problem that may be causing pain  Anti-inflammation medicines, which reduce swelling and tenderness, such as ibuprofen (Advil, Motrin) or naproxen (Aleve)  Acetaminophen (Tylenol)  Antidepressants and anti-seizure medicines, especially for nerve pain  Topical treatments such as patches or creams  Injections or nerve blocks  Chiropractic or osteopathic treatment  Acupuncture, massage, deep breathing, meditation, visual imagery, aromatherapy  Use heat or ice at the pain site  Physical therapy   Exercise  Stop smoking  Take part in therapy       Risks and side effects     Talk to your doctor before you start or decide to keep taking opioids. Possible side effects include:    Lowering your breathing rate enough to cause death  Overdose, including death, especially if taking higher than prescribed doses  Worse depression symptoms; less pleasure in things you usually enjoy  Feeling tired or sluggish  Slower thoughts or cloudy thinking  Being more sensitive to pain over time; pain is harder to control  Trouble sleeping or restless sleep  Changes in hormone levels (for example, less testosterone)  Changes in sex drive or ability to have sex  Constipation  Unsafe driving  Itching and sweating  Dizziness  Nausea, throwing up and dry mouth    What else should I know about opioids?    Opioids may lead to dependence, tolerance, or addiction.    Dependence means that if you stop or reduce the medicine too quickly, you will have withdrawal symptoms. These include loose poop (diarrhea), jitters, flu-like symptoms, nervousness and tremors. Dependence is not the same as addiction.                     Tolerance means needing higher doses over time to  get the same effect. This may increase the chance of serious side effects.    Addiction is when people improperly use a substance that harms their body, their mind or their relations with others. Use of opiates can cause a relapse of addiction if you have a history of drug or alcohol abuse.    People who have used opioids for a long time may have a lower quality of life, worse depression, higher levels of pain and more visits to doctors.    You can overdose on opioids. Take these steps to lower your risk of overdose:    Recognize the signs:  Signs of overdose include decrease or loss of consciousness (blackout), slowed breathing, trouble waking up and blue lips. If someone is worried about overdose, they should call 911.    Talk to your doctor about Narcan (naloxone).   If you are at risk for overdose, you may be given a prescription for Narcan. This medicine very quickly reverses the effects of opioids.   If you overdose, a friend or family member can give you Narcan while waiting for the ambulance. They need to know the signs of overdose and how to give Narcan.     Don't use alcohol or street drugs.   Taking them with opioids can cause death.    Do not take any of these medicines unless your doctor says it s OK. Taking these with opioids can cause death:  Benzodiazepines, such as lorazepam (Ativan), alprazolam (Xanax) or diazepam (Valium)  Muscle relaxers, such as cyclobenzaprine (Flexeril)  Sleeping pills like zolpidem (Ambien)   Other opioids      How to keep you and other people safe while taking opioids:    Never share your opioids with others.  Opioid medicines are regulated by the Drug Enforcement Agency ([employer]). Selling or sharing medications is a criminal act.    2. Be sure to store opioids in a secure place, locked up if possible. Young children can easily swallow them and overdose.    3. When you are traveling with your medicines, keep them in the original bottles. If you use a pill box, be sure you also  carry a copy of your medicine list from your clinic or pharmacy.    4. Safe disposal of opioids    Most pharmacies have places to get rid of medicine, called disposal kiosks. Medicine disposal options are also available in every [facility]. Search your county and  medication disposal  to find more options. You can find more details at:  [email or web address]     I agree that my provider, clinic care team, and pharmacy may work with any city, state or federal law enforcement agency that investigates the misuse, sale, or other diversion of my controlled medicine. I will allow my provider to discuss my care with, or share a copy of, this agreement with any other treating provider, pharmacy or emergency room where I receive care.    I have read this agreement and have asked questions about anything I did not understand.    _______________________________________________________  Patient Signature - Angelia Stallings _____________________                   Date     _______________________________________________________  Provider Signature - Calixto Leblanc PA-C   _____________________                   Date     _______________________________________________________  Witness Signature (required if provider not present while patient signing)   _____________________                   Date

## 2023-08-09 NOTE — PROGRESS NOTES
Assessment & Plan     Fibromyalgia  Chronic widespread nerve pain for many year accompanied by anxiety, depression, chronic migraines, and GI issues. She has had pain to there right arm and leg that is sharp/stinging pain. She has chronic left lower leg and ankle pain. She has a left hip labral tear as well. She is currently using oxycodone as need that helps with her pain. We tried  Cymbalta but had to stop due to GI side effects. Intolerant to gabapentin. She did undergo an EMG of her LLE due to pain and involvement movement of her great toe that showed polyneuropathy. We will trial her on amitriptyline and tizanidine at bedtime. Side effects were talked about. She is to increases to 20mg of the amitriptyline after 2 weeks if she tolerates this.  Will continue oxycodone 20 tablets a month.  Hoping that the amitriptyline and the tizanidine decreases her need for the oxycodone.  Controlled substance agreement updated today.  - amitriptyline (ELAVIL) 10 MG tablet; Take 1 tablet (10 mg) by mouth At Bedtime  - tiZANidine (ZANAFLEX) 4 MG tablet; Take 1 tablet (4 mg) by mouth 3 times daily    Polyneuropathy  EMG done 2022 was done due to pain in her left lower extremity along with involuntary movement of her left great toe.  This did come back as possible mild sensory polyneuropathy.  She also has chronic widespread pain which I suspect is due to fibromyalgia.  She would like to see a neurologist for further evaluation.  Had labs including B12 and TSH which are normal.  She was also evaluated by rheumatology and had labs 2022 including a CRP, CCP, and rheumatoid factor, sed rate, KERRY, vitamin D and HLA-B27 all which were unremarkable.  EMG 22  This is an abnormal study, demonstrating electrophysiologic evidence of the followin. 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  2. No evidence of axonal injury to left lumbosacral nerve roots. Note that normal needle electromyography does not exclude clinically  "symptomatic radiculopathy without substantial motor axonal injury.     - Adult Neurology  Referral; Future    Pain in joint involving ankle and foot, left  Left ankle pain.  MRI done showed tenosynovitis.  She did follow-up with podiatry.    MRI ankle Left 7/29/22  MPRESSION:  1.  Mild tendinopathy and tenosynovitis involving the left extensor digitorum longus tendon. No evidence of tear.  2.  Chronic sprain of the left anterior talofibular ligament.          Calixto Leblanc PA-C  [facility]    Prosper Galan is a 48 year old, presenting for the following health issues:  Recheck Medication (Chronic pain management)      8/9/2023    11:41 AM   Additional Questions   Roomed by Sinai Stone   Accompanied by none       History of Present Illness       Reason for visit:  Chronic pain    She eats 2-3 servings of fruits and vegetables daily.She consumes 1 sweetened beverage(s) daily.She exercises with enough effort to increase her heart rate 10 to 19 minutes per day.  She exercises with enough effort to increase her heart rate 5 days per week.   She is taking medications regularly.         Review of Systems   Constitutional:  Negative for chills and fatigue.   HENT: Negative.     Eyes: Negative.    Respiratory:  Negative for cough, shortness of breath and wheezing.    Cardiovascular:  Negative for chest pain and palpitations.   Gastrointestinal:  Positive for abdominal distention and nausea. Negative for vomiting.   Musculoskeletal:         Right arm pain, right foot pain, left leg/ankle pain, left hip labral tear    Neurological:  Positive for headaches and paresthesias. Negative for dizziness and numbness.            Objective    /86 (BP Location: Left arm, Patient Position: Sitting, Cuff Size: Adult Regular)   Pulse 98   Temp 98.4  F (36.9  C) (Oral)   Resp 14   Ht 1.727 m (5' 8\")   Wt 87.5 kg (193 lb)   LMP  (LMP Unknown)   SpO2 98%   BMI 29.35 kg/m    Body " mass index is 29.35 kg/m .  Physical Exam  Vitals and nursing note reviewed.   Constitutional:       Appearance: Normal appearance.   HENT:      Head: Normocephalic and atraumatic.   Eyes:      Conjunctiva/sclera: Conjunctivae normal.   Cardiovascular:      Rate and Rhythm: Normal rate and regular rhythm.   Skin:     General: Skin is warm and dry.   Neurological:      General: No focal deficit present.      Mental Status: She is alert.      Motor: No weakness.   Psychiatric:         Mood and Affect: Mood normal.         Behavior: Behavior normal.         Thought Content: Thought content normal.         Judgment: Judgment normal.

## 2023-08-12 ENCOUNTER — NURSE TRIAGE (OUTPATIENT)
Dept: NURSING | Facility: CLINIC | Age: 48
End: 2023-08-12
Payer: COMMERCIAL

## 2023-08-12 ENCOUNTER — HOSPITAL ENCOUNTER (EMERGENCY)
Facility: CLINIC | Age: 48
Discharge: HOME | End: 2023-08-12
Admitting: PHYSICIAN ASSISTANT
Payer: COMMERCIAL

## 2023-08-12 VITALS
SYSTOLIC BLOOD PRESSURE: 163 MMHG | TEMPERATURE: 97.4 F | WEIGHT: 193 LBS | RESPIRATION RATE: 18 BRPM | BODY MASS INDEX: 29.35 KG/M2 | HEART RATE: 92 BPM | DIASTOLIC BLOOD PRESSURE: 114 MMHG | OXYGEN SATURATION: 100 %

## 2023-08-12 DIAGNOSIS — M54.42 ACUTE BILATERAL LOW BACK PAIN WITH LEFT-SIDED SCIATICA: Primary | ICD-10-CM

## 2023-08-12 DIAGNOSIS — F32.A DEPRESSION: ICD-10-CM

## 2023-08-12 PROCEDURE — 99284 EMERGENCY DEPT VISIT MOD MDM: CPT | Mod: 25

## 2023-08-12 PROCEDURE — 250N000012 HC RX MED GY IP 250 OP 636 PS 637: Performed by: PHYSICIAN ASSISTANT

## 2023-08-12 PROCEDURE — 96372 THER/PROPH/DIAG INJ SC/IM: CPT | Performed by: PHYSICIAN ASSISTANT

## 2023-08-12 PROCEDURE — 250N000011 HC RX IP 250 OP 636: Mod: JZ | Performed by: PHYSICIAN ASSISTANT

## 2023-08-12 PROCEDURE — 250N000013 HC RX MED GY IP 250 OP 250 PS 637: Performed by: PHYSICIAN ASSISTANT

## 2023-08-12 RX ORDER — LIDOCAINE 4 G/G
1 PATCH TOPICAL
Status: DISCONTINUED | OUTPATIENT
Start: 2023-08-12 | End: 2023-08-12

## 2023-08-12 RX ORDER — PREDNISONE 20 MG/1
60 TABLET ORAL ONCE
Status: COMPLETED | OUTPATIENT
Start: 2023-08-12 | End: 2023-08-12

## 2023-08-12 RX ORDER — KETOROLAC TROMETHAMINE 15 MG/ML
15 INJECTION, SOLUTION INTRAMUSCULAR; INTRAVENOUS ONCE
Status: DISCONTINUED | OUTPATIENT
Start: 2023-08-12 | End: 2023-08-12

## 2023-08-12 RX ORDER — PREDNISONE 20 MG/1
60 TABLET ORAL DAILY
Qty: 12 TABLET | Refills: 0 | Status: SHIPPED | OUTPATIENT
Start: 2023-08-12 | End: 2023-08-16

## 2023-08-12 RX ORDER — LIDOCAINE 4 G/G
1 PATCH TOPICAL EVERY 24 HOURS
Status: DISCONTINUED | OUTPATIENT
Start: 2023-08-12 | End: 2023-08-12 | Stop reason: HOSPADM

## 2023-08-12 RX ORDER — KETOROLAC TROMETHAMINE 15 MG/ML
15 INJECTION, SOLUTION INTRAMUSCULAR; INTRAVENOUS ONCE
Status: COMPLETED | OUTPATIENT
Start: 2023-08-12 | End: 2023-08-12

## 2023-08-12 RX ORDER — OXYCODONE HYDROCHLORIDE 5 MG/1
5 TABLET ORAL EVERY 6 HOURS PRN
Qty: 12 TABLET | Refills: 0 | Status: SHIPPED | OUTPATIENT
Start: 2023-08-12 | End: 2023-08-15

## 2023-08-12 RX ORDER — OXYCODONE HYDROCHLORIDE 5 MG/1
5 TABLET ORAL ONCE
Status: COMPLETED | OUTPATIENT
Start: 2023-08-12 | End: 2023-08-12

## 2023-08-12 RX ADMIN — LIDOCAINE 1 PATCH: 246 PATCH TOPICAL at 16:33

## 2023-08-12 RX ADMIN — OXYCODONE HYDROCHLORIDE 5 MG: 5 TABLET ORAL at 16:34

## 2023-08-12 RX ADMIN — KETOROLAC TROMETHAMINE 15 MG: 15 INJECTION, SOLUTION INTRAMUSCULAR; INTRAVENOUS at 16:33

## 2023-08-12 RX ADMIN — PREDNISONE 60 MG: 20 TABLET ORAL at 16:33

## 2023-08-12 ASSESSMENT — ACTIVITIES OF DAILY LIVING (ADL): ADLS_ACUITY_SCORE: 35

## 2023-08-12 NOTE — DISCHARGE INSTRUCTIONS
Continue with ibuprofen and Tylenol for pain.  Continue with tizanidine and amitriptyline as previously prescribed.  Prednisone as prescribed.  Your next dose will be tomorrow morning.  Take with food to help with stomach upset.  Please take the steroid, Prednisone, for the full course as prescribed. Take Prednisone with food or milk to minimize stomach upset.   Sideeffects of Prednisone include difficulty sleeping, increased appetite, weight gain, and changes in mood. If you are diabetic, please monitor your blood sugar regularly while taking this medicine as Prednisone can cause highblood sugar.     I have written a very small prescription of oxycodone to help with pain over the weekend.  You will need to call your primary provider first thing Monday morning to let them know of this prescription and for any further pain management.     I also recommend calling your therapist first thing on Monday to reestablish care.    Follow-up in clinic early next week for recheck.  Return to the emergency department if you develop worsening pain, fevers, loss of bowel or bladder control, new weakness, worsening or changing abdominal pain, thoughts of self-harm or hurting others or worsening depression, or any other concerning symptoms.  We would be happy to see you.

## 2023-08-12 NOTE — TELEPHONE ENCOUNTER
Caller is reporting pain in left lower leg unrelieved with OTC analgesia , topical pain med,   Seen in clinic 3 days ago and Rxed with amitriptyline which she has take x 2; States  pain  is severe and is out of oxycodone   for past week and not  due for refill. (Has pain contract)   States pain worsened after PT and did  get better with meds an self care  Stteshe has  worsening muscle twitches in her  extremities with pain.   Protocol switched when patient expressed  extreme hopelessness.   Caller states her depression has worsened with suicidal thoughts but no plan; not sleeping , crying ; is only on Amitriptiline now and wonders if it is making things worse after 2 days   Triage protocol reviewed   Advised that  depression with some SI is of main concern after triage and advised ED assessment  now   Caller has a ride and will proceed to [facility] ED  Linda Puga RN  FNA             Reason for Disposition   Muscle jerks, twitches, or spasm of the body or body part   [1] Depression AND [2] unable to do any of normal activities (e.g., self care, school, work; in comparison to baseline).    Additional Information   Negative: Shock suspected (e.g., cold/pale/clammy skin, too weak to stand, low BP, rapid pulse)   Negative: Difficult to awaken or acting confused (e.g., disoriented, slurred speech)   Negative: Sounds like a life-threatening emergency to the triager   Negative: Chest pain   Negative: Arm pains with exertion (e.g., walking)   Negative: Muscle aches from influenza (flu) suspected   Negative: Muscle aches from heat exposure suspected   Negative: Sickle cell pain crisis (sickle cell attack) suspected   Negative: Lyme disease suspected (e.g., bull's eye rash or tick bite / exposure in past month)   Negative: Pain only in back   Negative: Difficult to awaken or acting confused (e.g., disoriented, slurred speech)   Negative: Sounds like a life-threatening emergency to the triager   Negative: Abnormal twitch,  blinking, tic, or spasm of eyelid(s)   Negative: Sounds like a seizure (generalized or focal)   Negative: Suspected alcohol withdrawal   Negative: Suspected substance use (drug use), addiction, or withdrawal   Negative: [1] Shivering or chills AND [2] fever   Negative: [1] Shivering or chills AND [2] cold exposure (R/O hypothermia)   Negative: Face, arm, or leg weakness   Negative: [1] Muscle rigidity or tightness AND [2] present now   Negative: [1] New-onset muscle jerks (twitches, spasms) AND [2] present now   Negative: [1] New-onset muscle jerks AND [2] episode lasts > 1 minute AND [3] resolved   Negative: Patient sounds very sick or weak to the triager   Negative: Patient attempted suicide   Negative: Patient is threatening suicide now   Negative: Violent behavior, or threatening to physically hurt or kill someone   Negative: [1] Patient is very confused (disoriented, slurred speech) AND [2] no other adult (e.g., friend or family member) available   Negative: [1] Difficult to awaken or acting very confused (disoriented, slurred speech) AND [2] new-onset   Negative: Sounds like a life-threatening emergency to the triager   Negative: Suicide thoughts, threats, attempts, or questions   Negative: Questions or concerns about alcohol use, unhealthy alcohol use, binge drinking, intoxication, or withdrawal   Negative: Questions or concerns about substance use (drug use), unhealthy drug use, intoxication, or withdrawal   Negative: Questions or concerns about bipolar disorder (manic depression)   Negative: Questions or concerns about depression during the postpartum period (< 1 year since delivery)    Protocols used: Muscle Aches and Body Pain-A-AH, Muscle Jerks - Tics - Jyydckku-O-BC, Depression-A-AH

## 2023-08-12 NOTE — ED TRIAGE NOTES
"Pt presents to the ED with c/o left leg pain that has been getting worse for the last 4 to 5 days. Pt endorses to chronic leg pain. Reports \"electric shock pains\" that go from toes to groin. Pt just started taking amitriptyline on 8/9.      Triage Assessment       Row Name 08/12/23 [id number]       Triage Assessment (Adult)    Airway WDL WDL       Respiratory WDL    Respiratory WDL WDL       Skin Circulation/Temperature WDL    Skin Circulation/Temperature WDL WDL       Cardiac WDL    Cardiac WDL WDL       Peripheral/Neurovascular WDL    Peripheral Neurovascular WDL WDL       Cognitive/Neuro/Behavioral WDL    Cognitive/Neuro/Behavioral WDL WDL                    "

## 2023-08-12 NOTE — ED PROVIDER NOTES
EMERGENCY DEPARTMENT ENCOUNTER      NAME: Angelia Stallings  AGE: 48 year old female  YOB: 1975  MRN: [id number]  EVALUATION DATE & TIME: 8/12/2023  3:12 PM    PCP: Calixto Leblanc    ED PROVIDER: Tita Shafer PA-C      Chief Complaint   Patient presents with    Leg Pain         FINAL IMPRESSION:  1. Acute bilateral low back pain with left-sided sciatica    2. Depression          ED COURSE & MEDICAL DECISION MAKING:    3:41 PM I introduced myself to patient, performed initial HPI and examination.   5:16 PM Discussed plan for discharge    Angelia Stallings is a 48 year old female who presents to the emergency department today for evaluation of back pain, radiating to left lower leg.  Patient has ongoing history of sciatica.  Reports pain is gradually worsened, but this pain is ultimately the same pain she has felt for years.  Does have some paresthesias to the great toe.  No loss of bowel or bladder control.  No saddle paresthesias.  Does have intermittent left lower quadrant abdominal pain and a known ovarian cyst for which she is working with OB/GYN for.  Follows with spine clinic, no recent epidural injections. Has been on steroids historically.  Has a pain contract agreed with primary care, gets 20 tablets of oxycodone once a month.  Does not have any at the moment.  Recently started amitriptyline.  Otherwise managed pain with Tylenol, ibuprofen, tizanidine.    Patient reports in starting the amitriptyline and with her worsening pain she is feeling helpless.  Has had thoughts of cutting her wrists intermittently but does not think she would ever act on this.  Does not wish to die.  Ultimately no active homicidal or suicidal ideation.  Patient has a good support system.  Has had a therapist previously but is not currently established.  I do not see any evidence of acute mental health crisis and no emergent evaluation indicated at this time.  Patient feels safe returning home with partner and  discussed ED return precautions if the symptoms were to worsen.  Ultimately I think it is more of a stress response to her worsening chronic pain    Differential diagnosis for back pain includes muscle spasm/strain, slipped disc w/ radicular pain including sciatica, slipped disc w/ cauda equina syndrome,vertebral fracture, vertebral tumor, epidural abscess / discitis, or pyelonephritis.    I do not believe a fracture to be the source of this patient's pain as there was no preceding trauma.  Based on this, no imaging of the spine was done.    I do not believe the pain is caused by an epidural abscess as the patient denies hx of IV drug use and does not have fevers/chills and no recent procedures.    I do not believe this patient's pain is from an infiltrative vertebral tumor as the patient does not have weight loss or night sweats and no known history of cancer.    I do not believe this patient's pain represents a rupture AAA.  There is no palpable, pulsatile mass on exam.  Does have intermittent left lower quadrant abdominal pain.  History of known ovarian cyst measuring approximately 3 cm.  She has established OB/GYN.  Presentation not consistent with ovarian torsion.  No indication for emergent imaging at this time.  Otherwise abdomen is benign, low suspicion for other intra-abdominal etiology.  Patient do not have urinary symptoms or CVA tenderness to suggest pyelonephritis.      Based on history and exam, the most likely etiology of this patient's back pain is sciatica, muscle spasm/strain.  It could be due to a slipped disc.  Emergent MRI is not indicated as this patient does not have new weakness or cauda equina syndrome.  Patient has no bowel or bladder incontinence.    Patient was given medicines in the emergency department: Toradol, oxycodone, lidocaine patch, and prednisone.  Discussed continued at home management with Tylenol, ibuprofen, tizanidine, amitriptyline.  Will discharge with a prescription for  prednisone.  Given that it is a Saturday and patient would not be able to follow-up with primary until Monday at the earliest, she was given a small prescription of oxycodone to bridge her through the weekend.  Discussed appropriate use, reporting to her primary doctor first thing Monday, close follow-up with PCP for further pain management.  Patient verbalizes understanding and agreement.  Discussed red flag/indications to return to the emergency department.  All questions answered the best my ability and patient is discharged in stable and ambulatory condition with significant other.      Medical Decision Making    History:  Supplemental history from: Caregiver  External Record(s) reviewed: Documented in chart, if applicable.    Work Up:  Chart documentation includes differential considered and any EKGs or imaging independently interpreted by provider.  In additional to work up documented, I considered the following work up: Documented in chart, if applicable.    External consultation:  Discussion of management with another provider: Documented in chart, if applicable    Complicating factors:  Care impacted by chronic illness: Other: See above  Care affected by social determinants of health: N/A    Disposition considerations: Discharge. I prescribed additional prescription strength medication(s) as charted. See documentation for any additional details.      MEDICATIONS GIVEN IN THE EMERGENCY:  Medications   Lidocaine (LIDOCARE) 4 % Patch 1 patch (1 patch Transdermal $Patch/Med Applied 8/12/23 1633)   oxyCODONE (ROXICODONE) tablet 5 mg (5 mg Oral $Given 8/12/23 1634)   predniSONE (DELTASONE) tablet 60 mg (60 mg Oral $Given 8/12/23 1633)   ketorolac (TORADOL) injection 15 mg (15 mg Intramuscular $Given 8/12/23 1633)       NEW PRESCRIPTIONS STARTED AT TODAY'S ER VISIT  Discharge Medication List as of 8/12/2023  5:46 PM        START taking these medications    Details   oxyCODONE (ROXICODONE) 5 MG tablet Take 1 tablet  (5 mg) by mouth every 6 hours as needed for pain, Disp-12 tablet, R-0, Local Print      predniSONE (DELTASONE) 20 MG tablet Take 3 tablets (60 mg) by mouth daily for 4 days, Disp-12 tablet, R-0, Local Print                =================================================================    HPI    Patient information was obtained from: Patient    Use of : N/A        Angelia Stallings is a 48 year old female with a pertinent history of fibromyalgia, chronic low back pain, sciatica, polyneuropathy who presents to this ED for evaluation of left leg pain.    Reports worsening pains. Has chronic pain down back of left leg radiating into hip and left lower abdomen.     Was seen on , started on Amitriptyline. PT that night working on plantar fasciitis, pain started getting worse.     Unable to sleep, lay down or get comfortable due to pain.    4 advil, 2 tylenol this morning for pain.  Tizanidine, amitriptyline last night for pain.      with last prescription  for #20 oxycodone 5 mg. Has a pain agreement signed with PCP.   REVIEW OF SYSTEMS   ROS negative unless otherwise stated in HPI    PAST MEDICAL HISTORY:  Past Medical History:   Diagnosis Date    Allergic rhinitis     Anxiety     Brain aneurysm 2015    s/p clipping,     Hiatal hernia     Repaired in 2018 with Nissen.    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    History of hemolysis, elevated liver enzymes, and low platelet (HELLP) syndrome     History of miscarriage 2004    Hypertensive disorder 2015     (normal spontaneous vaginal delivery)     PONV (postoperative nausea and vomiting)        PAST SURGICAL HISTORY:  Past Surgical History:   Procedure Laterality Date    CRANIOTOMY, REPAIR ANEURYSM, COMBINED  2/19/15    MCA aneurysm clipping x 2    CRYOCAUTERY OF CERVIX      Description: Cervix Cryosurgery;  Recorded: 2008;  Comments: TRUE I    DILATION AND CURETTAGE, OPERATIVE HYSTEROSCOPY, COMBINED N/A 2015    Procedure:  DILATION AND CURETTAGE WITH HYSTEROSCOPY ;  Surgeon: Catarina Lehman DO;  Location: [facility];  Service:     HC DILATION/CURETTAGE DIAG/THER NON OB      Description: Dilation And Curettage;  Proc Date: 2004;  Comments: FOR MISCARRIAGE    HYSTEROSCOPY W/ ENDOMETRIAL ABLATION  12/15/2015    MOUTH SURGERY      MN LAP,ESOPHAGOGAST FUNDOPLASTY N/A 3/1/2018    Procedure: ROBOTIC NISSEN FUNDOPLICATION;  Surgeon: Leo Quezada DO;  Location: [facility];  Service: General       CURRENT MEDICATIONS:    oxyCODONE (ROXICODONE) 5 MG tablet  predniSONE (DELTASONE) 20 MG tablet  amitriptyline (ELAVIL) 10 MG tablet  Cholecalciferol (VITAMIN D3) 75 MCG (3000 UT) TABS  naproxen (NAPROSYN) 500 MG tablet  oxyCODONE-acetaminophen (PERCOCET) 5-325 MG tablet  tiZANidine (ZANAFLEX) 4 MG tablet  tiZANidine (ZANAFLEX) 4 MG tablet        ALLERGIES:  Allergies   Allergen Reactions    Cat Dander [Animal Dander] Itching and Shortness Of Breath    [id number] Antimigraine Agents [Sumatriptan] Unknown     Hx cerebral aneurysms, s/p clipping    Watermelon [Citrullus Vulgaris] Unknown    Penicillins Hives and Rash    Sulfa (Sulfonamide Antibiotics) [Sulfa Antibiotics] Hives and Rash       FAMILY HISTORY:  Family History   Problem Relation Age of Onset    Hypertension Father     Cerebrovascular Disease Father     Heart Disease Father     Hyperlipidemia Father     Depression Brother     Allergies Brother     Cancer Maternal Uncle         cervical    Heart Disease Paternal Grandfather     Hypertension Paternal Grandfather     Breast Cancer Paternal Grandmother 90.00    No Known Problems Mother        SOCIAL HISTORY:   Social History     Socioeconomic History    Marital status: Single    Number of children: 1   Tobacco Use    Smoking status: Former     Packs/day: 0.25     Years: 15.00     Pack years: 3.75     Types: Cigarettes     Quit date: 2009     Years since quittin.6     Passive exposure: Past    Smokeless  tobacco: Never    Tobacco comments:     No exposure   Vaping Use    Vaping Use: Never used   Substance and Sexual Activity    Alcohol use: Yes     Comment: Alcoholic Drinks/day: rare    Drug use: No    Sexual activity: Yes     Partners: Male       VITALS:  BP (!) 163/114   Pulse 92   Temp 97.4  F (36.3  C) (Temporal)   Resp 18   Wt 87.5 kg (193 lb)   LMP  (LMP Unknown)   SpO2 100%   BMI 29.35 kg/m      PHYSICAL EXAM    Constitutional: Well developed, Well nourished, NAD, GCS 15   HENT: Normocephalic, Atraumatic.   Neck- Supple, Nontender.   Eyes: Conjunctiva normal.   Respiratory: No respiratory distress, speaking in full sentences.   Cardiovascular: Normal heart rate, Regular rhythm, No murmurs. Pulses WNL   GI: Soft, mild LLQ abdominal tenderness into left flank. No CVAT.    Musculoskeletal: No deformities, Moves all extremities equally. Mild tenderness along the midline low back, moreso into left SI joint.   Integument: Warm, Dry, No erythema, ecchymosis, or rash.  Neurologic: Alert & oriented x 3, Normal sensory function. No focal deficits.   Psychiatric: Affect normal, Judgment normal, Mood normal. Cooperative.      LAB:  All pertinent labs reviewed and interpreted.       RADIOLOGY:  Reviewed all pertinent imaging. Please see official radiology report.  No orders to display       EKG:    None    PROCEDURES:   None        Tita Shafer PA-C  Emergency Medicine  [facility] EMERGENCY ROOM  [address] [id number] [phone]             Tita Shafer PA-C  08/12/23 5295

## 2023-09-08 ENCOUNTER — MYC REFILL (OUTPATIENT)
Dept: FAMILY MEDICINE | Facility: CLINIC | Age: 48
End: 2023-09-08
Payer: COMMERCIAL

## 2023-09-08 DIAGNOSIS — G89.29 OTHER CHRONIC PAIN: Primary | ICD-10-CM

## 2023-09-08 RX ORDER — OXYCODONE AND ACETAMINOPHEN 5; 325 MG/1; MG/1
1 TABLET ORAL EVERY 6 HOURS PRN
Qty: 20 TABLET | Refills: 0 | Status: SHIPPED | OUTPATIENT
Start: 2023-09-08 | End: 2023-10-05

## 2023-09-11 ENCOUNTER — TELEPHONE (OUTPATIENT)
Dept: FAMILY MEDICINE | Facility: CLINIC | Age: 48
End: 2023-09-11

## 2023-09-11 NOTE — TELEPHONE ENCOUNTER
Patient Returning Call    Reason for call:  returning call    Information relayed to patient:  no, states fax number is not needed. Medical director at [facility] is going to take referral and start the referral process and will call patient to clarify if they want to do a pain management evaluation. Patient will be contacted to schedule by [facility].    States nothing is needed from the [facility] at this time.     Patient has additional questions:  No

## 2023-09-11 NOTE — TELEPHONE ENCOUNTER
Forms Request  Name of form/paperwork: [facility]  Have you been seen for this request:   Do we have the form: on Nawaf's desk  When is form needed by: when done  How would you like the form returned: call   Patient Notified form requests are processed in 3-5 business days: yes    Okay to leave a detailed message?

## 2023-09-11 NOTE — TELEPHONE ENCOUNTER
Contacted [facility] for referral fax number. Staff unavailable at this time. Will call us back with fax number.

## 2023-10-05 ENCOUNTER — PATIENT OUTREACH (OUTPATIENT)
Dept: CARE COORDINATION | Facility: CLINIC | Age: 48
End: 2023-10-05
Payer: COMMERCIAL

## 2023-10-05 ENCOUNTER — MYC REFILL (OUTPATIENT)
Dept: FAMILY MEDICINE | Facility: CLINIC | Age: 48
End: 2023-10-05
Payer: COMMERCIAL

## 2023-10-05 DIAGNOSIS — G89.29 OTHER CHRONIC PAIN: ICD-10-CM

## 2023-10-05 RX ORDER — OXYCODONE AND ACETAMINOPHEN 5; 325 MG/1; MG/1
1 TABLET ORAL EVERY 6 HOURS PRN
Qty: 20 TABLET | Refills: 0 | Status: SHIPPED | OUTPATIENT
Start: 2023-10-05 | End: 2023-10-26

## 2023-10-24 ENCOUNTER — TRANSFERRED RECORDS (OUTPATIENT)
Dept: HEALTH INFORMATION MANAGEMENT | Facility: CLINIC | Age: 48
End: 2023-10-24
Payer: COMMERCIAL

## 2023-10-26 ENCOUNTER — MYC REFILL (OUTPATIENT)
Dept: FAMILY MEDICINE | Facility: CLINIC | Age: 48
End: 2023-10-26
Payer: COMMERCIAL

## 2023-10-26 DIAGNOSIS — G89.29 OTHER CHRONIC PAIN: ICD-10-CM

## 2023-10-27 RX ORDER — OXYCODONE AND ACETAMINOPHEN 5; 325 MG/1; MG/1
1 TABLET ORAL EVERY 6 HOURS PRN
Qty: 20 TABLET | Refills: 0 | Status: SHIPPED | OUTPATIENT
Start: 2023-10-27 | End: 2023-11-13

## 2023-11-11 PROBLEM — R25.2 CRAMP OF LIMB: Status: ACTIVE | Noted: 2022-06-30

## 2023-11-11 PROBLEM — G60.9 HEREDITARY AND IDIOPATHIC PERIPHERAL NEUROPATHY: Status: ACTIVE | Noted: 2022-06-30

## 2023-11-13 ENCOUNTER — OFFICE VISIT (OUTPATIENT)
Dept: FAMILY MEDICINE | Facility: CLINIC | Age: 48
End: 2023-11-13
Payer: COMMERCIAL

## 2023-11-13 VITALS
BODY MASS INDEX: 28.58 KG/M2 | OXYGEN SATURATION: 99 % | HEIGHT: 69 IN | DIASTOLIC BLOOD PRESSURE: 84 MMHG | HEART RATE: 96 BPM | TEMPERATURE: 98.2 F | WEIGHT: 193 LBS | RESPIRATION RATE: 16 BRPM | SYSTOLIC BLOOD PRESSURE: 132 MMHG

## 2023-11-13 DIAGNOSIS — D64.9 ANEMIA, UNSPECIFIED TYPE: ICD-10-CM

## 2023-11-13 DIAGNOSIS — G89.4 CHRONIC PAIN SYNDROME: ICD-10-CM

## 2023-11-13 DIAGNOSIS — G89.29 CHRONIC BILATERAL LOW BACK PAIN WITH SCIATICA, SCIATICA LATERALITY UNSPECIFIED: ICD-10-CM

## 2023-11-13 DIAGNOSIS — M54.40 CHRONIC BILATERAL LOW BACK PAIN WITH SCIATICA, SCIATICA LATERALITY UNSPECIFIED: ICD-10-CM

## 2023-11-13 DIAGNOSIS — Z00.00 ANNUAL PHYSICAL EXAM: Primary | ICD-10-CM

## 2023-11-13 DIAGNOSIS — K44.9 HIATAL HERNIA WITH GERD: ICD-10-CM

## 2023-11-13 DIAGNOSIS — I10 BENIGN ESSENTIAL HYPERTENSION: ICD-10-CM

## 2023-11-13 DIAGNOSIS — F41.1 ANXIETY STATE: ICD-10-CM

## 2023-11-13 DIAGNOSIS — Z13.220 SCREENING FOR HYPERLIPIDEMIA: ICD-10-CM

## 2023-11-13 DIAGNOSIS — Z98.890 S/P CRANIOTOMY: ICD-10-CM

## 2023-11-13 DIAGNOSIS — G43.009 MIGRAINE WITHOUT AURA AND WITHOUT STATUS MIGRAINOSUS, NOT INTRACTABLE: ICD-10-CM

## 2023-11-13 DIAGNOSIS — E78.5 HYPERLIPIDEMIA LDL GOAL <100: ICD-10-CM

## 2023-11-13 DIAGNOSIS — G89.29 OTHER CHRONIC PAIN: ICD-10-CM

## 2023-11-13 DIAGNOSIS — Z87.42 HISTORY OF ABNORMAL CERVICAL PAPANICOLAOU SMEAR: ICD-10-CM

## 2023-11-13 DIAGNOSIS — E53.8 VITAMIN B12 DEFICIENCY (NON ANEMIC): ICD-10-CM

## 2023-11-13 DIAGNOSIS — K21.9 HIATAL HERNIA WITH GERD: ICD-10-CM

## 2023-11-13 DIAGNOSIS — F32.4 MAJOR DEPRESSION SINGLE EPISODE, IN PARTIAL REMISSION (H): ICD-10-CM

## 2023-11-13 LAB
ALBUMIN SERPL BCG-MCNC: 4.4 G/DL (ref 3.5–5.2)
ALP SERPL-CCNC: 47 U/L (ref 35–104)
ALT SERPL W P-5'-P-CCNC: 19 U/L (ref 0–50)
ANION GAP SERPL CALCULATED.3IONS-SCNC: 11 MMOL/L (ref 7–15)
AST SERPL W P-5'-P-CCNC: 19 U/L (ref 0–45)
BILIRUB SERPL-MCNC: 0.6 MG/DL
BUN SERPL-MCNC: 12.4 MG/DL (ref 6–20)
CALCIUM SERPL-MCNC: 9.2 MG/DL (ref 8.6–10)
CHLORIDE SERPL-SCNC: 101 MMOL/L (ref 98–107)
CHOLEST SERPL-MCNC: 217 MG/DL
CREAT SERPL-MCNC: 0.94 MG/DL (ref 0.51–0.95)
CREAT UR-MCNC: 282 MG/DL
DEPRECATED HCO3 PLAS-SCNC: 25 MMOL/L (ref 22–29)
EGFRCR SERPLBLD CKD-EPI 2021: 74 ML/MIN/1.73M2
ERYTHROCYTE [DISTWIDTH] IN BLOOD BY AUTOMATED COUNT: 12.9 % (ref 10–15)
GLUCOSE SERPL-MCNC: 87 MG/DL (ref 70–99)
HBA1C MFR BLD: 5.2 % (ref 0–5.6)
HCT VFR BLD AUTO: 40.8 % (ref 35–47)
HDLC SERPL-MCNC: 56 MG/DL
HGB BLD-MCNC: 13.2 G/DL (ref 11.7–15.7)
LDLC SERPL CALC-MCNC: 134 MG/DL
MCH RBC QN AUTO: 27.4 PG (ref 26.5–33)
MCHC RBC AUTO-ENTMCNC: 32.4 G/DL (ref 31.5–36.5)
MCV RBC AUTO: 85 FL (ref 78–100)
NONHDLC SERPL-MCNC: 161 MG/DL
PLATELET # BLD AUTO: 312 10E3/UL (ref 150–450)
POTASSIUM SERPL-SCNC: 4.1 MMOL/L (ref 3.4–5.3)
PROT SERPL-MCNC: 7.2 G/DL (ref 6.4–8.3)
RBC # BLD AUTO: 4.81 10E6/UL (ref 3.8–5.2)
SODIUM SERPL-SCNC: 137 MMOL/L (ref 135–145)
TRIGL SERPL-MCNC: 135 MG/DL
VIT B12 SERPL-MCNC: 481 PG/ML (ref 232–1245)
VIT D+METAB SERPL-MCNC: 28 NG/ML (ref 20–50)
WBC # BLD AUTO: 7.8 10E3/UL (ref 4–11)

## 2023-11-13 PROCEDURE — 90686 IIV4 VACC NO PRSV 0.5 ML IM: CPT | Performed by: PHYSICIAN ASSISTANT

## 2023-11-13 PROCEDURE — G0480 DRUG TEST DEF 1-7 CLASSES: HCPCS | Performed by: PHYSICIAN ASSISTANT

## 2023-11-13 PROCEDURE — 80061 LIPID PANEL: CPT | Performed by: PHYSICIAN ASSISTANT

## 2023-11-13 PROCEDURE — 90480 ADMN SARSCOV2 VAC 1/ONLY CMP: CPT | Performed by: PHYSICIAN ASSISTANT

## 2023-11-13 PROCEDURE — 82306 VITAMIN D 25 HYDROXY: CPT | Performed by: PHYSICIAN ASSISTANT

## 2023-11-13 PROCEDURE — 83036 HEMOGLOBIN GLYCOSYLATED A1C: CPT | Performed by: PHYSICIAN ASSISTANT

## 2023-11-13 PROCEDURE — 99396 PREV VISIT EST AGE 40-64: CPT | Mod: 25 | Performed by: PHYSICIAN ASSISTANT

## 2023-11-13 PROCEDURE — 36415 COLL VENOUS BLD VENIPUNCTURE: CPT | Performed by: PHYSICIAN ASSISTANT

## 2023-11-13 PROCEDURE — 80053 COMPREHEN METABOLIC PANEL: CPT | Performed by: PHYSICIAN ASSISTANT

## 2023-11-13 PROCEDURE — 99214 OFFICE O/P EST MOD 30 MIN: CPT | Mod: 25 | Performed by: PHYSICIAN ASSISTANT

## 2023-11-13 PROCEDURE — 82607 VITAMIN B-12: CPT | Performed by: PHYSICIAN ASSISTANT

## 2023-11-13 PROCEDURE — 91320 SARSCV2 VAC 30MCG TRS-SUC IM: CPT | Performed by: PHYSICIAN ASSISTANT

## 2023-11-13 PROCEDURE — 90471 IMMUNIZATION ADMIN: CPT | Performed by: PHYSICIAN ASSISTANT

## 2023-11-13 PROCEDURE — 85027 COMPLETE CBC AUTOMATED: CPT | Performed by: PHYSICIAN ASSISTANT

## 2023-11-13 RX ORDER — FLUOXETINE 10 MG/1
10 CAPSULE ORAL DAILY
Qty: 90 CAPSULE | Refills: 3 | Status: SHIPPED | OUTPATIENT
Start: 2023-11-13 | End: 2023-12-01

## 2023-11-13 RX ORDER — OXYCODONE AND ACETAMINOPHEN 5; 325 MG/1; MG/1
1 TABLET ORAL EVERY 6 HOURS PRN
Qty: 20 TABLET | Refills: 0 | Status: SHIPPED | OUTPATIENT
Start: 2023-11-13 | End: 2023-12-01

## 2023-11-13 ASSESSMENT — ENCOUNTER SYMPTOMS
WEAKNESS: 0
FEVER: 0
CONSTIPATION: 0
MYALGIAS: 1
NERVOUS/ANXIOUS: 1
SHORTNESS OF BREATH: 0
CHILLS: 0
DIARRHEA: 0
HEMATOCHEZIA: 0
DIZZINESS: 0
HEARTBURN: 1
FREQUENCY: 0
DYSURIA: 0
PARESTHESIAS: 0
HEADACHES: 0
ARTHRALGIAS: 0
COUGH: 0
ABDOMINAL PAIN: 1
JOINT SWELLING: 0
NAUSEA: 1
HEMATURIA: 0
SORE THROAT: 0
PALPITATIONS: 1
EYE PAIN: 0
SHORTNESS OF BREATH: 1

## 2023-11-13 ASSESSMENT — ANXIETY QUESTIONNAIRES
GAD7 TOTAL SCORE: 6
6. BECOMING EASILY ANNOYED OR IRRITABLE: SEVERAL DAYS
GAD7 TOTAL SCORE: 6
3. WORRYING TOO MUCH ABOUT DIFFERENT THINGS: SEVERAL DAYS
7. FEELING AFRAID AS IF SOMETHING AWFUL MIGHT HAPPEN: SEVERAL DAYS
1. FEELING NERVOUS, ANXIOUS, OR ON EDGE: SEVERAL DAYS
IF YOU CHECKED OFF ANY PROBLEMS ON THIS QUESTIONNAIRE, HOW DIFFICULT HAVE THESE PROBLEMS MADE IT FOR YOU TO DO YOUR WORK, TAKE CARE OF THINGS AT HOME, OR GET ALONG WITH OTHER PEOPLE: SOMEWHAT DIFFICULT
5. BEING SO RESTLESS THAT IT IS HARD TO SIT STILL: SEVERAL DAYS
2. NOT BEING ABLE TO STOP OR CONTROL WORRYING: SEVERAL DAYS

## 2023-11-13 ASSESSMENT — PATIENT HEALTH QUESTIONNAIRE - PHQ9
5. POOR APPETITE OR OVEREATING: NOT AT ALL
10. IF YOU CHECKED OFF ANY PROBLEMS, HOW DIFFICULT HAVE THESE PROBLEMS MADE IT FOR YOU TO DO YOUR WORK, TAKE CARE OF THINGS AT HOME, OR GET ALONG WITH OTHER PEOPLE: SOMEWHAT DIFFICULT
SUM OF ALL RESPONSES TO PHQ QUESTIONS 1-9: 7
SUM OF ALL RESPONSES TO PHQ QUESTIONS 1-9: 7

## 2023-11-13 NOTE — PROGRESS NOTES
SUBJECTIVE:   CC: Angelia is an 48 year old who presents for preventive health visit.       11/13/2023     7:18 AM   Additional Questions   Roomed by Gen NGOZI CMA       Angelia is a pleasant 48-year-old female who presents to the clinic today for annual physical.  Past medical history significant for anxiety, depression, chronic migraines, hiatal hernia with GERD, cerebral aneurysm status post clipping, chronic pain syndrome, and polyneuropathy to the left lower extremity.  She is feeling well today.  No questions or concerns regarding her chronic health    She has been suffering from left-sided lower back hip and ankle pain for some time.  She has been seen orthopedics who recently did a lumbar injection last week.  She is also continuing to do physical therapy.  She did have a MRI of her lumbar spine about a year ago which showed bulged disc in the L4-L5 region.  She has not noticed much improvement from the steroid injection.    She was having multiple joint pain with chronic pain so she was referred to rheumatology December 2022.  Work-up was negative at that time.  She was then referred back to primary care.    Hiatal hernia with GERD.  Diagnosed back in 2018 and underwent a Nissen procedure.  He was having worsening reflux June 2022 which showed partial dehiscent of the Nissen but due to COVID she did not follow-up.  She feels that her symptoms are well controlled she is not having any reflux symptoms or dysphagia at this time    History of a cerebral aneurysm status post clipping in 2015.  Last MRI angio of head 3/2022 did not show any evidence of new aneurysms.    Underlying anxiety and depression.  She has tried switching to amitriptyline and Cymbalta to see if this helps with her chronic pain.  Unfortunately she did not tolerate these medications well.  Prior to switching to the Cymbalta she was on citalopram and feels that her symptoms were well controlled on the citalopram although she was very  fatigued on it.  She has noted increased anxiety over the last few months and would like to try something else for the anxiety.        Healthy Habits:     Getting at least 3 servings of Calcium per day:  NO    Bi-annual eye exam:  Yes    Dental care twice a year:  Yes    Sleep apnea or symptoms of sleep apnea:  None    Diet:  Regular (no restrictions)    Frequency of exercise:  2-3 days/week    Duration of exercise:  15-30 minutes    Taking medications regularly:  Yes    Medication side effects:  Lightheadedness    Additional concerns today:  Yes      Today's PHQ-9 Score:       2023     7:02 AM   PHQ-9 SCORE   PHQ-9 Total Score MyChart 7 (Mild depression)   PHQ-9 Total Score 7           Social History     Tobacco Use     Smoking status: Former     Packs/day: 0.25     Years: 15.00     Additional pack years: 0.00     Total pack years: 3.75     Types: Cigarettes     Quit date: 2009     Years since quittin.8     Passive exposure: Past     Smokeless tobacco: Never     Tobacco comments:     No exposure   Substance Use Topics     Alcohol use: Yes     Comment: Alcoholic Drinks/day: rare             2023     7:06 AM   Alcohol Use   Prescreen: >3 drinks/day or >7 drinks/week? Not Applicable     Reviewed orders with patient.  Reviewed health maintenance and updated orders accordingly - Yes  Lab work is in process    Breast Cancer Screenin/4/2022     7:41 AM 2023     7:08 AM   Breast CA Risk Assessment (FHS-7)   Do you have a family history of breast, colon, or ovarian cancer? Yes No / Unknown       click delete button to remove this line now  Mammogram Screening: Recommended annual mammography  Pertinent mammograms are reviewed under the imaging tab.    History of abnormal Pap smear: NO - age 30-65 PAP every 5 years with negative HPV co-testing recommended      Latest Ref Rng & Units 2023    11:00 AM   PAP / HPV   PAP  Negative for Intraepithelial Lesion or Malignancy (NILM)    HPV  "16 DNA Negative Negative    HPV 18 DNA Negative Negative    Other HR HPV Negative Negative      Reviewed and updated as needed this visit by clinical staff   Tobacco  Allergies  Meds              Reviewed and updated as needed this visit by Provider                     Review of Systems   Constitutional:  Negative for chills and fever.   HENT:  Negative for congestion, ear pain, hearing loss and sore throat.    Eyes:  Negative for pain and visual disturbance.   Respiratory:  Negative for cough and shortness of breath.    Cardiovascular:  Positive for chest pain and palpitations. Negative for peripheral edema.   Gastrointestinal:  Positive for abdominal pain, heartburn and nausea. Negative for constipation, diarrhea and hematochezia.   Genitourinary:  Negative for dysuria, frequency, genital sores, hematuria and urgency.   Musculoskeletal:  Positive for myalgias. Negative for arthralgias and joint swelling.   Skin:  Negative for rash.   Neurological:  Negative for dizziness, weakness, headaches and paresthesias.   Psychiatric/Behavioral:  Positive for mood changes. The patient is nervous/anxious.           OBJECTIVE:   /84   Pulse 96   Temp 98.2  F (36.8  C) (Oral)   Resp 16   Ht 1.746 m (5' 8.75\")   Wt 87.5 kg (193 lb)   LMP  (LMP Unknown)   SpO2 99%   Breastfeeding No   BMI 28.71 kg/m    Physical Exam  Vitals and nursing note reviewed.   Constitutional:       General: She is not in acute distress.     Appearance: Normal appearance. She is well-developed and well-groomed. She is not ill-appearing or toxic-appearing.   HENT:      Head: Normocephalic and atraumatic.      Right Ear: Tympanic membrane, ear canal and external ear normal.      Left Ear: Tympanic membrane, ear canal and external ear normal.      Mouth/Throat:      Lips: Pink.      Mouth: Mucous membranes are moist.      Palate: No mass.      Pharynx: Oropharynx is clear. Uvula midline.      Tonsils: No tonsillar exudate or tonsillar " abscesses.   Eyes:      General: Lids are normal.         Right eye: No discharge.         Left eye: No discharge.   Neck:      Trachea: Trachea normal.   Cardiovascular:      Rate and Rhythm: Normal rate and regular rhythm.      Heart sounds: S1 normal and S2 normal. No murmur heard.  Pulmonary:      Effort: Pulmonary effort is normal.      Breath sounds: Normal breath sounds and air entry.   Abdominal:      General: Bowel sounds are normal. There is no distension.      Palpations: Abdomen is soft.      Tenderness: There is no abdominal tenderness. There is no guarding or rebound.   Musculoskeletal:      Cervical back: Full passive range of motion without pain and neck supple.      Right lower leg: No edema.      Left lower leg: No edema.   Lymphadenopathy:      Cervical: No cervical adenopathy.   Skin:     General: Skin is warm and dry.      Findings: No lesion or rash.   Neurological:      General: No focal deficit present.      Mental Status: She is alert.      Cranial Nerves: No cranial nerve deficit.      Gait: Gait is intact.      Deep Tendon Reflexes:      Reflex Scores:       Patellar reflexes are 2+ on the right side and 2+ on the left side.  Psychiatric:         Attention and Perception: Attention normal.         Mood and Affect: Mood normal.         Speech: Speech normal.         ASSESSMENT/PLAN:     Annual physical exam  We will update annual screening labs including a CBC, complete metabolic panel, lipid, A1c, vitamin D, and B12.  She will update flu and COVID today  - CBC with platelets; Future  - Comprehensive metabolic panel (BMP + Alb, Alk Phos, ALT, AST, Total. Bili, TP); Future  - Lipid panel reflex to direct LDL Fasting; Future  - Hemoglobin A1c; Future  - Vitamin D Deficiency; Future  - CBC with platelets  - Comprehensive metabolic panel (BMP + Alb, Alk Phos, ALT, AST, Total. Bili, TP)  - Lipid panel reflex to direct LDL Fasting  - Hemoglobin A1c  - Vitamin D Deficiency    S/P craniotomy, MCA  aneurysm clipping x 2  Status post clipping in 2015.  Last MR angio of head 3/23 did not show any evidence of new cerebral aneurysms.  She continues to follow-up with neurosurgery as needed.    Major depression single episode, in partial remission (H24)  Anxiety  Previously on citalopram prior to switching her to Cymbalta.  She has been off Cymbalta and amitriptyline.  She feels that her anxiety is not doing well off of SSRI.  We discussed a few options today and she would like to try fluoxetine.  We will start her at 10 mg daily.  Side effects of the medication were discussed.  She is to let me know how she is doing in 4 to 6 weeks  - FLUoxetine (PROZAC) 10 MG capsule; Take 1 capsule (10 mg) by mouth daily    Hiatal hernia with GERD  Status post Nissen procedure.  She is not currently on PPI or H2 blockers.  Symptoms have been well controlled at this time.  No dysphagia.    Anemia, unspecified type  Struve anemia most likely due to B12 deficiency.  We will check CBC level today    History of abnormal cervical Papanicolaou smear  Up-to-date on Pap.  [facility]    Chronic bilateral low back pain with sciatica, sciatica laterality unspecified  Chronic pain syndrome  Chronic left lower back, hip and left ankle pain.  She is still working with orthopedic surgery.  Recently underwent an injection and she still is doing physical therapy.  MRI of her lumbar spine about a year ago did show bulged disc with some mild narrowing at L4-L5.  She denies any bowel or bladder dysfunction.  Recommended having her continuing to follow-up with Ortho let me know if she needs anything in the meantime.  She is taking oxycodone as needed for the pain.  She has been on this for some time from previous primary care provider.  We did discuss the addiction properties of this medication.  She would like to wean off this in the future if her back pain gets better.  We will update urine drug screen today.   Controlled substance agreement updated today.  She is only getting 20 tablets a month.  - [id number] - Urine Drug Confirmation Panel (Comprehensive); Future  - [id number] - Urine Drug Confirmation Panel (Comprehensive)  - oxyCODONE-acetaminophen (PERCOCET) 5-325 MG tablet; Take 1 tablet by mouth every 6 hours as needed for pain    Chronic migraines  Stable at this time. Has not had issues with this in a month. No change in the characteristics of her migraines when she does get one.    Benign essential hypertension  History of elevated blood pressure in the past.  Blood pressure is 132/84.  She is not on any antihypertension medications at this time.    Vitamin B12 deficiency (non anemic)  We will check B12 level.  - Vitamin B12; Future  - Vitamin B12    Hyperlipidemia LDL goal <100  I will check fasting lipid today.  Last lipid panel 11/2022 was elevated with an LDL of 174, triglyceride 95, HDL of 63.  We discussed diet and exercise.  - Lipid panel reflex to direct LDL Fasting; Future  - Lipid panel reflex to direct LDL Fasting    Breast cancer screening  She is up-to-date on mammogram.  Last mammogram 3/23    Colon cancer screening  Up-to-date on colonoscopy.  Had this through [facility].    Cervical cancer screening  Up-to-date on Pap.  Last Pap 7/23     Follow-up Visit   Expected date:  May 13, 2024 (Approximate)      Follow Up Appointment Details:     Follow-up with whom?: Me    Follow-Up for what?: Chronic Disease f/u    Chronic Disease f/u: General (Other)    How?: In Person    Is this an as-needed follow-up?: No                   Current Outpatient Medications   Medication     Cholecalciferol (VITAMIN D3) 75 MCG (3000 UT) TABS     FLUoxetine (PROZAC) 10 MG capsule     oxyCODONE-acetaminophen (PERCOCET) 5-325 MG tablet     tiZANidine (ZANAFLEX) 4 MG tablet     No current facility-administered medications for this visit.             Patient has been advised of split billing requirements and indicates understanding:  "Yes      COUNSELING:  Reviewed preventive health counseling, as reflected in patient instructions       Regular exercise       Healthy diet/nutrition       Vision screening       Colorectal Cancer Screening      BMI:   Estimated body mass index is 28.71 kg/m  as calculated from the following:    Height as of this encounter: 1.746 m (5' 8.75\").    Weight as of this encounter: 87.5 kg (193 lb).   Weight management plan: Discussed healthy diet and exercise guidelines      She reports that she quit smoking about 14 years ago. Her smoking use included cigarettes. She has a 3.75 pack-year smoking history. She has been exposed to tobacco smoke. She has never used smokeless tobacco.          Calixto Leblanc PA-C  [facility]  Answers submitted by the patient for this visit:  Patient Health Questionnaire (Submitted on 11/13/2023)  If you checked off any problems, how difficult have these problems made it for you to do your work, take care of things at home, or get along with other people?: Somewhat difficult  PHQ9 TOTAL SCORE: 7    "

## 2023-11-13 NOTE — LETTER
Opioid / Opioid Plus Controlled Substance Agreement    This is an agreement between you and your provider about the safe and appropriate use of controlled substance/opioids prescribed by your care team. Controlled substances are medicines that can cause physical and mental dependence (abuse).    There are strict laws about having and using these medicines. We here at [facility] are committing to working with you in your efforts to get better. To support you in this work, we ll help you schedule regular office appointments for medicine refills. If we must cancel or change your appointment for any reason, we ll make sure you have enough medicine to last until your next appointment.     As a Provider, I will:  Listen carefully to your concerns and treat you with respect.   Recommend a treatment plan that I believe is in your best interest. This plan may involve therapies other than opioid pain medication.   Talk with you often about the possible benefits, and the risk of harm of any medicine that we prescribe for you.   Provide a plan on how to taper (discontinue or go off) using this medicine if the decision is made to stop its use.    As a Patient, I understand that opioid(s):   Are a controlled substance prescribed by my care team to help me function or work and manage my condition(s).   Are strong medicines and can cause serious side effects such as:  Drowsiness, which can seriously affect my driving ability  A lower breathing rate, enough to cause death  Harm to my thinking ability   Depression   Abuse of and addiction to this medicine  Need to be taken exactly as prescribed. Combining opioids with certain medicines or chemicals (such as illegal drugs, sedatives, sleeping pills, and benzodiazepines) can be dangerous or even fatal. If I stop opioids suddenly, I may have severe withdrawal symptoms.  Do not work for all types of pain nor for all patients. If they re not helpful, I may be asked to stop  them.        The risks, benefits and side effects of these medicine(s) were explained to me. I agree that:  I will take part in other treatments as advised by my care team. This may be psychiatry or counseling, physical therapy, behavioral therapy, group treatment or a referral to a specialist.     I will keep all my appointments. I understand that this is part of the monitoring of opioids. My care team may require an office visit for EVERY opioid/controlled substance refill. If I miss appointments or don t follow instructions, my care team may stop my medicine.    I will take my medicines as prescribed. I will not change the dose or schedule unless my care team tells me to. There will be no refills if I run out early.     I may be asked to come to the clinic and complete a urine drug test or complete a pill count at any time. If I don t give a urine sample or participate in a pill count, the care team may stop my medicine.    I will only receive prescriptions from this clinic for chronic pain. If I am treated by another provider for acute pain issues, I will tell them that I am taking opioid pain medication for chronic pain and that I have a treatment agreement with this provider. I will inform my [facility] care team within one business day if I am given a prescription for any pain medication by another healthcare provider. My [facility] care team can contact other providers and pharmacists about my use of any medicines.    It is up to me to make sure that I don t run out of my medicines on weekends or holidays. If my care team is willing to refill my opioid prescription without a visit, I must request refills only during office hours. Refills may take up to 3 business days to process. I will use one pharmacy to fill all my opioid and other controlled substance prescriptions. I will notify the clinic about any changes to my insurance or medication availability.    I am responsible for my  prescriptions. If the medicine/prescription is lost, stolen or destroyed, it will not be replaced. I also agree not to share controlled substance medicines with anyone.    I am aware I should not use any illegal or recreational drugs. I agree not to drink alcohol unless my care team says I can.       If I enroll in the [place] [facility] Cannabis program, I will tell my care team prior to my next refill.     I will tell my care team right away if I become pregnant, have a new medical problem treated outside of my regular clinic, or have a change in my medications.    I understand that this medicine can affect my thinking, judgment and reaction time. Alcohol and drugs affect the brain and body, which can affect the safety of my driving. Being under the influence of alcohol or drugs can affect my decision-making, behaviors, personal safety, and the safety of others. Driving while impaired (DWI) can occur if a person is driving, operating, or in physical control of a car, motorcycle, boat, snowmobile, ATV, motorbike, off-road vehicle, or any other motor vehicle ([place] Statute 169A.20). I understand the risk if I choose to drive or operate any vehicle or machinery.    I understand that if I do not follow any of the conditions above, my prescriptions or treatment may be stopped or changed.          Opioids  What You Need to Know    What are opioids?   Opioids are pain medicines that must be prescribed by a doctor. They are also known as narcotics.     Examples are:   morphine (MS Contin, Izzy)  oxycodone (Oxycontin)  oxycodone and acetaminophen (Percocet)  hydrocodone and acetaminophen (Vicodin, Norco)   fentanyl patch (Duragesic)   hydromorphone (Dilaudid)   methadone  codeine (Tylenol #3)     What do opioids do well?   Opioids are best for severe short-term pain such as after a surgery or injury. They may work well for cancer pain. They may help some people with long-lasting (chronic) pain.     What do opioids NOT do  well?   Opioids never get rid of pain entirely, and they don t work well for most patients with chronic pain. Opioids don t reduce swelling, one of the causes of pain.                                    Other ways to manage chronic pain and improve function include:     Treat the health problem that may be causing pain  Anti-inflammation medicines, which reduce swelling and tenderness, such as ibuprofen (Advil, Motrin) or naproxen (Aleve)  Acetaminophen (Tylenol)  Antidepressants and anti-seizure medicines, especially for nerve pain  Topical treatments such as patches or creams  Injections or nerve blocks  Chiropractic or osteopathic treatment  Acupuncture, massage, deep breathing, meditation, visual imagery, aromatherapy  Use heat or ice at the pain site  Physical therapy   Exercise  Stop smoking  Take part in therapy       Risks and side effects     Talk to your doctor before you start or decide to keep taking opioids. Possible side effects include:    Lowering your breathing rate enough to cause death  Overdose, including death, especially if taking higher than prescribed doses  Worse depression symptoms; less pleasure in things you usually enjoy  Feeling tired or sluggish  Slower thoughts or cloudy thinking  Being more sensitive to pain over time; pain is harder to control  Trouble sleeping or restless sleep  Changes in hormone levels (for example, less testosterone)  Changes in sex drive or ability to have sex  Constipation  Unsafe driving  Itching and sweating  Dizziness  Nausea, throwing up and dry mouth    What else should I know about opioids?    Opioids may lead to dependence, tolerance, or addiction.    Dependence means that if you stop or reduce the medicine too quickly, you will have withdrawal symptoms. These include loose poop (diarrhea), jitters, flu-like symptoms, nervousness and tremors. Dependence is not the same as addiction.                     Tolerance means needing higher doses over time to  get the same effect. This may increase the chance of serious side effects.    Addiction is when people improperly use a substance that harms their body, their mind or their relations with others. Use of opiates can cause a relapse of addiction if you have a history of drug or alcohol abuse.    People who have used opioids for a long time may have a lower quality of life, worse depression, higher levels of pain and more visits to doctors.    You can overdose on opioids. Take these steps to lower your risk of overdose:    Recognize the signs:  Signs of overdose include decrease or loss of consciousness (blackout), slowed breathing, trouble waking up and blue lips. If someone is worried about overdose, they should call 911.    Talk to your doctor about Narcan (naloxone).   If you are at risk for overdose, you may be given a prescription for Narcan. This medicine very quickly reverses the effects of opioids.   If you overdose, a friend or family member can give you Narcan while waiting for the ambulance. They need to know the signs of overdose and how to give Narcan.     Don't use alcohol or street drugs.   Taking them with opioids can cause death.    Do not take any of these medicines unless your doctor says it s OK. Taking these with opioids can cause death:  Benzodiazepines, such as lorazepam (Ativan), alprazolam (Xanax) or diazepam (Valium)  Muscle relaxers, such as cyclobenzaprine (Flexeril)  Sleeping pills like zolpidem (Ambien)   Other opioids      How to keep you and other people safe while taking opioids:    Never share your opioids with others.  Opioid medicines are regulated by the Drug Enforcement Agency ([employer]). Selling or sharing medications is a criminal act.    2. Be sure to store opioids in a secure place, locked up if possible. Young children can easily swallow them and overdose.    3. When you are traveling with your medicines, keep them in the original bottles. If you use a pill box, be sure you also  carry a copy of your medicine list from your clinic or pharmacy.    4. Safe disposal of opioids    Most pharmacies have places to get rid of medicine, called disposal kiosks. Medicine disposal options are also available in every [facility]. Search your county and  medication disposal  to find more options. You can find more details at:  https://www.[facility]a.[facility].[place]./living-green/managing-unwanted-medications     I agree that my provider, clinic care team, and pharmacy may work with any city, state or federal law enforcement agency that investigates the misuse, sale, or other diversion of my controlled medicine. I will allow my provider to discuss my care with, or share a copy of, this agreement with any other treating provider, pharmacy or emergency room where I receive care.    I have read this agreement and have asked questions about anything I did not understand.    _______________________________________________________  Patient Signature - Angelia Stallings _____________________                   Date     _______________________________________________________  Provider Signature - Calixto Leblanc PA-C   _____________________                   Date     _______________________________________________________  Witness Signature (required if provider not present while patient signing)   _____________________                   Date

## 2023-11-13 NOTE — TELEPHONE ENCOUNTER
Action 11/13/23 MV 2.06pm   Action Taken 1) records request faxed to [facility]  2) imaging requst faxed to [facility], [facility]     Action 11/22/23 MV 10.47am   Action Taken Records received from [facility] and sent to scanning ; [facility] and [facility] images resolved in PACS - 2nd request faxed for [facility] images     Action 12/4/23 MV 12.42pm   Action Taken [facility] images resolved in PACS       RECORDS RECEIVED FROM: internal   REASON FOR VISIT: Polyneuropathy    Date of Appt: 2/8/24   NOTES (FOR ALL VISITS) STATUS DETAILS   OFFICE NOTE from referring provider Internal Calixto CARTER @ [facility]:  8/9/23   OFFICE NOTE from other specialist Internal/Received Jina CARTER @ [facility]:  12/5/22    Dr See Land @ [facility]:  6/30/22  4/13/22  3/9/22  8/11/21  (Additional encounters received)    Morena CARTER @ [facility]:  6/15/22   DISCHARGE REPORT from the ER [facility]:  5/4/22   [facility] Internal/CE [facility]:  6/24/22    [facility]:  3/22/22   MEDICATION LIST Internal    IMAGING  (FOR ALL VISITS)     MRI (HEAD, NECK, SPINE) PACS [facility]:  MRI Lumbar Spine 10/5/22    [facility]:  MRA Head 2/28/22    [facility]:  MRI Cervical Spine 4/8/19   CT (HEAD, NECK, SPINE) PACS [facility]:  CTA Head 12/7/19

## 2023-11-15 LAB
NORDIAZEPAM UR QL CFM: PRESENT
OXAZEPAM UR QL CFM: PRESENT
OXYMORPHONE UR CFM-MCNC: 133 NG/ML
OXYMORPHONE/CREAT UR: 47 NG/MG {CREAT}
TEMAZEPAM UR QL CFM: PRESENT

## 2023-12-01 ENCOUNTER — OFFICE VISIT (OUTPATIENT)
Dept: FAMILY MEDICINE | Facility: CLINIC | Age: 48
End: 2023-12-01
Payer: COMMERCIAL

## 2023-12-01 VITALS
HEIGHT: 68 IN | TEMPERATURE: 98.4 F | OXYGEN SATURATION: 97 % | SYSTOLIC BLOOD PRESSURE: 112 MMHG | DIASTOLIC BLOOD PRESSURE: 82 MMHG | RESPIRATION RATE: 14 BRPM | WEIGHT: 195.6 LBS | BODY MASS INDEX: 29.64 KG/M2 | HEART RATE: 102 BPM

## 2023-12-01 DIAGNOSIS — G89.29 OTHER CHRONIC PAIN: ICD-10-CM

## 2023-12-01 DIAGNOSIS — S73.192D TEAR OF LEFT ACETABULAR LABRUM, SUBSEQUENT ENCOUNTER: ICD-10-CM

## 2023-12-01 DIAGNOSIS — Z01.818 PREOP GENERAL PHYSICAL EXAM: Primary | ICD-10-CM

## 2023-12-01 DIAGNOSIS — J30.2 SEASONAL ALLERGIC RHINITIS, UNSPECIFIED TRIGGER: ICD-10-CM

## 2023-12-01 PROBLEM — K42.9 UMBILICAL HERNIA: Status: RESOLVED | Noted: 2017-04-03 | Resolved: 2023-12-01

## 2023-12-01 PROBLEM — H69.93 DYSFUNCTION OF BOTH EUSTACHIAN TUBES: Status: RESOLVED | Noted: 2022-01-26 | Resolved: 2023-12-01

## 2023-12-01 PROCEDURE — 99213 OFFICE O/P EST LOW 20 MIN: CPT | Performed by: NURSE PRACTITIONER

## 2023-12-01 RX ORDER — OXYCODONE AND ACETAMINOPHEN 5; 325 MG/1; MG/1
1 TABLET ORAL EVERY 6 HOURS PRN
COMMUNITY
Start: 2023-12-01 | End: 2023-12-13

## 2023-12-01 RX ORDER — LORATADINE 10 MG/1
10 TABLET ORAL DAILY
COMMUNITY
Start: 2023-12-01 | End: 2024-02-08

## 2023-12-01 ASSESSMENT — PAIN SCALES - GENERAL: PAINLEVEL: NO PAIN (0)

## 2023-12-01 NOTE — PATIENT INSTRUCTIONS
Preparing for Your Surgery  Getting started  A nurse will call you to review your health history and instructions. They will give you an arrival time based on your scheduled surgery time. Please be ready to share:  Your doctor's clinic name and phone number  Your medical, surgical, and anesthesia history  A list of allergies and sensitivities  A list of medicines, including herbal treatments and over-the-counter drugs  Whether the patient has a legal guardian (ask how to send us the papers in advance)  Please tell us if you're pregnant--or if there's any chance you might be pregnant. Some surgeries may injure a fetus (unborn baby), so they require a pregnancy test. Surgeries that are safe for a fetus don't always need a test, and you can choose whether to have one.   If you have a child who's having surgery, please ask for a copy of Preparing for Your Child's Surgery.    Preparing for surgery  Within 10 to 30 days of surgery: Have a pre-op exam (sometimes called an H&P, or History and Physical). This can be done at a clinic or pre-operative center.  If you're having a , you may not need this exam. Talk to your care team.  At your pre-op exam, talk to your care team about all medicines you take. If you need to stop any medicines before surgery, ask when to start taking them again.  We do this for your safety. Many medicines can make you bleed too much during surgery. Some change how well surgery (anesthesia) drugs work.  Call your insurance company to let them know you're having surgery. (If you don't have insurance, call [phone].)  Call your clinic if there's any change in your health. This includes signs of a cold or flu (sore throat, runny nose, cough, rash, fever). It also includes a scrape or scratch near the surgery site.  If you have questions on the day of surgery, call your hospital or surgery center.  Eating and drinking guidelines  For your safety: Unless your surgeon tells you otherwise,  follow the guidelines below.  Eat and drink as usual until 8 hours before you arrive for surgery. After that, no food or milk.  Drink clear liquids until 2 hours before you arrive. These are liquids you can see through, like water, Gatorade, and Propel Water. They also include plain black coffee and tea (no cream or milk), candy, and breath mints. You can spit out gum when you arrive.  If you drink alcohol: Stop drinking it the night before surgery.  If your care team tells you to take medicine on the morning of surgery, it's okay to take it with a sip of water.  Preventing infection  Shower or bathe the night before and morning of your surgery. Follow the instructions your clinic gave you. (If no instructions, use regular soap.)  Don't shave or clip hair near your surgery site. We'll remove the hair if needed.  Don't smoke or vape the morning of surgery. You may chew nicotine gum up to 2 hours before surgery. A nicotine patch is okay.  Note: Some surgeries require you to completely quit smoking and nicotine. Check with your surgeon.  Your care team will make every effort to keep you safe from infection. We will:  Clean our hands often with soap and water (or an alcohol-based hand rub).  Clean the skin at your surgery site with a special soap that kills germs.  Give you a special gown to keep you warm. (Cold raises the risk of infection.)  Wear special hair covers, masks, gowns and gloves during surgery.  Give antibiotic medicine, if prescribed. Not all surgeries need antibiotics.  What to bring on the day of surgery  Photo ID and insurance card  Copy of your health care directive, if you have one  Glasses and hearing aids (bring cases)  You can't wear contacts during surgery  Inhaler and eye drops, if you use them (tell us about these when you arrive)  CPAP machine or breathing device, if you use them  A few personal items, if spending the night  If you have . . .  A pacemaker, ICD (cardiac defibrillator) or other  implant: Bring the ID card.  An implanted stimulator: Bring the remote control.  A legal guardian: Bring a copy of the certified (court-stamped) guardianship papers.  Please remove any jewelry, including body piercings. Leave jewelry and other valuables at home.  If you're going home the day of surgery  You must have a responsible adult drive you home. They should stay with you overnight as well.  If you don't have someone to stay with you, and you aren't safe to go home alone, we may keep you overnight. Insurance often won't pay for this.  After surgery  If it's hard to control your pain or you need more pain medicine, please call your surgeon's office.  Questions?   If you have any questions for your care team, list them here: _________________________________________________________________________________________________________________________________________________________________________ ____________________________________ ____________________________________ ____________________________________  For informational purposes only. Not to replace the advice of your health care provider. Copyright   2003, 2019 [employer] [facility] [employer]. All rights reserved. Clinically reviewed by Namita Lind MD. SMARTworks 681993 - REV 12/22.    How to Take Your Medication Before Surgery  - Take all of your medications before surgery as usual    [email or web address]

## 2023-12-01 NOTE — PROGRESS NOTES
[facility]  [address]  [place] [address]  Phone: [phone]  Fax: [phone]  Primary Provider: Calixto Leblanc  Pre-op Performing Provider: EM WESTFALL      PREOPERATIVE EVALUATION:  Today's date: 12/1/2023    Angelai is a 48 year old, presenting for the following:  Pre-Op Exam (DOS 12/5/23. Pt is currently taking claritin and wondering if that's okay. )        12/1/2023     2:58 PM   Additional Questions   Roomed by Jacki LOO CMA       Surgical Information:  Surgery/Procedure: Labral tear repair Left side  Surgery Location: [facility]  Surgeon: Dr. Richie Brian  Surgery Date: 12/5/23  Time of Surgery: 9:30  Where patient plans to recover: At home with family  Fax number for surgical facility: [phone]      Subjective       HPI related to upcoming procedure: chronic pain left hip, gradually worsening.  Aching pain, sharp at times.  Tried conservative measures without relief.         12/1/2023     2:42 PM   Preop Questions   1. Have you ever had a heart attack or stroke? No   2. Have you ever had surgery on your heart or blood vessels, such as a stent placement, a coronary artery bypass, or surgery on an artery in your head, neck, heart, or legs? No   3. Do you have chest pain with activity? No   4. Do you have a history of  heart failure? No   5. Do you currently have a cold, bronchitis or symptoms of other infection? No   6. Do you have a cough, shortness of breath, or wheezing? No   7. Do you or anyone in your family have previous history of blood clots? No   8. Do you or does anyone in your family have a serious bleeding problem such as prolonged bleeding following surgeries or cuts? No   9. Have you ever had problems with anemia or been told to take iron pills? YES - occasionally   10. Have you had any abnormal blood loss such as black, tarry or bloody stools, or abnormal vaginal bleeding? No   11. Have  you ever had a blood transfusion? No   12. Are you willing to have a blood transfusion if it is medically needed before, during, or after your surgery? Yes   13. Have you or any of your relatives ever had problems with anesthesia? YES - nausea, urinary retention after last surgery    14. Do you have sleep apnea, excessive snoring or daytime drowsiness? No   15. Do you have any artifical heart valves or other implanted medical devices like a pacemaker, defibrillator, or continuous glucose monitor? No   16. Do you have artificial joints? No   17. Are you allergic to latex? No   18. Is there any chance that you may be pregnant? No       Health Care Directive:  Patient does not have a Health Care Directive or Living Will: Patient states has Advance Directive and will bring in a copy to clinic.    Preoperative Review of :   reviewed - controlled substances reflected in medication list.          Review of Systems  CONSTITUTIONAL: NEGATIVE for fever, chills, change in weight  INTEGUMENTARY/SKIN: NEGATIVE for worrisome rashes, moles or lesions  EYES: NEGATIVE for vision changes or irritation  ENT/MOUTH: nasal congestion, clear drainage (seasonal allergies)  RESP: NEGATIVE for significant cough or SOB  CV: NEGATIVE for chest pain, palpitations or peripheral edema  GI: NEGATIVE for nausea, abdominal pain, heartburn, or change in bowel habits  : NEGATIVE for frequency, dysuria, or hematuria  MUSCULOSKELETAL:joint pain, fibromyalgia  NEURO: numbness/tingling in fingers when sleeping   ENDOCRINE: cold intolerance  HEME: NEGATIVE for bleeding problems  PSYCHIATRIC: NEGATIVE for changes in mood or affect    Patient Active Problem List    Diagnosis Date Noted    Polyneuropathy 06/30/2022     Priority: Medium    Cramp of limb 06/30/2022     Priority: Medium    Hereditary and idiopathic peripheral neuropathy 06/30/2022     Priority: Medium    Left foot pain 01/26/2022     Priority: Medium    Atypical glandular cells on  cervical Pap smear 2021     Priority: Medium    History of abnormal cervical Papanicolaou smear 2021     Priority: Medium    Uterine leiomyoma 2021     Priority: Medium    Myofascial pain 2021     Priority: Medium    Status post laparoscopic Nissen fundoplication 2020     Priority: Medium    Degeneration of intervertebral disc of cervical region 2016     Priority: Medium    Allergic Rhinitis      Priority: Medium     Created by Conversion  Replacement Utility updated for latest IMO load        Anxiety      Priority: Medium     Created by Conversion  Replacement Utility updated for latest IMO load        Muscle spasm 2016     Priority: Medium    Occipital neuralgia 2016     Priority: Medium    Migraine without aura 2016     Priority: Medium    Arthralgia of temporomandibular joint 10/06/2015     Priority: Medium    Closed traumatic dislocation of temporomandibular joint 2015     Priority: Medium    Fibromyositis 2015     Priority: Medium    Psychogenic headache 2015     Priority: Medium    Heartburn 2015     Priority: Medium    History of intracranial aneurysm 2015     Priority: Medium    Constipation 2015     Priority: Medium    S/P craniotomy, MCA aneurysm clipping x 2 2015     Priority: Medium    Neck pain 2015     Priority: Medium    Lower Back Pain      Priority: Medium     Created by Conversion          Past Medical History:   Diagnosis Date    Allergic rhinitis     Anemia     Anxiety     Brain aneurysm 2015    s/p clipping,     Dysfunction of both eustachian tubes     Hiatal hernia     Repaired in 2018 with Nissen.    High risk HPV infection 09/10/2015    History of hemolysis, elevated liver enzymes, and low platelet (HELLP) syndrome     History of miscarriage 2004    History of spontaneous      Hypertensive disorder 2015    Major depression single episode, in partial remission (H24)      Menorrhagia     Middle cerebral aneurysm      (normal spontaneous vaginal delivery)     PONV (postoperative nausea and vomiting)     Sepsis (H)     Umbilical hernia      Past Surgical History:   Procedure Laterality Date    CRANIOTOMY, REPAIR ANEURYSM, COMBINED  2/19/15    MCA aneurysm clipping x 2    CRYOCAUTERY OF CERVIX      Description: Cervix Cryosurgery;  Recorded: 2008;  Comments: TRUE I    DILATION AND CURETTAGE, OPERATIVE HYSTEROSCOPY, COMBINED N/A 2015    Procedure: DILATION AND CURETTAGE WITH HYSTEROSCOPY ;  Surgeon: Catarina Lehman DO;  [place] [facility]:     HC DILATION/CURETTAGE DIAG/THER NON OB      Description: Dilation And Curettage;  Proc Date: 2004;  Comments: FOR MISCARRIAGE    HYSTEROSCOPY W/ ENDOMETRIAL ABLATION  12/15/2015    MOUTH SURGERY      VT LAP,ESOPHAGOGAST FUNDOPLASTY N/A 3/1/2018    Procedure: ROBOTIC NISSEN FUNDOPLICATION;  Surgeon: Leo Quezada DO;  [place] [facility]  Service: General     Current Outpatient Medications   Medication Sig Dispense Refill    loratadine (CLARITIN) 10 MG tablet Take 1 tablet (10 mg) by mouth daily      tiZANidine (ZANAFLEX) 4 MG tablet Take 1-2 at bedtime as needed for muscle spasms 60 tablet 0       Allergies   Allergen Reactions    Cat Dander [Animal Dander] Itching and Shortness Of Breath    [id number] Antimigraine Agents [Sumatriptan] Unknown     Hx cerebral aneurysms, s/p clipping    Watermelon [Citrullus Vulgaris] Unknown    Penicillins Hives and Rash    Sulfa (Sulfonamide Antibiotics) [Sulfa Antibiotics] Hives and Rash        Social History     Tobacco Use    Smoking status: Former     Packs/day: 0.25     Years: 15.00     Additional pack years: 0.00     Total pack years: 3.75     Types: Cigarettes     Quit date: 2009     Years since quittin.9     Passive exposure: Past    Smokeless tobacco: Never    Tobacco comments:     No exposure   Substance Use Topics    Alcohol use: Yes  "    Comment: Alcoholic Drinks/day: rare     Family History   Problem Relation Age of Onset    No Known Problems Mother     Hypertension Father     Cerebrovascular Disease Father     Heart Disease Father     Hyperlipidemia Father     Depression Brother     Allergies Brother     No Known Problems Brother     Breast Cancer Paternal Grandmother 90    Heart Disease Paternal Grandfather     Hypertension Paternal Grandfather     Cancer Maternal Uncle         cervical     History   Drug Use No         Objective     /82 (BP Location: Left arm, Patient Position: Sitting, Cuff Size: Adult Regular)   Pulse 102   Temp 98.4  F (36.9  C) (Oral)   Resp 14   Ht 1.734 m (5' 8.25\")   Wt 88.7 kg (195 lb 9.6 oz)   LMP  (LMP Unknown)   SpO2 97%   Breastfeeding No   BMI 29.52 kg/m      Physical Exam    GENERAL APPEARANCE: healthy, alert and no distress     EYES: EOMI, PERRL     HEENT: ear canals and TM's normal and nose and mouth without ulcers or lesions     NECK: no adenopathy, no asymmetry, masses, or scars and thyroid normal to palpation     RESP: lungs clear to auscultation - no rales, rhonchi or wheezes     CV: regular rates and rhythm, normal S1 S2, no S3 or S4 and no murmur, click or rub     ABDOMEN:  soft, nontender, no HSM or masses and bowel sounds normal     MS: extremities normal- no gross deformities noted, no evidence of inflammation in joints, FROM in all extremities.     SKIN: no suspicious lesions or rashes     NEURO: Normal strength and tone, sensory exam grossly normal, mentation intact and speech normal     PSYCH: mentation appears normal. and affect normal/bright     LYMPHATICS: No cervical adenopathy    Recent Labs   Lab Test 11/13/23  0804   HGB 13.2         POTASSIUM 4.1   CR 0.94   A1C 5.2        Diagnostics:  Recent Results (from the past 720 hour(s))   CBC with platelets    Collection Time: 11/13/23  8:04 AM   Result Value Ref Range    WBC Count 7.8 4.0 - 11.0 10e3/uL    RBC Count " 4.81 3.80 - 5.20 10e6/uL    Hemoglobin 13.2 11.7 - 15.7 g/dL    Hematocrit 40.8 35.0 - 47.0 %    MCV 85 78 - 100 fL    MCH 27.4 26.5 - 33.0 pg    MCHC 32.4 31.5 - 36.5 g/dL    RDW 12.9 10.0 - 15.0 %    Platelet Count 312 150 - 450 10e3/uL   Comprehensive metabolic panel (BMP + Alb, Alk Phos, ALT, AST, Total. Bili, TP)    Collection Time: 11/13/23  8:04 AM   Result Value Ref Range    Sodium 137 135 - 145 mmol/L    Potassium 4.1 3.4 - 5.3 mmol/L    Carbon Dioxide (CO2) 25 22 - 29 mmol/L    Anion Gap 11 7 - 15 mmol/L    Urea Nitrogen 12.4 6.0 - 20.0 mg/dL    Creatinine 0.94 0.51 - 0.95 mg/dL    GFR Estimate 74 >60 mL/min/1.73m2    Calcium 9.2 8.6 - 10.0 mg/dL    Chloride 101 98 - 107 mmol/L    Glucose 87 70 - 99 mg/dL    Alkaline Phosphatase 47 35 - 104 U/L    AST 19 0 - 45 U/L    ALT 19 0 - 50 U/L    Protein Total 7.2 6.4 - 8.3 g/dL    Albumin 4.4 3.5 - 5.2 g/dL    Bilirubin Total 0.6 <=1.2 mg/dL   Lipid panel reflex to direct LDL Fasting    Collection Time: 11/13/23  8:04 AM   Result Value Ref Range    Cholesterol 217 (H) <200 mg/dL    Triglycerides 135 <150 mg/dL    Direct Measure HDL 56 >=50 mg/dL    LDL Cholesterol Calculated 134 (H) <=100 mg/dL    Non HDL Cholesterol 161 (H) <130 mg/dL   Hemoglobin A1c    Collection Time: 11/13/23  8:04 AM   Result Value Ref Range    Hemoglobin A1C 5.2 0.0 - 5.6 %   Vitamin B12    Collection Time: 11/13/23  8:04 AM   Result Value Ref Range    Vitamin B12 481 232 - 1,245 pg/mL   Vitamin D Deficiency    Collection Time: 11/13/23  8:04 AM   Result Value Ref Range    Vitamin D, Total (25-Hydroxy) 28 20 - 50 ng/mL   Urine Drug Confirmation Panel    Collection Time: 11/13/23  8:04 AM   Result Value Ref Range    Oxymorphone ng/mL 133 (H) <50 ng/mL    Oxymorphone 47 Absent ng/mg [creat]    Nordiazepam Present (A) Absent    Oxazepam Present (A) Absent    Temazepam Present (A) Absent   Urine Creatinine for Drug Screen Panel    Collection Time: 11/13/23  8:04 AM   Result Value Ref Range     Creatinine Urine for Drug Screen 282 mg/dL      No EKG required for low risk surgery (cataract, skin procedure, breast biopsy, etc).    Revised Cardiac Risk Index (RCRI):  The patient has the following serious cardiovascular risks for perioperative complications:   - No serious cardiac risks = 0 points     RCRI Interpretation: 1 point: Class II (low risk - 0.9% complication rate)     A/P:  (Z01.818) Preop general physical exam  (primary encounter diagnosis)  Comment: Cleared for surgery  Plan: Surgery as scheduled    (S73.192D) Tear of left acetabular labrum, subsequent encounter  Comment:   Plan: Surgery as scheduled     (J30.2) Seasonal allergic rhinitis, unspecified trigger  Comment:   Plan: loratadine (CLARITIN) 10 MG tablet            Signed Electronically by: Erma Waite CNP  Copy of this evaluation report is provided to requesting physician.
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
## 2023-12-05 DIAGNOSIS — S13.4XXA WHIPLASH INJURY TO NECK, INITIAL ENCOUNTER: ICD-10-CM

## 2023-12-05 NOTE — TELEPHONE ENCOUNTER
Pt currently taking hydroxyzine as she just had surgery-wants this filled so she can take when she is done with that.        Refill Request  Medication name: Pending Prescriptions:                       Disp   Refills    tiZANidine (ZANAFLEX) 4 MG tablet         60 tab*0            Sig: Take 1-2 at bedtime as needed for muscle spasms    Requested Pharmacy:  [id number] - [facility] [address] AT [facility] [address]

## 2023-12-13 ENCOUNTER — MYC REFILL (OUTPATIENT)
Dept: FAMILY MEDICINE | Facility: CLINIC | Age: 48
End: 2023-12-13
Payer: COMMERCIAL

## 2023-12-13 DIAGNOSIS — G89.29 OTHER CHRONIC PAIN: ICD-10-CM

## 2023-12-14 RX ORDER — OXYCODONE AND ACETAMINOPHEN 5; 325 MG/1; MG/1
1 TABLET ORAL EVERY 6 HOURS PRN
Qty: 20 TABLET | Refills: 0 | Status: SHIPPED | OUTPATIENT
Start: 2023-12-14 | End: 2024-01-10

## 2024-01-05 ENCOUNTER — NURSE TRIAGE (OUTPATIENT)
Dept: FAMILY MEDICINE | Facility: CLINIC | Age: 49
End: 2024-01-05
Payer: COMMERCIAL

## 2024-01-05 NOTE — TELEPHONE ENCOUNTER
Due to the location of the pain I would like her to be seen rather than filling pain medications.

## 2024-01-05 NOTE — TELEPHONE ENCOUNTER
Relayed provider message to patient. Patient interested in referral to [facility]. Writer called [facility], but unable to accept patient as too close to closing.     Informed patient that [facility] was unable to accept her and recommended patient go to ED for evaluation. Patient verbalized understanding.     Mary Jo Brewer RN  [facility]

## 2024-01-05 NOTE — TELEPHONE ENCOUNTER
"Nurse Triage SBAR    Is this a 2nd Level Triage? YES, LICENSED PRACTITIONER REVIEW IS REQUIRED    Situation: Patient calling reporting pain in right calf.     Background: Patient reports had left hip surgery on 12/5/23.     Assessment: Patient reports 7/10 pain in back of right calf- area in middle, closer to ankle, and closer to knee. Reports pain started last weekend and has been hurting off and on, but feels worse today.     Patient states she started back to work on Tuesday and has been doing physical therapy. Feels pain got worse after starting physical therapy. Patient thinks pain is muscle related. Did not do physical therapy today.     Patient denies warmth, redness, fever, chest pain, shortness of breath or any other symptoms.     Has attempted heat, ice, Advil and Tylenol with little relief. Patient states she is out of Oxycodone.     Protocol Recommended Disposition:   Go To ED/UCC Now (Or To Office With PCP Approval)    Recommendation: Advised patient to go to ED and offered ADS. Patient declined both and states she thinks this is \"more of a muscle issue\" and that daughter had left. Writer educated on importance of being seen to rule out blood clot.     Patient requesting refill of oxycodone to help with pain. Cannot get a new refill through insurance until next week.      Routed to provider    Does the patient meet one of the following criteria for ADS visit consideration? 16+ years old, with an [facility] PCP     TIP  Providers, please consider if this condition is appropriate for management at one of our Acute and Diagnostic Services sites.     If patient is a good candidate, please use dotphrase <dot>triageresponse and select Refer to ADS to document.      Reason for Disposition   Thigh or calf pain in only one leg and present > 1 hour    Additional Information   Negative: Looks like a broken bone or dislocated joint (e.g., crooked or deformed)   Negative: Sounds like a life-threatening emergency to the " "triager   Negative: Followed a hip injury   Negative: Followed a knee injury   Negative: Followed an ankle or foot injury   Negative: Back pain radiating (shooting) into leg(s)   Negative: Foot pain is main symptom   Negative: Ankle pain is main symptom   Negative: Knee pain is main symptom   Negative: Leg swelling is main symptom   Negative: Chest pain   Negative: Difficulty breathing   Negative: Entire foot is cool or blue in comparison to other side   Negative: Unable to walk   Negative: Fever and red area (or area very tender to touch)   Negative: Swollen joint and fever    Answer Assessment - Initial Assessment Questions  1. ONSET: \"When did the pain start?\"       Last weekend, has gone off and on, but is very painful today.   2. LOCATION: \"Where is the pain located?\"       Right back of calf   3. PAIN: \"How bad is the pain?\"    (Scale 1-10; or mild, moderate, severe)    -  MILD (1-3): doesn't interfere with normal activities     -  MODERATE (4-7): interferes with normal activities (e.g., work or school) or awakens from sleep, limping     -  SEVERE (8-10): excruciating pain, unable to do any normal activities, unable to walk      7/10  4. WORK OR EXERCISE: \"Has there been any recent work or exercise that involved this part of the body?\"       Got worse after physical therapy   5. CAUSE: \"What do you think is causing the leg pain?\"      Thinks it is muscle related  6. OTHER SYMPTOMS: \"Do you have any other symptoms?\" (e.g., chest pain, back pain, breathing difficulty, swelling, rash, fever, numbness, weakness)      Denies all, some weakness in leg  7. PREGNANCY: \"Is there any chance you are pregnant?\" \"When was your last menstrual period?\"      No.    Protocols used: Leg Pain-A-[place]    Mary Jo Brewer RN  [facility]  "

## 2024-01-08 ENCOUNTER — TELEPHONE (OUTPATIENT)
Dept: FAMILY MEDICINE | Facility: CLINIC | Age: 49
End: 2024-01-08
Payer: COMMERCIAL

## 2024-01-08 ENCOUNTER — OFFICE VISIT (OUTPATIENT)
Dept: FAMILY MEDICINE | Facility: CLINIC | Age: 49
End: 2024-01-08
Payer: COMMERCIAL

## 2024-01-08 ENCOUNTER — HOSPITAL ENCOUNTER (OUTPATIENT)
Dept: ULTRASOUND IMAGING | Facility: CLINIC | Age: 49
Discharge: HOME | End: 2024-01-08
Attending: PHYSICIAN ASSISTANT | Admitting: PHYSICIAN ASSISTANT
Payer: COMMERCIAL

## 2024-01-08 VITALS
RESPIRATION RATE: 14 BRPM | SYSTOLIC BLOOD PRESSURE: 148 MMHG | WEIGHT: 195 LBS | TEMPERATURE: 98.8 F | DIASTOLIC BLOOD PRESSURE: 97 MMHG | HEART RATE: 102 BPM | OXYGEN SATURATION: 97 % | BODY MASS INDEX: 29.43 KG/M2

## 2024-01-08 DIAGNOSIS — G89.29 OTHER CHRONIC PAIN: ICD-10-CM

## 2024-01-08 DIAGNOSIS — M79.89 CALF SWELLING: ICD-10-CM

## 2024-01-08 DIAGNOSIS — M79.661 RIGHT CALF PAIN: ICD-10-CM

## 2024-01-08 DIAGNOSIS — M79.661 RIGHT CALF PAIN: Primary | ICD-10-CM

## 2024-01-08 PROCEDURE — 93971 EXTREMITY STUDY: CPT | Mod: RT

## 2024-01-08 PROCEDURE — 99214 OFFICE O/P EST MOD 30 MIN: CPT | Performed by: PHYSICIAN ASSISTANT

## 2024-01-08 RX ORDER — FLUOXETINE 10 MG/1
10 CAPSULE ORAL DAILY
COMMUNITY
End: 2024-02-08 | Stop reason: SINTOL

## 2024-01-08 NOTE — TELEPHONE ENCOUNTER
S-(situation):   See nurse triage with patient, on 1/5/24, regarding right calf pain    Patient also called into the clinic today, 1/8/24, to report that the pain in her right calf is still present    B-(background):   Hx of HTN, chronic bilateral back pain with sciatica, hiatal hernia with GERD, allergic rhinitis, anxiety, migraine, polyneuropathy, TMJ pain, fibromyalgia, and intercranial aneurism 2/19/2015     Also had a labral tear repair (Left side) on 12/5/24 by Dr. Richie Brian at [facility]     A-(assessment):   Patient reports ongoing right calf pain, in back of right calf, since 1/5/24    Has had plantar fascitis/Achilles tendon pain in right leg since she has been using her right leg more often-had a   labral tear repair (Left side) on 12/5/24 by Dr. Richie Brian at [facility]     Has been going to PT for 4 months for left hip pain concerns    Has been using one left sided crutch for the past week    Went back to work full time last week, although she missed 1/2 day of work on Friday, 1/5/24, due to right calf pain    Patient feels like the pain in her right calf may be from overuse of her right side due to recent surgery on her left hip or from recent PT     Feels hard in back of right calf, although no redness or warmth to back of right calf, since at least Friday, 1/5/24    R-(recommendations):   Writer assisted the patient with pre-registering her at the [facility] today, 1/8/24, at 1 hr and 9 minutes from 10:16 am for evaluation of right calf pain    Denies other questions or concerns at this time    Monalisa Lemus, CAROLIN, BSN  [facility]

## 2024-01-08 NOTE — PROGRESS NOTES
Chief Complaint   Patient presents with    Leg Pain     Rt lower leg painful worse last week  pain and cramping  did have left hip surgery in December       ASSESSMENT/PLAN:  Angelia was seen today for leg pain.    Diagnoses and all orders for this visit:    Right calf pain  -     US Lower Extremity Venous Duplex Right; Future    Calf swelling  -     US Lower Extremity Venous Duplex Right; Future    Ultrasound negative for DVT.  Suspect calf strain from overwork/compensation.  Activity modification, elevation.  Already has physical therapy for her hip and they can treat this as well.    Jax Wilson PA-C      SUBJECTIVE:  Angelia is a 48 year old female who presents to urgent care with about 1 week of right calf pain, swelling and feeling hard.  She had a left hip labral tear surgery at [facility] 12/5/2023 and has been using a crutch to help walk on this side and has felt some general soreness in the right calf for about 2 to 3 weeks.  It seems to have worsened however.  No shortness of breath, no chest pain    ROS: Pertinent ROS neg other than the symptoms noted above in the HPI.     OBJECTIVE:  BP (!) 148/97   Pulse 102   Temp 98.8  F (37.1  C) (Oral)   Resp 14   Wt 88.5 kg (195 lb)   LMP  (LMP Unknown)   SpO2 97%   BMI 29.43 kg/m     GENERAL: healthy, alert and no distress  EYES: Eyes grossly normal to inspection, PERRL and conjunctivae and sclerae normal  HENT: ear canals and TM's normal, nose and mouth without ulcers or lesions  MS: Right lower extremity: Tenderness of the calf, more firm compared to the left, no pitting edema, slight swelling.  Pain with plantar and dorsiflexion against resistance.    SKIN: no suspicious lesions or rashes  NEURO: Normal strength and tone, mentation intact and speech normal    DIAGNOSTICS    Results for orders placed or performed during the hospital encounter of 01/08/24   US Lower Extremity Venous Duplex Right     Status: None    Narrative    EXAM: US LOWER EXTREMITY  VENOUS DUPLEX RIGHT  LOCATION: [facility]  DATE: 1/8/2024    INDICATION: calf pain and swelling, hip labrum repair 1 month ago  COMPARISON: None.  TECHNIQUE: Venous Duplex ultrasound of the right lower extremity with and without compression, augmentation and duplex. Color flow and spectral Doppler with waveform analysis performed.    FINDINGS: Exam includes the common femoral, femoral, popliteal, and contralateral common femoral veins as well as segmentally visualized deep calf veins and greater saphenous vein.     RIGHT: No deep vein thrombosis. No superficial thrombophlebitis. No popliteal cyst.      Impression    IMPRESSION:  1.  No deep venous thrombosis in the right leg.  2.  Findings called by the undersigned to Angel Wilson at 1355.        Current Outpatient Medications   Medication    FLUoxetine (PROZAC) 10 MG capsule    tiZANidine (ZANAFLEX) 4 MG tablet    loratadine (CLARITIN) 10 MG tablet    oxyCODONE-acetaminophen (PERCOCET) 5-325 MG tablet     No current facility-administered medications for this visit.      Patient Active Problem List   Diagnosis    Allergic Rhinitis    Lower Back Pain    Anxiety    Neck pain    S/P craniotomy, MCA aneurysm clipping x 2    History of intracranial aneurysm    Migraine without aura    Status post laparoscopic Nissen fundoplication    Atypical glandular cells on cervical Pap smear    Closed traumatic dislocation of temporomandibular joint    Fibromyositis    Heartburn    History of abnormal cervical Papanicolaou smear    Myofascial pain    Psychogenic headache    Uterine leiomyoma    Left foot pain    Arthralgia of temporomandibular joint    Constipation    Degeneration of intervertebral disc of cervical region    Muscle spasm    Occipital neuralgia    Polyneuropathy    Cramp of limb    Hereditary and idiopathic peripheral neuropathy      Past Medical History:   Diagnosis Date    Allergic rhinitis     Anemia     Anxiety     Brain aneurysm  2015    s/p clipping,     Dysfunction of both eustachian tubes     Hiatal hernia     Repaired in 2018 with Nissen.    High risk HPV infection 09/10/2015    History of hemolysis, elevated liver enzymes, and low platelet (HELLP) syndrome     History of miscarriage 2004    History of spontaneous      Hypertensive disorder 2015    Major depression single episode, in partial remission (H24)     Menorrhagia     Middle cerebral aneurysm      (normal spontaneous vaginal delivery)     PONV (postoperative nausea and vomiting)     Sepsis (H)     Umbilical hernia      Past Surgical History:   Procedure Laterality Date    CRANIOTOMY, REPAIR ANEURYSM, COMBINED  2/19/15    MCA aneurysm clipping x 2    CRYOCAUTERY OF CERVIX      Description: Cervix Cryosurgery;  Recorded: 2008;  Comments: TRUE I    DILATION AND CURETTAGE, OPERATIVE HYSTEROSCOPY, COMBINED N/A 2015    Procedure: DILATION AND CURETTAGE WITH HYSTEROSCOPY ;  Surgeon: Catarina Lehman DO;  Location: [facility] [place], [place];  Service:     HC DILATION/CURETTAGE DIAG/THER NON OB      Description: Dilation And Curettage;  Proc Date: 2004;  Comments: FOR MISCARRIAGE    HYSTEROSCOPY W/ ENDOMETRIAL ABLATION  12/15/2015    MOUTH SURGERY      [place] LAP,ESOPHAGOGAST FUNDOPLASTY N/A 3/1/2018    Procedure: ROBOTIC NISSEN FUNDOPLICATION;  Surgeon: Leo Quezada DO;  Location: [facility] [place], [place];  Service: General     Family History   Problem Relation Age of Onset    No Known Problems Mother     Hypertension Father     Cerebrovascular Disease Father     Heart Disease Father     Hyperlipidemia Father     Depression Brother     Allergies Brother     No Known Problems Brother     Breast Cancer Paternal Grandmother 90    Heart Disease Paternal Grandfather     Hypertension Paternal Grandfather     Cancer Maternal Uncle         cervical     Social History     Tobacco Use    Smoking status: Former     Packs/day: 0.25     Years: 15.00     Additional  pack years: 0.00     Total pack years: 3.75     Types: Cigarettes     Quit date: 1/1/2009     Years since quitting: 15.0     Passive exposure: Past    Smokeless tobacco: Never    Tobacco comments:     No exposure   Substance Use Topics    Alcohol use: Yes     Comment: Alcoholic Drinks/day: rare              The plan of care was discussed with the patient. They understand and agree with the course of treatment prescribed. A printed summary was given including instructions and medications.  The use of Dragon/[employer] dictation services may have been used to construct the content in this note; any grammatical or spelling errors are non-intentional. Please contact the author of this note directly if you are in need of any clarification.

## 2024-01-08 NOTE — TELEPHONE ENCOUNTER
Patient triaged, seen at [facility], waiting results of US. Pending as patient states she is is in need of a refill, and other medications/therapies are not helping    Refill Request  Medication name: Pending Prescriptions:                       Disp   Refills    oxyCODONE-acetaminophen (PERCOCET) 5-325 *20 tab*0            Sig: Take 1 tablet by mouth every 6 hours as needed           for pain    Requested Pharmacy:  [id number] [address] AT [facility] [address]

## 2024-01-10 ENCOUNTER — MYC REFILL (OUTPATIENT)
Dept: FAMILY MEDICINE | Facility: CLINIC | Age: 49
End: 2024-01-10
Payer: COMMERCIAL

## 2024-01-10 DIAGNOSIS — G89.29 OTHER CHRONIC PAIN: ICD-10-CM

## 2024-01-11 RX ORDER — OXYCODONE AND ACETAMINOPHEN 5; 325 MG/1; MG/1
1 TABLET ORAL EVERY 6 HOURS PRN
Qty: 20 TABLET | Refills: 0 | OUTPATIENT
Start: 2024-01-11

## 2024-01-11 RX ORDER — OXYCODONE AND ACETAMINOPHEN 5; 325 MG/1; MG/1
1 TABLET ORAL EVERY 6 HOURS PRN
Qty: 20 TABLET | Refills: 0 | Status: SHIPPED | OUTPATIENT
Start: 2024-01-11 | End: 2024-02-06

## 2024-02-08 ENCOUNTER — OFFICE VISIT (OUTPATIENT)
Dept: NEUROLOGY | Facility: CLINIC | Age: 49
End: 2024-02-08
Attending: PHYSICIAN ASSISTANT
Payer: COMMERCIAL

## 2024-02-08 ENCOUNTER — PRE VISIT (OUTPATIENT)
Dept: NEUROLOGY | Facility: CLINIC | Age: 49
End: 2024-02-08

## 2024-02-08 VITALS
DIASTOLIC BLOOD PRESSURE: 76 MMHG | RESPIRATION RATE: 18 BRPM | OXYGEN SATURATION: 100 % | HEART RATE: 108 BPM | HEIGHT: 68 IN | BODY MASS INDEX: 28.95 KG/M2 | WEIGHT: 191 LBS | SYSTOLIC BLOOD PRESSURE: 128 MMHG

## 2024-02-08 DIAGNOSIS — G62.9 POLYNEUROPATHY: ICD-10-CM

## 2024-02-08 PROCEDURE — 99204 OFFICE O/P NEW MOD 45 MIN: CPT | Performed by: PSYCHIATRY & NEUROLOGY

## 2024-02-08 RX ORDER — HYDROXYZINE PAMOATE 25 MG/1
25 CAPSULE ORAL PRN
COMMUNITY
Start: 2024-02-06

## 2024-02-08 ASSESSMENT — PAIN SCALES - GENERAL: PAINLEVEL: SEVERE PAIN (6)

## 2024-02-08 NOTE — LETTER
"2024       RE: Angelia Stallings  [address] [address]     Dear Colleague,    Thank you for referring your patient, Angelia Stallings, to the [facility] [place] [facility]. Please see a copy of my visit note below.    48 year old woman with the following history:    4 years ago heard a \"pop\" in left foot, followed by pain medial aspect of left foot, then ankle sprain 3 years ago. Current symptoms are tight muscles in calves, with radiation to lateral aspect of left ankle. Worse after walking but walking makes it worse. Sometimes has focal tenderness. No cutaneous allodynia. No negative sensory symptoms in left foot but has occasional spontaneous positive sensory phenomena. No clear history of LE weakness, sphincter disturbances, or motor or sensory symptoms elsewhere in the body. Has developed involuntary contraction of left foot toes 2 and 3 for two years.    ROS significant for left buttock pain radiating down LLE with LBP responding to ELIANA. Present for many years (> 10?) but more severe in past 2-3 years.     FHx:     Mother has episodic leg symptoms.  Father  at 78 without a neuromuscular diagnosis.  Two brothers, one daughter, no similar symptoms.    Negative EtOH, does not smoke.     Cancer ROS negative, no dietary restrictions, no suspected toxin exposures.     Intermittent flexion of left 2nd and 3rd toes, precipitated by massage distal to FH, not rhythmic    Mental state: Alert, appropriate, speech, language, and thought content normal.     Cranial nerves II-XII normal.    Sensory examination:     Right Left   Light touch Normal Normal   Vibration (timed)     Vibration (Rydell-Seiffer)     Temp     Pin Normal Normal   Pos     Legend:   MM = medial malleolus, TT = tibial tuberosity, K = patella, MCP = MCP joint  MF = mid-foot, DC = distal calf, MC = mid calf, PC = proximal calf      Motor " examination:     Right Left   Shoulder abduction  5 5   Elbow extension 5 5   Elbow flexion 5 5   Wrist extension  5 5   Finger extension 5 5   FDI 5 5   APB 5 5   Hip flexion 5 5   Knee flexion 5 5   Knee extension 5 5   Dorsiflexion 5 5   Plantar flexion 5 5   A=atrophy    Inversion, eversion, toe flexion/extension normal    Tone normal     Reflexes:   Right Left   Biceps 2 2   BRD 2 2   Triceps 2 2   Kvng 2 2   Patellar 2 2   Achilles 2 2   Plantar Flexor Flexor   Clonus Absent Absent    Symmetric reflex spread in UEs with normal tone      Coordination:  Finger-nose normal.  Heel-shin normal.  RRMs normal.    Gait:  Normal. Valgus deformity. Cautious, antalgic attributed to hip.  Heel, toe normal. Sways with Romberg.    Imaging and EDx reviewed.      Impression:  Painful leg and moving toes  Mildly abnormal sensory NCS      Recommendations:  Repeat EDx  Consider movement disorder referral  Review with foot/ankle provider       48 minutes spent on the date of the encounter on chart review, history and examination, documentation and further activities as noted above.        Again, thank you for allowing me to participate in the care of your patient.      Sincerely,    Darío Khoury MD

## 2024-02-08 NOTE — PATIENT INSTRUCTIONS
Impressions    Mildly abnormal nerve conduction studies. I would like to repeat them to see if things are changing.   I'll review with our movement disorders specialist and with your foot and ankle specialist.  Follow up call next week.       Please call Cathy @ [phone] for questions or concerns during regular business hours. For a more efficient way to communicate, use [employer] and address the message to your physician. Remember, [employer] is only read during business hours. Do not leave urgent messages on voicemail or [employer] Experience. If situation is urgent, contact the Neurology Clinic @ [phone] and ask to speak to a Triage Nurse or Call 911 or visit an Emergency Department.     Please call your pharmacy if you need a medication refill. They will send us an electronic message.

## 2024-02-08 NOTE — PROGRESS NOTES
"48 year old woman with the following history:    4 years ago heard a \"pop\" in left foot, followed by pain medial aspect of left foot, then ankle sprain 3 years ago. Current symptoms are tight muscles in calves, with radiation to lateral aspect of left ankle. Worse after walking but walking makes it worse. Sometimes has focal tenderness. No cutaneous allodynia. No negative sensory symptoms in left foot but has occasional spontaneous positive sensory phenomena. No clear history of LE weakness, sphincter disturbances, or motor or sensory symptoms elsewhere in the body. Has developed involuntary contraction of left foot toes 2 and 3 for two years.    ROS significant for left buttock pain radiating down LLE with LBP responding to ELIANA. Present for many years (> 10?) but more severe in past 2-3 years.     FHx:     Mother has episodic leg symptoms.  Father  at 78 without a neuromuscular diagnosis.  Two brothers, one daughter, no similar symptoms.    Negative EtOH, does not smoke.     Cancer ROS negative, no dietary restrictions, no suspected toxin exposures.     Examination    Intermittent flexion of left 2nd and 3rd toes, precipitated by massage distal to FH, not rhythmic    Mental state: Alert, appropriate, speech, language, and thought content normal.     Cranial nerves II-XII normal.    Sensory examination:     Right Left   Light touch Normal Normal   Vibration (timed)     Vibration (Rydell-Seiffer)     Temp     Pin Normal Normal   Pos     Legend:   MM = medial malleolus, TT = tibial tuberosity, K = patella, MCP = MCP joint  MF = mid-foot, DC = distal calf, MC = mid calf, PC = proximal calf      Motor examination:     Right Left   Shoulder abduction  5 5   Elbow extension 5 5   Elbow flexion 5 5   Wrist extension  5 5   Finger extension 5 5   FDI 5 5   APB 5 5   Hip flexion 5 5   Knee flexion 5 5   Knee extension 5 5   Dorsiflexion 5 5   Plantar flexion 5 5   A=atrophy    Inversion, eversion, toe flexion/extension " normal    Tone normal     Reflexes:   Right Left   Biceps 2 2   BRD 2 2   Triceps 2 2   Kvng 2 2   Patellar 2 2   Achilles 2 2   Plantar Flexor Flexor   Clonus Absent Absent    Symmetric reflex spread in UEs with normal tone      Coordination:  Finger-nose normal.  Heel-shin normal.  RRMs normal.    Gait:  Normal. Valgus deformity. Cautious, antalgic attributed to hip.  Heel, toe normal. Sways with Romberg.    Imaging and EDx reviewed.      Impression:  Painful leg and moving toes  Mildly abnormal sensory NCS      Recommendations:  Repeat EDx  Consider movement disorder referral  Review with foot/ankle provider       Darío Khoury M.D.    Addendum: reviewed with movement disorders specialist. Because of occasional overlap with RLS, recommended checking iron studies (done in 2022; normal), trial of gabapentinoids (done), trial of DA (not done), and movement disorders referral. Reviewed [facility] provider's note; no foot/ankle pathology suspected.     Will refer to movement disorders group. Could offer another foot and ankle or orthotics opinion, although yield is low as she has seen 2 or 3 providers in this area.    48 minutes spent on the date of the encounter on chart review, history and examination, documentation and further activities as noted above.

## 2024-02-09 ENCOUNTER — MYC MEDICAL ADVICE (OUTPATIENT)
Dept: NEUROLOGY | Facility: CLINIC | Age: 49
End: 2024-02-09
Payer: COMMERCIAL

## 2024-02-09 DIAGNOSIS — Q87.89 PAINFUL LEG AND MOVING TOES SYNDROME: Primary | ICD-10-CM

## 2024-02-09 DIAGNOSIS — M79.606 PAINFUL LEG AND MOVING TOES SYNDROME: Primary | ICD-10-CM

## 2024-02-10 PROBLEM — M79.604 PAINFUL LEGS AND MOVING TOES: Status: ACTIVE | Noted: 2024-02-10

## 2024-02-10 PROBLEM — M79.605 PAINFUL LEGS AND MOVING TOES: Status: ACTIVE | Noted: 2024-02-10

## 2024-02-10 PROBLEM — Z92.89 HISTORY OF EMG: Status: ACTIVE | Noted: 2024-02-10

## 2024-02-10 PROBLEM — R25.9 ABNORMAL INVOLUNTARY MOVEMENT: Status: ACTIVE | Noted: 2024-02-10

## 2024-02-14 ENCOUNTER — OFFICE VISIT (OUTPATIENT)
Dept: NEUROLOGY | Facility: CLINIC | Age: 49
End: 2024-02-14
Attending: PSYCHIATRY & NEUROLOGY
Payer: COMMERCIAL

## 2024-02-14 VITALS — HEART RATE: 83 BPM | DIASTOLIC BLOOD PRESSURE: 95 MMHG | SYSTOLIC BLOOD PRESSURE: 135 MMHG

## 2024-02-14 DIAGNOSIS — M79.606 PAINFUL LEG AND MOVING TOES SYNDROME: Primary | ICD-10-CM

## 2024-02-14 DIAGNOSIS — R25.9 ABNORMAL INVOLUNTARY MOVEMENT: ICD-10-CM

## 2024-02-14 DIAGNOSIS — Q87.89 PAINFUL LEG AND MOVING TOES SYNDROME: Primary | ICD-10-CM

## 2024-02-14 PROCEDURE — 99417 PROLNG OP E/M EACH 15 MIN: CPT | Performed by: PSYCHIATRY & NEUROLOGY

## 2024-02-14 PROCEDURE — G2211 COMPLEX E/M VISIT ADD ON: HCPCS | Performed by: PSYCHIATRY & NEUROLOGY

## 2024-02-14 PROCEDURE — 99215 OFFICE O/P EST HI 40 MIN: CPT | Performed by: PSYCHIATRY & NEUROLOGY

## 2024-02-14 RX ORDER — DULOXETIN HYDROCHLORIDE 20 MG/1
CAPSULE, DELAYED RELEASE ORAL
Qty: 30 CAPSULE | Refills: 11 | Status: SHIPPED | OUTPATIENT
Start: 2024-02-14 | End: 2024-06-12

## 2024-02-14 NOTE — PROGRESS NOTES
Department of Neurology  Movement Disorders Division     Patient: Angelia Stallings   MRN: [id number]   : 1975   Date of Visit:  2024     Dear Colleague,     Thank you for referring your patient, Ms. Stallings, to the [facility] NEUROLOGY at [facility]. Please see a copy of my visit note below.    Referring Provider: Darío Khoury MD     Impression:  Angelia Stallings is a 48 year old female with pain in her legs (L>R) and involuntary movement of the second and third digits of there left toes.  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 We discussed PLMT in detail, that it can be associated and exacerbated by peripheral neuropathy, factors that may exacerbate symptoms and how it is managed. We discussed that we could certainly retrial medications that treat neuropathic pain, however, she has trialed several already without improvement. I encouraged managing paint via CBT and meditation in order to change her perception of pain and she seemed open to this. History does not seem consistent with Restless Leg Syndrome at this time. See details below.     Painful Leg Moving Toes syndrome  Complex Regional Pain Syndrome    Plan:  - Labs: ceruloplasmin, Copper  - referral to psychology for CBT to improve perception of pain   - Start Cymbalta 20 mg x 5 weeks then increase to 40 mg daily. Stop at lowest effective dose.   - Physical Therapy to evaluate for exercises that will not exacerbate leg pain   - If migraines worsens, will place referral for Migraine clinic   - Encourage daily and safe exercise  - Consider trying guided meditation. Here are some apps I recommend:.     - [employer] shiloh (www.headspace.com): 14 day free trial and then there is a monthly or annual rate  - Calm shiloh (www.calm.com): 7 day free trial and then there is an annual rate  - [employer]  "shiloh (www.[employer].com): FREE   - Continue to work with Dr. Khoury for evaluation of peripheral neuropathy     Patient to return in 3-5 months, for in-person visit, 30 minutes.     Jenni Dangelo DO, MA   of Neurology   [facility]     Note dictated using voice recognition software.  100 minutes spent on date of encounter doing chart reviews and exam and documentation and further activities as noted above.      Thank you for your referral to our [facility] Movement Disorders Program, we appreciate the opportunity of being your partner in healthcare. Please feel free to reach out to us at any point if any questions or concerns about this visit.     The longitudinal plan of care for the condition(s) below were addressed during this visit. Due to the added complexity in care, I will continue to support Angelia in the subsequent management of this condition(s) and with the ongoing continuity of care of this condition(s).    Problem List Items Addressed This Visit as of 2/14/2024         Other    Abnormal involuntary movement of toes    Painful Leg Moving Toes syndrome  Complex Regional Pain Syndrome       ------------------------------------------------------------------------------------------------------------      History of Present Illness  Ms. Stallings is a pleasant 48 year old R handed female that presents to Neurology Movement clinic as a new patient for evaluation of \"Painful leg and moving toes syndrome.\"    She has seen Dr. Brian Vega for 2nd opinion at [facility] after being seen at [place]. She also saw Dr. Garcia for left knee pain and pain down left leg and  Dr. Wheeler (spine doctor) at [facility] regarding the left leg pain. Ultimately, the above providers did not suspect back/foot/ankle pathology as the etiology of patient's symptoms.     History obtained from patient.   Patient reports about 4 years ago, she heard a pop while on the elliptical and developed pain in " "bottom of left foot and arch. She was treated for plantar fasciitis and tried an orthotic which was not helpful. She was also told tendonitis. She sprained foot a year later. Pain changed to lateral left calf and muscles would tighten up. Simultaneously, she developed left leg pain from glut to hamstring. She developed cramping of big toe and it would \"curl up\" She describes pulling of muscles causing big toe to pull up. 2 years ago, second and third toe on left foot started twitching and moving, involuntary.   Left hip surgery to treat labral tear, IT lengthening and shaved bursa. She admits to a good recovery. This helped pain in left hip.   Now having similar sensation on the right leg since 8/2023 with plantar fasciitis, calf tightness and fatigue of calf.   She had tendonitis in right arm.  Currently, left second and third toes \"move\". She reports twitches in dorsal right foot and anterior lateral leg. Pain in lateral ankle described as a deep aching. Doesn't notice or bother her when walking. Notices pain in leg when sitting or laying down. She reports some numbness in top of left big toe and at end of toes she has a sensation of \"electric shock and someone sticking her in the toes\". Left leg used to be crampy but now not as much. And now right foot is more crampy.   Moving toes don't ever stop but is distracted when she is busy.    She reports pain occurs in foot all day and after being up all day and trying to rest the pain is more evident. Pain is a deep achy pain rated as 7/10 and may cry due to the severity of the pain.   Triggers: being on feet all day  When pressing on left leg, will cause left toes to move  Failed medications:   - gabapentin, Lyrica: made muscles (arm) twitch and limbs would jump off the bed  - Cymbalta caused nausea   - Zoloft caused decreased energy   - Effexor caused apathy  - Amitriptyline led to hospital visit as she felt \"hopeless\" and distraught and stopping this medication " "improved/resolved this symptom   - head/cold baths    Plantar fascia socks help moving toes. She takes oxocodone for the pain but it makes her feel tired. She does not want to be on oxycodone but she does not want to be in pain anymore.   Migraines are improved since 10/2023.     Accepted into [facility] 30 day recovery but she could not  afford this ($40,000).    RLS Screen:  RLS began  ------------------  1. Uncomfortable sensation in the legs with a clear need or urge to move the legs: denies   2. Symptoms worse at night: \"Hard to tell as every day is different.\" But does note pain all day long.  3. symptoms come on with rest: denies  4. symptoms relieved with movement: denies     Current Restless Leg Syndrome treatment: none  Previous Restless Leg Syndrome treatment: gabapentin     Review of Systems:  Other than that mentioned above, the remainder of 12 systems reviewed were negative.    PMH/PSH: nissen hernia repair, anxiety, depression, left hip repair, uterine ablation   FH: Alzheimer's  Paternal: heart disease   Mother has episodic leg symptoms.  Father  at 78 without a neuromuscular diagnosis.  Two brothers, one daughter, no similar symptoms.  SH:   -Parents/Family/Living situation: lives alone with Carie bernal   -Children: daughter 21 year old  -Marital:    -Work:    -Tobacco: denies  -Alcohol: occasional   -Illicit substances: denies     Medications:  Current Outpatient Medications   Medication Sig Dispense Refill    hydrOXYzine denise (VISTARIL) 25 MG capsule Take 25 mg by mouth as needed      oxyCODONE-acetaminophen (PERCOCET) 5-325 MG tablet Take 1 tablet by mouth every 6 hours as needed for pain 20 tablet 0    tiZANidine (ZANAFLEX) 4 MG tablet Take 1-2 at bedtime as needed for muscle spasms 60 tablet 3           Allergies   Allergen Reactions    Cat Dander [Animal Dander] Itching and Shortness Of Breath    [id number] Antimigraine Agents [Sumatriptan] Unknown     Hx cerebral " aneurysms, s/p clipping    Watermelon [Citrullus Vulgaris] Unknown    Penicillins Hives and Rash    Sulfa (Sulfonamide Antibiotics) [Sulfa Antibiotics] Hives and Rash         Physical Exam:  The patient's  blood pressure is 135/95 (abnormal) and her pulse is 83.    Physical Exam:  GENERAL: alert, active, attentive, appropriately groomed   HEENT: normocephalic, eyes open with no discharge, nares patent, oropharynx clear-no lesions  CHEST: non labored breathing, chest rise equal b/l  EXTREMITIES: no edema/cyanosis in extremities visible during exam  PSYCH: mood stable     Neurologic Exam:  MENTAL STATUS: Alert and oriented to person, place, time, and situation. Follows commands. Recent and remote memory intact. Attention span and concentration intact. Fund of knowledge intact to current events.  SPEECH: Fluent, intact comprehension and articulation  CN: overall visual fields intact, EOMIB, no nystagmus, normal saccades, facial sensation intact, facial movement symmetric, hearing grossly intact to conversation  MOTOR: Moves all extremities equally against gravity without difficulty with 5/5 strength in BUE/BLE involving ShAbd, ElbFlex, ElbEx, HipFlex, KneeExt, KneeFlex, ADF  Involuntary movements:   Involuntary Flexion of 2nd and 3rd digits of left toe while in a sitting position with leg resting on ground and with knee extended 4th digit involuntarily aBducts, able to overcome these involuntary movements   TTP to lateral leg above lateral malleoli and around lateral malleoli   REFLEXES (R/L): 2/2  in biceps, brachioradialis, triceps, patellars, achilles; no clonus, toes down going  SENSATION: intact to light touch throughout   COORDINATION: no dysmetria with FTN, HTS  GAIT: able to rise unassisted from a seated position, normal arm swing and dec'd stride length on left leg, somewhat antalgic gait, no en bloc turns, no ataxia     Data Reviewed: I have personally reviewed the tests/studies below.   - Reviewed [facility]  provider's note; no foot/ankle pathology suspected.      -11/2022 Ferritin 58, iron panel WNL  - vitamin D WNL  Lab Results   Component Value Date    A1C 5.2 11/13/2023    A1C 5.3 10/12/2021     TSH   Date Value Ref Range Status   06/22/2022 1.41 0.30 - 5.00 uIU/mL Final     CBC RESULTS:   Recent Labs   Lab Test 11/13/23  0804   WBC 7.8   RBC 4.81   HGB 13.2   HCT 40.8   MCV 85   MCH 27.4   MCHC 32.4   RDW 12.9        Last Comprehensive Metabolic Panel:  Sodium   Date Value Ref Range Status   11/13/2023 137 135 - 145 mmol/L Final     Comment:     Reference intervals for this test were updated on 09/26/2023 to more accurately reflect our healthy population. There may be differences in the flagging of prior results with similar values performed with this method. Interpretation of those prior results can be made in the context of the updated reference intervals.      Potassium   Date Value Ref Range Status   11/13/2023 4.1 3.4 - 5.3 mmol/L Final   10/12/2021 4.4 3.5 - 5.0 mmol/L Final     Chloride   Date Value Ref Range Status   11/13/2023 101 98 - 107 mmol/L Final   10/12/2021 104 98 - 107 mmol/L Final     Carbon Dioxide (CO2)   Date Value Ref Range Status   11/13/2023 25 22 - 29 mmol/L Final   10/12/2021 24 22 - 31 mmol/L Final     Anion Gap   Date Value Ref Range Status   11/13/2023 11 7 - 15 mmol/L Final   10/12/2021 10 5 - 18 mmol/L Final     Glucose   Date Value Ref Range Status   11/13/2023 87 70 - 99 mg/dL Final   10/12/2021 67 (L) 70 - 125 mg/dL Final     Urea Nitrogen   Date Value Ref Range Status   11/13/2023 12.4 6.0 - 20.0 mg/dL Final   10/12/2021 16 8 - 22 mg/dL Final     Creatinine   Date Value Ref Range Status   11/13/2023 0.94 0.51 - 0.95 mg/dL Final     GFR Estimate   Date Value Ref Range Status   11/13/2023 74 >60 mL/min/1.73m2 Final     Calcium   Date Value Ref Range Status   11/13/2023 9.2 8.6 - 10.0 mg/dL Final     Bilirubin Total   Date Value Ref Range Status   11/13/2023 0.6 <=1.2 mg/dL  Final     Alkaline Phosphatase   Date Value Ref Range Status   2023 47 35 - 104 U/L Final     ALT   Date Value Ref Range Status   2023 19 0 - 50 U/L Final     Comment:     Reference intervals for this test were updated on 2023 to more accurately reflect our healthy population. There may be differences in the flagging of prior results with similar values performed with this method. Interpretation of those prior results can be made in the context of the updated reference intervals.       AST   Date Value Ref Range Status   2023 19 0 - 45 U/L Final     Comment:     Reference intervals for this test were updated on 2023 to more accurately reflect our healthy population. There may be differences in the flagging of prior results with similar values performed with this method. Interpretation of those prior results can be made in the context of the updated reference intervals.     10/2021 L spine MRI: focal midline L3-4 disc protrusion which does encroach into the central spinal canal but does not cause any traversing nerve root compression.  She has a left paracentral L5-S1 disc bulge with annular fissure and slightly high intensity zone with no compression of S1 or L5.  Otherwise, age-appropriate degenerative changes of the facet joints.    2022 L ankle MRI:   IMPRESSION:  1.  Mild tendinopathy and tenosynovitis involving the left extensor digitorum longus tendon. No evidence of tear.  2.  Chronic sprain of the left anterior talofibular ligament.    3/2022 MRI brain  IMPRESSION:   1. 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   2.  No evidence of new cerebral aneurysm.     2022 EMG  Interpretation:  This is an abnormal study, demonstrating electrophysiologic evidence of the followin. 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  2. No evidence of axonal injury to left lumbosacral nerve roots.

## 2024-02-14 NOTE — LETTER
2024         RE: Angelia Stallings  [address] [facility] [address]        Dear Colleague,    Thank you for referring your patient, Angelia Stallings, to the [facility] NEUROLOGY CLINIC [facility]. Please see a copy of my visit note below.    Department of Neurology  Movement Disorders Division     Patient: Angelia Stallings   MRN: [id number]   : 1975   Date of Visit:  2024     Dear Colleague,     Thank you for referring your patient, Ms. Stallings, to the [facility] NEUROLOGY at [facility]. Please see a copy of my visit note below.    Referring Provider: Darío Khoury MD     Impression:  Angelia Stallings is a 48 year old female with pain in her legs (L>R) and involuntary movement of the second and third digits of there left toes.  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 We discussed PLMT in detail, that it can be associated and exacerbated by peripheral neuropathy, factors that may exacerbate symptoms and how it is managed. We discussed that we could certainly retrial medications that treat neuropathic pain, however, she has trialed several already without improvement. I encouraged managing paint via CBT and meditation in order to change her perception of pain and she seemed open to this. History does not seem consistent with Restless Leg Syndrome at this time. See details below.     Painful Leg Moving Toes syndrome  Complex Regional Pain Syndrome    Plan:  - Labs: ceruloplasmin, Copper  - referral to psychology for CBT to improve perception of pain   - Start Cymbalta 20 mg x 5 weeks then increase to 40 mg daily. Stop at lowest effective dose.   - Physical Therapy to evaluate for exercises that will not exacerbate leg pain   - If migraines worsens, will place referral for Migraine clinic   - Encourage daily and  "safe exercise  - Consider trying guided meditation. Here are some apps I recommend:.     - HeadspaNutrabolt shiloh (www.headspace.com): 14 day free trial and then there is a monthly or annual rate  - Calm shiloh (www.calm.com): 7 day free trial and then there is an annual rate  - InsightTimer shiloh (www.insighttimer.com): FREE   - Continue to work with Dr. Khoury for evaluation of peripheral neuropathy     Patient to return in 3-5 months, for in-person visit, 30 minutes.     Jenni Dangelo DO, MA   of Neurology   [facility]     Note dictated using voice recognition software.  100 minutes spent on date of encounter doing chart reviews and exam and documentation and further activities as noted above.      Thank you for your referral to our [facility] Movement Disorders Program, we appreciate the opportunity of being your partner in healthcare. Please feel free to reach out to us at any point if any questions or concerns about this visit.     The longitudinal plan of care for the condition(s) below were addressed during this visit. Due to the added complexity in care, I will continue to support Angelia in the subsequent management of this condition(s) and with the ongoing continuity of care of this condition(s).    Problem List Items Addressed This Visit as of 2/14/2024         Other    Abnormal involuntary movement of toes    Painful Leg Moving Toes syndrome  Complex Regional Pain Syndrome       ------------------------------------------------------------------------------------------------------------      History of Present Illness  Ms. Stallings is a pleasant 48 year old R handed female that presents to Neurology Movement clinic as a new patient for evaluation of \"Painful leg and moving toes syndrome.\"    She has seen Dr. Brian Vega for 2nd opinion at [facility] orthopedics after being seen at [place]. She also saw Dr. Garcia for left knee pain and pain down left leg and  Dr. Wheeler (spine doctor) at " "[facility] regarding the left leg pain. Ultimately, the above providers did not suspect back/foot/ankle pathology as the etiology of patient's symptoms.     History obtained from patient.   Patient reports about 4 years ago, she heard a pop while on the elliptical and developed pain in bottom of left foot and arch. She was treated for plantar fasciitis and tried an orthotic which was not helpful. She was also told tendonitis. She sprained foot a year later. Pain changed to lateral left calf and muscles would tighten up. Simultaneously, she developed left leg pain from glut to hamstring. She developed cramping of big toe and it would \"curl up\" She describes pulling of muscles causing big toe to pull up. 2 years ago, second and third toe on left foot started twitching and moving, involuntary.   Left hip surgery to treat labral tear, IT lengthening and shaved bursa. She admits to a good recovery. This helped pain in left hip.   Now having similar sensation on the right leg since 8/2023 with plantar fasciitis, calf tightness and fatigue of calf.   She had tendonitis in right arm.  Currently, left second and third toes \"move\". She reports twitches in dorsal right foot and anterior lateral leg. Pain in lateral ankle described as a deep aching. Doesn't notice or bother her when walking. Notices pain in leg when sitting or laying down. She reports some numbness in top of left big toe and at end of toes she has a sensation of \"electric shock and someone sticking her in the toes\". Left leg used to be crampy but now not as much. And now right foot is more crampy.   Moving toes don't ever stop but is distracted when she is busy.    She reports pain occurs in foot all day and after being up all day and trying to rest the pain is more evident. Pain is a deep achy pain rated as 7/10 and may cry due to the severity of the pain.   Triggers: being on feet all day  When pressing on left leg, will cause left toes to move  Failed medications: " "  - gabapentin, Lyrica: made muscles (arm) twitch and limbs would jump off the bed  - Cymbalta caused nausea   - Zoloft caused decreased energy   - Effexor caused apathy  - Amitriptyline led to hospital visit as she felt \"hopeless\" and distraught and stopping this medication improved/resolved this symptom   - head/cold baths    Plantar fascia socks help moving toes. She takes oxocodone for the pain but it makes her feel tired. She does not want to be on oxycodone but she does not want to be in pain anymore.   Migraines are improved since 10/2023.     Accepted into [facility] 30 day recovery but she could not  afford this ($40,000).    RLS Screen:  RLS began  ------------------  1. Uncomfortable sensation in the legs with a clear need or urge to move the legs: denies   2. Symptoms worse at night: \"Hard to tell as every day is different.\" But does note pain all day long.  3. symptoms come on with rest: denies  4. symptoms relieved with movement: denies     Current Restless Leg Syndrome treatment: none  Previous Restless Leg Syndrome treatment: gabapentin     Review of Systems:  Other than that mentioned above, the remainder of 12 systems reviewed were negative.    PMH/PSH: nissen hernia repair, anxiety, depression, left hip repair, uterine ablation   FH: Alzheimer's  Paternal: heart disease   Mother has episodic leg symptoms.  Father  at 78 without a neuromuscular diagnosis.  Two brothers, one daughter, no similar symptoms.  SH:   -Parents/Family/Living situation: lives alone with Carie bernal   -Children: daughter 21 year old  -Marital:    -Work:    -Tobacco: denies  -Alcohol: occasional   -Illicit substances: denies     Medications:  Current Outpatient Medications   Medication Sig Dispense Refill     hydrOXYzine denise (VISTARIL) 25 MG capsule Take 25 mg by mouth as needed       oxyCODONE-acetaminophen (PERCOCET) 5-325 MG tablet Take 1 tablet by mouth every 6 hours as needed for pain 20 tablet 0     " tiZANidine (ZANAFLEX) 4 MG tablet Take 1-2 at bedtime as needed for muscle spasms 60 tablet 3           Allergies   Allergen Reactions     Cat Dander [Animal Dander] Itching and Shortness Of Breath     Ovpebkhi-5-He0 Antimigraine Agents [Sumatriptan] Unknown     Hx cerebral aneurysms, s/p clipping     Watermelon [Citrullus Vulgaris] Unknown     Penicillins Hives and Rash     Sulfa (Sulfonamide Antibiotics) [Sulfa Antibiotics] Hives and Rash         Physical Exam:  The patient's  blood pressure is 135/95 (abnormal) and her pulse is 83.    Physical Exam:  GENERAL: alert, active, attentive, appropriately groomed   HEENT: normocephalic, eyes open with no discharge, nares patent, oropharynx clear-no lesions  CHEST: non labored breathing, chest rise equal b/l  EXTREMITIES: no edema/cyanosis in extremities visible during exam  PSYCH: mood stable     Neurologic Exam:  MENTAL STATUS: Alert and oriented to person, place, time, and situation. Follows commands. Recent and remote memory intact. Attention span and concentration intact. Fund of knowledge intact to current events.  SPEECH: Fluent, intact comprehension and articulation  CN: overall visual fields intact, EOMIB, no nystagmus, normal saccades, facial sensation intact, facial movement symmetric, hearing grossly intact to conversation  MOTOR: Moves all extremities equally against gravity without difficulty with 5/5 strength in BUE/BLE involving ShAbd, ElbFlex, ElbEx, HipFlex, KneeExt, KneeFlex, ADF  Involuntary movements:   Involuntary Flexion of 2nd and 3rd digits of left toe while in a sitting position with leg resting on ground and with knee extended 4th digit involuntarily aBducts, able to overcome these involuntary movements   TTP to lateral leg above lateral malleoli and around lateral malleoli   REFLEXES (R/L): 2/2  in biceps, brachioradialis, triceps, patellars, achilles; no clonus, toes down going  SENSATION: intact to light touch throughout   COORDINATION: no  dysmetria with FTN, HTS  GAIT: able to rise unassisted from a seated position, normal arm swing and dec'd stride length on left leg, somewhat antalgic gait, no en bloc turns, no ataxia     Data Reviewed: I have personally reviewed the tests/studies below.   - Reviewed [facility] provider's note; no foot/ankle pathology suspected.      -11/2022 Ferritin 58, iron panel WNL  - vitamin D WNL  Lab Results   Component Value Date    A1C 5.2 11/13/2023    A1C 5.3 10/12/2021     TSH   Date Value Ref Range Status   06/22/2022 1.41 0.30 - 5.00 uIU/mL Final     CBC RESULTS:   Recent Labs   Lab Test 11/13/23  0804   WBC 7.8   RBC 4.81   HGB 13.2   HCT 40.8   MCV 85   MCH 27.4   MCHC 32.4   RDW 12.9        Last Comprehensive Metabolic Panel:  Sodium   Date Value Ref Range Status   11/13/2023 137 135 - 145 mmol/L Final     Comment:     Reference intervals for this test were updated on 09/26/2023 to more accurately reflect our healthy population. There may be differences in the flagging of prior results with similar values performed with this method. Interpretation of those prior results can be made in the context of the updated reference intervals.      Potassium   Date Value Ref Range Status   11/13/2023 4.1 3.4 - 5.3 mmol/L Final   10/12/2021 4.4 3.5 - 5.0 mmol/L Final     Chloride   Date Value Ref Range Status   11/13/2023 101 98 - 107 mmol/L Final   10/12/2021 104 98 - 107 mmol/L Final     Carbon Dioxide (CO2)   Date Value Ref Range Status   11/13/2023 25 22 - 29 mmol/L Final   10/12/2021 24 22 - 31 mmol/L Final     Anion Gap   Date Value Ref Range Status   11/13/2023 11 7 - 15 mmol/L Final   10/12/2021 10 5 - 18 mmol/L Final     Glucose   Date Value Ref Range Status   11/13/2023 87 70 - 99 mg/dL Final   10/12/2021 67 (L) 70 - 125 mg/dL Final     Urea Nitrogen   Date Value Ref Range Status   11/13/2023 12.4 6.0 - 20.0 mg/dL Final   10/12/2021 16 8 - 22 mg/dL Final     Creatinine   Date Value Ref Range Status   11/13/2023 0.94  0.51 - 0.95 mg/dL Final     GFR Estimate   Date Value Ref Range Status   11/13/2023 74 >60 mL/min/1.73m2 Final     Calcium   Date Value Ref Range Status   11/13/2023 9.2 8.6 - 10.0 mg/dL Final     Bilirubin Total   Date Value Ref Range Status   11/13/2023 0.6 <=1.2 mg/dL Final     Alkaline Phosphatase   Date Value Ref Range Status   11/13/2023 47 35 - 104 U/L Final     ALT   Date Value Ref Range Status   11/13/2023 19 0 - 50 U/L Final     Comment:     Reference intervals for this test were updated on 6/12/2023 to more accurately reflect our healthy population. There may be differences in the flagging of prior results with similar values performed with this method. Interpretation of those prior results can be made in the context of the updated reference intervals.       AST   Date Value Ref Range Status   11/13/2023 19 0 - 45 U/L Final     Comment:     Reference intervals for this test were updated on 6/12/2023 to more accurately reflect our healthy population. There may be differences in the flagging of prior results with similar values performed with this method. Interpretation of those prior results can be made in the context of the updated reference intervals.     10/2021 L spine MRI: focal midline L3-4 disc protrusion which does encroach into the central spinal canal but does not cause any traversing nerve root compression.  She has a left paracentral L5-S1 disc bulge with annular fissure and slightly high intensity zone with no compression of S1 or L5.  Otherwise, age-appropriate degenerative changes of the facet joints.    7/2022 L ankle MRI:   IMPRESSION:  1.  Mild tendinopathy and tenosynovitis involving the left extensor digitorum longus tendon. No evidence of tear.  2.  Chronic sprain of the left anterior talofibular ligament.    3/2022 MRI brain  IMPRESSION:   1. 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   2.  No  evidence of new cerebral aneurysm.     2022 EMG  Interpretation:  This is an abnormal study, demonstrating electrophysiologic evidence of the followin. 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  2. No evidence of axonal injury to left lumbosacral nerve roots.         Again, thank you for allowing me to participate in the care of your patient.        Sincerely,        Jenni Dangelo DO

## 2024-02-14 NOTE — NURSING NOTE
Chief Complaint   Patient presents with    Painful leg     left    Moving Toes     Left 2nd and 3rd toe       YUDI Maldonado on 2/14/2024 at 3:06 PM

## 2024-02-14 NOTE — PATIENT INSTRUCTIONS
Plan:  - Labs: ceruloplasmin, Copper  - referral to psychology for CBT to improve pain   - Start Cymbalta 20 mg x 5 weeks then increase to 40 mg daily.   - Physical Therapy to evaluate for exercises that will not exacerbate leg pain   - If migraines worsens, will place referral for Migraine clinic   - Encourage daily and safe exercise  - Consider trying guided meditation. Here are some apps I recommend:.     - Sportomato shiloh (www.headspace.com): 14 day free trial and then there is a monthly or annual rate  - Calm shiloh (www.calm.com): 7 day free trial and then there is an annual rate  - InsightTimer shiloh (www.[employer].com): FREE     Patient to return in 3-5 months, for in-person visit, 30 minutes.

## 2024-02-15 ENCOUNTER — LAB (OUTPATIENT)
Dept: LAB | Facility: CLINIC | Age: 49
End: 2024-02-15
Payer: COMMERCIAL

## 2024-02-15 DIAGNOSIS — R25.9 ABNORMAL INVOLUNTARY MOVEMENT: ICD-10-CM

## 2024-02-15 PROCEDURE — 82525 ASSAY OF COPPER: CPT | Mod: 90

## 2024-02-15 PROCEDURE — 82390 ASSAY OF CERULOPLASMIN: CPT

## 2024-02-15 PROCEDURE — 36415 COLL VENOUS BLD VENIPUNCTURE: CPT

## 2024-02-15 PROCEDURE — 99000 SPECIMEN HANDLING OFFICE-LAB: CPT

## 2024-02-16 LAB — COPPER SERPL-MCNC: 96.1 UG/DL

## 2024-02-19 LAB — CERULOPLASMIN SERPL-MCNC: 34 MG/DL (ref 20–60)

## 2024-02-22 ENCOUNTER — TELEPHONE (OUTPATIENT)
Dept: FAMILY MEDICINE | Facility: CLINIC | Age: 49
End: 2024-02-22
Payer: COMMERCIAL

## 2024-02-22 DIAGNOSIS — R11.0 NAUSEA: Primary | ICD-10-CM

## 2024-02-22 NOTE — TELEPHONE ENCOUNTER
S-(situation): Patient has been on Cymbalta since 2/14 and it is helping for the pain issues.  But it is causing nausea.    B-(background): Diagnosis: Painful legs moving toes syndrome.    A-(assessment): Patient states that she becomes nauseated with the 20 mg Cymbalta.  She would like to stay on this medication because it has been helpful for her pain.  But she is nauseated and is requesting something to try for nausea as needed.    Writer did encourage to take with food to minimize the nausea. She states that today she did take it with food and is not sure if that helped or not.      R-(recommendations): Please advise if would like to send a rx or other disposition.    ELENA Caldwell RN  [facility]

## 2024-02-23 RX ORDER — ONDANSETRON 4 MG/1
4 TABLET, ORALLY DISINTEGRATING ORAL EVERY 8 HOURS PRN
Qty: 30 TABLET | Refills: 3 | Status: SHIPPED | OUTPATIENT
Start: 2024-02-23 | End: 2024-08-16

## 2024-02-23 NOTE — TELEPHONE ENCOUNTER
Zofran sent. She should follow up and talk about medications changes if she can not tolerate the nausea.

## 2024-03-04 ENCOUNTER — MYC REFILL (OUTPATIENT)
Dept: FAMILY MEDICINE | Facility: CLINIC | Age: 49
End: 2024-03-04
Payer: COMMERCIAL

## 2024-03-04 DIAGNOSIS — G89.29 OTHER CHRONIC PAIN: ICD-10-CM

## 2024-03-04 RX ORDER — OXYCODONE AND ACETAMINOPHEN 5; 325 MG/1; MG/1
1 TABLET ORAL EVERY 6 HOURS PRN
Qty: 20 TABLET | Refills: 0 | Status: SHIPPED | OUTPATIENT
Start: 2024-03-04 | End: 2024-03-29

## 2024-03-29 ENCOUNTER — MYC REFILL (OUTPATIENT)
Dept: FAMILY MEDICINE | Facility: CLINIC | Age: 49
End: 2024-03-29
Payer: COMMERCIAL

## 2024-03-29 DIAGNOSIS — G89.29 OTHER CHRONIC PAIN: ICD-10-CM

## 2024-04-01 RX ORDER — OXYCODONE AND ACETAMINOPHEN 5; 325 MG/1; MG/1
1 TABLET ORAL EVERY 6 HOURS PRN
Qty: 20 TABLET | Refills: 0 | Status: SHIPPED | OUTPATIENT
Start: 2024-04-01 | End: 2024-04-18

## 2024-04-17 ENCOUNTER — OFFICE VISIT (OUTPATIENT)
Dept: NEUROLOGY | Facility: CLINIC | Age: 49
End: 2024-04-17
Attending: PSYCHIATRY & NEUROLOGY
Payer: COMMERCIAL

## 2024-04-17 DIAGNOSIS — M79.675 PAINFUL LEGS AND MOVING TOES OF LEFT FOOT: Primary | ICD-10-CM

## 2024-04-17 DIAGNOSIS — G62.9 POLYNEUROPATHY: ICD-10-CM

## 2024-04-17 DIAGNOSIS — M79.605 PAINFUL LEGS AND MOVING TOES OF LEFT FOOT: Primary | ICD-10-CM

## 2024-04-17 PROCEDURE — 95885 MUSC TST DONE W/NERV TST LIM: CPT | Performed by: PSYCHIATRY & NEUROLOGY

## 2024-04-17 PROCEDURE — 95909 NRV CNDJ TST 5-6 STUDIES: CPT | Performed by: PSYCHIATRY & NEUROLOGY

## 2024-04-17 NOTE — PATIENT INSTRUCTIONS
I would not evaluate for neuropathy further at this time unless symptoms worsen.  I do not think you have complex regional pain syndrome.  I recommend continuing to work with Dr Dangelo on management of painful leg and moving toes.  I recommend continuing to work with orthopedics, sports medicine, or PT as indicated to manage biomechanical problems that might be contributing to your pain.

## 2024-04-17 NOTE — PROGRESS NOTES
[facility]  Electrodiagnostic Laboratory                 Department of Neurology                                                                                                         Test Date:  2024    Patient: Angelia Stallings : 1975 Physician: Darío Khoury MD   Sex: Male AGE: 48 year Ref Phys:    ID#: [id number]   Technician: Sanjuanita Batista     History and Examination:  Angelia Stallings is a 48 year old woman with left foot pain and a diagnosis of painful legs and moving toes. Prior electrodiagnostic studies have been normal or demonstrated mild attenuation of sensory nerve action potentials for a person of her age. Examination demonstrates normal muscle bulk and no abnormalities of temperature, color, or skin. She is referred for a follow up study to assess for possible progression of neuropathy.    Techniques:  Motor conduction studies were done with surface recording electrodes. Sensory conduction studies were performed with surface electrodes, unless indicated otherwise by (n), designating the use of subdermal recording electrodes. Temperature was monitored and recorded throughout the study. EMG was done with a concentric needle electrode.     Results:  Bilateral sural and superficial fibular sensory conduction studies demonstrated sural sensory amplitudes at or slightly below the laboratory's lower limit of the normal range for age and were otherwise normal. Left fibular and tibial motor conduction studies were normal. Left tibial and right fibular F-responses studies were normal. Left fibular F-responses were not readily demonstrated, but the study was of limited quality because of movement artifact related to toe movement. Needle electromyography of left intrinsic foot muscles, which had not been performed on prior electrodiagnostic studies, was normal.     Interpretation:  This study demonstrates no diagnostic abnormalities. In particular, the following are  noted:  Sural sensory amplitudes are at or slightly below the laboratory's lower limit of the normal range. This is of uncertain diagnostic significance as an isolated finding; specifically, it is deemed insufficient in severity to confidently diagnose polyneuropathy, particularly as age-adjusted norms differ.  While no definite fibular F-responses were identified on the left, this too is of limited diagnostic significance because there was considerable artifact due to involuntary foot movement, other studies are normal, and fibular F-persistence is commonly low in the normal population.      _____________________________  Darío Khoury MD  Board Certified in Clinical Neurophysiology and Neuromuscular Medicine        Nerve Conduction Studies  Motor Sites      Latency Amplitude Neg. Amp Diff Segment Distance Velocity Neg. Dur Neg Area Diff Temperature Comment   Site (ms) Norm (mV) Norm (%)  cm m/s Norm (ms) (%) ( C)    Left Fibular (EDB) Motor   Ankle 3.7  < 6.0 3.7  > 2.5  Ankle-EDB 8   6.0  31.2    Bel Fib Head 11.0 - 3.4 - -8 Bel Fib Head-Ankle 30.2 41  > 38 6.6 -3 31.5    Pop Fossa 12.6 - 3.5 - 3 Pop Fossa-Bel Fib Head 10.3 64  > 38 6.6 9 31.6    Left Tibial (AHB) Motor   Ankle 4.2  < 6.5 13.5  > 5.0  Ankle-AHB 5.8   5.5  32    Knee 13.7 - 6.1 - -55 Knee-Ankle 38.5 41  > 38 6.5 -36 32.4      F-Wave Sites      Min F-Lat Max-Min F-Lat Mean F-Lat   Site (ms) (ms) (ms)   Left Fibular F-Wave   Ankle NR NR NR   Right Fibular F-Wave   Ankle 49.1 44.0 -   Left Tibial F-Wave   Ankle 51.5 6.6 -     Sensory Sites      Onset Lat Peak Lat Amp (O-P) Amp (P-P) Segment Distance Velocity Temperature Comment   Site ms (ms)  V Norm ( V)  cm m/s Norm ( C)    Left Superficial Fibular Sensory   14 cm-Ankle 2.6 3.4 4  > 3 4 14 cm-Ankle 12.5 48  > 38 28.7    Right Superficial Fibular Sensory   14 cm-Ankle 2.3 3.3 4  > 3 4 14 cm-Ankle 12 52  > 38 29.7    Left Sural Sensory   Calf-Lat Mall 2.3 3.5 8  > 5 11 Calf-Lat Mall 13.5 59  > 38 31.8     Right Sural Sensory   Calf-Lat Mall 3.2 4.1 7  > 5 11 Calf-Lat Mall 14.3 45  > 38 29.7        Electromyography     Side Muscle Ins Act Fibs/PSW Fasc HF Amp Dur Poly Recrt Int Pat   Left ADM p Nml None Nml 0 Nml Nml 0 Nml Nml   Left EDB Nml None Nml 0 Nml Nml 0 Nml Nml         NCS Waveforms:    Motor         Sensory                F-Wave

## 2024-04-17 NOTE — LETTER
2024       RE: Angelia Stallings  [address] [facility] [address]       Dear Colleague,    Thank you for referring your patient, Angelia Stallings, to the [facility] EMG CLINIC [place] at [facility]. Please see a copy of my visit note below.                        [facility]  Electrodiagnostic Laboratory                 Department of Neurology                                                                                                         Test Date:  2024    Patient: Angelia Stallings : 1975 Physician: Darío Khoury MD   Sex: Male AGE: 48 year Ref Phys:    ID#: [id number]   Technician: Sanjuanita Batista     History and Examination:  Angelia Stallings is a 48 year old woman with left foot pain and a diagnosis of painful legs and moving toes. Prior electrodiagnostic studies have been normal or demonstrated mild attenuation of sensory nerve action potentials for a person of her age. Examination demonstrates normal muscle bulk and no abnormalities of temperature, color, or skin. She is referred for a follow up study to assess for possible progression of neuropathy.    Techniques:  Motor conduction studies were done with surface recording electrodes. Sensory conduction studies were performed with surface electrodes, unless indicated otherwise by (n), designating the use of subdermal recording electrodes. Temperature was monitored and recorded throughout the study. EMG was done with a concentric needle electrode.     Results:  Bilateral sural and superficial fibular sensory conduction studies demonstrated sural sensory amplitudes at or slightly below the laboratory's lower limit of the normal range for age and were otherwise normal. Left fibular and tibial motor conduction studies were normal. Left tibial and right fibular F-responses studies were normal. Left fibular F-responses were not readily demonstrated, but the study was  of limited quality because of movement artifact related to toe movement. Needle electromyography of left intrinsic foot muscles, which had not been performed on prior electrodiagnostic studies, was normal.     Interpretation:  This study demonstrates no diagnostic abnormalities. In particular, the following are noted:  Sural sensory amplitudes are at or slightly below the laboratory's lower limit of the normal range. This is of uncertain diagnostic significance as an isolated finding; specifically, it is deemed insufficient in severity to confidently diagnose polyneuropathy, particularly as age-adjusted norms differ.  While no definite fibular F-responses were identified on the left, this too is of limited diagnostic significance because there was considerable artifact due to involuntary foot movement, other studies are normal, and fibular F-persistence is commonly low in the normal population.      _____________________________  Darío Khoury MD  Board Certified in Clinical Neurophysiology and Neuromuscular Medicine        Nerve Conduction Studies  Motor Sites      Latency Amplitude Neg. Amp Diff Segment Distance Velocity Neg. Dur Neg Area Diff Temperature Comment   Site (ms) Norm (mV) Norm (%)  cm m/s Norm (ms) (%) ( C)    Left Fibular (EDB) Motor   Ankle 3.7  < 6.0 3.7  > 2.5  Ankle-EDB 8   6.0  31.2    Bel Fib Head 11.0 - 3.4 - -8 Bel Fib Head-Ankle 30.2 41  > 38 6.6 -3 31.5    Pop Fossa 12.6 - 3.5 - 3 Pop Fossa-Bel Fib Head 10.3 64  > 38 6.6 9 31.6    Left Tibial (AHB) Motor   Ankle 4.2  < 6.5 13.5  > 5.0  Ankle-AHB 5.8   5.5  32    Knee 13.7 - 6.1 - -55 Knee-Ankle 38.5 41  > 38 6.5 -36 32.4      F-Wave Sites      Min F-Lat Max-Min F-Lat Mean F-Lat   Site (ms) (ms) (ms)   Left Fibular F-Wave   Ankle NR NR NR   Right Fibular F-Wave   Ankle 49.1 44.0 -   Left Tibial F-Wave   Ankle 51.5 6.6 -     Sensory Sites      Onset Lat Peak Lat Amp (O-P) Amp (P-P) Segment Distance Velocity Temperature Comment   Site ms (ms)  V  Norm ( V)  cm m/s Norm ( C)    Left Superficial Fibular Sensory   14 cm-Ankle 2.6 3.4 4  > 3 4 14 cm-Ankle 12.5 48  > 38 28.7    Right Superficial Fibular Sensory   14 cm-Ankle 2.3 3.3 4  > 3 4 14 cm-Ankle 12 52  > 38 29.7    Left Sural Sensory   Calf-Lat Mall 2.3 3.5 8  > 5 11 Calf-Lat Mall 13.5 59  > 38 31.8    Right Sural Sensory   Calf-Lat Mall 3.2 4.1 7  > 5 11 Calf-Lat Mall 14.3 45  > 38 29.7        Electromyography     Side Muscle Ins Act Fibs/PSW Fasc HF Amp Dur Poly Recrt Int Pat   Left ADM p Nml None Nml 0 Nml Nml 0 Nml Nml   Left EDB Nml None Nml 0 Nml Nml 0 Nml Nml         NCS Waveforms:    Motor         Sensory                F-Wave                       EMG results, prior electrodiagnostic studies, and laboratory studies reviewed with the patient. Recommendations, copied from patient instructions:    I would not evaluate for neuropathy further at this time unless symptoms worsen.  I do not think you have complex regional pain syndrome.  I recommend continuing to work with Dr Dangelo on management of painful leg and moving toes.  I recommend continuing to work with orthopedics, sports medicine, or PT as indicated to manage biomechanical problems that might be contributing to your pain.        Again, thank you for allowing me to participate in the care of your patient.      Sincerely,    Darío Khoury MD     [id number]:1-3 Days|| ||00\01||False;

## 2024-04-17 NOTE — PROGRESS NOTES
EMG results, prior electrodiagnostic studies, and laboratory studies reviewed with the patient. Recommendations, copied from patient instructions:    I would not evaluate for neuropathy further at this time unless symptoms worsen.  I do not think you have complex regional pain syndrome.  I recommend continuing to work with Dr Dangelo on management of painful leg and moving toes.  I recommend continuing to work with orthopedics, sports medicine, or PT as indicated to manage biomechanical problems that might be contributing to your pain.      Darío Khoury M.D.

## 2024-04-18 ENCOUNTER — MYC REFILL (OUTPATIENT)
Dept: FAMILY MEDICINE | Facility: CLINIC | Age: 49
End: 2024-04-18
Payer: COMMERCIAL

## 2024-04-18 DIAGNOSIS — G89.29 OTHER CHRONIC PAIN: ICD-10-CM

## 2024-04-19 RX ORDER — OXYCODONE AND ACETAMINOPHEN 5; 325 MG/1; MG/1
1 TABLET ORAL EVERY 6 HOURS PRN
Qty: 20 TABLET | Refills: 0 | Status: SHIPPED | OUTPATIENT
Start: 2024-04-19 | End: 2024-05-15

## 2024-05-15 ENCOUNTER — MYC REFILL (OUTPATIENT)
Dept: FAMILY MEDICINE | Facility: CLINIC | Age: 49
End: 2024-05-15
Payer: COMMERCIAL

## 2024-05-15 DIAGNOSIS — G89.29 OTHER CHRONIC PAIN: ICD-10-CM

## 2024-05-15 RX ORDER — OXYCODONE AND ACETAMINOPHEN 5; 325 MG/1; MG/1
1 TABLET ORAL EVERY 6 HOURS PRN
Qty: 20 TABLET | Refills: 0 | Status: SHIPPED | OUTPATIENT
Start: 2024-05-15 | End: 2024-06-12

## 2024-05-24 ENCOUNTER — NURSE TRIAGE (OUTPATIENT)
Dept: NURSING | Facility: CLINIC | Age: 49
End: 2024-05-24
Payer: COMMERCIAL

## 2024-05-24 NOTE — TELEPHONE ENCOUNTER
Had called for appointment, vertigo, mostly when walking for last week. Did go and flew last week and then was going up and down from 8th floor of hotel. Started on the flight, felt like she was floating. Now seems with walking, body seems to have a sense of gravity, weird feeling. Hasn't fallen, not spinning. Buzzing, mostly left ear pain. A few weeks ago had migraine for one week made her feel off as well.  She will go to urgent care since there are no clinic visits available. I didn't think a video visit would be appropriate.  Gloria Veloz RN  [facility]    Reason for Disposition   MODERATE dizziness (e.g., interferes with normal activities)  (Exception: Dizziness caused by heat exposure, sudden standing, or poor fluid intake.)    Additional Information   Negative: SEVERE difficulty breathing (e.g., struggling for each breath, speaks in single words)   Negative: Shock suspected (e.g., cold/pale/clammy skin, too weak to stand, low BP, rapid pulse)   Negative: Difficult to awaken or acting confused (e.g., disoriented, slurred speech)   Negative: Fainted, and still feels dizzy afterwards   Negative: Overdose (accidental or intentional) of medications   Negative: New neurologic deficit that is present now: * Weakness of the face, arm, or leg on one side of the body * Numbness of the face, arm, or leg on one side of the body * Loss of speech or garbled speech   Negative: Heart beating < 50 beats per minute OR > 140 beats per minute   Negative: Sounds like a life-threatening emergency to the triager   Negative: Chest pain   Negative: Rectal bleeding, bloody stool, or tarry-black stool   Negative: Vomiting is main symptom     nausea   Negative: Diarrhea is main symptom   Negative: Headache is main symptom     Left side, temple, pain at 5. Took naproxyn one hour ago.   Negative: Heat exhaustion suspected (i.e., dehydration from heat exposure)   Negative: Patient states that they are having an anxiety or  panic attack   Negative: Dizziness from low blood sugar (i.e., < 60 mg/dl or 3.5 mmol/l)   Negative: SEVERE dizziness (e.g., unable to stand, requires support to walk, feels like passing out now)   Negative: SEVERE headache or neck pain     Neck pain at 5, headache 5.   Negative: Spinning or tilting sensation (vertigo) present now and one or more stroke risk factors (i.e., hypertension, diabetes mellitus, prior stroke/TIA, heart attack, age over 60) (Exception: Prior physician evaluation for this AND no different/worse than usual.)   Negative: Neurologic deficit that was brief (now gone), ANY of the following:* Weakness of the face, arm, or leg on one side of the body* Numbness of the face, arm, or leg on one side of the body* Loss of speech or garbled speech   Negative: Loss of vision or double vision  (Exception: Similar to previous migraines.)   Negative: Extra heartbeats, irregular heart beating, or heart is beating very fast (i.e., 'palpitations')   Negative: Difficulty breathing   Negative: Drinking very little and dehydration suspected (e.g., no urine > 12 hours, very dry mouth, very lightheaded)   Negative: Follows bleeding (e.g., stomach, rectum, vagina)  (Exception: Became dizzy from sight of small amount blood.)   Negative: Patient sounds very sick or weak to the triager   Negative: Lightheadedness (dizziness) present now, after 2 hours of rest and fluids   Negative: Spinning or tilting sensation (vertigo) present now   Negative: Fever > 103 F (39.4 C)   Negative: Fever > 100.0 F (37.8 C) and has diabetes mellitus or a weak immune system (e.g., HIV positive, cancer chemotherapy, organ transplant, splenectomy, chronic steroids)    Protocols used: Dizziness-A-OH

## 2024-06-01 DIAGNOSIS — S13.4XXA WHIPLASH INJURY TO NECK, INITIAL ENCOUNTER: ICD-10-CM
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
## 2024-06-12 ENCOUNTER — MYC REFILL (OUTPATIENT)
Dept: FAMILY MEDICINE | Facility: CLINIC | Age: 49
End: 2024-06-12

## 2024-06-12 ENCOUNTER — OFFICE VISIT (OUTPATIENT)
Dept: NEUROLOGY | Facility: CLINIC | Age: 49
End: 2024-06-12
Payer: COMMERCIAL

## 2024-06-12 VITALS — SYSTOLIC BLOOD PRESSURE: 134 MMHG | DIASTOLIC BLOOD PRESSURE: 90 MMHG | HEART RATE: 105 BPM

## 2024-06-12 DIAGNOSIS — Q87.89 PAINFUL LEG AND MOVING TOES SYNDROME: Primary | ICD-10-CM
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
## 2024-06-12 DIAGNOSIS — R52 PAIN: ICD-10-CM

## 2024-06-12 DIAGNOSIS — G62.9 POLYNEUROPATHY: ICD-10-CM

## 2024-06-12 DIAGNOSIS — R25.9 ABNORMAL INVOLUNTARY MOVEMENT: ICD-10-CM

## 2024-06-12 DIAGNOSIS — S13.4XXA WHIPLASH INJURY TO NECK, INITIAL ENCOUNTER: ICD-10-CM

## 2024-06-12 DIAGNOSIS — M79.606 PAINFUL LEG AND MOVING TOES SYNDROME: Primary | ICD-10-CM

## 2024-06-12 DIAGNOSIS — M79.605 PAINFUL LEGS AND MOVING TOES OF LEFT FOOT: ICD-10-CM

## 2024-06-12 DIAGNOSIS — G43.919 INTRACTABLE MIGRAINE WITHOUT STATUS MIGRAINOSUS, UNSPECIFIED MIGRAINE TYPE: ICD-10-CM

## 2024-06-12 DIAGNOSIS — G89.29 OTHER CHRONIC PAIN: ICD-10-CM

## 2024-06-12 DIAGNOSIS — M79.675 PAINFUL LEGS AND MOVING TOES OF LEFT FOOT: ICD-10-CM

## 2024-06-12 PROCEDURE — G2211 COMPLEX E/M VISIT ADD ON: HCPCS | Performed by: PSYCHIATRY & NEUROLOGY

## 2024-06-12 PROCEDURE — 99215 OFFICE O/P EST HI 40 MIN: CPT | Performed by: PSYCHIATRY & NEUROLOGY

## 2024-06-12 RX ORDER — DULOXETIN HYDROCHLORIDE 20 MG/1
20 CAPSULE, DELAYED RELEASE ORAL AT BEDTIME
Qty: 90 CAPSULE | Refills: 3 | Status: SHIPPED | OUTPATIENT
Start: 2024-06-12 | End: 2024-08-23

## 2024-06-12 RX ORDER — DULOXETIN HYDROCHLORIDE 60 MG/1
60 CAPSULE, DELAYED RELEASE ORAL DAILY
Qty: 30 CAPSULE | Refills: 11 | Status: SHIPPED | OUTPATIENT
Start: 2024-06-12 | End: 2024-06-12 | Stop reason: DRUGHIGH

## 2024-06-12 RX ORDER — OXYCODONE AND ACETAMINOPHEN 5; 325 MG/1; MG/1
1 TABLET ORAL EVERY 6 HOURS PRN
Qty: 20 TABLET | Refills: 0 | Status: SHIPPED | OUTPATIENT
Start: 2024-06-12 | End: 2024-07-01

## 2024-06-12 NOTE — LETTER
"2024      Angelia Stallings  [address] [facility] [address]      Dear Colleague,    Thank you for referring your patient, Angelia Stallings, to the [facility] NEUROLOGY [facility]IN[facility]. Please see a copy of my visit note below.    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 The patient's main concern today is pain in multiple sites, which is a trigger for her PLMTS and cramps. We discussed ways to improve symptoms of PLMTS by addressing triggers such as pain, poor sleep, stress/anxiety, headaches.       Painful Leg Moving Toes syndrome  Complex Regional Pain Syndrome  Pain in her legs (L>R) and involuntary movement of the second and third digits of there left toes that began in  on the left then developed similar symptoms on right leg in 2023. Triggers include pain and being on feet all day. 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 Copper and Ceruloplasmin have been normal. EMG unremarkable.   Failed medications:   - gabapentin, Lyrica: made muscles (arm) twitch and limbs would jump off the bed  - Cymbalta caused nausea   - Zoloft caused decreased energy   - Effexor caused apathy  - Amitriptyline led to hospital visit as she felt \"hopeless\" and distraught and stopping this medication improved/resolved this symptom   - hot/cold baths    Headaches    Plan:   - No change to duloxetine 20 mg daily   - PM&R referral   - Stop drinking water 1-2 hours prior to sleep to prevent unwanted awakenings   - Agree with eye evaluation   - For prophylactic therapy, start Magnesium gluconate 500mg qday to be taken with food   - The biggest side effect from magnesium is " diarrhea   - It may take 3-4 months for you to notice any effect from the magnesium  - HA Education: Think SMART, behavioral changes to improve headaches  S: sleep- same schedule, 8-10hrs at night  M: meals- don t skip meals  Limit caffeine (max 12oz/day, goal is 0)  Drink water (40-60oz/day)  A: activity-regular exercise (3x/wk)  R: relaxation  T: trigger avoidance  Food: cheeses, chocolate, lunchmeats, citrus, food additives etc  Stress  Limit medications (no more than 2x/wk- can cause overuse headache)  - Consider trying guided meditation. Here are some apps I recommend:.     - FOB.com shiloh (www.headspace.com): 14 day free trial and then there is a monthly or annual rate  - Calm shiloh (www.calm.com): 7 day free trial and then there is an annual rate  - [employer] DirectTimer shiloh (www.Frontifyr.com): FREE   - Offered psychology referral for CBT to improve perception of pain but she declined   - Continue taking multivitamin daily   - Start a symptom diary for headaches and pain    Patient to return in 6 months, for in-person visit, 30 minutes.     Jenni Dangelo, DO  Movement Disorders Specialist  [facility]     Note dictated using voice recognition software.   45 minutes spent on date of encounter doing chart reviews and exam and documentation and further activities as noted above.   The longitudinal plan of care for the diagnosis(es)/condition(s) as documented were addressed during this visit. Due to the added complexity in care, I will continue to support Angelia in the subsequent management and with ongoing continuity of care.     ------------------------------------------------------------------------------------------------------------      History of Present Illness  Ms. Stallings is a pleasant 48 year old female that presents to Neurology Movement clinic for follow up regarding pain in her legs (L>R) and involuntary movement of the second and third digits of her left toes thought to be Painful Leg  "Moving Toes syndrome.  The patient was initially seen in neurologic consultation on 2/14/24 for evaluation of pain in her legs (L>R) and involuntary movement of the second and third digits of her left toes thought to be Painful Leg Moving Toes syndrome. EMG performed which was unremarkable. Please see the comprehensive neurologic consultation notes from those dates in the Epic records for details.      History obtained from patient.   Main complaint: pain in multiple sites, cramps   Patient reports, \"Right now, I'm feeling pretty good.\"  Pain: She hasn't woken up from pain in legs in the past month. She has been trying to eat healthier. Still has pain in the IT band.She has been having leg cramps nightly in the calf. Right big toe cramps and pulls big toe up. She gets cramps in her feet, bilaterally. She repots ankle pain. She reports hip, back and IT band pain. Ankle pain is improving.   She went to [facility] a couple and developed a \"vertigo-like\" feeling and a feeling where \"my body was going down.\" She also saw waves in her vision when walking down the bella with lights. She also reports migraines and jaw issues. Wasn't having a migraine with the above vision issues. She does report \"ice-pick\" type headaches which last 30 seconds each and may have 5-6 events consecutively; may last 1-2 weeks. She used to get Botox injections for migraines but stopped as her migraines improved. Deneis world spinning.   Duloxetine: \"I felt like the pain was a little bit better initially.\" She didn't have energy on duloxetine 40  mg daily and it didn't improve pain symptoms.   Migraine: see above   Physical Therapy: Physical Therapy from August to March after surgery and to improve tendonitis.   Exercise: Yes.   Sleep: Wakes up a couple times a night due to having urinate and having to urinate.   Now taking vitamin D supplement and feels she has more energy.   Going to [facility] to help daughter move into an apartment as " daughter is starting a new job.   Movement Disorder-related Medications                   Indication        Duloxetine 20 mg  CRPS 1                                      Review of Systems:  Other than that mentioned above, the remainder of 12 systems reviewed were negative.    PMH: unchanged  PSH: unchanged  FH: unchanged  SH: unchanged    Medications:  Current Outpatient Medications   Medication Sig Dispense Refill     DULoxetine (CYMBALTA) 20 MG capsule Start 20 mg daily x 5 weeks then increase to 40 mg daily. (Patient taking differently: Take 20 mg by mouth daily Start 20 mg daily x 5 weeks then increase to 40 mg daily.) 30 capsule 11     tiZANidine (ZANAFLEX) 4 MG tablet TAKE 1 TO 2 TABLETS BY MOUTH AT BEDTIME AS NEEDED FOR MUSCLE SPASMS 60 tablet 3     hydrOXYzine denise (VISTARIL) 25 MG capsule Take 25 mg by mouth as needed (Patient not taking: Reported on 6/12/2024)       ondansetron (ZOFRAN ODT) 4 MG ODT tab Take 1 tablet (4 mg) by mouth every 8 hours as needed for nausea (Patient not taking: Reported on 6/12/2024) 30 tablet 3     oxyCODONE-acetaminophen (PERCOCET) 5-325 MG tablet Take 1 tablet by mouth every 6 hours as needed for pain Refill needs to last until 5/17/24 (Patient not taking: Reported on 6/12/2024) 20 tablet 0           Allergies   Allergen Reactions     Cat Dander [Animal Dander] Itching and Shortness Of Breath     [id number] Antimigraine Agents [Sumatriptan] Unknown     Hx cerebral aneurysms, s/p clipping     Watermelon [Citrullus Vulgaris] Unknown     Penicillins Hives and Rash     Sulfa (Sulfonamide Antibiotics) [Sulfa Antibiotics] Hives and Rash          Physical Exam:  The patient's  blood pressure is 134/90 (abnormal) and her pulse is 105.    Physical Exam:  GENERAL: alert, active, attentive, appropriately groomed   HEENT: normocephalic, eyes open with no discharge, nares patent, oropharynx clear-no lesions  CHEST: non labored breathing  EXTREMITIES: no edema/cyanosis in extremities  visible during exam  PSYCH: mood stable     Neurologic Exam:  MENTAL STATUS: Alert and oriented to person, place, time, and situation. Follows commands. Recent and remote memory intact. Attention span and concentration intact. Fund of knowledge intact to current events.  SPEECH: Fluent, intact comprehension and articulation  CN: overall visual fields intact, EOMIB, no nystagmus, normal saccades, facial sensation intact, facial movement symmetric, hearing grossly intact to conversation, tongue protrudes midline   MOTOR: Moves all extremities equally against gravity without difficulty   Involuntary movements: roving toes, L > R  GAIT: able to rise unassisted from a seated position, normal arm swing and normal stride length, no en bloc turns, no ataxia      Data Reviewed: I have personally reviewed the tests/studies below.   - EMG 4/2024  Interpretation:  This study demonstrates no diagnostic abnormalities. In particular, the following are noted:  Sural sensory amplitudes are at or slightly below the laboratory's lower limit of the normal range. This is of uncertain diagnostic significance as an isolated finding; specifically, it is deemed insufficient in severity to confidently diagnose polyneuropathy, particularly as age-adjusted norms differ.  While no definite fibular F-responses were identified on the left, this too is of limited diagnostic significance because there was considerable artifact due to involuntary foot movement, other studies are normal, and fibular F-persistence is commonly low in the normal population.    - 2/2024 ceruloplasmin, Copper, both within normal limits     Lab Results   Component Value Date    A1C 5.2 11/13/2023    A1C 5.3 10/12/2021     TSH   Date Value Ref Range Status   06/22/2022 1.41 0.30 - 5.00 uIU/mL Final     CBC RESULTS:   Recent Labs   Lab Test 11/13/23  0804   WBC 7.8   RBC 4.81   HGB 13.2   HCT 40.8   MCV 85   MCH 27.4   MCHC 32.4   RDW 12.9        Last Comprehensive  Metabolic Panel:  Sodium   Date Value Ref Range Status   11/13/2023 137 135 - 145 mmol/L Final     Comment:     Reference intervals for this test were updated on 09/26/2023 to more accurately reflect our healthy population. There may be differences in the flagging of prior results with similar values performed with this method. Interpretation of those prior results can be made in the context of the updated reference intervals.      Potassium   Date Value Ref Range Status   11/13/2023 4.1 3.4 - 5.3 mmol/L Final   10/12/2021 4.4 3.5 - 5.0 mmol/L Final     Chloride   Date Value Ref Range Status   11/13/2023 101 98 - 107 mmol/L Final   10/12/2021 104 98 - 107 mmol/L Final     Carbon Dioxide (CO2)   Date Value Ref Range Status   11/13/2023 25 22 - 29 mmol/L Final   10/12/2021 24 22 - 31 mmol/L Final     Anion Gap   Date Value Ref Range Status   11/13/2023 11 7 - 15 mmol/L Final   10/12/2021 10 5 - 18 mmol/L Final     Glucose   Date Value Ref Range Status   11/13/2023 87 70 - 99 mg/dL Final   10/12/2021 67 (L) 70 - 125 mg/dL Final     Urea Nitrogen   Date Value Ref Range Status   11/13/2023 12.4 6.0 - 20.0 mg/dL Final   10/12/2021 16 8 - 22 mg/dL Final     Creatinine   Date Value Ref Range Status   11/13/2023 0.94 0.51 - 0.95 mg/dL Final     GFR Estimate   Date Value Ref Range Status   11/13/2023 74 >60 mL/min/1.73m2 Final     Calcium   Date Value Ref Range Status   11/13/2023 9.2 8.6 - 10.0 mg/dL Final     Bilirubin Total   Date Value Ref Range Status   11/13/2023 0.6 <=1.2 mg/dL Final     Alkaline Phosphatase   Date Value Ref Range Status   11/13/2023 47 35 - 104 U/L Final     ALT   Date Value Ref Range Status   11/13/2023 19 0 - 50 U/L Final     Comment:     Reference intervals for this test were updated on 6/12/2023 to more accurately reflect our healthy population. There may be differences in the flagging of prior results with similar values performed with this method. Interpretation of those prior results can be  made in the context of the updated reference intervals.       AST   Date Value Ref Range Status   11/13/2023 19 0 - 45 U/L Final     Comment:     Reference intervals for this test were updated on 6/12/2023 to more accurately reflect our healthy population. There may be differences in the flagging of prior results with similar values performed with this method. Interpretation of those prior results can be made in the context of the updated reference intervals.                        Again, thank you for allowing me to participate in the care of your patient.        Sincerely,        Jenni Dangelo DO

## 2024-06-12 NOTE — PATIENT INSTRUCTIONS
Plan:   - No change to duloxetine 20 mg daily   - Stop drinking water 1-2 hours prior to sleep  - PM&R referral   - Agree with [facility] referral   - For prophylactic therapy, start Magnesium gluconate 500mg qday to be taken with food   - The biggest side effect from magnesium is diarrhea   - It may take 3-4 months for you to notice any effect from the magnesium  - HA Education: Think SMART, behavioral changes to improve headaches  S: sleep- same schedule, 8-10hrs at night  M: meals- don t skip meals  Limit caffeine (max 12oz/day, goal is 0)  Drink water (40-60oz/day)  A: activity-regular exercise (3x/wk)  R: relaxation  T: trigger avoidance  Food: cheeses, chocolate, lunchmeats, citrus, food additives etc  Stress  Limit medications (no more than 2x/wk- can cause overuse headache)  - Consider trying guided meditation. Here are some apps I recommend:.     - [employer].com shiloh (www.headspace.com): 14 day free trial and then there is a monthly or annual rate  - Calm shiloh (www.calm.com): 7 day free trial and then there is an annual rate  - InsightTimer shiloh (www.Vibrant Commercial Technologiesr.com): FREE   - Offered psychology referral for CBT to improve perception of pain but she declined   - Continue taking multivitamin daily   - Start a symptom diary for headaches and pain    Patient to return in 6 months, for in-person visit, 30 minutes.

## 2024-06-12 NOTE — PROGRESS NOTES
"Department of Neurology  Movement Disorders Division     Patient: Angelia Stallings   MRN: [id number]   : 1975   Date of Visit: 2024    Impression:  Angelia Stallings is a 48 year old female with Painful Leg Moving Toes syndrome (PLMTS). The patient's main concern today is pain in multiple sites, which is a trigger for her PLMTS and cramps. We discussed ways to improve symptoms of PLMTS by addressing triggers such as pain, poor sleep, stress/anxiety, headaches.       Painful Leg Moving Toes syndrome  Complex Regional Pain Syndrome  Pain in her legs (L>R) and involuntary movement of the second and third digits of there left toes that began in  on the left then developed similar symptoms on right leg in 2023. Triggers include pain and being on feet all day. 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 Copper and Ceruloplasmin have been normal. EMG unremarkable.   Failed medications:   - gabapentin, Lyrica: made muscles (arm) twitch and limbs would jump off the bed  - Cymbalta caused nausea   - Zoloft caused decreased energy   - Effexor caused apathy  - Amitriptyline led to hospital visit as she felt \"hopeless\" and distraught and stopping this medication improved/resolved this symptom   - hot/cold baths    Headaches    Plan:   - No change to duloxetine 20 mg daily   - PM&R referral   - Stop drinking water 1-2 hours prior to sleep to prevent unwanted awakenings   - Agree with eye evaluation   - For prophylactic therapy, start Magnesium gluconate 500mg qday to be taken with food   - The biggest side effect from magnesium is diarrhea   - It may take 3-4 months for you to notice any effect from the magnesium  - HA Education: Think SMART, behavioral changes to improve headaches  S: sleep- same schedule, 8-10hrs at night  M: meals- don t skip meals  Limit caffeine (max 12oz/day, goal is " 0)  Drink water (40-60oz/day)  A: activity-regular exercise (3x/wk)  R: relaxation  T: trigger avoidance  Food: cheeses, chocolate, lunchmeats, citrus, food additives etc  Stress  Limit medications (no more than 2x/wk- can cause overuse headache)  - Consider trying guided meditation. Here are some apps I recommend:.     - WizMeta shiloh (www.headspace.com): 14 day free trial and then there is a monthly or annual rate  - Calm shiloh (www.calm.com): 7 day free trial and then there is an annual rate  - InsightTimer shiloh (www.Rockerboxr.com): FREE   - Offered psychology referral for CBT to improve perception of pain but she declined   - Continue taking multivitamin daily   - Start a symptom diary for headaches and pain    Patient to return in 6 months, for in-person visit, 30 minutes.     Jenni Dangelo, DO  Movement Disorders Specialist  [facility]     Note dictated using voice recognition software.   45 minutes spent on date of encounter doing chart reviews and exam and documentation and further activities as noted above.   The longitudinal plan of care for the diagnosis(es)/condition(s) as documented were addressed during this visit. Due to the added complexity in care, I will continue to support Angelia in the subsequent management and with ongoing continuity of care.     ------------------------------------------------------------------------------------------------------------      History of Present Illness  Ms. Stallings is a pleasant 48 year old female that presents to Neurology Movement clinic for follow up regarding pain in her legs (L>R) and involuntary movement of the second and third digits of her left toes thought to be Painful Leg Moving Toes syndrome.  The patient was initially seen in neurologic consultation on 2/14/24 for evaluation of pain in her legs (L>R) and involuntary movement of the second and third digits of her left toes thought to be Painful Leg Moving Toes syndrome. EMG  "performed which was unremarkable. Please see the comprehensive neurologic consultation notes from those dates in the Epic records for details.      History obtained from patient.   Main complaint: pain in multiple sites, cramps   Patient reports, \"Right now, I'm feeling pretty good.\"  Pain: She hasn't woken up from pain in legs in the past month. She has been trying to eat healthier. Still has pain in the IT band.She has been having leg cramps nightly in the calf. Right big toe cramps and pulls big toe up. She gets cramps in her feet, bilaterally. She repots ankle pain. She reports hip, back and IT band pain. Ankle pain is improving.   She went to [facility] a couple and developed a \"vertigo-like\" feeling and a feeling where \"my body was going down.\" She also saw waves in her vision when walking down the bella with lights. She also reports migraines and jaw issues. Wasn't having a migraine with the above vision issues. She does report \"ice-pick\" type headaches which last 30 seconds each and may have 5-6 events consecutively; may last 1-2 weeks. She used to get Botox injections for migraines but stopped as her migraines improved. Deneis world spinning.   Duloxetine: \"I felt like the pain was a little bit better initially.\" She didn't have energy on duloxetine 40  mg daily and it didn't improve pain symptoms.   Migraine: see above   Physical Therapy: Physical Therapy from August to March after surgery and to improve tendonitis.   Exercise: Yes.   Sleep: Wakes up a couple times a night due to having urinate and having to urinate.   Now taking vitamin D supplement and feels she has more energy.   Going to [facility] to help daughter move into an apartment as daughter is starting a new job.   Movement Disorder-related Medications                   Indication        Duloxetine 20 mg  CRPS 1                                      Review of Systems:  Other than that mentioned above, the remainder of 12 systems reviewed " were negative.    PMH: unchanged  PSH: unchanged  FH: unchanged  SH: unchanged    Medications:  Current Outpatient Medications   Medication Sig Dispense Refill    DULoxetine (CYMBALTA) 20 MG capsule Start 20 mg daily x 5 weeks then increase to 40 mg daily. (Patient taking differently: Take 20 mg by mouth daily Start 20 mg daily x 5 weeks then increase to 40 mg daily.) 30 capsule 11    tiZANidine (ZANAFLEX) 4 MG tablet TAKE 1 TO 2 TABLETS BY MOUTH AT BEDTIME AS NEEDED FOR MUSCLE SPASMS 60 tablet 3    hydrOXYzine denise (VISTARIL) 25 MG capsule Take 25 mg by mouth as needed (Patient not taking: Reported on 6/12/2024)      ondansetron (ZOFRAN ODT) 4 MG ODT tab Take 1 tablet (4 mg) by mouth every 8 hours as needed for nausea (Patient not taking: Reported on 6/12/2024) 30 tablet 3    oxyCODONE-acetaminophen (PERCOCET) 5-325 MG tablet Take 1 tablet by mouth every 6 hours as needed for pain Refill needs to last until 5/17/24 (Patient not taking: Reported on 6/12/2024) 20 tablet 0           Allergies   Allergen Reactions    Cat Dander [Animal Dander] Itching and Shortness Of Breath    [id number] Antimigraine Agents [Sumatriptan] Unknown     Hx cerebral aneurysms, s/p clipping    Watermelon [Citrullus Vulgaris] Unknown    Penicillins Hives and Rash    Sulfa (Sulfonamide Antibiotics) [Sulfa Antibiotics] Hives and Rash          Physical Exam:  The patient's  blood pressure is 134/90 (abnormal) and her pulse is 105.    Physical Exam:  GENERAL: alert, active, attentive, appropriately groomed   HEENT: normocephalic, eyes open with no discharge, nares patent, oropharynx clear-no lesions  CHEST: non labored breathing  EXTREMITIES: no edema/cyanosis in extremities visible during exam  PSYCH: mood stable     Neurologic Exam:  MENTAL STATUS: Alert and oriented to person, place, time, and situation. Follows commands. Recent and remote memory intact. Attention span and concentration intact. Fund of knowledge intact to current  events.  SPEECH: Fluent, intact comprehension and articulation  CN: overall visual fields intact, EOMIB, no nystagmus, normal saccades, facial sensation intact, facial movement symmetric, hearing grossly intact to conversation, tongue protrudes midline   MOTOR: Moves all extremities equally against gravity without difficulty   Involuntary movements: roving toes, L > R  GAIT: able to rise unassisted from a seated position, normal arm swing and normal stride length, no en bloc turns, no ataxia      Data Reviewed: I have personally reviewed the tests/studies below.   - EMG 4/2024  Interpretation:  This study demonstrates no diagnostic abnormalities. In particular, the following are noted:  Sural sensory amplitudes are at or slightly below the laboratory's lower limit of the normal range. This is of uncertain diagnostic significance as an isolated finding; specifically, it is deemed insufficient in severity to confidently diagnose polyneuropathy, particularly as age-adjusted norms differ.  While no definite fibular F-responses were identified on the left, this too is of limited diagnostic significance because there was considerable artifact due to involuntary foot movement, other studies are normal, and fibular F-persistence is commonly low in the normal population.    - 2/2024 ceruloplasmin, Copper, both within normal limits     Lab Results   Component Value Date    A1C 5.2 11/13/2023    A1C 5.3 10/12/2021     TSH   Date Value Ref Range Status   06/22/2022 1.41 0.30 - 5.00 uIU/mL Final     CBC RESULTS:   Recent Labs   Lab Test 11/13/23  0804   WBC 7.8   RBC 4.81   HGB 13.2   HCT 40.8   MCV 85   MCH 27.4   MCHC 32.4   RDW 12.9        Last Comprehensive Metabolic Panel:  Sodium   Date Value Ref Range Status   11/13/2023 137 135 - 145 mmol/L Final     Comment:     Reference intervals for this test were updated on 09/26/2023 to more accurately reflect our healthy population. There may be differences in the flagging  of prior results with similar values performed with this method. Interpretation of those prior results can be made in the context of the updated reference intervals.      Potassium   Date Value Ref Range Status   11/13/2023 4.1 3.4 - 5.3 mmol/L Final   10/12/2021 4.4 3.5 - 5.0 mmol/L Final     Chloride   Date Value Ref Range Status   11/13/2023 101 98 - 107 mmol/L Final   10/12/2021 104 98 - 107 mmol/L Final     Carbon Dioxide (CO2)   Date Value Ref Range Status   11/13/2023 25 22 - 29 mmol/L Final   10/12/2021 24 22 - 31 mmol/L Final     Anion Gap   Date Value Ref Range Status   11/13/2023 11 7 - 15 mmol/L Final   10/12/2021 10 5 - 18 mmol/L Final     Glucose   Date Value Ref Range Status   11/13/2023 87 70 - 99 mg/dL Final   10/12/2021 67 (L) 70 - 125 mg/dL Final     Urea Nitrogen   Date Value Ref Range Status   11/13/2023 12.4 6.0 - 20.0 mg/dL Final   10/12/2021 16 8 - 22 mg/dL Final     Creatinine   Date Value Ref Range Status   11/13/2023 0.94 0.51 - 0.95 mg/dL Final     GFR Estimate   Date Value Ref Range Status   11/13/2023 74 >60 mL/min/1.73m2 Final     Calcium   Date Value Ref Range Status   11/13/2023 9.2 8.6 - 10.0 mg/dL Final     Bilirubin Total   Date Value Ref Range Status   11/13/2023 0.6 <=1.2 mg/dL Final     Alkaline Phosphatase   Date Value Ref Range Status   11/13/2023 47 35 - 104 U/L Final     ALT   Date Value Ref Range Status   11/13/2023 19 0 - 50 U/L Final     Comment:     Reference intervals for this test were updated on 6/12/2023 to more accurately reflect our healthy population. There may be differences in the flagging of prior results with similar values performed with this method. Interpretation of those prior results can be made in the context of the updated reference intervals.       AST   Date Value Ref Range Status   11/13/2023 19 0 - 45 U/L Final     Comment:     Reference intervals for this test were updated on 6/12/2023 to more accurately reflect our healthy population. There may  be differences in the flagging of prior results with similar values performed with this method. Interpretation of those prior results can be made in the context of the updated reference intervals.

## 2024-06-12 NOTE — NURSING NOTE
Chief Complaint   Patient presents with    Follow Up       YUDI Maldonado on 6/12/2024 at 7:19 AM

## 2024-06-12 NOTE — TELEPHONE ENCOUNTER
Left message to return call.     When patient returns call, please assist with scheduling visit with PCP within 1 month.     Patt Cisneros RN  [facility]

## 2024-06-13 ENCOUNTER — TELEPHONE (OUTPATIENT)
Dept: FAMILY MEDICINE | Facility: CLINIC | Age: 49
End: 2024-06-13
Payer: COMMERCIAL

## 2024-06-13 DIAGNOSIS — S13.4XXA WHIPLASH INJURY TO NECK, INITIAL ENCOUNTER: ICD-10-CM
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
## 2024-06-13 NOTE — TELEPHONE ENCOUNTER
Medication Question or Refill    Contacts         Type Contact Phone/Fax    06/13/2024 07:46 AM CDT Phone (Incoming) Angelia Stallings (Self) [phone] (M)            What medication are you calling about (include dose and sig)?: tiZANidine (ZANAFLEX) 4 MG tablet     Preferred Pharmacy:  [id number] [address] [facility] [address] [facility]  [address]  [place] [address]  Phone: [phone] Fax: [phone]      Controlled Substance Agreement on file:   CSA -- Patient Level:     [Media Unavailable] Controlled Substance Agreement - Opioid - Scan on 8/16/2023  1:15 PM   [Media Unavailable] Controlled Substance Agreement - Opioid - Scan on 7/15/2022 12:20 PM   [Media Unavailable] Controlled Substance Agreement - Opioid - Scan on 6/7/2022  8:41 AM       Who prescribed the medication?: Leers    Do you need a refill? No    When did you use the medication last? N/A    Patient offered an appointment? No    Do you have any questions or concerns?  Yes: the medication was refilled on 06/03/24, patient stated that [facility] never received the RX and asked if could be resent to the pharmacy again.      Could we send this information to you in [facility] or would you prefer to receive a phone call?:   Patient would prefer a phone call   Okay to leave a detailed message?: Yes at Home number on file [phone] (home)

## 2024-06-13 NOTE — TELEPHONE ENCOUNTER
Patient called to check on the status, and stated she isn't trying to bug  but she just wants to make sure she has the medication before she leaves out of town tomorrow.

## 2024-06-13 NOTE — TELEPHONE ENCOUNTER
Called and spoke with pharmacy, they did not receive new prescription. Last received on their end on 05/03/2024. Medication pended for provider review.

## 2024-06-14 NOTE — TELEPHONE ENCOUNTER
Left message to return call. If patient calls back, please route to [facility].     TCs, please send to RNs.    Iona Grant RN  [facility]

## 2024-06-14 NOTE — TELEPHONE ENCOUNTER
Upon chart review, noted patient is scheduled to see PCP on 7/1/24. Closing encounter.     Mary Jo Brewer RN  [facility]

## 2024-06-26 ENCOUNTER — TELEPHONE (OUTPATIENT)
Dept: FAMILY MEDICINE | Facility: CLINIC | Age: 49
End: 2024-06-26
Payer: COMMERCIAL

## 2024-06-26 NOTE — TELEPHONE ENCOUNTER
Patient Quality Outreach    Patient is due for the following:   Hypertension -  Hypertension follow-up visit    Next Steps:   Patient has upcoming appointment, these items will be addressed at that time.    Type of outreach:    Chart review performed, no outreach needed.    Next Steps:  Reach out within 90 days via  NA .    Max number of attempts reached: No. Will try again in 90 days if patient still on fail list.    Questions for provider review:    None           Alexandra Sood

## 2024-07-01 ENCOUNTER — OFFICE VISIT (OUTPATIENT)
Dept: FAMILY MEDICINE | Facility: CLINIC | Age: 49
End: 2024-07-01
Payer: COMMERCIAL

## 2024-07-01 ENCOUNTER — MYC REFILL (OUTPATIENT)
Dept: FAMILY MEDICINE | Facility: CLINIC | Age: 49
End: 2024-07-01

## 2024-07-01 VITALS
SYSTOLIC BLOOD PRESSURE: 136 MMHG | DIASTOLIC BLOOD PRESSURE: 86 MMHG | HEART RATE: 97 BPM | HEIGHT: 68 IN | BODY MASS INDEX: 28.04 KG/M2 | WEIGHT: 185 LBS | OXYGEN SATURATION: 99 % | RESPIRATION RATE: 16 BRPM | TEMPERATURE: 97.6 F

## 2024-07-01 DIAGNOSIS — G43.009 MIGRAINE WITHOUT AURA AND WITHOUT STATUS MIGRAINOSUS, NOT INTRACTABLE: ICD-10-CM

## 2024-07-01 DIAGNOSIS — F41.1 ANXIETY STATE: ICD-10-CM

## 2024-07-01 DIAGNOSIS — G62.9 POLYNEUROPATHY: ICD-10-CM

## 2024-07-01 DIAGNOSIS — G89.29 CHRONIC BILATERAL LOW BACK PAIN WITHOUT SCIATICA: ICD-10-CM

## 2024-07-01 DIAGNOSIS — Z98.890 S/P CRANIOTOMY: ICD-10-CM

## 2024-07-01 DIAGNOSIS — S73.192D TEAR OF LEFT ACETABULAR LABRUM, SUBSEQUENT ENCOUNTER: ICD-10-CM

## 2024-07-01 DIAGNOSIS — M54.50 CHRONIC BILATERAL LOW BACK PAIN WITHOUT SCIATICA: ICD-10-CM

## 2024-07-01 DIAGNOSIS — Z86.79 HISTORY OF INTRACRANIAL ANEURYSM: ICD-10-CM

## 2024-07-01 DIAGNOSIS — F32.4 MAJOR DEPRESSION SINGLE EPISODE, IN PARTIAL REMISSION (H): ICD-10-CM

## 2024-07-01 DIAGNOSIS — G89.4 CHRONIC PAIN SYNDROME: Primary | ICD-10-CM

## 2024-07-01 DIAGNOSIS — G89.29 OTHER CHRONIC PAIN: ICD-10-CM

## 2024-07-01 DIAGNOSIS — R25.9 ABNORMAL INVOLUNTARY MOVEMENT: ICD-10-CM

## 2024-07-01 PROCEDURE — 99214 OFFICE O/P EST MOD 30 MIN: CPT | Performed by: PHYSICIAN ASSISTANT

## 2024-07-01 PROCEDURE — G2211 COMPLEX E/M VISIT ADD ON: HCPCS | Performed by: PHYSICIAN ASSISTANT

## 2024-07-01 RX ORDER — OXYCODONE AND ACETAMINOPHEN 5; 325 MG/1; MG/1
1 TABLET ORAL EVERY 6 HOURS PRN
Qty: 20 TABLET | Refills: 0 | Status: SHIPPED | OUTPATIENT
Start: 2024-07-01 | End: 2024-07-24

## 2024-07-01 ASSESSMENT — ENCOUNTER SYMPTOMS
FEVER: 0
SEIZURES: 0
CHILLS: 0
ABDOMINAL PAIN: 0
PALPITATIONS: 0
EYE PAIN: 0
ARTHRALGIAS: 1
SHORTNESS OF BREATH: 0
BACK PAIN: 0
SORE THROAT: 0
DYSURIA: 0
COUGH: 0
VOMITING: 0
HEMATURIA: 0
COLOR CHANGE: 0
MYALGIAS: 1

## 2024-07-01 ASSESSMENT — PATIENT HEALTH QUESTIONNAIRE - PHQ9
SUM OF ALL RESPONSES TO PHQ QUESTIONS 1-9: 5
10. IF YOU CHECKED OFF ANY PROBLEMS, HOW DIFFICULT HAVE THESE PROBLEMS MADE IT FOR YOU TO DO YOUR WORK, TAKE CARE OF THINGS AT HOME, OR GET ALONG WITH OTHER PEOPLE: SOMEWHAT DIFFICULT
SUM OF ALL RESPONSES TO PHQ QUESTIONS 1-9: 5

## 2024-07-01 NOTE — LETTER

## 2024-07-01 NOTE — PROGRESS NOTES
Assessment & Plan     Chronic pain syndrome  Abnormal involuntary movement of toes  Chronic bilateral low back pain without sciatica  Polyneuropathy  Ongoing chronic regional pain syndrome, polyneuropathy, lower back pain, and left hip pain due to a labral tear.  She has underwent surgery to her left labrum and does have improvement in her pain since.  She is seeing neurology for chronic regional pain syndrome along with polyneuropathy.  She followed up with Dr. Dominguez who repeated her EMGs of her lower extremities and her EMGs had improved.  She will be following up with PMNR in the near future.  She is getting some benefit from the Cymbalta 20 mg so we will continue this.  We will also continue 20 tablets of oxycodone a month along with tizanidine.  Controlled substance agreement updated.  PDMP reviewed no red flags noted.  Again we did discuss naltrexone to see if this helps with her pain.  She is not interested at this time but would be beneficial in the future if her pain worsens.    Major depression single episode, in partial remission (H24)  Anxiety state  Overall she feels that her mood is stable.  Continue with Cymbalta.    History of intracranial aneurysm  S/P craniotomy, MCA aneurysm clipping x 2  Recent MRI as of 3/23 of brain did not show any new aneurysms.  Underwent craniotomy in 2015.  Chronic migraines have been stable although she did have a flareup last month.  This was controlled with here oxycodone and over-the-counter pain medications.  She follows with neurosurgery every 5 years.    Migraine without aura and without status migrainosus, not intractable  Stable at this time.  She did have a flareup last month but was utilizing oxycodone, tizanidine and over-the-counter pain medications.  No change in the characteristics of her migraines.  She does not feel that she wants to see neurology due to her migraines as they have been controlled.  She is to continue to monitor her migraines.    Tear of  "left acetabular labrum, subsequent encounter  Doing much better since labral repair a few months ago.  Still having issues with her IT band.  She did a long sent to physical therapy.    The longitudinal plan of care for the diagnosis(es)/condition(s) as documented were addressed during this visit. Due to the added complexity in care, I will continue to support Angelia in the subsequent management and with ongoing continuity of care.     Follow-up Visit   Expected date:  Jan 01, 2025 (Approximate)      Follow Up Appointment Details:     Follow-up with whom?: Me    Follow-Up for what?: Adult Preventive    How?: In Person                     Current Outpatient Medications   Medication Sig Dispense Refill    DULoxetine (CYMBALTA) 20 MG capsule Take 1 capsule (20 mg) by mouth at bedtime 90 capsule 3    tiZANidine (ZANAFLEX) 4 MG tablet TAKE 1 TO 2 TABLETS BY MOUTH AT BEDTIME AS NEEDED FOR MUSCLE SPASMS 60 tablet 3    hydrOXYzine denise (VISTARIL) 25 MG capsule Take 25 mg by mouth as needed (Patient not taking: Reported on 6/12/2024)      ondansetron (ZOFRAN ODT) 4 MG ODT tab Take 1 tablet (4 mg) by mouth every 8 hours as needed for nausea (Patient not taking: Reported on 6/12/2024) 30 tablet 3    oxyCODONE-acetaminophen (PERCOCET) 5-325 MG tablet Take 1 tablet by mouth every 6 hours as needed for pain (Patient not taking: Reported on 7/1/2024) 20 tablet 0     No current facility-administered medications for this visit.           BMI  Estimated body mass index is 28.13 kg/m  as calculated from the following:    Height as of this encounter: 1.727 m (5' 8\").    Weight as of this encounter: 83.9 kg (185 lb).   Weight management plan: Discussed healthy diet and exercise guidelines          Subjective   Angelia is a 48 year old, presenting for the following health issues:  Medication Update        7/1/2024     7:17 AM   Additional Questions   Roomed by Gen NGOZI CMA     Patient is a pleasant 48-year-old female who presents to the " "clinic today for chronic disease management follow-up.  Past medical history significant for chronic regional pain syndrome, polyneuropathy, fibromyalgia, chronic lower back pain, chronic migraines, up to 6 total neuralgia, hypertension, GERD, anxiety and depression, and a history of an MCA aneurysm s/p craniotomy in 2015.    Overall she is doing well.  Recently followed up with neurology 6/12/2024 for chronic regional pain syndrome.  She continues to have chronic pain and twitching of her toes bilaterally.  She has been taking Cymbalta 20 mg and feels that she has had some improvement in her pain.  She has tried multiple medications in the past including TCAs, gabapentin, and Lyrica and did not tolerate the medications.  Neurology placed a referral for PMNR and she will follow-up with them in the near future.  He continues on oxycodone 20 tablets a month for chronic regional pain syndrome.  She is also taking this for her chronic migraines.  She does use this sparingly and it is helpful.    She does have a history of an MCA aneurysm s/p craniotomy in 2015.  Last MRI 3/23 was negative for any new aneurysms.  She follows up with neurosurgery every 5 years.    History of Present Illness       Headaches:   Since the patient's last clinic visit, headaches are: improved  The patient is getting headaches:  4 days per month  She is not able to do normal daily activities when she has a migraine.  The patient is taking the following rescue/relief medications:  Ibuprofen (Advil, Motrin) and other   Patient states \"I get only a small amount of relief\" from the rescue/relief medications.   The patient is taking the following medications to prevent migraines:  No medications to prevent migraines  In the past 4 weeks, the patient has gone to an Urgent Care or Emergency Room 0 times times due to headaches.    Reason for visit:  Medication visir    She eats 2-3 servings of fruits and vegetables daily.She consumes 2 sweetened " "beverage(s) daily.She exercises with enough effort to increase her heart rate 10 to 19 minutes per day.  She exercises with enough effort to increase her heart rate 4 days per week.   She is taking medications regularly.         Review of Systems   Constitutional:  Negative for chills and fever.   HENT:  Negative for ear pain and sore throat.    Eyes:  Negative for pain and visual disturbance.   Respiratory:  Negative for cough and shortness of breath.    Cardiovascular:  Negative for chest pain and palpitations.   Gastrointestinal:  Negative for abdominal pain and vomiting.   Genitourinary:  Negative for dysuria and hematuria.   Musculoskeletal:  Positive for arthralgias and myalgias. Negative for back pain.   Skin:  Negative for color change and rash.   Neurological:  Negative for seizures and syncope.   All other systems reviewed and are negative.          Objective    BP (!) 136/94 (BP Location: Right arm, Patient Position: Sitting, Cuff Size: Adult Regular)   Pulse 97   Temp 97.6  F (36.4  C) (Oral)   Resp 16   Ht 1.727 m (5' 8\")   Wt 83.9 kg (185 lb)   LMP  (LMP Unknown)   SpO2 99%   Breastfeeding No   BMI 28.13 kg/m    Body mass index is 28.13 kg/m .  Physical Exam  Vitals and nursing note reviewed.   Constitutional:       Appearance: Normal appearance.   HENT:      Head: Normocephalic and atraumatic.   Eyes:      Conjunctiva/sclera: Conjunctivae normal.   Cardiovascular:      Rate and Rhythm: Normal rate and regular rhythm.      Heart sounds: No murmur heard.     No friction rub. No gallop.   Pulmonary:      Effort: Pulmonary effort is normal.      Breath sounds: No wheezing, rhonchi or rales.   Skin:     General: Skin is warm and dry.   Neurological:      Mental Status: She is alert.   Psychiatric:         Mood and Affect: Mood normal.         Behavior: Behavior normal.         Thought Content: Thought content normal.         Judgment: Judgment normal.                Signed Electronically by: " Calixto Leblanc PA-C

## 2024-07-05 ENCOUNTER — NURSE TRIAGE (OUTPATIENT)
Dept: FAMILY MEDICINE | Facility: CLINIC | Age: 49
End: 2024-07-05
Payer: COMMERCIAL

## 2024-07-05 ENCOUNTER — OFFICE VISIT (OUTPATIENT)
Dept: FAMILY MEDICINE | Facility: CLINIC | Age: 49
End: 2024-07-05
Payer: COMMERCIAL

## 2024-07-05 VITALS
TEMPERATURE: 98.2 F | BODY MASS INDEX: 28.74 KG/M2 | HEART RATE: 88 BPM | SYSTOLIC BLOOD PRESSURE: 119 MMHG | WEIGHT: 189 LBS | DIASTOLIC BLOOD PRESSURE: 84 MMHG | RESPIRATION RATE: 20 BRPM | OXYGEN SATURATION: 99 %

## 2024-07-05 DIAGNOSIS — R51.9 FACIAL PAIN: ICD-10-CM

## 2024-07-05 DIAGNOSIS — J01.00 ACUTE NON-RECURRENT MAXILLARY SINUSITIS: Primary | ICD-10-CM

## 2024-07-05 DIAGNOSIS — M26.629 TMJ PAIN DYSFUNCTION SYNDROME: ICD-10-CM

## 2024-07-05 PROCEDURE — 99213 OFFICE O/P EST LOW 20 MIN: CPT

## 2024-07-05 RX ORDER — FLUTICASONE PROPIONATE 50 MCG
1 SPRAY, SUSPENSION (ML) NASAL DAILY
Qty: 18.2 ML | Refills: 0 | Status: SHIPPED | OUTPATIENT
Start: 2024-07-05

## 2024-07-05 RX ORDER — OXYMETAZOLINE HYDROCHLORIDE 0.05 G/100ML
2 SPRAY NASAL 2 TIMES DAILY
Qty: 2 ML | Refills: 0 | Status: SHIPPED | OUTPATIENT
Start: 2024-07-05 | End: 2024-07-08

## 2024-07-05 RX ORDER — NAPROXEN 250 MG/1
250 TABLET ORAL 2 TIMES DAILY WITH MEALS
Qty: 30 TABLET | Refills: 3 | Status: SHIPPED | OUTPATIENT
Start: 2024-07-05 | End: 2024-08-16

## 2024-07-05 NOTE — TELEPHONE ENCOUNTER
Provider Response to 2nd Level Triage Request    I have reviewed the RN documentation. My recommendation is:  Refer to Urgent Care    Corinne Blunt MD on 7/5/2024 at 4:29 PM'

## 2024-07-05 NOTE — TELEPHONE ENCOUNTER
Nurse Triage SBAR    Is this a 2nd Level Triage? YES, LICENSED PRACTITIONER REVIEW IS REQUIRED    Situation: Migraines coming on with chewing and brushing teeth     Background: History of migraines    Assessment:  Patient calling with migraines that are happening from chewing or brushing her teeth. The patient has a history of migraines, but this is a new experience for them to come. She has complaints of pain to her temples, back of her head, and above her eyes primarily. She is also endorsing neck pack, but still has full range of motion. She thinks it has the potential to be a nerve flare, but she isn't sure. She does endorse having a recent sinus infection which she treated herself at home with amoxicillin she had from a previous infection for a week. She is still having congestion and light yellow discharge sometimes, but not often. She rates her pain at 8/10 and is taking OTC medication such as ibuprofen and prescription medication oxycodone. She reports that this helps her pain for about an hour before it begins again. She was told we will reach out to providers, but seeing as the clinic is almost closed if she doesn't hear anything by closing she needs to be seen in an urgent care. She verbalized understanding and plans to follow this plan.     Protocol Recommended Disposition:   Callback By PCP Within 1 Hour    Recommendation: If we cannot call back in time, be seen in urgent care.    Routed to provider    Does the patient meet one of the following criteria for ADS visit consideration? 16+ years old, with an [facility] PCP     TIP  Providers, please consider if this condition is appropriate for management at one of our Acute and Diagnostic Services sites.     If patient is a good candidate, please use dotphrase <dot>triageresponse and select Refer to ADS to document.  Reason for Disposition   SEVERE headache (e.g., excruciating) and has had severe headaches before    Additional Information   Negative: Difficult  "to awaken or acting confused (e.g., disoriented, slurred speech)   Negative: Weakness of the face, arm or leg on one side of the body and new-onset   Negative: Numbness of the face, arm or leg on one side of the body and new-onset   Negative: Loss of speech or garbled speech and new-onset   Negative: Passed out (i.e., fainted, collapsed and was not responding)   Negative: Sounds like a life-threatening emergency to the triager   Negative: Followed a head injury within last 3 days   Negative: Traumatic Brain Injury (TBI) is suspected   Negative: Sinus pain of forehead and yellow or green nasal discharge   Negative: Pregnant   Negative: Unable to walk without falling   Negative: Stiff neck (can't touch chin to chest)   Negative: Possibility of carbon monoxide exposure   Negative: SEVERE headache, states 'worst headache' of life   Negative: SEVERE headache, sudden-onset (i.e., reaching maximum intensity within seconds to 1 hour)   Negative: Severe pain in one eye   Negative: Loss of vision or double vision  (Exception: Same as prior migraines.)   Negative: Patient sounds very sick or weak to the triager   Negative: Fever > 103 F (39.4 C)   Negative: Fever > 100.0 F (37.8 C) and has diabetes mellitus or a weak immune system (e.g., HIV positive, cancer chemotherapy, organ transplant, splenectomy, chronic steroids)    Answer Assessment - Initial Assessment Questions  1. LOCATION: \"Where does it hurt?\"       Entire head  2. ONSET: \"When did the headache start?\" (Minutes, hours or days)       Yesterday  3. PATTERN: \"Does the pain come and go, or has it been constant since it started?\"      Comes and goes  4. SEVERITY: \"How bad is the pain?\" and \"What does it keep you from doing?\"  (e.g., Scale 1-10; mild, moderate, or severe)    - MILD (1-3): doesn't interfere with normal activities     - MODERATE (4-7): interferes with normal activities or awakens from sleep     - SEVERE (8-10): excruciating pain, unable to do any normal " "activities         8/10  5. RECURRENT SYMPTOM: \"Have you ever had headaches before?\" If Yes, ask: \"When was the last time?\" and \"What happened that time?\"       No  6. CAUSE: \"What do you think is causing the headache?\"      Nerve issues  7. MIGRAINE: \"Have you been diagnosed with migraine headaches?\" If Yes, ask: \"Is this headache similar?\"       Yes  8. HEAD INJURY: \"Has there been any recent injury to the head?\"       No  9. OTHER SYMPTOMS: \"Do you have any other symptoms?\" (fever, stiff neck, eye pain, sore throat, cold symptoms)      Neck pain, just above eye pain  10. PREGNANCY: \"Is there any chance you are pregnant?\" \"When was your last menstrual period?\"        No    Protocols used: Headache-A-[place]    Iona Grant RN  [facility]  "

## 2024-07-05 NOTE — PATIENT INSTRUCTIONS
For sinus pressure and congestion, use nasal sprays as below:    1) Afrin twice a day for 3 days (do not continue past 3 days as it can make congestion worse after that)  2) Flonase twice a day for 14 days or until symptoms completely gone  3) Nasal saline spray 2-4 times a day for 14 days or until symptoms completely gone.    Also use tylenol and ibuprofen as needed for pain.    If your symptoms do not improve by 10 days, take augmentin twice a day for 7 days.

## 2024-07-05 NOTE — TELEPHONE ENCOUNTER
Provider Recommendation Follow Up:   Reached patient/caregiver. Informed of provider's recommendations. Patient verbalized understanding and agrees with the plan.     Mary Jo Brewer RN  [facility]

## 2024-07-05 NOTE — PROGRESS NOTES
Assessment & Plan     Acute non-recurrent maxillary sinusitis  Facial pain  Patient with greater than 10 days of sinusitis.  Given duration of symptoms offered Augmentin to treat possible bacterial sinusitis.  Also has a history of TMJ dysfunction.  Has had 2 days of sharp shooting pain concerning for inflammation irritating her nerve.  Will treat sinusitis as well as recommended continuing supportive care for TMJ dysfunction (massage, anti-inflammatories, heat, ice, stress reduction).  - oxymetazoline (AFRIN) 0.05 % nasal spray  Dispense: 2 mL; Refill: 0  - fluticasone (FLONASE) 50 MCG/ACT nasal spray  Dispense: 18.2 mL; Refill: 0  - sodium chloride (OCEAN) 0.65 % nasal spray  Dispense: 60 mL; Refill: 1  - naproxen (NAPROSYN) 250 MG tablet  Dispense: 30 tablet; Refill: 3  - amoxicillin-clavulanate (AUGMENTIN) 875-125 MG tablet  Dispense: 14 tablet; Refill: 0     20 minutes spent by me on the date of the encounter doing chart review, patient visit, and documentation     No follow-ups on file.    Kala Knapp MD  [facility]    Prosper Galan is a 48 year old female who presents to clinic today for the following health issues:  Chief Complaint   Patient presents with    Facial Pain     Right side of face- when brushing or chewing she has pain and she get a headache- possible sinus issues- fir the past 2 days     HPI  7/4 was brushing her teeth and has been having some right sided facial pain sharp and shooting up toward her ear.    Did have sinus pain and pressure for the last 1-2 weeks.    Has tried, ice, ibuprofen, and an oxycodone. The ice helps the most. But only when its on.    Review of Systems  Constitutional, HEENT, cardiovascular, pulmonary, gi and gu systems are negative, except as otherwise noted.     Objective    /84 (BP Location: Right arm, Patient Position: Sitting, Cuff Size: Adult Large)   Pulse 88   Temp 98.2  F (36.8  C) (Oral)   Resp 20   Wt 85.7  kg (189 lb)   LMP  (LMP Unknown)   SpO2 99%   BMI 28.74 kg/m    Physical Exam  GENERAL: alert and no distress  NECK: no adenopathy, no asymmetry, masses, or scars  HEENT: Pain with opening jaw to full range of motion.  Posterior oropharynx unremarkable.  No obvious dental caries or oral infection, abscess.  RESP: lungs clear to auscultation - no rales, rhonchi or wheezes  CV: regular rate and rhythm  ABDOMEN: soft, nontender, no hepatosplenomegaly, no masses and bowel sounds normal  MS: no gross musculoskeletal defects noted, no edema

## 2024-07-10 ENCOUNTER — HOSPITAL ENCOUNTER (OUTPATIENT)
Dept: MAMMOGRAPHY | Facility: CLINIC | Age: 49
Discharge: HOME | End: 2024-07-10
Attending: PHYSICIAN ASSISTANT | Admitting: PHYSICIAN ASSISTANT
Payer: COMMERCIAL

## 2024-07-10 DIAGNOSIS — Z12.31 SCREENING MAMMOGRAM, ENCOUNTER FOR: ICD-10-CM

## 2024-07-10 PROCEDURE — 77063 BREAST TOMOSYNTHESIS BI: CPT

## 2024-07-24 ENCOUNTER — MYC REFILL (OUTPATIENT)
Dept: FAMILY MEDICINE | Facility: CLINIC | Age: 49
End: 2024-07-24
Payer: COMMERCIAL

## 2024-07-24 DIAGNOSIS — G89.29 OTHER CHRONIC PAIN: ICD-10-CM

## 2024-07-24 NOTE — TELEPHONE ENCOUNTER
7/1/2024 Note: We will also continue 20 tablets of oxycodone a month along with tizanidine.    Too early for the refill.    Corinne Blunt MD on 7/24/2024 at 11:08 AM;

## 2024-07-25 RX ORDER — OXYCODONE AND ACETAMINOPHEN 5; 325 MG/1; MG/1
1 TABLET ORAL EVERY 6 HOURS PRN
Qty: 20 TABLET | Refills: 0 | Status: SHIPPED | OUTPATIENT
Start: 2024-07-25 | End: 2024-08-20

## 2024-08-16 ENCOUNTER — HOSPITAL ENCOUNTER (OUTPATIENT)
Dept: CT IMAGING | Facility: HOSPITAL | Age: 49
Discharge: HOME | End: 2024-08-16
Attending: FAMILY MEDICINE | Admitting: FAMILY MEDICINE
Payer: COMMERCIAL

## 2024-08-16 ENCOUNTER — TELEPHONE (OUTPATIENT)
Dept: FAMILY MEDICINE | Facility: CLINIC | Age: 49
End: 2024-08-16

## 2024-08-16 ENCOUNTER — OFFICE VISIT (OUTPATIENT)
Dept: FAMILY MEDICINE | Facility: CLINIC | Age: 49
End: 2024-08-16
Payer: COMMERCIAL

## 2024-08-16 ENCOUNTER — OFFICE VISIT (OUTPATIENT)
Dept: PEDIATRICS | Facility: CLINIC | Age: 49
End: 2024-08-16
Payer: COMMERCIAL

## 2024-08-16 VITALS
RESPIRATION RATE: 18 BRPM | HEART RATE: 93 BPM | OXYGEN SATURATION: 98 % | WEIGHT: 187.5 LBS | BODY MASS INDEX: 28.51 KG/M2 | TEMPERATURE: 98.5 F | DIASTOLIC BLOOD PRESSURE: 97 MMHG | SYSTOLIC BLOOD PRESSURE: 155 MMHG

## 2024-08-16 VITALS
OXYGEN SATURATION: 98 % | DIASTOLIC BLOOD PRESSURE: 84 MMHG | HEART RATE: 89 BPM | BODY MASS INDEX: 28.43 KG/M2 | SYSTOLIC BLOOD PRESSURE: 144 MMHG | TEMPERATURE: 98.6 F | RESPIRATION RATE: 18 BRPM | WEIGHT: 187 LBS

## 2024-08-16 DIAGNOSIS — N89.8 VAGINAL DISCHARGE: Primary | ICD-10-CM

## 2024-08-16 DIAGNOSIS — R11.0 NAUSEA: ICD-10-CM

## 2024-08-16 DIAGNOSIS — R10.32 LLQ ABDOMINAL PAIN: ICD-10-CM

## 2024-08-16 DIAGNOSIS — B96.89 BV (BACTERIAL VAGINOSIS): ICD-10-CM

## 2024-08-16 DIAGNOSIS — R10.2 PELVIC PAIN IN FEMALE: ICD-10-CM

## 2024-08-16 DIAGNOSIS — R19.7 DIARRHEA, UNSPECIFIED TYPE: ICD-10-CM

## 2024-08-16 DIAGNOSIS — N76.0 BV (BACTERIAL VAGINOSIS): ICD-10-CM

## 2024-08-16 DIAGNOSIS — R10.32 LLQ ABDOMINAL PAIN: Primary | ICD-10-CM

## 2024-08-16 DIAGNOSIS — N93.9 VAGINAL BLEEDING: ICD-10-CM

## 2024-08-16 DIAGNOSIS — R51.9 ACUTE NONINTRACTABLE HEADACHE, UNSPECIFIED HEADACHE TYPE: ICD-10-CM

## 2024-08-16 LAB
ALBUMIN SERPL-MCNC: 3.5 G/DL (ref 3.4–5)
ALBUMIN UR-MCNC: NEGATIVE MG/DL
ALP SERPL-CCNC: 54 U/L (ref 40–150)
ALT SERPL W P-5'-P-CCNC: 18 U/L (ref 0–50)
ANION GAP SERPL CALCULATED.3IONS-SCNC: 3 MMOL/L (ref 3–14)
APPEARANCE UR: CLEAR
AST SERPL W P-5'-P-CCNC: 20 U/L (ref 0–45)
BACTERIA #/AREA URNS HPF: ABNORMAL /HPF
BASOPHILS # BLD AUTO: 0 10E3/UL (ref 0–0.2)
BASOPHILS NFR BLD AUTO: 1 %
BILIRUB SERPL-MCNC: 0.6 MG/DL (ref 0.2–1.3)
BILIRUB UR QL STRIP: NEGATIVE
BUN SERPL-MCNC: 14 MG/DL (ref 7–30)
CALCIUM SERPL-MCNC: 8.8 MG/DL (ref 8.5–10.1)
CHLORIDE BLD-SCNC: 107 MMOL/L (ref 94–109)
CLUE CELLS: PRESENT
CO2 SERPL-SCNC: 30 MMOL/L (ref 20–32)
COLOR UR AUTO: YELLOW
CREAT SERPL-MCNC: 0.9 MG/DL (ref 0.52–1.04)
EGFRCR SERPLBLD CKD-EPI 2021: 78 ML/MIN/1.73M2
EOSINOPHIL # BLD AUTO: 0.1 10E3/UL (ref 0–0.7)
EOSINOPHIL NFR BLD AUTO: 1 %
ERYTHROCYTE [DISTWIDTH] IN BLOOD BY AUTOMATED COUNT: 13 % (ref 10–15)
GLUCOSE BLD-MCNC: 153 MG/DL (ref 70–99)
GLUCOSE UR STRIP-MCNC: NEGATIVE MG/DL
HCG UR QL: NEGATIVE
HCT VFR BLD AUTO: 37.8 % (ref 35–47)
HGB BLD-MCNC: 12.4 G/DL (ref 11.7–15.7)
HGB UR QL STRIP: ABNORMAL
IMM GRANULOCYTES # BLD: 0 10E3/UL
IMM GRANULOCYTES NFR BLD: 0 %
KETONES UR STRIP-MCNC: NEGATIVE MG/DL
LEUKOCYTE ESTERASE UR QL STRIP: NEGATIVE
LYMPHOCYTES # BLD AUTO: 2 10E3/UL (ref 0.8–5.3)
LYMPHOCYTES NFR BLD AUTO: 23 %
MCH RBC QN AUTO: 28.2 PG (ref 26.5–33)
MCHC RBC AUTO-ENTMCNC: 32.8 G/DL (ref 31.5–36.5)
MCV RBC AUTO: 86 FL (ref 78–100)
MONOCYTES # BLD AUTO: 0.4 10E3/UL (ref 0–1.3)
MONOCYTES NFR BLD AUTO: 5 %
NEUTROPHILS # BLD AUTO: 6.1 10E3/UL (ref 1.6–8.3)
NEUTROPHILS NFR BLD AUTO: 71 %
NITRATE UR QL: NEGATIVE
PH UR STRIP: 6.5 [PH] (ref 5–8)
PLATELET # BLD AUTO: 316 10E3/UL (ref 150–450)
POTASSIUM BLD-SCNC: 3.9 MMOL/L (ref 3.4–5.3)
PROT SERPL-MCNC: 7 G/DL (ref 6.8–8.8)
RBC # BLD AUTO: 4.4 10E6/UL (ref 3.8–5.2)
RBC #/AREA URNS AUTO: ABNORMAL /HPF
SODIUM SERPL-SCNC: 140 MMOL/L (ref 135–145)
SP GR UR STRIP: 1.01 (ref 1–1.03)
SQUAMOUS #/AREA URNS AUTO: ABNORMAL /LPF
TRICHOMONAS, WET PREP: ABNORMAL
UROBILINOGEN UR STRIP-ACNC: 0.2 E.U./DL
WBC # BLD AUTO: 8.7 10E3/UL (ref 4–11)
WBC #/AREA URNS AUTO: ABNORMAL /HPF
WBC'S/HIGH POWER FIELD, WET PREP: ABNORMAL
YEAST, WET PREP: ABNORMAL

## 2024-08-16 PROCEDURE — 96374 THER/PROPH/DIAG INJ IV PUSH: CPT | Performed by: FAMILY MEDICINE

## 2024-08-16 PROCEDURE — 250N000009 HC RX 250: Performed by: FAMILY MEDICINE

## 2024-08-16 PROCEDURE — 80053 COMPREHEN METABOLIC PANEL: CPT | Performed by: FAMILY MEDICINE

## 2024-08-16 PROCEDURE — 99215 OFFICE O/P EST HI 40 MIN: CPT | Mod: 25 | Performed by: FAMILY MEDICINE

## 2024-08-16 PROCEDURE — 99207 REFERRAL TO ACUTE AND DIAGNOSTIC SERVICES: CPT | Performed by: PHYSICIAN ASSISTANT

## 2024-08-16 PROCEDURE — 99417 PROLNG OP E/M EACH 15 MIN: CPT | Performed by: FAMILY MEDICINE

## 2024-08-16 PROCEDURE — 81001 URINALYSIS AUTO W/SCOPE: CPT | Performed by: PHYSICIAN ASSISTANT

## 2024-08-16 PROCEDURE — 96361 HYDRATE IV INFUSION ADD-ON: CPT | Performed by: FAMILY MEDICINE

## 2024-08-16 PROCEDURE — 250N000011 HC RX IP 250 OP 636: Performed by: FAMILY MEDICINE

## 2024-08-16 PROCEDURE — 36415 COLL VENOUS BLD VENIPUNCTURE: CPT | Performed by: FAMILY MEDICINE

## 2024-08-16 PROCEDURE — 74177 CT ABD & PELVIS W/CONTRAST: CPT

## 2024-08-16 PROCEDURE — 85025 COMPLETE CBC W/AUTO DIFF WBC: CPT | Performed by: FAMILY MEDICINE

## 2024-08-16 PROCEDURE — 81025 URINE PREGNANCY TEST: CPT | Performed by: PHYSICIAN ASSISTANT

## 2024-08-16 PROCEDURE — 87210 SMEAR WET MOUNT SALINE/INK: CPT | Performed by: FAMILY MEDICINE

## 2024-08-16 RX ORDER — KETOROLAC TROMETHAMINE 30 MG/ML
15 INJECTION, SOLUTION INTRAMUSCULAR; INTRAVENOUS ONCE
Status: COMPLETED | OUTPATIENT
Start: 2024-08-16 | End: 2024-08-16

## 2024-08-16 RX ORDER — IOPAMIDOL 755 MG/ML
90 INJECTION, SOLUTION INTRAVASCULAR ONCE
Status: COMPLETED | OUTPATIENT
Start: 2024-08-16 | End: 2024-08-16

## 2024-08-16 RX ORDER — METRONIDAZOLE 500 MG/1
500 TABLET ORAL 2 TIMES DAILY
Qty: 14 TABLET | Refills: 0 | Status: SHIPPED | OUTPATIENT
Start: 2024-08-16 | End: 2024-08-23

## 2024-08-16 RX ADMIN — SODIUM CHLORIDE 90 ML: 9 INJECTION, SOLUTION INTRAVENOUS at 14:20

## 2024-08-16 RX ADMIN — KETOROLAC TROMETHAMINE 15 MG: 30 INJECTION, SOLUTION INTRAMUSCULAR; INTRAVENOUS at 13:15

## 2024-08-16 RX ADMIN — Medication 1000 ML: at 14:29

## 2024-08-16 RX ADMIN — IOPAMIDOL 90 ML: 755 INJECTION, SOLUTION INTRAVENOUS at 14:19

## 2024-08-16 ASSESSMENT — PAIN SCALES - GENERAL: PAINLEVEL: MILD PAIN (3)

## 2024-08-16 NOTE — PROGRESS NOTES
Assessment & Plan     LLQ abdominal pain    - UA Macroscopic with reflex to Microscopic and Culture - Clinic Collect  - UA Microscopic with Reflex to Culture  - HCG qualitative urine  - HCG qualitative urine  - Referral to Acute and Diagnostic Services (Day of diagnostic / First order acute)    Vaginal bleeding    - HCG qualitative urine  - Referral to Acute and Diagnostic Services (Day of diagnostic / First order acute)     Patient was seen for 1 day history of left lower quadrant abdominal pain with diarrhea and some new vaginal bleeding.  Pain wraps around the back and she has associated nausea.  Differential is wide and includes but not limited to gastroenteritis, ureteral stone, diverticulitis, ectopic pregnancy, ovarian torsion, colitis.  Given her acute symptoms would recommend she be seen in the ADS for consideration of higher level imaging including but not limited to CT scan to further evaluate her symptoms.  Discussed with Dr. Vazquez ADS provider at [facility] today was agreeable to see her.  Patient will go by private vehicle there now for evaluation.      Process for making after hours referrals to the ADS    1.  Send an Epic message to the ADS clinic pool at patient preferred site  [place]: [facility] [place] [facility] Support Staff  ([id number])  [place]: [facility] [place] [facility] Support Staff([id number])  [place]: [facility] [place] [facility] Support Staff ([id number])    [place]: P [place] [facility] Support Staff ([id number])  [place]: [facility] [place] [facility] Support Staff ([id number])  Include the reason for the referral and any pertinent information.    2.  Verify that the patient is medically stable and willing to be seen shortly after 9 am the next day the clinic is open.  If the patient is having abdominal or GI symptoms, consider telling them to be NPO if you think imaging will be needed.    3.  Give the patient the phone number  "for the site.  Have the patient call the ADS if they haven't been contacted by 10 am.  [place]: [phone]  [place]:  [phone]  [place]: [phone]   [place]:  [phone]    [phone]    [facility]    Prosper Galan is a 49 year old female who presents to clinic today for the following health issues:  Chief Complaint   Patient presents with    Hematuria     Symptoms started yesterday. Nausea, diarrhea, pelvic pain, bloody discharge, blood in urine, left sided lower quadrant pain and back pain, sharp pain sometimes with urination.          2024    10:39 AM   Additional Questions   Roomed by Donna LI.   Accompanied by Self       HPI    Diarrhea started yesterday.  Nausea   Diarrhea today:not watery, small, soft, more frequent stool.    6-7 x this morning.    Quarter sized blood with wiping after urination this morning  Vaginal discharge. Has had ablasion and thus no menses.    Painful intercourse last night on the L side   LLQ pain constant but increased episodes comes intermittently lasting seconds.     Bloody discharge   Nausea last night, comes and goes now.   Lower abdomen pain with radiation to the L and back.    No burning with uration, \"sensation\" in the urethra.    Sharp pain in the urethra   Many years ago had diverticulitis       Review of Systems  Constitutional, HEENT, cardiovascular, pulmonary, gi and gu systems are negative, except as otherwise noted.      Patient Active Problem List   Diagnosis    Allergic Rhinitis    Lower Back Pain    Anxiety    Neck pain    S/P craniotomy, MCA aneurysm clipping x 2    History of intracranial aneurysm    Migraine without aura    Status post laparoscopic Nissen fundoplication    Atypical glandular cells on cervical Pap smear    Closed traumatic dislocation of temporomandibular joint    Fibromyositis    Heartburn    History of abnormal cervical Papanicolaou smear    " Myofascial pain    Psychogenic headache    Uterine leiomyoma    Left foot pain    Arthralgia of temporomandibular joint    Constipation    Degeneration of intervertebral disc of cervical region    Muscle spasm    Occipital neuralgia    Polyneuropathy    Cramp of limb    Hereditary and idiopathic peripheral neuropathy    History of EMG 6/24/2022    Abnormal involuntary movement of toes    Painful legs and moving toes     Current Outpatient Medications   Medication Sig Dispense Refill    DULoxetine (CYMBALTA) 20 MG capsule Take 1 capsule (20 mg) by mouth at bedtime 90 capsule 3    fluticasone (FLONASE) 50 MCG/ACT nasal spray Spray 1 spray into both nostrils daily (Patient not taking: Reported on 8/16/2024) 18.2 mL 0    oxyCODONE-acetaminophen (PERCOCET) 5-325 MG tablet Take 1 tablet by mouth every 6 hours as needed for pain 20 tablet 0    sodium chloride (OCEAN) 0.65 % nasal spray 2-4 times a day in both nostrils for 14 days or until feeling completely better. (Patient not taking: Reported on 8/16/2024) 60 mL 1    tiZANidine (ZANAFLEX) 4 MG tablet TAKE 1 TO 2 TABLETS BY MOUTH AT BEDTIME AS NEEDED FOR MUSCLE SPASMS 60 tablet 3    hydrOXYzine denise (VISTARIL) 25 MG capsule Take 25 mg by mouth as needed (Patient not taking: Reported on 6/12/2024)      metroNIDAZOLE (FLAGYL) 500 MG tablet Take 1 tablet (500 mg) by mouth 2 times daily for 7 days 14 tablet 0    VITAMIN D PO Take 4,000 mg by mouth daily 2 tablets by mouth, once a day       Current Facility-Administered Medications   Medication Dose Route Frequency Provider Last Rate Last Admin    sodium chloride (PF) 0.9% PF flush 3 mL  3 mL Intravenous q1 min prn            Objective    BP (!) 155/97   Pulse 93   Temp 98.5  F (36.9  C) (Oral)   Resp 18   Wt 85 kg (187 lb 8 oz)   LMP  (LMP Unknown)   SpO2 98%   BMI 28.51 kg/m    Physical Exam   Pt is in no acute distress and appears well  Ears patent B:  TM s intact, non-injected. All land marks easily visibile    Nasal  mucosa is non-edematous, no discharge.    Pharynx: non erythematous, tonsils non hypertrophied, No exudate   Neck supple: no adenopathy  Lungs: CTA  Heart: RRR, no murmur, no thrills or heaves   Ext: no edema  Skin: no rashes    Abdomen: BS active, soft, TTP LLQ, no rebound or peritoneal signs. No masses or hsm.  MM moist, skin turger good.    No CVAT.   UA unremarkable.    Upt negative

## 2024-08-16 NOTE — TELEPHONE ENCOUNTER
Message from Calixto Leblanc PA-C:   Can we call pt and help her set up a follow up from ADS   clinic with me in 1-2 weeks.   Can sure same day/next day. Thank you

## 2024-08-16 NOTE — TELEPHONE ENCOUNTER
Patient scheduled for office visit with PCP on 8/23/24.     Mary Jo Brewer RN  [facility]

## 2024-08-16 NOTE — PROGRESS NOTES
[facility]ute [facility] Services Clinic Visit    Assessment & Plan     LLQ abdominal pain  CT shows a mild colitis in the descending and sigmoid colon.  Discussed with patient that this could represent a viral illness, bacterial infection, or inflammation.  Based on patient's history with eating at [employer] yesterday and then developing diarrhea this likely is viral or food related.  At this point we will watch her symptoms and focus on hydration.  Have her follow-up next week to see if she is getting better.  She will be on Flagyl for BV which would give some coverage to the colon as well.  Patient was given Toradol 15 mg IV x 1 as well as a liter of normal saline here in clinic today.  Reviewed results of labs and imaging with the patient today in clinic.  - sodium chloride (PF) 0.9% PF flush 3 mL  - CT Abdomen Pelvis w Contrast; Future  - CBC with platelets differential; Future  - Comprehensive metabolic panel; Future  - Wet prep - Clinic Collect  - ketorolac (TORADOL) injection 15 mg  - sodium chloride 0.9% BOLUS 1,000 mL  - Comprehensive metabolic panel  - CBC with platelets differential  - CT Abdomen Pelvis w Contrast    Pelvic pain in female  There is some fluid noted in the cervix on CT.  Outpatient pelvic ultrasound is ordered to further evaluate this.  Patient feels that she may have been told she has had that in the past since having her ablation.  She will follow-up with her primary for ultrasound results.  - CT Abdomen Pelvis w Contrast; Future  - ketorolac (TORADOL) injection 15 mg  - CT Abdomen Pelvis w Contrast  - US Pelvic Complete with Transvaginal; Future    Nausea  Patient declined Zofran.  She thinks she may have some at home for as needed use.   - CT Abdomen Pelvis w Contrast; Future  - Comprehensive metabolic panel; Future  - sodium chloride 0.9% BOLUS 1,000 mL  - Comprehensive metabolic panel  - CT Abdomen Pelvis w Contrast    Acute nonintractable headache, unspecified headache type   Toradol  given today  - ketorolac (TORADOL) injection 15 mg    Diarrhea, unspecified type  Since yesterday  See above  - CT Abdomen Pelvis w Contrast; Future  - CBC with platelets differential; Future  - Comprehensive metabolic panel; Future  - sodium chloride 0.9% BOLUS 1,000 mL  - Comprehensive metabolic panel  - CBC with platelets differential  - CT Abdomen Pelvis w Contrast    Vaginal discharge  See below  - Wet prep - Clinic Collect    BV (bacterial vaginosis)  Wet prep is positive for bacterial vaginosis.  Will treat with metronidazole twice daily for 7 days.  No alcohol while taking this medication.  Patient verbalizes understanding.  - metroNIDAZOLE (FLAGYL) 500 MG tablet; Take 1 tablet (500 mg) by mouth 2 times daily for 7 days    > 55 minutes were spent doing chart review, history and exam, documentation and further activities per the note.             Return in about 1 week (around 8/23/2024) for Follow up, with primary provider.    Subjective   Angelia is a 49 year old, presenting for the following health issues:  Abdominal Pain    HPI     Abdominal/Flank Pain  Onset/Duration: Yesterday  Description:   Character: Dull ache, Sharp pain  Location: left lower quadrant pelvic region  Radiation: Back  Intensity: 3/10  Progression of Symptoms:  worsening  Accompanying Signs & Symptoms:  Fever/chills: no   Gas/Bloating: YES- Gas last night, and bloating going on today  Nausea: YES  Vomitting: no   Diarrhea: YES- Yesterday, right after [employer] for lunch  Constipation:YES  Dysuria: no            Hematuria: YES           Frequency: YES- More than usual this morning           Incontinence of urine: YES- Little earlier today  History:            Last bowel movement: today - 30 min ago  Trauma: no   Previous similar pain: YES- Years ago   Previous tests done: US in Feb after ovarian cyst           Previous Abdominal surgery: YES- Issen (Hernia)  Precipitating factors:   Does the pain change with:     Food: no      Bowel  Movement: no     Urination: no              Other factors: YES- Sex last night and had pain on the left hand side. Achy doesn't go away but the sharp pain on the left side is intermittent   Therapies tried and outcome:  None    When food last eaten: 1 hour ago    Patient is a 49-year-old female who has had diarrhea since yesterday.  She ate lunch at [employer] and later on had loose to watery stools.  During intercourse last night she had left lower quadrant pain and today she is having left lower quadrant and pelvic pain sometimes right lower quadrant pain.  She has been feeling nauseous since yesterday but she has not vomited.  She typically does not vomit since she has had a Nissen fundoplication.  Occasional left upper quadrant pain today as well.  She has been having some clear to bloody vaginal discharge today.  She had a uterine ablation done years ago.  She does have a history of ovarian cysts.  No fevers or chills  She does have a headache behind her eyes around her jaw and posteriorly.  She does have a history of migraines.  She is not concerned about sexually transmitted infections    Review of Systems  Negative except as listed in HPI      Objective    BP (!) 144/84 (BP Location: Right arm, Patient Position: Sitting, Cuff Size: Adult Regular)   Pulse 89   Temp 98.6  F (37  C) (Oral)   Resp 18   Wt 84.8 kg (187 lb)   LMP  (LMP Unknown)   SpO2 98%   BMI 28.43 kg/m    Body mass index is 28.43 kg/m .  Physical Exam   Vitals are noted and within normal limits except for mildly elevated blood pressure today  In general she is alert, oriented, and in no acute distress  Heart has a regular rate and rhythm with no murmurs  Lungs are clear to auscultation bilaterally with good air entry  Back has no CVA tenderness  Abdomen is soft with normal bowel sounds.  Nondistended.  There is tenderness to palpation in the left lower quadrant  UA from urgent care with no sign of infection  UPT from urgent care is  negative  CBC with differential: Within normal limits  CMP: Within normal limits  Wet prep: positive for clue cells  CT abdomen pelvis: Mild colitis in the descending and sigmoid colon noted on CT.  Also fluid collection in the cervix.  Will order outpatient pelvic ultrasound.  Results for orders placed or performed in visit on 08/16/24   CT Abdomen Pelvis w Contrast     Status: None    Narrative    EXAM: CT ABDOMEN PELVIS W CONTRAST  LOCATION: [facility]  DATE: 8/16/2024    INDICATION:  LLQ abdominal pain, Pelvic pain in female, Nausea, Diarrhea, unspecified type  COMPARISON: CT abdomen pelvis 10/20/2020  TECHNIQUE: CT scan of the abdomen and pelvis was performed following injection of IV contrast. Multiplanar reformats were obtained. Dose reduction techniques were used.  CONTRAST: 90ml IV ISOVUE 370    FINDINGS:   LOWER CHEST: Normal.    HEPATOBILIARY: Normal liver contours. Focal fat deposition adjacent to the falciform. No worrisome liver lesions. Gallbladder is contracted. No biliary ductal dilation.    PANCREAS: Normal.    SPLEEN: Normal.    ADRENAL GLANDS: Normal.    KIDNEYS/BLADDER: The kidneys enhance symmetrically. No solid mass, urolithiasis, or collecting system dilation. Urinary bladder is unremarkable.    BOWEL: The descending and rectosigmoid colon are minimally distended but with possible mild wall thickening. Remainder of bowel is normal in caliber. The appendix is normal. No free fluid or free air. Postsurgical changes from gastric fundoplication with   the wrap located below the diaphragm.    LYMPH NODES: No lymphadenopathy.    VASCULATURE: The abdominal aorta is nonaneurysmal.    PELVIC ORGANS: Uterus is present. Heterogeneous fluid distention within the endocervical canal, which does not have typical appearance of a nabothian cyst. Small bilateral simple appearing ovarian cysts.    MUSCULOSKELETAL: No worrisome bone lesions.      Impression    IMPRESSION:   1.  The  descending and rectosigmoid colon are minimally distended but with possible wall thickening, which may represent mild colitis.   2.  Irregularly-shaped fluid distention within the cervix, which does not have typical appearance of a nabothian cyst on CT. Recommend pelvic ultrasound for further evaluation.  3.  Additional details in the findings.   Comprehensive metabolic panel     Status: Abnormal   Result Value Ref Range    Sodium 140 135 - 145 mmol/L    Potassium 3.9 3.4 - 5.3 mmol/L    Chloride 107 94 - 109 mmol/L    Carbon Dioxide (CO2) 30 20 - 32 mmol/L    Anion Gap 3 3 - 14 mmol/L    Urea Nitrogen 14 7 - 30 mg/dL    Creatinine 0.90 0.52 - 1.04 mg/dL    GFR Estimate 78 >60 mL/min/1.73m2    Calcium 8.8 8.5 - 10.1 mg/dL    Glucose 153 (H) 70 - 99 mg/dL    Alkaline Phosphatase 54 40 - 150 U/L    AST 20 0 - 45 U/L    ALT 18 0 - 50 U/L    Protein Total 7.0 6.8 - 8.8 g/dL    Albumin 3.5 3.4 - 5.0 g/dL    Bilirubin Total 0.6 0.2 - 1.3 mg/dL   CBC with platelets and differential     Status: None   Result Value Ref Range    WBC Count 8.7 4.0 - 11.0 10e3/uL    RBC Count 4.40 3.80 - 5.20 10e6/uL    Hemoglobin 12.4 11.7 - 15.7 g/dL    Hematocrit 37.8 35.0 - 47.0 %    MCV 86 78 - 100 fL    MCH 28.2 26.5 - 33.0 pg    MCHC 32.8 31.5 - 36.5 g/dL    RDW 13.0 10.0 - 15.0 %    Platelet Count 316 150 - 450 10e3/uL    % Neutrophils 71 %    % Lymphocytes 23 %    % Monocytes 5 %    % Eosinophils 1 %    % Basophils 1 %    % Immature Granulocytes 0 %    Absolute Neutrophils 6.1 1.6 - 8.3 10e3/uL    Absolute Lymphocytes 2.0 0.8 - 5.3 10e3/uL    Absolute Monocytes 0.4 0.0 - 1.3 10e3/uL    Absolute Eosinophils 0.1 0.0 - 0.7 10e3/uL    Absolute Basophils 0.0 0.0 - 0.2 10e3/uL    Absolute Immature Granulocytes 0.0 <=0.4 10e3/uL   Wet prep - Clinic Collect     Status: Abnormal    Specimen: Vagina; Swab   Result Value Ref Range    Trichomonas Absent Absent    Yeast Absent Absent    Clue Cells Present (A) Absent    WBCs/high power field 1+ (A)  None   CBC with platelets differential     Status: None    Narrative    The following orders were created for panel order CBC with platelets differential.  Procedure                               Abnormality         Status                     ---------                               -----------         ------                     CBC with platelets and d...[[id number]]                      Final result                 Please view results for these tests on the individual orders.   Results for orders placed or performed in visit on 08/16/24   UA Macroscopic with reflex to Microscopic and Culture - Clinic Collect     Status: Abnormal    Specimen: Urine, Clean Catch   Result Value Ref Range    Color Urine Yellow Colorless, Straw, Light Yellow, Yellow    Appearance Urine Clear Clear    Glucose Urine Negative Negative mg/dL    Bilirubin Urine Negative Negative    Ketones Urine Negative Negative mg/dL    Specific Gravity Urine 1.015 1.005 - 1.030    Blood Urine Trace (A) Negative    pH Urine 6.5 5.0 - 8.0    Protein Albumin Urine Negative Negative mg/dL    Urobilinogen Urine 0.2 0.2, 1.0 E.U./dL    Nitrite Urine Negative Negative    Leukocyte Esterase Urine Negative Negative   UA Microscopic with Reflex to Culture     Status: Abnormal   Result Value Ref Range    Bacteria Urine None Seen None Seen /HPF    RBC Urine None Seen 0-2 /HPF /HPF    WBC Urine None Seen 0-5 /HPF /HPF    Squamous Epithelials Urine Few (A) None Seen /LPF    Narrative    Urine Culture not indicated   HCG qualitative urine     Status: Normal   Result Value Ref Range    hCG Urine Qualitative Negative Negative                 Signed Electronically by: Eliana Vazquez MD

## 2024-08-16 NOTE — PROGRESS NOTES
Can we call pt and help her set up a follow up from [facility] with me in 1-2 weeks. Can sure same day/next day. Thank you

## 2024-08-20 ENCOUNTER — HOSPITAL ENCOUNTER (OUTPATIENT)
Dept: ULTRASOUND IMAGING | Facility: CLINIC | Age: 49
Discharge: HOME | End: 2024-08-20
Attending: PHYSICIAN ASSISTANT | Admitting: PHYSICIAN ASSISTANT
Payer: COMMERCIAL

## 2024-08-20 ENCOUNTER — MYC REFILL (OUTPATIENT)
Dept: FAMILY MEDICINE | Facility: CLINIC | Age: 49
End: 2024-08-20
Payer: COMMERCIAL

## 2024-08-20 DIAGNOSIS — G89.29 OTHER CHRONIC PAIN: ICD-10-CM

## 2024-08-20 DIAGNOSIS — R10.2 PELVIC PAIN IN FEMALE: ICD-10-CM

## 2024-08-20 PROCEDURE — 76830 TRANSVAGINAL US NON-OB: CPT

## 2024-08-20 PROCEDURE — 76856 US EXAM PELVIC COMPLETE: CPT

## 2024-08-21 RX ORDER — OXYCODONE AND ACETAMINOPHEN 5; 325 MG/1; MG/1
1 TABLET ORAL EVERY 6 HOURS PRN
Qty: 20 TABLET | Refills: 0 | Status: SHIPPED | OUTPATIENT
Start: 2024-08-21 | End: 2024-09-16

## 2024-08-21 NOTE — TELEPHONE ENCOUNTER
Spoke with patient and chart review.     Patient reports jaw and neck pain are related to TMJ and are not new symptoms.    Routed to PCP for approval.    Patt Cisneros RN  [facility]

## 2024-08-23 ENCOUNTER — OFFICE VISIT (OUTPATIENT)
Dept: FAMILY MEDICINE | Facility: CLINIC | Age: 49
End: 2024-08-23
Payer: COMMERCIAL

## 2024-08-23 VITALS
HEIGHT: 68 IN | OXYGEN SATURATION: 98 % | DIASTOLIC BLOOD PRESSURE: 80 MMHG | BODY MASS INDEX: 28.49 KG/M2 | SYSTOLIC BLOOD PRESSURE: 118 MMHG | TEMPERATURE: 98.8 F | RESPIRATION RATE: 14 BRPM | HEART RATE: 86 BPM | WEIGHT: 188 LBS

## 2024-08-23 DIAGNOSIS — Q87.89 PAINFUL LEG AND MOVING TOES SYNDROME: ICD-10-CM

## 2024-08-23 DIAGNOSIS — D25.9 UTERINE LEIOMYOMA, UNSPECIFIED LOCATION: ICD-10-CM

## 2024-08-23 DIAGNOSIS — B96.89 BV (BACTERIAL VAGINOSIS): ICD-10-CM

## 2024-08-23 DIAGNOSIS — M79.606 PAINFUL LEG AND MOVING TOES SYNDROME: ICD-10-CM

## 2024-08-23 DIAGNOSIS — K52.9 COLITIS: Primary | ICD-10-CM

## 2024-08-23 DIAGNOSIS — N76.0 BV (BACTERIAL VAGINOSIS): ICD-10-CM

## 2024-08-23 PROCEDURE — 99214 OFFICE O/P EST MOD 30 MIN: CPT | Performed by: PHYSICIAN ASSISTANT

## 2024-08-23 RX ORDER — DULOXETIN HYDROCHLORIDE 30 MG/1
30 CAPSULE, DELAYED RELEASE ORAL DAILY
Qty: 90 CAPSULE | Refills: 3 | Status: SHIPPED | OUTPATIENT
Start: 2024-08-23

## 2024-08-23 RX ORDER — ONDANSETRON 4 MG/1
4 TABLET, ORALLY DISINTEGRATING ORAL EVERY 8 HOURS PRN
Qty: 30 TABLET | Refills: 3 | Status: SHIPPED | OUTPATIENT
Start: 2024-08-23

## 2024-08-23 NOTE — PROGRESS NOTES
Assessment & Plan     Colitis  Recently in [facility] for left lower quadrant pain, pelvic pain, nausea, diarrhea and headaches.  She was diagnosed with colitis on CT.  She continues to have symptoms but symptoms have improved a little bit.  She is taking Zofran to as needed.  She was also placed on Flagyl which she thinks may have caused ongoing symptoms including nausea and upset stomach.  Recommended doing stool cultures but she would like to wait a week or 2 to see if her symptoms improve on their own.  She is not having any fevers or chills or new symptoms.  Recommended following up if symptoms do not improve or she develops worsening symptoms  - ondansetron (ZOFRAN ODT) 4 MG ODT tab; Take 1 tablet (4 mg) by mouth every 8 hours as needed for nausea.    Bacterial vaginosis  She was treated with Flagyl and has finished symptoms.    Uterine leiomyoma, unspecified location  Ultrasound transvagina noted a 3.0 cm right fibroid.  She does have abdominal distention and bloating.  She does have intermittent heavy periods.  Recommended OB/GYN but she would like to hold off for now.  Recommended repeating ultrasound in 3 months.  Transvaginal ultrasound ordered.    Painful leg and moving toes syndrome  Widespread muscle pain  Fibromyalgia  Suspect fibromyalgia.  She does have chronic headaches.  Recommended pain clinic but she will think about this.  She is tolerating Cymbalta 20 mg and has noted some mild improvement in symptoms.  She would like to increase to Cymbalta to 30 mg to see if she has improvement.  We also discussed low-dose naltrexone again but she would like to continue to think about this.  Will continue Percocet 20 tablets a month.  - DULoxetine (CYMBALTA) 30 MG capsule; Take 1 capsule (30 mg) by mouth daily.    Total time spent was 35 minutes including reviewing records prior to arrival, consultation, placing orders, education, and reviewing the plan of care on the date of service.        Subjective    Angelia is a 49 year old, presenting for the following health issues:  RECHECK (Pt was seen twice on 8/16/24 for abdominal pain and vaginal bleeding. Was seen at [facility] for further imaging. CT did show a mild colitis in the descending and sigmoid colon. At that time there was some fluid in her cervix and has been told this in the past.)        8/23/2024     2:36 PM   Additional Questions   Roomed by Sinai Stone   Accompanied by john     Angelia is a pleasant 49-year-old female who presents to the clinic today for a follow-up on [facility] clinic.  Recently was seen in [facility] on 8/16/2024 for left lower quadrant pain, pelvic pain, diarrhea, nausea and headaches.  She also was diagnosed with bacterial vaginosis and has since finished antibiotics for that.  He has been taking Zofran for the nausea.  A day or 2 before her onset of symptoms she did eat at [employer].  During her workup she did undergo a CT of her abdomen and pelvis which showed mild colitis, fluid distention within the cervix.  She then underwent an ultrasound which showed a 3.0 cm right fibroid.    Since being discharged from the [facility] her diarrhea has improved but she continues to have bloating and nausea.  She is not having any worsening symptoms.    She continues to have widespread pain.  She does take Percocet as needed.  Pain is widespread.  She has been having headaches which are not atypical for her.  She is currently on Cymbalta 20 mg to help with widespread pain which she has noted some improvement.    History of Present Illness       Reason for visit:  Stomach pain    She eats 2-3 servings of fruits and vegetables daily.She consumes 2 sweetened beverage(s) daily.She exercises with enough effort to increase her heart rate 10 to 19 minutes per day.  She exercises with enough effort to increase her heart rate 3 or less days per week.   She is taking medications regularly.     Review of Systems   Constitutional:  Negative for chills and  "fever.   HENT:  Negative for ear pain and sore throat.    Eyes:  Negative for pain and visual disturbance.   Respiratory:  Negative for cough and shortness of breath.    Cardiovascular:  Negative for chest pain and palpitations.   Gastrointestinal:  Positive for abdominal pain, diarrhea and nausea. Negative for abdominal distention, rectal pain and vomiting.   Genitourinary:  Negative for dysuria and hematuria.   Musculoskeletal:  Positive for arthralgias and myalgias. Negative for back pain.   Skin:  Negative for color change and rash.   Neurological:  Negative for dizziness, seizures and syncope.   All other systems reviewed and are negative.              Objective    /80 (BP Location: Left arm, Patient Position: Sitting, Cuff Size: Adult Regular)   Pulse 86   Temp 98.8  F (37.1  C) (Oral)   Resp 14   Ht 1.727 m (5' 8\")   Wt 85.3 kg (188 lb)   LMP  (LMP Unknown)   SpO2 98%   Breastfeeding No   BMI 28.59 kg/m    Body mass index is 28.59 kg/m .  Physical Exam  Vitals and nursing note reviewed.   Constitutional:       General: She is not in acute distress.     Appearance: Normal appearance. She is not ill-appearing, toxic-appearing or diaphoretic.   HENT:      Head: Normocephalic and atraumatic.   Eyes:      Conjunctiva/sclera: Conjunctivae normal.   Cardiovascular:      Rate and Rhythm: Normal rate and regular rhythm.      Heart sounds: No murmur heard.     No friction rub. No gallop.   Pulmonary:      Effort: Pulmonary effort is normal.      Breath sounds: No wheezing, rhonchi or rales.   Abdominal:      General: Bowel sounds are normal.      Tenderness: There is no abdominal tenderness. There is no guarding or rebound.   Skin:     General: Skin is warm and dry.   Neurological:      Mental Status: She is alert.   Psychiatric:         Mood and Affect: Mood normal.         Behavior: Behavior normal.         Thought Content: Thought content normal.         Judgment: Judgment normal.                "     Signed Electronically by: Calixto Leblanc PA-C     no

## 2024-08-26 ASSESSMENT — ENCOUNTER SYMPTOMS
MYALGIAS: 1
DIARRHEA: 1
EYE PAIN: 0
ABDOMINAL PAIN: 1
HEMATURIA: 0
PALPITATIONS: 0
FEVER: 0
SHORTNESS OF BREATH: 0
COLOR CHANGE: 0
RECTAL PAIN: 0
SEIZURES: 0
SORE THROAT: 0
NAUSEA: 1
CHILLS: 0
VOMITING: 0
DIZZINESS: 0
DYSURIA: 0
ABDOMINAL DISTENTION: 0
BACK PAIN: 0
ARTHRALGIAS: 1
COUGH: 0

## 2024-09-16 ENCOUNTER — MYC REFILL (OUTPATIENT)
Dept: FAMILY MEDICINE | Facility: CLINIC | Age: 49
End: 2024-09-16
Payer: COMMERCIAL

## 2024-09-16 DIAGNOSIS — G89.29 OTHER CHRONIC PAIN: ICD-10-CM

## 2024-09-17 RX ORDER — OXYCODONE AND ACETAMINOPHEN 5; 325 MG/1; MG/1
1 TABLET ORAL EVERY 6 HOURS PRN
Qty: 20 TABLET | Refills: 0 | Status: SHIPPED | OUTPATIENT
Start: 2024-09-17

## 2024-09-25 ENCOUNTER — THERAPY VISIT (OUTPATIENT)
Dept: PHYSICAL THERAPY | Facility: REHABILITATION | Age: 49
End: 2024-09-25
Attending: PSYCHIATRY & NEUROLOGY
Payer: COMMERCIAL

## 2024-09-25 DIAGNOSIS — M79.606 PAINFUL LEG AND MOVING TOES SYNDROME: ICD-10-CM

## 2024-09-25 DIAGNOSIS — Q87.89 PAINFUL LEG AND MOVING TOES SYNDROME: ICD-10-CM

## 2024-09-25 PROCEDURE — 97162 PT EVAL MOD COMPLEX 30 MIN: CPT | Mod: GP

## 2024-09-25 PROCEDURE — 97140 MANUAL THERAPY 1/> REGIONS: CPT | Mod: GP

## 2024-09-25 PROCEDURE — 97110 THERAPEUTIC EXERCISES: CPT | Mod: GP
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
## 2024-10-03 ENCOUNTER — MYC MEDICAL ADVICE (OUTPATIENT)
Dept: FAMILY MEDICINE | Facility: CLINIC | Age: 49
End: 2024-10-03
Payer: COMMERCIAL

## 2024-10-04 ENCOUNTER — MYC MEDICAL ADVICE (OUTPATIENT)
Dept: FAMILY MEDICINE | Facility: CLINIC | Age: 49
End: 2024-10-04
Payer: COMMERCIAL

## 2024-10-04 DIAGNOSIS — G89.4 CHRONIC PAIN SYNDROME: Primary | ICD-10-CM

## 2024-10-04 RX ORDER — CYCLOBENZAPRINE HCL 5 MG
5-10 TABLET ORAL 3 TIMES DAILY PRN
Qty: 30 TABLET | Refills: 5 | Status: SHIPPED | OUTPATIENT
Start: 2024-10-04

## 2024-10-04 NOTE — TELEPHONE ENCOUNTER
Please call pt and let her know this its almost 2 weeks early to refill pain medications. I agreed on 20 tabs a month. She has been asking for early refills lately and if she is needing more than the 20 a month she will need to start seeing a pain clinic for a better pain management. I can not refill at this time.

## 2024-10-04 NOTE — TELEPHONE ENCOUNTER
Called the patient to gather more information. She states she has been in a constant 7/10 level of pain with it switching from leg to leg. She is out of her percocet and would like a renewal with extra pills for more coverage during this flare. When asked, she did not wish to be seen in an UC or ED prior to hearing back from her PCP and we discussed that there might be a need for her to be seen there for the acute pain. Alternatives she has used in this time in an attempt at pain control include ice, bio freeze, patches, massage, and stretching. Nothing is providing any lasting relief for the patient at this time. Please review information and patient message, and send back recommendations.       Iona Grant RN  [facility]

## 2024-10-04 NOTE — TELEPHONE ENCOUNTER
Relayed message to patient and she verbalized understanding. She states that she is doing PT right now, and she cannot go to a pain clinic right now as that would be too many things at once. She would like to discuss it as a possibility after the new year.    Iona Grant RN  [facility]     Fast labor with delivery of live male infant.

## 2024-10-16 DIAGNOSIS — G89.29 OTHER CHRONIC PAIN: ICD-10-CM

## 2024-10-16 RX ORDER — OXYCODONE AND ACETAMINOPHEN 5; 325 MG/1; MG/1
1 TABLET ORAL EVERY 6 HOURS PRN
Qty: 20 TABLET | Refills: 0 | Status: SHIPPED | OUTPATIENT
Start: 2024-10-16 | End: 2024-11-13

## 2024-10-16 NOTE — TELEPHONE ENCOUNTER
Medication Question or Refill    Contacts       Contact Date/Time Type Contact Phone/Fax    10/16/2024 10:12 AM CDT Phone (Incoming) Angelia Stallings (Self) [phone] (M)            What medication are you calling about (include dose and sig)?: oxyCODONE-acetaminophen (PERCOCET) 5-325 MG tablet    Preferred Pharmacy:  [facility] #[id number] [address] [facility]  [address]  [place] [address]  Phone: [phone] Fax: [phone]      Controlled Substance Agreement on file:   CSA -- Patient Level:     [Media Unavailable] Controlled Substance Agreement - Opioid - Scan on 7/1/2024  9:25 AM   [Media Unavailable] Controlled Substance Agreement - Opioid - Scan on 8/16/2023  1:15 PM   [Media Unavailable] Controlled Substance Agreement - Opioid - Scan on 7/15/2022 12:20 PM   [Media Unavailable] Controlled Substance Agreement - Opioid - Scan on 6/7/2022  8:41 AM       Who prescribed the medication?: Leers    Do you need a refill? Yes    When did you use the medication last? N/A    Patient offered an appointment? No    Do you have any questions or concerns?  No      Could we send this information to you in [facility] or would you prefer to receive a phone call?:   Patient would prefer a phone call   Okay to leave a detailed message?: Yes at Home number on file [phone] (home)

## 2024-10-17 ENCOUNTER — THERAPY VISIT (OUTPATIENT)
Dept: PHYSICAL THERAPY | Facility: REHABILITATION | Age: 49
End: 2024-10-17
Attending: PSYCHIATRY & NEUROLOGY
Payer: COMMERCIAL

## 2024-10-17 DIAGNOSIS — M79.606 PAINFUL LEG AND MOVING TOES SYNDROME: Primary | ICD-10-CM

## 2024-10-17 DIAGNOSIS — Q87.89 PAINFUL LEG AND MOVING TOES SYNDROME: Primary | ICD-10-CM

## 2024-10-17 PROCEDURE — 97110 THERAPEUTIC EXERCISES: CPT | Mod: GP

## 2024-10-17 PROCEDURE — 97140 MANUAL THERAPY 1/> REGIONS: CPT | Mod: GP

## 2024-10-30 ENCOUNTER — THERAPY VISIT (OUTPATIENT)
Dept: PHYSICAL THERAPY | Facility: REHABILITATION | Age: 49
End: 2024-10-30
Payer: COMMERCIAL

## 2024-10-30 DIAGNOSIS — M79.606 PAINFUL LEG AND MOVING TOES SYNDROME: Primary | ICD-10-CM

## 2024-10-30 DIAGNOSIS — Q87.89 PAINFUL LEG AND MOVING TOES SYNDROME: Primary | ICD-10-CM

## 2024-10-30 PROCEDURE — 97110 THERAPEUTIC EXERCISES: CPT | Mod: GP | Performed by: PHYSICAL THERAPIST

## 2024-10-30 PROCEDURE — 97140 MANUAL THERAPY 1/> REGIONS: CPT | Mod: GP | Performed by: PHYSICAL THERAPIST

## 2024-11-12 ENCOUNTER — THERAPY VISIT (OUTPATIENT)
Dept: PHYSICAL THERAPY | Facility: REHABILITATION | Age: 49
End: 2024-11-12
Payer: COMMERCIAL

## 2024-11-12 DIAGNOSIS — Q87.89 PAINFUL LEG AND MOVING TOES SYNDROME: Primary | ICD-10-CM

## 2024-11-12 DIAGNOSIS — M79.606 PAINFUL LEG AND MOVING TOES SYNDROME: Primary | ICD-10-CM

## 2024-11-12 PROCEDURE — 97110 THERAPEUTIC EXERCISES: CPT | Mod: GP

## 2024-11-12 PROCEDURE — 97140 MANUAL THERAPY 1/> REGIONS: CPT | Mod: GP

## 2024-11-13 ENCOUNTER — MYC REFILL (OUTPATIENT)
Dept: FAMILY MEDICINE | Facility: CLINIC | Age: 49
End: 2024-11-13
Payer: COMMERCIAL

## 2024-11-13 DIAGNOSIS — G89.29 OTHER CHRONIC PAIN: ICD-10-CM

## 2024-11-13 RX ORDER — OXYCODONE AND ACETAMINOPHEN 5; 325 MG/1; MG/1
1 TABLET ORAL EVERY 6 HOURS PRN
Qty: 20 TABLET | Refills: 0 | Status: SHIPPED | OUTPATIENT
Start: 2024-11-13

## 2024-11-15 ENCOUNTER — OFFICE VISIT (OUTPATIENT)
Dept: FAMILY MEDICINE | Facility: CLINIC | Age: 49
End: 2024-11-15
Payer: COMMERCIAL

## 2024-11-15 VITALS
OXYGEN SATURATION: 99 % | HEART RATE: 86 BPM | SYSTOLIC BLOOD PRESSURE: 124 MMHG | TEMPERATURE: 98 F | HEIGHT: 68 IN | DIASTOLIC BLOOD PRESSURE: 84 MMHG | WEIGHT: 186 LBS | BODY MASS INDEX: 28.19 KG/M2 | RESPIRATION RATE: 14 BRPM

## 2024-11-15 DIAGNOSIS — D64.9 ANEMIA, UNSPECIFIED TYPE: ICD-10-CM

## 2024-11-15 DIAGNOSIS — R25.9 ABNORMAL INVOLUNTARY MOVEMENT: ICD-10-CM

## 2024-11-15 DIAGNOSIS — M79.7 FIBROMYOSITIS: ICD-10-CM

## 2024-11-15 DIAGNOSIS — G89.29 CHRONIC BILATERAL LOW BACK PAIN WITHOUT SCIATICA: ICD-10-CM

## 2024-11-15 DIAGNOSIS — E78.5 HYPERLIPIDEMIA LDL GOAL <100: ICD-10-CM

## 2024-11-15 DIAGNOSIS — M54.81 OCCIPITAL NEURALGIA, UNSPECIFIED LATERALITY: ICD-10-CM

## 2024-11-15 DIAGNOSIS — F41.1 ANXIETY STATE: ICD-10-CM

## 2024-11-15 DIAGNOSIS — M54.50 CHRONIC BILATERAL LOW BACK PAIN WITHOUT SCIATICA: ICD-10-CM

## 2024-11-15 DIAGNOSIS — Z00.00 ANNUAL PHYSICAL EXAM: Primary | ICD-10-CM

## 2024-11-15 DIAGNOSIS — Z86.79 HISTORY OF INTRACRANIAL ANEURYSM: ICD-10-CM

## 2024-11-15 DIAGNOSIS — G43.009 MIGRAINE WITHOUT AURA AND WITHOUT STATUS MIGRAINOSUS, NOT INTRACTABLE: ICD-10-CM

## 2024-11-15 DIAGNOSIS — Z98.890 S/P CRANIOTOMY: ICD-10-CM

## 2024-11-15 DIAGNOSIS — G89.4 CHRONIC PAIN SYNDROME: ICD-10-CM

## 2024-11-15 LAB
ALBUMIN SERPL BCG-MCNC: 4.4 G/DL (ref 3.5–5.2)
ALP SERPL-CCNC: 64 U/L (ref 40–150)
ALT SERPL W P-5'-P-CCNC: 18 U/L (ref 0–50)
AMPHETAMINES UR QL SCN: NORMAL
ANION GAP SERPL CALCULATED.3IONS-SCNC: 12 MMOL/L (ref 7–15)
AST SERPL W P-5'-P-CCNC: 22 U/L (ref 0–45)
BARBITURATES UR QL SCN: NORMAL
BENZODIAZ UR QL SCN: NORMAL
BILIRUB SERPL-MCNC: 0.8 MG/DL
BUN SERPL-MCNC: 19.1 MG/DL (ref 6–20)
BZE UR QL SCN: NORMAL
CALCIUM SERPL-MCNC: 9.8 MG/DL (ref 8.8–10.4)
CANNABINOIDS UR QL SCN: NORMAL
CHLORIDE SERPL-SCNC: 101 MMOL/L (ref 98–107)
CHOLEST SERPL-MCNC: 250 MG/DL
CREAT SERPL-MCNC: 0.99 MG/DL (ref 0.51–0.95)
EGFRCR SERPLBLD CKD-EPI 2021: 69 ML/MIN/1.73M2
ERYTHROCYTE [DISTWIDTH] IN BLOOD BY AUTOMATED COUNT: 12.8 % (ref 10–15)
EST. AVERAGE GLUCOSE BLD GHB EST-MCNC: 108 MG/DL
FASTING STATUS PATIENT QL REPORTED: ABNORMAL
FASTING STATUS PATIENT QL REPORTED: ABNORMAL
FENTANYL UR QL: NORMAL
GLUCOSE SERPL-MCNC: 87 MG/DL (ref 70–99)
HBA1C MFR BLD: 5.4 % (ref 0–5.6)
HCO3 SERPL-SCNC: 27 MMOL/L (ref 22–29)
HCT VFR BLD AUTO: 41.7 % (ref 35–47)
HDLC SERPL-MCNC: 56 MG/DL
HGB BLD-MCNC: 13.5 G/DL (ref 11.7–15.7)
LDLC SERPL CALC-MCNC: 178 MG/DL
MCH RBC QN AUTO: 27.8 PG (ref 26.5–33)
MCHC RBC AUTO-ENTMCNC: 32.4 G/DL (ref 31.5–36.5)
MCV RBC AUTO: 86 FL (ref 78–100)
NONHDLC SERPL-MCNC: 194 MG/DL
OPIATES UR QL SCN: NORMAL
PCP QUAL URINE (ROCHE): NORMAL
PLATELET # BLD AUTO: 291 10E3/UL (ref 150–450)
POTASSIUM SERPL-SCNC: 4.7 MMOL/L (ref 3.4–5.3)
PROT SERPL-MCNC: 7.6 G/DL (ref 6.4–8.3)
RBC # BLD AUTO: 4.86 10E6/UL (ref 3.8–5.2)
SODIUM SERPL-SCNC: 140 MMOL/L (ref 135–145)
TRIGL SERPL-MCNC: 81 MG/DL
WBC # BLD AUTO: 6 10E3/UL (ref 4–11)

## 2024-11-15 RX ORDER — ASPIRIN 81 MG/1
1 TABLET, COATED ORAL
COMMUNITY
Start: 2023-12-04 | End: 2024-11-15

## 2024-11-15 SDOH — HEALTH STABILITY: PHYSICAL HEALTH: ON AVERAGE, HOW MANY MINUTES DO YOU ENGAGE IN EXERCISE AT THIS LEVEL?: 20 MIN

## 2024-11-15 SDOH — HEALTH STABILITY: PHYSICAL HEALTH: ON AVERAGE, HOW MANY DAYS PER WEEK DO YOU ENGAGE IN MODERATE TO STRENUOUS EXERCISE (LIKE A BRISK WALK)?: 3 DAYS

## 2024-11-15 ASSESSMENT — ENCOUNTER SYMPTOMS
ARTHRALGIAS: 0
FEVER: 0
SORE THROAT: 0
SHORTNESS OF BREATH: 0
PALPITATIONS: 0
CHILLS: 0
DYSURIA: 0
HEMATURIA: 0
SEIZURES: 0
ABDOMINAL PAIN: 0
EYE PAIN: 0
BACK PAIN: 0
VOMITING: 0
COUGH: 0
COLOR CHANGE: 0

## 2024-11-15 ASSESSMENT — SOCIAL DETERMINANTS OF HEALTH (SDOH): HOW OFTEN DO YOU GET TOGETHER WITH FRIENDS OR RELATIVES?: TWICE A WEEK

## 2024-11-15 NOTE — PROGRESS NOTES
Preventive Care Visit  [facility]  Calixto Leblanc PA-C, Family Medicine  Nov 15, 2024      Assessment & Plan     Annual physical exam  Overall doing well.  Will update screening labs.  Flu vaccine updated  - Comprehensive metabolic panel (BMP + Alb, Alk Phos, ALT, AST, Total. Bili, TP); Future  - CBC with platelets; Future  - Hemoglobin A1c; Future  - Comprehensive metabolic panel (BMP + Alb, Alk Phos, ALT, AST, Total. Bili, TP)  - CBC with platelets  - Hemoglobin A1c    Hyperlipidemia LDL goal <100  Last lipid panel a year ago showed an , triglyceride 135, HDL of 56.  She continues to watch diet and stay active.  Will check a fasting lipid panel today  - Lipid panel reflex to direct LDL Fasting; Future  - Lipid panel reflex to direct LDL Fasting  Recent Labs   Lab Test 11/13/23  0804 11/04/22  0837   CHOL 217* 254*   HDL 56 63   * 172*   TRIG 135 95       History of intracranial aneurysm  S/P craniotomy, MCA aneurysm clipping x 2  Status post craniotomy and clipping.  Recent MRI from July 2023 was stable no new aneurysms noted.  She is on a 5-year follow-up with neurology through [facility].  No new symptoms.    Anxiety state  Anxiety fluctuates.  Anxiety is typically worse when she is in a lot of pain.  Cymbalta has been keeping her symptoms well-controlled.  She is doing well today    Chronic pain syndrome  fibromyalgia  Chronic pain syndrome with widespread pain.  The pain typically will switch from site.  She has pain in her upper back, lower back, neck, bilateral hips, right posterior calf and restless toes.  She is taking hydrocodone as needed for pain which is helpful.  She also endorses chronic headaches/migraines, anxiety, and GI issues.  I suspect fibromyalgia might be a component to her chronic pain.  Unfortunately does not tolerate gabapentin or Lyrica.  She does take tizanidine and Flexeril intermittently which is helpful.  She has been on amitriptyline in the  past but had side effects.  We did discuss naltrexone again today which could be a good option to see if this helps with her chronic pain ultimately could discontinue narcotics.  She will continue to think about naltrexone.  We discussed pain management clinic but she would like to hold off at this time.  Will continue hydrocodone 20 tablets a month.  Controlled substance agreement up-to-date.  PDMP reviewed no red flags.  Urine drug screen updated today  - Urine Drug Screen; Future  - Urine Drug Screen    Abnormal involuntary movement of toes  Recently saw neurology June 2024.  She is doing physical therapy.  Continue with Cymbalta at this time.  EMG was unremarkable other than polyneuropathy    Occipital neuralgia, unspecified laterality  Migraine without aura and without status migrainosus, not intractable  Migraines have continued but are at baseline.  She feels like the migraines are coming from stiffness in her neck which she is using the muscle relaxers and hydrocodone as needed.  She has done Botox in the past which was significantly helpful. migraines are stable at this time.    Uterine fibroid  Pelvic ultrasound 8/20/2024 showed a 3.0 cm fibroid of the anterior right uterine corpus.  She continues to follow up with OB/GYN    Anemia, unspecified type  Remote history of anemia we will check a CBC today    Up-to-date on colonoscopy, Pap, and mammogram     Follow-up Visit   Expected date:  May 15, 2025 (Approximate)      Follow Up Appointment Details:     Follow-up with whom?: Me    Follow-Up for what?: Chronic Disease f/u    Chronic Disease f/u: General (Other)    How?: In Person                 Current Outpatient Medications   Medication Sig Dispense Refill    cyclobenzaprine (FLEXERIL) 5 MG tablet Take 1-2 tablets (5-10 mg) by mouth 3 times daily as needed for muscle spasms. 30 tablet 5    DULoxetine (CYMBALTA) 30 MG capsule Take 1 capsule (30 mg) by mouth daily. 90 capsule 3    fluticasone (FLONASE) 50  MCG/ACT nasal spray Spray 1 spray into both nostrils daily 18.2 mL 0    oxyCODONE-acetaminophen (PERCOCET) 5-325 MG tablet Take 1 tablet by mouth every 6 hours as needed for pain. 20 tablet 0    tiZANidine (ZANAFLEX) 4 MG tablet TAKE 1 TO 2 TABLETS BY MOUTH AT BEDTIME AS NEEDED FOR MUSCLE SPASMS 60 tablet 3    VITAMIN D PO Take 4,000 mg by mouth daily 2 tablets by mouth, once a day       Current Facility-Administered Medications   Medication Dose Route Frequency Provider Last Rate Last Admin    sodium chloride (PF) 0.9% PF flush 3 mL  3 mL Intravenous q1 min prn        '      Patient has been advised of split billing requirements and indicates understanding: Yes        Counseling  Appropriate preventive services were addressed with this patient via screening, questionnaire, or discussion as appropriate for fall prevention, nutrition, physical activity, Tobacco-use cessation, social engagement, weight loss and cognition.  Checklist reviewing preventive services available has been given to the patient.  Reviewed patient's diet, addressing concerns and/or questions.   She is at risk for lack of exercise and has been provided with information to increase physical activity for the benefit of her well-being.   She is at risk for psychosocial distress and has been provided with information to reduce risk.         Subjective   Angelia is a 49 year old, presenting for the following:  Physical (Fasting Physical, no PAP. Eye twitch x 1-2 weeks in R eye)        11/15/2024     9:35 AM   Additional Questions   Roomed by Sinai Stone   Accompanied by aleks          Angelia is a pleasant 49-year-old female that presents to the clinic today for annual visit and chronic disease management follow-up.  Past medical history significant for dyslipidemia, remote history of anemia, chronic pain syndrome, anxiety/depression, intracranial aneurysm status postcraniotomy and clipping, GERD, restless toe syndrome, occipital  neuralgia/migraines, and peripheral neuropathy.  Overall she is doing well    She saw neurology 6/2024 due to restless toe syndrome.  She did undergo EMG which was unremarkable other than polyneuropathy.  Neurology referred her to physical therapy she has had a few sessions and continues at this time.  She continues to take Cymbalta for this along with widespread pain.    She continues on hydrocodone 20 tablets a month for chronic pain syndrome.  She is also taking this for severe migraines.      Health Care Directive  Patient does not have a Health Care Directive: Discussed advance care planning with patient; however, patient declined at this time.      11/15/2024   General Health   How would you rate your overall physical health? Good   Feel stress (tense, anxious, or unable to sleep) To some extent      (!) STRESS CONCERN      11/15/2024   Nutrition   Three or more servings of calcium each day? Yes   Diet: Regular (no restrictions)   How many servings of fruit and vegetables per day? (!) 2-3   How many sweetened beverages each day? (!) 2            11/15/2024   Exercise   Days per week of moderate/strenous exercise 3 days   Average minutes spent exercising at this level 20 min            11/15/2024   Social Factors   Frequency of gathering with friends or relatives Twice a week   Worry food won't last until get money to buy more No   Food not last or not have enough money for food? No   Do you have housing? (Housing is defined as stable permanent housing and does not include staying ouside in a car, in a tent, in an abandoned building, in an overnight shelter, or couch-surfing.) Yes   Are you worried about losing your housing? No   Lack of transportation? No   Unable to get utilities (heat,electricity)? No            11/15/2024   Fall Risk   Fallen 2 or more times in the past year? No    Trouble with walking or balance? No        Patient-reported          11/15/2024   Dental   Dentist two times every year? Yes             11/15/2024   TB Screening   Were you born outside of the [place]? No                    11/15/2024   Substance Use   Alcohol more than 3/day or more than 7/wk No   Do you use any other substances recreationally? No        Social History     Tobacco Use    Smoking status: Former     Current packs/day: 0.00     Average packs/day: 0.3 packs/day for 15.0 years (3.8 ttl pk-yrs)     Types: Cigarettes     Start date: 1/1/1994     Quit date: 1/1/2009     Years since quitting: 15.8     Passive exposure: Past    Smokeless tobacco: Never    Tobacco comments:     No exposure   Vaping Use    Vaping status: Never Used   Substance Use Topics    Alcohol use: Yes     Comment: Alcoholic Drinks/day: rare    Drug use: No           7/10/2024   LAST FHS-7 RESULTS   1st degree relative breast or ovarian cancer No   Any relative bilateral breast cancer No   Any male have breast cancer No   Any ONE woman have BOTH breast AND ovarian cancer No   Any woman with breast cancer before 50yrs No   2 or more relatives with breast AND/OR ovarian cancer No   2 or more relatives with breast AND/OR bowel cancer No           Mammogram Screening - Mammogram every 1-2 years updated in Health Maintenance based on mutual decision making        11/15/2024   STI Screening   New sexual partner(s) since last STI/HIV test? No        History of abnormal Pap smear: No - age 30- 64 PAP with HPV every 5 years recommended        Latest Ref Rng & Units 7/12/2023    11:00 AM 4/20/2022    12:11 PM 2/24/2021     6:48 PM   PAP / HPV   PAP  Negative for Intraepithelial Lesion or Malignancy (NILM)      HPV 16 DNA Negative Negative      HPV 18 DNA Negative Negative      Other HR HPV Negative Negative      PAP-ABSTRACT   See Scanned Document     See Scanned Document           This result is from an external source.     ASCVD Risk   The 10-year ASCVD risk score (Marito DK, et al., 2019) is: 1.3%    Values used to calculate the score:      Age: 49 years      Sex:  Female      Is Non- : No      Diabetic: No      Tobacco smoker: No      Systolic Blood Pressure: 124 mmHg      Is BP treated: No      HDL Cholesterol: 56 mg/dL      Total Cholesterol: 217 mg/dL          11/15/2024   Contraception/Family Planning   Questions about contraception or family planning No           Reviewed and updated as needed this visit by Provider                    Past Medical History:   Diagnosis Date    Abnormal involuntary movement of toes 2/10/2024    Allergic rhinitis     Anemia     Anxiety     Brain aneurysm 2015    s/p clipping,     Dysfunction of both eustachian tubes     Hiatal hernia     Repaired in 2018 with Nissen.    High risk HPV infection 09/10/2015    History of EMG 2022 2/10/2024    This is an abnormal study, demonstrating electrophysiologic evidence of the followin. 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 2. No evidence of axonal injury to left lumbosacral nerve roots. Note that normal needle electromyography does not exclude clinically symptomatic radiculopathy without substantial motor axonal injury.      History of hemolysis, elevated liver enzymes, and low platelet (HELLP) syndrome     History of miscarriage 2004    History of spontaneous      Hypertensive disorder 2015    Major depression single episode, in partial remission (H)     Menorrhagia     Middle cerebral aneurysm      (normal spontaneous vaginal delivery)     Painful legs and moving toes 2/10/2024    PONV (postoperative nausea and vomiting)     Sepsis (H)     Umbilical hernia      Past Surgical History:   Procedure Laterality Date    CRANIOTOMY, REPAIR ANEURYSM, COMBINED  2/19/15    MCA aneurysm clipping x 2    CRYOCAUTERY OF CERVIX      Description: Cervix Cryosurgery;  Recorded: 2008;  Comments: TRUE I    DILATION AND CURETTAGE, OPERATIVE HYSTEROSCOPY, COMBINED N/A 2015    Procedure: DILATION AND CURETTAGE WITH HYSTEROSCOPY ;  Surgeon: Catairna HANSON  "DO Dominik;  Location: [facility];  Service:     HC DILATION/CURETTAGE DIAG/THER NON OB      Description: Dilation And Curettage;  Proc Date: 09/01/2004;  Comments: FOR MISCARRIAGE    HYSTEROSCOPY W/ ENDOMETRIAL ABLATION  12/15/2015    MOUTH SURGERY      CO LAP,ESOPHAGOGAST FUNDOPLASTY N/A 3/1/2018    Procedure: ROBOTIC NISSEN FUNDOPLICATION;  Surgeon: Leo Quezada DO;  Location: [facility];  Service: General       Review of Systems   Constitutional:  Negative for chills and fever.   HENT:  Negative for ear pain and sore throat.    Eyes:  Negative for pain and visual disturbance.   Respiratory:  Negative for cough and shortness of breath.    Cardiovascular:  Negative for chest pain and palpitations.   Gastrointestinal:  Negative for abdominal pain and vomiting.   Genitourinary:  Negative for dysuria and hematuria.   Musculoskeletal:  Negative for arthralgias and back pain.   Skin:  Negative for color change and rash.   Neurological:  Negative for seizures and syncope.   All other systems reviewed and are negative.         Objective    Exam  /84 (BP Location: Left arm, Patient Position: Sitting, Cuff Size: Adult Regular)   Pulse 86   Temp 98  F (36.7  C) (Oral)   Resp 14   Ht 1.727 m (5' 8\")   Wt 84.4 kg (186 lb)   LMP  (LMP Unknown)   SpO2 99%   Breastfeeding No   BMI 28.28 kg/m     Estimated body mass index is 28.28 kg/m  as calculated from the following:    Height as of this encounter: 1.727 m (5' 8\").    Weight as of this encounter: 84.4 kg (186 lb).    Physical Exam  Vitals and nursing note reviewed.   Constitutional:       General: She is not in acute distress.     Appearance: Normal appearance. She is well-developed and well-groomed. She is not ill-appearing or toxic-appearing.   HENT:      Head: Normocephalic and atraumatic.      Right Ear: Tympanic membrane, ear canal and external ear normal.      Left Ear: Tympanic membrane, ear canal and external ear normal.      " Mouth/Throat:      Lips: Pink.      Mouth: Mucous membranes are moist.      Palate: No mass.      Pharynx: Oropharynx is clear. Uvula midline.      Tonsils: No tonsillar exudate or tonsillar abscesses.   Eyes:      General: Lids are normal.         Right eye: No discharge.         Left eye: No discharge.   Neck:      Trachea: Trachea normal.   Cardiovascular:      Rate and Rhythm: Normal rate and regular rhythm.      Heart sounds: S1 normal and S2 normal. No murmur heard.  Pulmonary:      Effort: Pulmonary effort is normal.      Breath sounds: Normal breath sounds and air entry.   Abdominal:      General: Bowel sounds are normal. There is no distension.      Palpations: Abdomen is soft.      Tenderness: There is no abdominal tenderness. There is no guarding or rebound.   Musculoskeletal:      Cervical back: Full passive range of motion without pain and neck supple.      Right lower leg: No edema.      Left lower leg: No edema.   Lymphadenopathy:      Cervical: No cervical adenopathy.   Skin:     General: Skin is warm and dry.      Findings: No lesion or rash.   Neurological:      General: No focal deficit present.      Mental Status: She is alert.      Cranial Nerves: No cranial nerve deficit.      Gait: Gait is intact.      Deep Tendon Reflexes:      Reflex Scores:       Patellar reflexes are 2+ on the right side and 2+ on the left side.  Psychiatric:         Attention and Perception: Attention normal.         Mood and Affect: Mood normal.         Speech: Speech normal.         Signed Electronically by: Calixto Leblanc PA-C

## 2024-11-19 ENCOUNTER — THERAPY VISIT (OUTPATIENT)
Dept: PHYSICAL THERAPY | Facility: REHABILITATION | Age: 49
End: 2024-11-19
Payer: COMMERCIAL

## 2024-11-19 DIAGNOSIS — Q87.89 PAINFUL LEG AND MOVING TOES SYNDROME: Primary | ICD-10-CM

## 2024-11-19 DIAGNOSIS — M79.606 PAINFUL LEG AND MOVING TOES SYNDROME: Primary | ICD-10-CM

## 2024-11-19 PROCEDURE — 97110 THERAPEUTIC EXERCISES: CPT | Mod: GP

## 2024-11-19 PROCEDURE — 97140 MANUAL THERAPY 1/> REGIONS: CPT | Mod: GP

## 2024-11-26 ENCOUNTER — THERAPY VISIT (OUTPATIENT)
Dept: PHYSICAL THERAPY | Facility: REHABILITATION | Age: 49
End: 2024-11-26
Payer: COMMERCIAL

## 2024-11-26 DIAGNOSIS — Q87.89 PAINFUL LEG AND MOVING TOES SYNDROME: Primary | ICD-10-CM

## 2024-11-26 DIAGNOSIS — M79.606 PAINFUL LEG AND MOVING TOES SYNDROME: Primary | ICD-10-CM

## 2024-11-26 PROCEDURE — 97140 MANUAL THERAPY 1/> REGIONS: CPT | Mod: GP | Performed by: PHYSICAL THERAPIST

## 2024-12-04 ENCOUNTER — MYC REFILL (OUTPATIENT)
Dept: FAMILY MEDICINE | Facility: CLINIC | Age: 49
End: 2024-12-04
Payer: COMMERCIAL

## 2024-12-04 DIAGNOSIS — S13.4XXA WHIPLASH INJURY TO NECK, INITIAL ENCOUNTER: ICD-10-CM
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
## 2024-12-06 NOTE — Clinical Note
Team, PUJA. I accidentally signed the wrong script for duloxetine 60 mg daily. I have cancelled this Rx and placed the correct order duloxetine 20 mg daily. But I think the pharmacy may call to clear up confusion.   Thank you!  Jenni  No

## 2024-12-09 ENCOUNTER — HOSPITAL ENCOUNTER (OUTPATIENT)
Dept: ULTRASOUND IMAGING | Facility: CLINIC | Age: 49
Discharge: HOME | End: 2024-12-09
Attending: PHYSICIAN ASSISTANT | Admitting: PHYSICIAN ASSISTANT
Payer: COMMERCIAL

## 2024-12-09 DIAGNOSIS — D25.9 UTERINE LEIOMYOMA, UNSPECIFIED LOCATION: ICD-10-CM

## 2024-12-09 PROCEDURE — 76856 US EXAM PELVIC COMPLETE: CPT

## 2024-12-10 ENCOUNTER — MYC REFILL (OUTPATIENT)
Dept: FAMILY MEDICINE | Facility: CLINIC | Age: 49
End: 2024-12-10
Payer: COMMERCIAL

## 2024-12-10 DIAGNOSIS — G89.29 OTHER CHRONIC PAIN: ICD-10-CM

## 2024-12-10 RX ORDER — OXYCODONE AND ACETAMINOPHEN 5; 325 MG/1; MG/1
1 TABLET ORAL EVERY 6 HOURS PRN
Qty: 20 TABLET | Refills: 0 | Status: SHIPPED | OUTPATIENT
Start: 2024-12-10

## 2024-12-19 ENCOUNTER — TELEPHONE (OUTPATIENT)
Dept: FAMILY MEDICINE | Facility: CLINIC | Age: 49
End: 2024-12-19
Payer: COMMERCIAL

## 2024-12-19 DIAGNOSIS — F41.9 ANXIETY: Primary | ICD-10-CM

## 2024-12-19 RX ORDER — DIAZEPAM 2 MG/1
2 TABLET ORAL EVERY 6 HOURS PRN
Qty: 3 TABLET | Refills: 0 | Status: SHIPPED | OUTPATIENT
Start: 2024-12-19

## 2024-12-19 NOTE — TELEPHONE ENCOUNTER
Patient called  in crying stating she was having an increase in anxiety and pain related to her mother being diagnosed with breast cancer, and being scheduled for surgery next week. She reports that she believes the pain flare is related to this increase in anxiety, but she isn't requesting anything more than what she has for the pain at this time. She would like something to help manage her anxiety at this time.     Huddled with provider covering for PCP and it was agreed the patient could get something for today until her PCP can review her request when he is back in clinic. The patient states she is safe at this time and will call back into the clinic if she feels her anxiety is worsening in any way. The patient states that in the past either diazepam for anxiety, or trazodone for sleep both helped overall manage her anxiety.     Iona Grant RN  [facility]

## 2024-12-19 NOTE — TELEPHONE ENCOUNTER
Called patient to relay provider message. She verbalized understanding and this message thread will be routed to the PCP to review if it is appropriate for diazepam to be prescribed on a longer term basis.     Iona Grant RN  [facility]

## 2024-12-19 NOTE — TELEPHONE ENCOUNTER
Please let patient know I sent 3 pills of diazepam 2 mg to the pharmacy. She is at increased risk of respiratory depression when taken in conjunction with the percocet, she should take caution if taking both of these at the same time.

## 2024-12-30 ENCOUNTER — MYC MEDICAL ADVICE (OUTPATIENT)
Dept: FAMILY MEDICINE | Facility: CLINIC | Age: 49
End: 2024-12-30
Payer: COMMERCIAL

## 2024-12-30 DIAGNOSIS — Q87.89 PAINFUL LEG AND MOVING TOES SYNDROME: ICD-10-CM

## 2024-12-30 DIAGNOSIS — M79.606 PAINFUL LEG AND MOVING TOES SYNDROME: ICD-10-CM

## 2024-12-31 RX ORDER — DULOXETIN HYDROCHLORIDE 60 MG/1
60 CAPSULE, DELAYED RELEASE ORAL DAILY
Qty: 90 CAPSULE | Refills: 3 | Status: SHIPPED | OUTPATIENT
Start: 2024-12-31

## 2025-01-08 ENCOUNTER — MYC REFILL (OUTPATIENT)
Dept: FAMILY MEDICINE | Facility: CLINIC | Age: 50
End: 2025-01-08
Payer: COMMERCIAL

## 2025-01-08 DIAGNOSIS — G89.29 OTHER CHRONIC PAIN: ICD-10-CM

## 2025-01-09 RX ORDER — OXYCODONE AND ACETAMINOPHEN 5; 325 MG/1; MG/1
1 TABLET ORAL EVERY 6 HOURS PRN
Qty: 20 TABLET | Refills: 0 | Status: SHIPPED | OUTPATIENT
Start: 2025-01-09

## 2025-02-03 ENCOUNTER — MYC MEDICAL ADVICE (OUTPATIENT)
Dept: FAMILY MEDICINE | Facility: CLINIC | Age: 50
End: 2025-02-03
Payer: COMMERCIAL

## 2025-02-03 DIAGNOSIS — G89.29 OTHER CHRONIC PAIN: ICD-10-CM

## 2025-02-04 RX ORDER — OXYCODONE AND ACETAMINOPHEN 5; 325 MG/1; MG/1
1 TABLET ORAL EVERY 6 HOURS PRN
Qty: 20 TABLET | Refills: 0 | Status: SHIPPED | OUTPATIENT
Start: 2025-02-04

## 2025-02-19 ENCOUNTER — TELEPHONE (OUTPATIENT)
Dept: FAMILY MEDICINE | Facility: CLINIC | Age: 50
End: 2025-02-19
Payer: COMMERCIAL

## 2025-02-19 NOTE — TELEPHONE ENCOUNTER
Patient Quality Outreach    Patient is due for the following:   Hypertension -  BP check  Depression  -  PHQ-9 needed and DAP      Topic Date Due    Hepatitis B Vaccine (1 of 3 - 19+ 3-dose series) Never done    Pneumococcal Vaccine (1 of 1 - PCV) Never done    COVID-19 Vaccine (6 - 2024-25 season) 09/01/2024    Zoster (Shingles) Vaccine (1 of 2) 08/02/2024       Action(s) Taken:   Schedule a nurse only visit for BP check.     Type of outreach:    Sent cloudControl message.    Questions for provider review:    None           Alexandra Sood LPN        
admission

## 2025-03-03 ENCOUNTER — MYC REFILL (OUTPATIENT)
Dept: FAMILY MEDICINE | Facility: CLINIC | Age: 50
End: 2025-03-03
Payer: COMMERCIAL

## 2025-03-03 DIAGNOSIS — G89.29 OTHER CHRONIC PAIN: ICD-10-CM

## 2025-03-03 RX ORDER — OXYCODONE AND ACETAMINOPHEN 5; 325 MG/1; MG/1
1 TABLET ORAL EVERY 6 HOURS PRN
Qty: 20 TABLET | Refills: 0 | Status: SHIPPED | OUTPATIENT
Start: 2025-03-03

## 2025-04-15 ENCOUNTER — PATIENT OUTREACH (OUTPATIENT)
Dept: CARE COORDINATION | Facility: CLINIC | Age: 50
End: 2025-04-15
Payer: COMMERCIAL

## 2025-04-24 ENCOUNTER — MYC REFILL (OUTPATIENT)
Dept: FAMILY MEDICINE | Facility: CLINIC | Age: 50
End: 2025-04-24
Payer: COMMERCIAL

## 2025-04-24 DIAGNOSIS — G89.29 OTHER CHRONIC PAIN: ICD-10-CM

## 2025-04-24 RX ORDER — OXYCODONE AND ACETAMINOPHEN 5; 325 MG/1; MG/1
1 TABLET ORAL EVERY 6 HOURS PRN
Qty: 20 TABLET | Refills: 0 | Status: SHIPPED | OUTPATIENT
Start: 2025-04-24

## 2025-04-27 DIAGNOSIS — S13.4XXA WHIPLASH INJURY TO NECK, INITIAL ENCOUNTER: ICD-10-CM
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
## 2025-06-16 ENCOUNTER — TELEPHONE (OUTPATIENT)
Dept: FAMILY MEDICINE | Facility: CLINIC | Age: 50
End: 2025-06-16
Payer: COMMERCIAL

## 2025-06-16 DIAGNOSIS — G89.29 OTHER CHRONIC PAIN: ICD-10-CM

## 2025-06-16 RX ORDER — OXYCODONE AND ACETAMINOPHEN 5; 325 MG/1; MG/1
1 TABLET ORAL EVERY 6 HOURS PRN
Qty: 20 TABLET | Refills: 0 | Status: SHIPPED | OUTPATIENT
Start: 2025-06-16

## 2025-06-16 NOTE — TELEPHONE ENCOUNTER
Medication Question or Refill    Contacts       Contact Date/Time Type Contact Phone/Fax    06/16/2025 09:04 AM CDT Phone (Incoming) Angelia Stallings TRISTEN (Self) [phone] (M)        Patient requesting a refill prescription for the following medication.  Please send to the [facility] as below.    Patient has an appointment with PCP for 6/25/25.    Patient is out of medication.    What medication are you calling about (include dose and sig)?:      Disp Refills Start End DOMENICO   oxyCODONE-acetaminophen (PERCOCET) 5-325 MG tablet 20 tablet 0 5/20/2025 -- No   Sig - Route: Take 1 tablet by mouth every 6 hours as needed for pain. - Oral   Sent to pharmacy as: oxyCODONE-Acetaminophen 5-325 MG Oral Tablet (PERCOCET)       Preferred Pharmacy:   [facility] #[id number] - [place], [place] - [address] AT [facility] [address] [facility]  [address]  [place] [address]  Phone: [phone] Fax: [phone]        Controlled Substance Agreement on file:   CSA -- Patient Level:     [Media Unavailable] Controlled Substance Agreement - Opioid - Scan on 7/1/2024  9:25 AM   [Media Unavailable] Controlled Substance Agreement - Opioid - Scan on 8/16/2023  1:15 PM   [Media Unavailable] Controlled Substance Agreement - Opioid - Scan on 7/15/2022 12:20 PM   [Media Unavailable] Controlled Substance Agreement - Opioid - Scan on 6/7/2022  8:41 AM       Who prescribed the medication?: Calixto Leblanc    Do you need a refill? Yes    Could we [facility] or would you prefer to receive a phone call?:   Patient would prefer a phone call   Okay to leave a detailed message?: Yes at Cell number on file:    Telephone Information:   Mobile [phone]

## 2025-06-25 ENCOUNTER — OFFICE VISIT (OUTPATIENT)
Dept: FAMILY MEDICINE | Facility: CLINIC | Age: 50
End: 2025-06-25
Payer: COMMERCIAL

## 2025-06-25 VITALS
HEIGHT: 68 IN | RESPIRATION RATE: 16 BRPM | HEART RATE: 84 BPM | WEIGHT: 187 LBS | TEMPERATURE: 98.4 F | BODY MASS INDEX: 28.34 KG/M2 | OXYGEN SATURATION: 98 % | DIASTOLIC BLOOD PRESSURE: 88 MMHG | SYSTOLIC BLOOD PRESSURE: 138 MMHG

## 2025-06-25 DIAGNOSIS — G89.4 CHRONIC PAIN SYNDROME: ICD-10-CM

## 2025-06-25 DIAGNOSIS — F32.4 MAJOR DEPRESSION SINGLE EPISODE, IN PARTIAL REMISSION: ICD-10-CM

## 2025-06-25 DIAGNOSIS — Z86.79 HISTORY OF INTRACRANIAL ANEURYSM: ICD-10-CM

## 2025-06-25 DIAGNOSIS — M54.50 CHRONIC BILATERAL LOW BACK PAIN WITHOUT SCIATICA: ICD-10-CM

## 2025-06-25 DIAGNOSIS — M79.7 FIBROMYOSITIS: Primary | ICD-10-CM

## 2025-06-25 DIAGNOSIS — K44.9 HIATAL HERNIA: ICD-10-CM

## 2025-06-25 DIAGNOSIS — G89.29 CHRONIC BILATERAL LOW BACK PAIN WITHOUT SCIATICA: ICD-10-CM

## 2025-06-25 DIAGNOSIS — F41.9 ANXIETY: ICD-10-CM

## 2025-06-25 DIAGNOSIS — G62.9 POLYNEUROPATHY: ICD-10-CM

## 2025-06-25 PROCEDURE — 99214 OFFICE O/P EST MOD 30 MIN: CPT | Performed by: PHYSICIAN ASSISTANT

## 2025-06-25 PROCEDURE — G2211 COMPLEX E/M VISIT ADD ON: HCPCS | Performed by: PHYSICIAN ASSISTANT

## 2025-06-25 PROCEDURE — 3075F SYST BP GE 130 - 139MM HG: CPT | Performed by: PHYSICIAN ASSISTANT

## 2025-06-25 PROCEDURE — 3079F DIAST BP 80-89 MM HG: CPT | Performed by: PHYSICIAN ASSISTANT

## 2025-06-25 PROCEDURE — 96127 BRIEF EMOTIONAL/BEHAV ASSMT: CPT | Performed by: PHYSICIAN ASSISTANT

## 2025-06-25 RX ORDER — FLUCONAZOLE 150 MG/1
150 TABLET ORAL
COMMUNITY
Start: 2025-05-08 | End: 2025-06-25

## 2025-06-25 RX ORDER — CARISOPRODOL 250 MG/1
250 TABLET ORAL 4 TIMES DAILY PRN
Qty: 15 TABLET | Refills: 0 | Status: SHIPPED | OUTPATIENT
Start: 2025-06-25

## 2025-06-25 RX ORDER — PRENATAL VIT 91/IRON/FOLIC/DHA 28-975-200
COMBINATION PACKAGE (EA) ORAL AT BEDTIME
COMMUNITY
Start: 2025-05-07

## 2025-06-25 ASSESSMENT — PATIENT HEALTH QUESTIONNAIRE - PHQ9
10. IF YOU CHECKED OFF ANY PROBLEMS, HOW DIFFICULT HAVE THESE PROBLEMS MADE IT FOR YOU TO DO YOUR WORK, TAKE CARE OF THINGS AT HOME, OR GET ALONG WITH OTHER PEOPLE: SOMEWHAT DIFFICULT
SUM OF ALL RESPONSES TO PHQ QUESTIONS 1-9: 5
SUM OF ALL RESPONSES TO PHQ QUESTIONS 1-9: 5

## 2025-06-25 ASSESSMENT — ENCOUNTER SYMPTOMS: ABDOMINAL PAIN: 1

## 2025-06-25 NOTE — PROGRESS NOTES
Assessment & Plan     Chronic pain syndrome  Fibromyositis  Ongoing pain in her back, hips, upper shoulders and lower legs.  No new symptoms.  Continues to have flares a few times a month.  She is currently taking tizanidine and Percocet as needed.  She is getting 20 tablets a month of Percocet.  She states that the tizanidine is helpful but she is needing to wake up in the middle the night to take another 1.  Been on tizanidine for some time.  Wondering if she is building up a tolerance.  We will trial switching her to Soma 250 mg up to 4 times a day as needed to see if this helps with her chronic pain.  We did discuss side effects of the Soma and she is not to take this in conjunction with the tizanidine.  Also would like her to take this  2 to 3 hours apart from Percocet.  Will continue oxycodone 20 tablets a month.  PDMP reviewed no red flags.  UDS up to date.   - carisoprodol (SOMA) 250 MG tablet; Take 1 tablet (250 mg) by mouth 4 times daily as needed for muscle spasms.    Polyneuropathy  Was previously on Cymbalta.  She was having no improvement and more fatigued from the Cymbalta so discontinued this a few months ago.    Major depression single episode, in partial remission  Anxiety  Stable at this time.    Hiatal hernia  left lower low anterior and lateral sided pain and upset stomach.  Approximately 2 weeks ago developed left lower rib/side pain underneath her left breast.  States that it was constant in the beginning but now it is more of a dull ache.  She has not done any lifting twisting or bending to injury the area.  She is unable to reproduce the pain with pushing on it.  She is also having some increased gas and discomfort in the epigastric region but this has seemed to improve.  She does have a history of a fundoplication due to a hiatal hernia in 2018.  In 2020 she was having increased cough underwent a esophagram which showed partial dehiscence of the fundoplication in 2020.  She was wondering  "if this hiatal hernia is contributing to some of her symptoms.  He is not having any chest pain or shortness of breath.  I think it would be reasonable to do another esophagram to see if she has a worsening of her hiatal hernia that may be contributing to her symptoms.  She is to monitor her symptoms in the meantime.  Could be muscular in nature so hoping the Soma also helps with this discomfort.  She is to let me know if symptoms worsen or do not improve  - XR Esophagram; Future    Overweight  Difficulty with losing weight over the last few months.  She also has chronic pain syndrome and if she works out too much she has a flareup of her chronic pain.  She is watching her diet.  She feels she is at a plateau as far as weight.  Her BMI is 28.  She is interested in losing another 10 to 15 pounds.  We discussed medications including naltrexone, Wellbutrin, metformin, and and GLP-1's.  She does not do well on Wellbutrin.  She would be interested in trialing naltrexone or metformin.  We did discuss side effects of GLP-1 medications in detail including nausea, vomiting, constipation.  Also discussed that this is not always covered under insurance and could send a prescription to [facility].  She will do some research on medications and to get back to me on if she would like to trial 1 of these medications    Total time spent was 40 minutes including reviewing records prior to arrival, consultation, placing orders, education, and reviewing the plan of care on the date of service.    The longitudinal plan of care for the diagnosis(es)/condition(s) as documented were addressed during this visit. Due to the added complexity in care, I will continue to support Angelia in the subsequent management and with ongoing continuity of care.      BMI  Estimated body mass index is 28.43 kg/m  as calculated from the following:    Height as of this encounter: 1.727 m (5' 8\").    Weight as of this encounter: 84.8 kg (187 lb). "   Weight management plan: Discussed healthy diet and exercise guidelines        Subjective   Angelia is a 49 year old, presenting for the following health issues:  Abdominal Pain (Patient reports she has been having issues on her left side under her breast area. Unsure if its her muscle or something else x 2 months. Also feels bloated. Did have hiatal hernia repair about 5 years ago. Concerned about possible changes from that)        6/25/2025    11:34 AM   Additional Questions   Roomed by Sinai Villa   Accompanied by none     Angelia is a 55-year-old female that presents to the clinic for evaluation on left lower low anterior and lateral sided pain and upset stomach.  Approximately 2 weeks ago developed left lower rib/side pain underneath her left breast.  States that it was constant in the beginning but now it is more of a dull ache.  She has not done any lifting twisting or bending to injury the area.  She is unable to reproduce the pain with pushing on it.  She is also having some increased gas and discomfort in the epigastric region but this has seemed to improve.  She does have a history of a fundoplication due to a hiatal hernia in 2018.  In 2020 she was having increased cough underwent a esophagram which showed partial dehiscence of the fundoplication in 2020.  She was wondering if this hiatal hernia is contributing to some of her symptoms.  He is not having any chest pain or shortness of breath.    She also has been having worsening muscle tightness up in her shoulders and neck.  She does have chronic pain in her back, shoulders, hips, and lower legs.  She is currently taking tizanidine and as needed Percocet.  No new injury.    She also discussed with me that she is having difficulty with weight loss.  She is trying to stay active but due to her chronic pain syndrome if she  exercises too much she has a flareup in her chronic pain.  She is trying to watch her diet.  She is interested in trialing a  "medication for weight loss.    Abdominal Pain     History of Present Illness       Reason for visit:  Pain  Symptom onset:  3-4 weeks ago  Symptoms include:  Muscle pain, pain underneath left bteast  Symptom intensity:  Mild  Symptom progression:  Staying the same  Had these symptoms before:  Yes  Has tried/received treatment for these symptoms:  Yes  Previous treatment was successful:  No  What makes it worse:  No  What makes it better:  No   She is taking medications regularly.            Objective    /88 (BP Location: Right arm, Patient Position: Sitting, Cuff Size: Adult Regular)   Pulse 84   Temp 98.4  F (36.9  C) (Oral)   Resp 16   Ht 1.727 m (5' 8\")   Wt 84.8 kg (187 lb)   LMP  (LMP Unknown)   SpO2 98%   Breastfeeding No   BMI 28.43 kg/m    Body mass index is 28.43 kg/m .  Physical Exam  Vitals and nursing note reviewed.   Constitutional:       General: She is not in acute distress.     Appearance: Normal appearance. She is not ill-appearing, toxic-appearing or diaphoretic.   HENT:      Head: Normocephalic and atraumatic.   Eyes:      Conjunctiva/sclera: Conjunctivae normal.   Cardiovascular:      Rate and Rhythm: Normal rate and regular rhythm.      Heart sounds: No murmur heard.     No friction rub. No gallop.   Pulmonary:      Effort: Pulmonary effort is normal.      Breath sounds: No wheezing, rhonchi or rales.   Neurological:      Mental Status: She is alert.   Psychiatric:         Mood and Affect: Mood normal.         Behavior: Behavior normal.         Thought Content: Thought content normal.         Judgment: Judgment normal.                Signed Electronically by: Calixto Leblanc PA-C    "

## 2025-07-07 ENCOUNTER — OFFICE VISIT (OUTPATIENT)
Dept: FAMILY MEDICINE | Facility: CLINIC | Age: 50
End: 2025-07-07
Payer: COMMERCIAL

## 2025-07-07 VITALS
TEMPERATURE: 97.5 F | HEART RATE: 106 BPM | RESPIRATION RATE: 12 BRPM | BODY MASS INDEX: 28.14 KG/M2 | OXYGEN SATURATION: 97 % | HEIGHT: 68 IN | WEIGHT: 185.7 LBS | DIASTOLIC BLOOD PRESSURE: 88 MMHG | SYSTOLIC BLOOD PRESSURE: 126 MMHG

## 2025-07-07 DIAGNOSIS — R10.32 ABDOMINAL PAIN, LEFT LOWER QUADRANT: Primary | ICD-10-CM

## 2025-07-07 DIAGNOSIS — F11.20 CONTINUOUS OPIOID DEPENDENCE (H): ICD-10-CM

## 2025-07-07 PROCEDURE — 3079F DIAST BP 80-89 MM HG: CPT | Performed by: FAMILY MEDICINE

## 2025-07-07 PROCEDURE — 99213 OFFICE O/P EST LOW 20 MIN: CPT | Performed by: FAMILY MEDICINE

## 2025-07-07 PROCEDURE — 3074F SYST BP LT 130 MM HG: CPT | Performed by: FAMILY MEDICINE

## 2025-07-07 PROCEDURE — G2211 COMPLEX E/M VISIT ADD ON: HCPCS | Performed by: FAMILY MEDICINE

## 2025-07-07 RX ORDER — OMEGA-3 FATTY ACIDS/FISH OIL 300-1000MG
200 CAPSULE ORAL EVERY 4 HOURS PRN
COMMUNITY

## 2025-07-07 RX ORDER — ACETAMINOPHEN 325 MG/1
325-650 TABLET ORAL EVERY 6 HOURS PRN
COMMUNITY

## 2025-07-07 ASSESSMENT — ENCOUNTER SYMPTOMS
FATIGUE: 1
VOMITING: 1
NAUSEA: 1
FEVER: 1

## 2025-07-07 ASSESSMENT — ANXIETY QUESTIONNAIRES
GAD7 TOTAL SCORE: 4
IF YOU CHECKED OFF ANY PROBLEMS ON THIS QUESTIONNAIRE, HOW DIFFICULT HAVE THESE PROBLEMS MADE IT FOR YOU TO DO YOUR WORK, TAKE CARE OF THINGS AT HOME, OR GET ALONG WITH OTHER PEOPLE: SOMEWHAT DIFFICULT
7. FEELING AFRAID AS IF SOMETHING AWFUL MIGHT HAPPEN: SEVERAL DAYS
8. IF YOU CHECKED OFF ANY PROBLEMS, HOW DIFFICULT HAVE THESE MADE IT FOR YOU TO DO YOUR WORK, TAKE CARE OF THINGS AT HOME, OR GET ALONG WITH OTHER PEOPLE?: SOMEWHAT DIFFICULT
GAD7 TOTAL SCORE: 4
4. TROUBLE RELAXING: NOT AT ALL
1. FEELING NERVOUS, ANXIOUS, OR ON EDGE: SEVERAL DAYS
5. BEING SO RESTLESS THAT IT IS HARD TO SIT STILL: NOT AT ALL
2. NOT BEING ABLE TO STOP OR CONTROL WORRYING: SEVERAL DAYS
GAD7 TOTAL SCORE: 4
7. FEELING AFRAID AS IF SOMETHING AWFUL MIGHT HAPPEN: SEVERAL DAYS
3. WORRYING TOO MUCH ABOUT DIFFERENT THINGS: SEVERAL DAYS
6. BECOMING EASILY ANNOYED OR IRRITABLE: NOT AT ALL

## 2025-07-07 NOTE — LETTER
July 7, 2025      Angelia Stallings  [address] [facility] [address]        To Whom It May Concern:    Angelia TRISTEN Stallings  was seen on 7.7.25.  Please excuse her  until 7.9.25.        Sincerely,        Anton Bañuelos MD    Electronically signed

## 2025-07-07 NOTE — PROGRESS NOTES
Assessment & Plan     Abdominal pain, left lower quadrant  We have scheduled stat CT, which will be done tomorrow afternoon.  Advised bland diet until we have further information from the CT scan.  - CT Abdomen Pelvis w Contrast; Future    Continuous opioid dependence (H)  Advised Miralax daily to help with opioid induced constipation.      The longitudinal plan of care for the diagnosis(es)/condition(s) as documented were addressed during this visit. Due to the added complexity in care, I will continue to support Angelia in the subsequent management and with ongoing continuity of care.          Subjective   Angelia is a 49 year old, presenting for the following health issues:  Vomiting (Saturday. ), Abdominal Pain (Patient is here for abdominal pain that started on Saturday. Not fasting. ), Nausea, Fatigue, Fever (99.9. ), and Chills          7/7/2025     2:36 PM   Additional Questions   Roomed by yovany owen, reebcca   Accompanied by spouse       Vomiting   Associated symptoms include a fever.   Nausea  Associated symptoms include fatigue, a fever, nausea and vomiting.   Fatigue  Associated symptoms include fatigue, a fever, nausea and vomiting.   Fever  Associated symptoms include fatigue, a fever, nausea and vomiting.         Started 2 days ago, waking up feeling a fullness and bloating, took a laxative, had a bowel movement, but then nausea started, could not have vomiting due to nissen fundoplication surgery (no other abdominal surgeries).  She was able to eat some soup last night.  Yesterday her lower abdomen became tender and then developed chills with fever like symptoms. Woke up this morning with sweating.  She had similar symptoms a couple weeks ago.  Normal colonoscopy May 2023.    Last bowl movement was this morning. Her bowel movements are more loose.   Had nausea this morning.            Review of Systems  + Chills        Objective    /88 (BP Location: Left arm, Patient Position: Sitting,  "Cuff Size: Adult Regular)   Pulse 106   Temp 97.5  F (36.4  C) (Temporal)   Resp 12   Ht 1.727 m (5' 8\")   Wt 84.2 kg (185 lb 11.2 oz)   LMP  (LMP Unknown)   SpO2 97%   BMI 28.24 kg/m    Body mass index is 28.24 kg/m .  Physical Exam   GENERAL: alert and no distress  ABDOMEN: tenderness suprapubic and LLQ  PSYCH: mentation appears normal, affect normal/bright            Signed Electronically by: Anton Bañuelos MD    Answers submitted by the patient for this visit:  Patient Health Questionnaire (G7) (Submitted on 7/7/2025)  JONES 7 TOTAL SCORE: 4    "

## 2025-07-08 ENCOUNTER — HOSPITAL ENCOUNTER (OUTPATIENT)
Dept: CT IMAGING | Facility: HOSPITAL | Age: 50
Discharge: HOME | End: 2025-07-08
Attending: FAMILY MEDICINE
Payer: COMMERCIAL

## 2025-07-08 ENCOUNTER — ANESTHESIA (OUTPATIENT)
Dept: SURGERY | Facility: CLINIC | Age: 50
End: 2025-07-08
Payer: COMMERCIAL

## 2025-07-08 ENCOUNTER — HOSPITAL ENCOUNTER (EMERGENCY)
Facility: CLINIC | Age: 50
Discharge: HOME | End: 2025-07-08
Payer: COMMERCIAL

## 2025-07-08 ENCOUNTER — ANESTHESIA EVENT (OUTPATIENT)
Dept: SURGERY | Facility: CLINIC | Age: 50
End: 2025-07-08
Payer: COMMERCIAL

## 2025-07-08 VITALS
HEART RATE: 74 BPM | SYSTOLIC BLOOD PRESSURE: 139 MMHG | OXYGEN SATURATION: 95 % | HEIGHT: 68 IN | TEMPERATURE: 98 F | RESPIRATION RATE: 16 BRPM | BODY MASS INDEX: 27.43 KG/M2 | WEIGHT: 181 LBS | DIASTOLIC BLOOD PRESSURE: 75 MMHG

## 2025-07-08 DIAGNOSIS — K35.30 ACUTE APPENDICITIS WITH LOCALIZED PERITONITIS, WITHOUT PERFORATION, ABSCESS, OR GANGRENE: ICD-10-CM

## 2025-07-08 DIAGNOSIS — K35.80 ACUTE APPENDICITIS, UNSPECIFIED ACUTE APPENDICITIS TYPE: Primary | ICD-10-CM

## 2025-07-08 DIAGNOSIS — R10.32 ABDOMINAL PAIN, LEFT LOWER QUADRANT: ICD-10-CM

## 2025-07-08 LAB
ALBUMIN SERPL BCG-MCNC: 4.4 G/DL (ref 3.5–5.2)
ALBUMIN UR-MCNC: 30 MG/DL
ALP SERPL-CCNC: 62 U/L (ref 40–150)
ALT SERPL W P-5'-P-CCNC: 22 U/L (ref 0–50)
ANION GAP SERPL CALCULATED.3IONS-SCNC: 13 MMOL/L (ref 7–15)
APPEARANCE UR: CLEAR
AST SERPL W P-5'-P-CCNC: 25 U/L (ref 0–45)
BACTERIA #/AREA URNS HPF: ABNORMAL /HPF
BASOPHILS # BLD AUTO: 0 10E3/UL (ref 0–0.2)
BASOPHILS NFR BLD AUTO: 1 %
BILIRUB SERPL-MCNC: 0.6 MG/DL
BILIRUB UR QL STRIP: NEGATIVE
BUN SERPL-MCNC: 10 MG/DL (ref 6–20)
CALCIUM SERPL-MCNC: 9.3 MG/DL (ref 8.8–10.4)
CHLORIDE SERPL-SCNC: 97 MMOL/L (ref 98–107)
COLOR UR AUTO: ABNORMAL
CREAT SERPL-MCNC: 0.88 MG/DL (ref 0.51–0.95)
EGFRCR SERPLBLD CKD-EPI 2021: 80 ML/MIN/1.73M2
EOSINOPHIL # BLD AUTO: 0 10E3/UL (ref 0–0.7)
EOSINOPHIL NFR BLD AUTO: 0 %
ERYTHROCYTE [DISTWIDTH] IN BLOOD BY AUTOMATED COUNT: 13.3 % (ref 10–15)
GLUCOSE SERPL-MCNC: 123 MG/DL (ref 70–99)
GLUCOSE UR STRIP-MCNC: NEGATIVE MG/DL
HCG UR QL: NEGATIVE
HCO3 SERPL-SCNC: 26 MMOL/L (ref 22–29)
HCT VFR BLD AUTO: 38.2 % (ref 35–47)
HGB BLD-MCNC: 12.4 G/DL (ref 11.7–15.7)
HGB UR QL STRIP: ABNORMAL
IMM GRANULOCYTES # BLD: 0 10E3/UL
IMM GRANULOCYTES NFR BLD: 0 %
KETONES UR STRIP-MCNC: 5 MG/DL
LEUKOCYTE ESTERASE UR QL STRIP: NEGATIVE
LYMPHOCYTES # BLD AUTO: 1 10E3/UL (ref 0.8–5.3)
LYMPHOCYTES NFR BLD AUTO: 21 %
MCH RBC QN AUTO: 26.6 PG (ref 26.5–33)
MCHC RBC AUTO-ENTMCNC: 32.5 G/DL (ref 31.5–36.5)
MCV RBC AUTO: 82 FL (ref 78–100)
MONOCYTES # BLD AUTO: 0.4 10E3/UL (ref 0–1.3)
MONOCYTES NFR BLD AUTO: 9 %
NEUTROPHILS # BLD AUTO: 3.4 10E3/UL (ref 1.6–8.3)
NEUTROPHILS NFR BLD AUTO: 69 %
NITRATE UR QL: NEGATIVE
NRBC # BLD AUTO: 0 10E3/UL
NRBC BLD AUTO-RTO: 0 /100
PH UR STRIP: 6 [PH] (ref 5–7)
PLATELET # BLD AUTO: 237 10E3/UL (ref 150–450)
POTASSIUM SERPL-SCNC: 3.5 MMOL/L (ref 3.4–5.3)
PROT SERPL-MCNC: 7.6 G/DL (ref 6.4–8.3)
RBC # BLD AUTO: 4.66 10E6/UL (ref 3.8–5.2)
RBC URINE: 0 /HPF
SODIUM SERPL-SCNC: 136 MMOL/L (ref 135–145)
SP GR UR STRIP: <1.005 (ref 1–1.03)
SQUAMOUS EPITHELIAL: 8 /HPF
UROBILINOGEN UR STRIP-MCNC: 2 MG/DL
WBC # BLD AUTO: 4.9 10E3/UL (ref 4–11)
WBC URINE: 0 /HPF

## 2025-07-08 PROCEDURE — 36415 COLL VENOUS BLD VENIPUNCTURE: CPT

## 2025-07-08 PROCEDURE — 258N000003 HC RX IP 258 OP 636: Performed by: ANESTHESIOLOGY

## 2025-07-08 PROCEDURE — 250N000011 HC RX IP 250 OP 636: Performed by: NURSE ANESTHETIST, CERTIFIED REGISTERED

## 2025-07-08 PROCEDURE — 88304 TISSUE EXAM BY PATHOLOGIST: CPT | Mod: 26 | Performed by: PATHOLOGY

## 2025-07-08 PROCEDURE — 81001 URINALYSIS AUTO W/SCOPE: CPT

## 2025-07-08 PROCEDURE — 85025 COMPLETE CBC W/AUTO DIFF WBC: CPT

## 2025-07-08 PROCEDURE — 81025 URINE PREGNANCY TEST: CPT

## 2025-07-08 PROCEDURE — 250N000011 HC RX IP 250 OP 636

## 2025-07-08 PROCEDURE — 88304 TISSUE EXAM BY PATHOLOGIST: CPT | Mod: TC | Performed by: SURGERY

## 2025-07-08 PROCEDURE — 250N000009 HC RX 250: Performed by: NURSE ANESTHETIST, CERTIFIED REGISTERED

## 2025-07-08 PROCEDURE — 96365 THER/PROPH/DIAG IV INF INIT: CPT

## 2025-07-08 PROCEDURE — 250N000011 HC RX IP 250 OP 636: Performed by: ANESTHESIOLOGY

## 2025-07-08 PROCEDURE — 258N000003 HC RX IP 258 OP 636: Performed by: NURSE ANESTHETIST, CERTIFIED REGISTERED

## 2025-07-08 PROCEDURE — 360N000076 HC SURGERY LEVEL 3, PER MIN: Performed by: SURGERY

## 2025-07-08 PROCEDURE — 96375 TX/PRO/DX INJ NEW DRUG ADDON: CPT

## 2025-07-08 PROCEDURE — 710N000010 HC RECOVERY PHASE 1, LEVEL 2, PER MIN: Performed by: SURGERY

## 2025-07-08 PROCEDURE — 250N000011 HC RX IP 250 OP 636: Performed by: SURGERY

## 2025-07-08 PROCEDURE — 99285 EMERGENCY DEPT VISIT HI MDM: CPT | Mod: 25

## 2025-07-08 PROCEDURE — 272N000001 HC OR GENERAL SUPPLY STERILE: Performed by: SURGERY

## 2025-07-08 PROCEDURE — 44970 LAPAROSCOPY APPENDECTOMY: CPT | Performed by: SURGERY

## 2025-07-08 PROCEDURE — 250N000009 HC RX 250

## 2025-07-08 PROCEDURE — 74177 CT ABD & PELVIS W/CONTRAST: CPT

## 2025-07-08 PROCEDURE — 99204 OFFICE O/P NEW MOD 45 MIN: CPT | Mod: FS | Performed by: SURGERY

## 2025-07-08 PROCEDURE — 250N000013 HC RX MED GY IP 250 OP 250 PS 637: Performed by: ANESTHESIOLOGY

## 2025-07-08 PROCEDURE — 370N000017 HC ANESTHESIA TECHNICAL FEE, PER MIN: Performed by: SURGERY

## 2025-07-08 PROCEDURE — 99222 1ST HOSP IP/OBS MODERATE 55: CPT | Mod: 57

## 2025-07-08 PROCEDURE — 96376 TX/PRO/DX INJ SAME DRUG ADON: CPT

## 2025-07-08 PROCEDURE — 82040 ASSAY OF SERUM ALBUMIN: CPT

## 2025-07-08 PROCEDURE — 96361 HYDRATE IV INFUSION ADD-ON: CPT

## 2025-07-08 PROCEDURE — 250N000011 HC RX IP 250 OP 636: Performed by: FAMILY MEDICINE

## 2025-07-08 PROCEDURE — 250N000009 HC RX 250: Performed by: ANESTHESIOLOGY

## 2025-07-08 PROCEDURE — 999N000141 HC STATISTIC PRE-PROCEDURE NURSING ASSESSMENT: Performed by: SURGERY

## 2025-07-08 PROCEDURE — 710N000012 HC RECOVERY PHASE 2, PER MINUTE: Performed by: SURGERY

## 2025-07-08 PROCEDURE — 96367 TX/PROPH/DG ADDL SEQ IV INF: CPT

## 2025-07-08 PROCEDURE — 258N000003 HC RX IP 258 OP 636

## 2025-07-08 RX ORDER — DEXAMETHASONE SODIUM PHOSPHATE 10 MG/ML
4 INJECTION, SOLUTION INTRAMUSCULAR; INTRAVENOUS
Status: DISCONTINUED | OUTPATIENT
Start: 2025-07-08 | End: 2025-07-09 | Stop reason: HOSPADM

## 2025-07-08 RX ORDER — CEFAZOLIN SODIUM/WATER 2 G/20 ML
2 SYRINGE (ML) INTRAVENOUS
Status: COMPLETED | OUTPATIENT
Start: 2025-07-08 | End: 2025-07-08

## 2025-07-08 RX ORDER — ACETAMINOPHEN 325 MG/1
650 TABLET ORAL EVERY 4 HOURS PRN
Status: DISCONTINUED | OUTPATIENT
Start: 2025-07-08 | End: 2025-07-09 | Stop reason: HOSPADM

## 2025-07-08 RX ORDER — SODIUM CHLORIDE, SODIUM LACTATE, POTASSIUM CHLORIDE, CALCIUM CHLORIDE 600; 310; 30; 20 MG/100ML; MG/100ML; MG/100ML; MG/100ML
INJECTION, SOLUTION INTRAVENOUS CONTINUOUS
Status: DISCONTINUED | OUTPATIENT
Start: 2025-07-08 | End: 2025-07-08 | Stop reason: HOSPADM

## 2025-07-08 RX ORDER — FENTANYL CITRATE 50 UG/ML
25 INJECTION, SOLUTION INTRAMUSCULAR; INTRAVENOUS EVERY 5 MIN PRN
Status: DISCONTINUED | OUTPATIENT
Start: 2025-07-08 | End: 2025-07-08 | Stop reason: HOSPADM

## 2025-07-08 RX ORDER — CLINDAMYCIN PHOSPHATE 900 MG/50ML
900 INJECTION, SOLUTION INTRAVENOUS
Status: DISCONTINUED | OUTPATIENT
Start: 2025-07-08 | End: 2025-07-08 | Stop reason: ALTCHOICE

## 2025-07-08 RX ORDER — NALOXONE HYDROCHLORIDE 0.4 MG/ML
0.1 INJECTION, SOLUTION INTRAMUSCULAR; INTRAVENOUS; SUBCUTANEOUS
Status: DISCONTINUED | OUTPATIENT
Start: 2025-07-08 | End: 2025-07-08 | Stop reason: HOSPADM

## 2025-07-08 RX ORDER — MEPERIDINE HYDROCHLORIDE 50 MG/ML
12.5 INJECTION INTRAMUSCULAR; INTRAVENOUS; SUBCUTANEOUS EVERY 5 MIN PRN
Status: DISCONTINUED | OUTPATIENT
Start: 2025-07-08 | End: 2025-07-08 | Stop reason: HOSPADM

## 2025-07-08 RX ORDER — OXYCODONE HYDROCHLORIDE 5 MG/1
5 TABLET ORAL
Status: COMPLETED | OUTPATIENT
Start: 2025-07-08 | End: 2025-07-08

## 2025-07-08 RX ORDER — KETOROLAC TROMETHAMINE 30 MG/ML
INJECTION, SOLUTION INTRAMUSCULAR; INTRAVENOUS PRN
Status: DISCONTINUED | OUTPATIENT
Start: 2025-07-08 | End: 2025-07-08

## 2025-07-08 RX ORDER — METRONIDAZOLE 500 MG/100ML
500 INJECTION, SOLUTION INTRAVENOUS ONCE
Status: COMPLETED | OUTPATIENT
Start: 2025-07-08 | End: 2025-07-08

## 2025-07-08 RX ORDER — LIDOCAINE 40 MG/G
CREAM TOPICAL
Status: DISCONTINUED | OUTPATIENT
Start: 2025-07-08 | End: 2025-07-08 | Stop reason: HOSPADM

## 2025-07-08 RX ORDER — FENTANYL CITRATE 50 UG/ML
25 INJECTION, SOLUTION INTRAMUSCULAR; INTRAVENOUS
Status: DISCONTINUED | OUTPATIENT
Start: 2025-07-08 | End: 2025-07-09 | Stop reason: HOSPADM

## 2025-07-08 RX ORDER — ONDANSETRON 2 MG/ML
4 INJECTION INTRAMUSCULAR; INTRAVENOUS EVERY 30 MIN PRN
Status: DISCONTINUED | OUTPATIENT
Start: 2025-07-08 | End: 2025-07-09 | Stop reason: HOSPADM

## 2025-07-08 RX ORDER — ACETAMINOPHEN 325 MG/1
975 TABLET ORAL ONCE
Status: COMPLETED | OUTPATIENT
Start: 2025-07-08 | End: 2025-07-08

## 2025-07-08 RX ORDER — OXYCODONE HYDROCHLORIDE 5 MG/1
5 TABLET ORAL EVERY 4 HOURS PRN
Refills: 0 | Status: DISCONTINUED | OUTPATIENT
Start: 2025-07-08 | End: 2025-07-09 | Stop reason: HOSPADM

## 2025-07-08 RX ORDER — ONDANSETRON 4 MG/1
4 TABLET, ORALLY DISINTEGRATING ORAL EVERY 30 MIN PRN
Status: DISCONTINUED | OUTPATIENT
Start: 2025-07-08 | End: 2025-07-09 | Stop reason: HOSPADM

## 2025-07-08 RX ORDER — MORPHINE SULFATE 4 MG/ML
4 INJECTION, SOLUTION INTRAMUSCULAR; INTRAVENOUS
Refills: 0 | Status: COMPLETED | OUTPATIENT
Start: 2025-07-08 | End: 2025-07-08

## 2025-07-08 RX ORDER — HYDROCODONE BITARTRATE AND ACETAMINOPHEN 5; 325 MG/1; MG/1
1 TABLET ORAL EVERY 6 HOURS PRN
Qty: 15 TABLET | Refills: 0 | Status: SHIPPED | OUTPATIENT
Start: 2025-07-08 | End: 2025-07-11

## 2025-07-08 RX ORDER — DEXAMETHASONE SODIUM PHOSPHATE 10 MG/ML
INJECTION, SOLUTION INTRAMUSCULAR; INTRAVENOUS PRN
Status: DISCONTINUED | OUTPATIENT
Start: 2025-07-08 | End: 2025-07-08

## 2025-07-08 RX ORDER — ACETAMINOPHEN 650 MG/1
650 SUPPOSITORY RECTAL EVERY 4 HOURS PRN
Status: DISCONTINUED | OUTPATIENT
Start: 2025-07-08 | End: 2025-07-09 | Stop reason: HOSPADM

## 2025-07-08 RX ORDER — BUPIVACAINE HYDROCHLORIDE 2.5 MG/ML
INJECTION, SOLUTION EPIDURAL; INFILTRATION; INTRACAUDAL; PERINEURAL PRN
Status: DISCONTINUED | OUTPATIENT
Start: 2025-07-08 | End: 2025-07-08 | Stop reason: HOSPADM

## 2025-07-08 RX ORDER — HYDROMORPHONE HCL IN WATER/PF 6 MG/30 ML
0.2 PATIENT CONTROLLED ANALGESIA SYRINGE INTRAVENOUS EVERY 5 MIN PRN
Status: DISCONTINUED | OUTPATIENT
Start: 2025-07-08 | End: 2025-07-08 | Stop reason: HOSPADM

## 2025-07-08 RX ORDER — NALOXONE HYDROCHLORIDE 0.4 MG/ML
0.1 INJECTION, SOLUTION INTRAMUSCULAR; INTRAVENOUS; SUBCUTANEOUS
Status: DISCONTINUED | OUTPATIENT
Start: 2025-07-08 | End: 2025-07-09 | Stop reason: HOSPADM

## 2025-07-08 RX ORDER — CEFAZOLIN SODIUM/WATER 2 G/20 ML
2 SYRINGE (ML) INTRAVENOUS SEE ADMIN INSTRUCTIONS
Status: DISCONTINUED | OUTPATIENT
Start: 2025-07-08 | End: 2025-07-08 | Stop reason: HOSPADM

## 2025-07-08 RX ORDER — ONDANSETRON 2 MG/ML
4 INJECTION INTRAMUSCULAR; INTRAVENOUS EVERY 30 MIN PRN
Status: DISCONTINUED | OUTPATIENT
Start: 2025-07-08 | End: 2025-07-08 | Stop reason: HOSPADM

## 2025-07-08 RX ORDER — DOCUSATE SODIUM 100 MG/1
100 CAPSULE, LIQUID FILLED ORAL 2 TIMES DAILY
Qty: 30 CAPSULE | Refills: 0 | Status: SHIPPED | OUTPATIENT
Start: 2025-07-08

## 2025-07-08 RX ORDER — IOPAMIDOL 755 MG/ML
90 INJECTION, SOLUTION INTRAVASCULAR ONCE
Status: COMPLETED | OUTPATIENT
Start: 2025-07-08 | End: 2025-07-08

## 2025-07-08 RX ORDER — HYDROCODONE BITARTRATE AND ACETAMINOPHEN 5; 325 MG/1; MG/1
1 TABLET ORAL
Status: DISCONTINUED | OUTPATIENT
Start: 2025-07-08 | End: 2025-07-09 | Stop reason: HOSPADM

## 2025-07-08 RX ORDER — BUPIVACAINE HYDROCHLORIDE 2.5 MG/ML
INJECTION, SOLUTION INFILTRATION; PERINEURAL
Status: DISCONTINUED
Start: 2025-07-08 | End: 2025-07-08 | Stop reason: HOSPADM

## 2025-07-08 RX ORDER — PROPOFOL 10 MG/ML
INJECTION, EMULSION INTRAVENOUS CONTINUOUS PRN
Status: DISCONTINUED | OUTPATIENT
Start: 2025-07-08 | End: 2025-07-08

## 2025-07-08 RX ORDER — DEXAMETHASONE SODIUM PHOSPHATE 4 MG/ML
4 INJECTION, SOLUTION INTRA-ARTICULAR; INTRALESIONAL; INTRAMUSCULAR; INTRAVENOUS; SOFT TISSUE
Status: DISCONTINUED | OUTPATIENT
Start: 2025-07-08 | End: 2025-07-08 | Stop reason: HOSPADM

## 2025-07-08 RX ORDER — PROPOFOL 10 MG/ML
INJECTION, EMULSION INTRAVENOUS PRN
Status: DISCONTINUED | OUTPATIENT
Start: 2025-07-08 | End: 2025-07-08

## 2025-07-08 RX ORDER — FENTANYL CITRATE 50 UG/ML
INJECTION, SOLUTION INTRAMUSCULAR; INTRAVENOUS PRN
Status: DISCONTINUED | OUTPATIENT
Start: 2025-07-08 | End: 2025-07-08

## 2025-07-08 RX ORDER — HYDROMORPHONE HCL IN WATER/PF 6 MG/30 ML
0.4 PATIENT CONTROLLED ANALGESIA SYRINGE INTRAVENOUS EVERY 5 MIN PRN
Status: DISCONTINUED | OUTPATIENT
Start: 2025-07-08 | End: 2025-07-08 | Stop reason: HOSPADM

## 2025-07-08 RX ORDER — SCOPOLAMINE 1 MG/3D
1 PATCH, EXTENDED RELEASE TRANSDERMAL ONCE
Status: DISCONTINUED | OUTPATIENT
Start: 2025-07-08 | End: 2025-07-09 | Stop reason: HOSPADM

## 2025-07-08 RX ORDER — OXYCODONE HYDROCHLORIDE 5 MG/1
10 TABLET ORAL
Status: DISCONTINUED | OUTPATIENT
Start: 2025-07-08 | End: 2025-07-09 | Stop reason: HOSPADM

## 2025-07-08 RX ORDER — CLINDAMYCIN PHOSPHATE 900 MG/50ML
900 INJECTION, SOLUTION INTRAVENOUS SEE ADMIN INSTRUCTIONS
Status: DISCONTINUED | OUTPATIENT
Start: 2025-07-08 | End: 2025-07-08 | Stop reason: ALTCHOICE

## 2025-07-08 RX ORDER — LIDOCAINE HYDROCHLORIDE 10 MG/ML
INJECTION, SOLUTION INFILTRATION; PERINEURAL PRN
Status: DISCONTINUED | OUTPATIENT
Start: 2025-07-08 | End: 2025-07-08

## 2025-07-08 RX ORDER — CEFOTETAN DISODIUM 2 G/20ML
2 INJECTION, POWDER, FOR SOLUTION INTRAMUSCULAR; INTRAVENOUS ONCE
Status: COMPLETED | OUTPATIENT
Start: 2025-07-08 | End: 2025-07-08

## 2025-07-08 RX ORDER — ONDANSETRON 2 MG/ML
INJECTION INTRAMUSCULAR; INTRAVENOUS PRN
Status: DISCONTINUED | OUTPATIENT
Start: 2025-07-08 | End: 2025-07-08

## 2025-07-08 RX ORDER — FENTANYL CITRATE 50 UG/ML
50 INJECTION, SOLUTION INTRAMUSCULAR; INTRAVENOUS EVERY 5 MIN PRN
Status: DISCONTINUED | OUTPATIENT
Start: 2025-07-08 | End: 2025-07-08 | Stop reason: HOSPADM

## 2025-07-08 RX ORDER — ONDANSETRON 4 MG/1
4 TABLET, ORALLY DISINTEGRATING ORAL EVERY 30 MIN PRN
Status: DISCONTINUED | OUTPATIENT
Start: 2025-07-08 | End: 2025-07-08 | Stop reason: HOSPADM

## 2025-07-08 RX ADMIN — SODIUM CHLORIDE 1000 ML: 0.9 INJECTION, SOLUTION INTRAVENOUS at 11:19

## 2025-07-08 RX ADMIN — CEFOTETAN DISODIUM 2 G: 2 INJECTION, POWDER, FOR SOLUTION INTRAMUSCULAR; INTRAVENOUS at 12:04

## 2025-07-08 RX ADMIN — Medication 200 MG: at 18:46

## 2025-07-08 RX ADMIN — MORPHINE SULFATE 4 MG: 4 INJECTION, SOLUTION INTRAMUSCULAR; INTRAVENOUS at 11:19

## 2025-07-08 RX ADMIN — PROPOFOL 200 MG: 10 INJECTION, EMULSION INTRAVENOUS at 18:10

## 2025-07-08 RX ADMIN — ONDANSETRON 4 MG: 2 INJECTION, SOLUTION INTRAMUSCULAR; INTRAVENOUS at 19:24

## 2025-07-08 RX ADMIN — MIDAZOLAM 2 MG: 1 INJECTION INTRAMUSCULAR; INTRAVENOUS at 18:05

## 2025-07-08 RX ADMIN — FENTANYL CITRATE 100 MCG: 50 INJECTION INTRAMUSCULAR; INTRAVENOUS at 18:10

## 2025-07-08 RX ADMIN — DEXMEDETOMIDINE HYDROCHLORIDE 12 MCG: 100 INJECTION, SOLUTION INTRAVENOUS at 18:20

## 2025-07-08 RX ADMIN — LIDOCAINE HYDROCHLORIDE 50 MG: 10 INJECTION, SOLUTION INFILTRATION; PERINEURAL at 18:10

## 2025-07-08 RX ADMIN — Medication 100 MG: at 18:10

## 2025-07-08 RX ADMIN — Medication 2 G: at 18:05

## 2025-07-08 RX ADMIN — ONDANSETRON 4 MG: 2 INJECTION, SOLUTION INTRAMUSCULAR; INTRAVENOUS at 13:55

## 2025-07-08 RX ADMIN — ONDANSETRON 4 MG: 2 INJECTION, SOLUTION INTRAMUSCULAR; INTRAVENOUS at 11:19

## 2025-07-08 RX ADMIN — OXYCODONE HYDROCHLORIDE 5 MG: 5 TABLET ORAL at 20:23

## 2025-07-08 RX ADMIN — HYDROMORPHONE HYDROCHLORIDE 0.4 MG: 0.2 INJECTION, SOLUTION INTRAMUSCULAR; INTRAVENOUS; SUBCUTANEOUS at 19:34

## 2025-07-08 RX ADMIN — SODIUM CHLORIDE, SODIUM LACTATE, POTASSIUM CHLORIDE, AND CALCIUM CHLORIDE 100 ML/HR: .6; .31; .03; .02 INJECTION, SOLUTION INTRAVENOUS at 19:15

## 2025-07-08 RX ADMIN — METRONIDAZOLE 500 MG: 500 INJECTION, SOLUTION INTRAVENOUS at 12:50

## 2025-07-08 RX ADMIN — KETOROLAC TROMETHAMINE 30 MG: 30 INJECTION, SOLUTION INTRAMUSCULAR at 18:43

## 2025-07-08 RX ADMIN — DEXAMETHASONE SODIUM PHOSPHATE 10 MG: 10 INJECTION, SOLUTION INTRAMUSCULAR; INTRAVENOUS at 18:15

## 2025-07-08 RX ADMIN — SCOPOLAMINE 1 PATCH: 1.5 PATCH, EXTENDED RELEASE TRANSDERMAL at 17:00

## 2025-07-08 RX ADMIN — FENTANYL CITRATE 25 MCG: 50 INJECTION INTRAMUSCULAR; INTRAVENOUS at 19:07

## 2025-07-08 RX ADMIN — SODIUM CHLORIDE, SODIUM LACTATE, POTASSIUM CHLORIDE, AND CALCIUM CHLORIDE: .6; .31; .03; .02 INJECTION, SOLUTION INTRAVENOUS at 17:00

## 2025-07-08 RX ADMIN — MORPHINE SULFATE 4 MG: 4 INJECTION, SOLUTION INTRAMUSCULAR; INTRAVENOUS at 15:32

## 2025-07-08 RX ADMIN — MORPHINE SULFATE 4 MG: 4 INJECTION, SOLUTION INTRAMUSCULAR; INTRAVENOUS at 13:56

## 2025-07-08 RX ADMIN — FENTANYL CITRATE 25 MCG: 50 INJECTION INTRAMUSCULAR; INTRAVENOUS at 19:20

## 2025-07-08 RX ADMIN — FENTANYL CITRATE 50 MCG: 50 INJECTION INTRAMUSCULAR; INTRAVENOUS at 19:12

## 2025-07-08 RX ADMIN — IOPAMIDOL 90 ML: 755 INJECTION, SOLUTION INTRAVENOUS at 09:21

## 2025-07-08 RX ADMIN — ONDANSETRON 4 MG: 2 INJECTION INTRAMUSCULAR; INTRAVENOUS at 18:43

## 2025-07-08 RX ADMIN — ACETAMINOPHEN 975 MG: 325 TABLET ORAL at 17:00

## 2025-07-08 RX ADMIN — ROCURONIUM BROMIDE 30 MG: 10 INJECTION, SOLUTION INTRAVENOUS at 18:26

## 2025-07-08 RX ADMIN — HYDROMORPHONE HYDROCHLORIDE 0.4 MG: 0.2 INJECTION, SOLUTION INTRAMUSCULAR; INTRAVENOUS; SUBCUTANEOUS at 19:50

## 2025-07-08 RX ADMIN — PROPOFOL 200 MCG/KG/MIN: 10 INJECTION, EMULSION INTRAVENOUS at 18:12

## 2025-07-08 ASSESSMENT — ACTIVITIES OF DAILY LIVING (ADL)
ADLS_ACUITY_SCORE: 41
ADLS_ACUITY_SCORE: 46
ADLS_ACUITY_SCORE: 45
ADLS_ACUITY_SCORE: 41
ADLS_ACUITY_SCORE: 41
ADLS_ACUITY_SCORE: 46
ADLS_ACUITY_SCORE: 41

## 2025-07-08 ASSESSMENT — COLUMBIA-SUICIDE SEVERITY RATING SCALE - C-SSRS
1. IN THE PAST MONTH, HAVE YOU WISHED YOU WERE DEAD OR WISHED YOU COULD GO TO SLEEP AND NOT WAKE UP?: NO
6. HAVE YOU EVER DONE ANYTHING, STARTED TO DO ANYTHING, OR PREPARED TO DO ANYTHING TO END YOUR LIFE?: NO
2. HAVE YOU ACTUALLY HAD ANY THOUGHTS OF KILLING YOURSELF IN THE PAST MONTH?: NO

## 2025-07-08 NOTE — ED TRIAGE NOTES
Pt sent here after a CT scan showed an appy. Pain started 4 days ago.      Triage Assessment (Adult)       Row Name 07/08/25 1054          Triage Assessment    Airway WDL WDL        Respiratory WDL    Respiratory WDL WDL        Skin Circulation/Temperature WDL    Skin Circulation/Temperature WDL WDL        Cardiac WDL    Cardiac WDL WDL        Peripheral/Neurovascular WDL    Peripheral Neurovascular WDL WDL        Cognitive/Neuro/Behavioral WDL    Cognitive/Neuro/Behavioral WDL WDL

## 2025-07-08 NOTE — OP NOTE
Name:  Angelia TRISTEN Stallings  PCP:  Calixto Leblanc  Procedure Date:  7/8/2025      Procedure(s):  APPENDECTOMY, LAPAROSCOPIC    Pre-Procedure Diagnosis:  Acute appendicitis with localized peritonitis, without perforation, abscess, or gangrene [K35.30]     Post-Procedure Diagnosis:    Acute appendicitis    Surgeon(s):  Leo Quezada DO    Circulator: Janna Luna RN  Scrub Person: Alannah Knapp    Anesthesia Type:  GET      Findings:  Acute suppurative appendicitis    Operative Report:    The patient was taken to the operating room and placed in a supine position.  SCDs were placed on bilateral lower extremities.  Antibiotics were given prior to skin incision.  The abdomen was prepped and draped in a sterile fashion.    I began the first portion of the procedure by injecting quarter percent Marcaine with epinephrine into the infraumbilical position.  I then made a 5 mm transverse incision below the umbilicus and established a pneumoperitoneum using a Veress needle technique.  Once the pneumoperitoneum was established,I placed a 5 mm trocar with a 5 mm 30  camera into the abdomen.  The surrounding structures were scrutinized for any injuries upon entry.  I did not find any. I placed an 12 mm trocar in the left lower quadrant position as well as 1 suprapubic 5 mm trochars under direct visualization.  All trochars were placed after local anesthetic was injected to the fascial layers.      I then manipulated the large and small bowel to gain access to the appendix.  Once found, I made a window at the base of the appendix and then fired a 45 mm blue load stapler across the base the mesoappendix.  I then fired an additional 45 mm white load stapler across the mesoappendix.  I bolster this with additional 10 mm clips to ensure hemostasis at the staple line.  I then placed the appendix in an 10 mm Endo Catch bag and brought out the left lower quadrant incision.  I removed any amount of fluid from the pelvis and the  right paracolic gutter with suction .  Next I closed the 12 mm fascial defect with an 0 Vicryl suture using a Jacobo Billingsley needle technique.  I removed all trochars and deflated the abdomen.  I then closed all skin edges with 4-0 Monocryl subcuticular sutures.  The wounds were then cleaned and covered with a dry, sterile dressing.  All sponge counts and needle counts were correct at the end of the procedure.    Estimated Blood Loss:   5Cc    Specimens:    ID Type Source Tests Collected by Time Destination   1 : Appendix Tissue Appendix SURGICAL PATHOLOGY EXAM Leo Quezada DO 7/8/2025  6:42 PM           Drains:        Complications:    None    Leo Quezada DO

## 2025-07-08 NOTE — ED PROVIDER NOTES
EMERGENCY DEPARTMENT ENCOUNTER      NAME: Angelia Stallings  AGE: 49 year old female  YOB: 1975  MRN: [id number]  EVALUATION DATE & TIME: 7/8/2025 11:06 AM    PCP: Calixto Leblanc    ED PROVIDER: Octavio Lynn MD    FINAL IMPRESSION:  1. Acute appendicitis with localized peritonitis, without perforation, abscess, or gangrene        ED COURSE & MEDICAL DECISION MAKING:    Pertinent Labs & Imaging studies reviewed. (See chart for details)  49 year old female presents to the Emergency Department for evaluation of abdominal pain, nausea, vomiting, diarrhea and outpatient CT abdomen/pelvis scan showing appendicitis.  Differential diagnosis considered Appendicitis, hernia, ectopic pregnancy, ovarian torsion, ovarian cyst rupture, pelvic inflammatory disease, tubo-ovarian abscess, mittelschmerz, urinary tract infection, pyelonephritis, constipation, diverticulitis.     Triage Note: Pt sent here after a CT scan showed an appy. Pain started 4 days ago.         ED Course as of 07/08/25 1324   Tue Jul 08, 2025   1108 I met with the patient, obtained history, performed an initial exam, and discussed options and plan for diagnostics and treatment here in the ED.   1113 49-year-old female with history of fibromyalgia otherwise healthy presenting with acute appendicitis.  Has had 4 days of right upper quadrant abdominal pain nausea vomiting intermittent fevers.  Had outside CT scan that showed acute appendicitis without perforation.  Will obtain labs symptomatically treat and consult  general surgery.  Will give metronidazole and cefotetan   1120 Dr. Nuñez.  Plan to take to the OR at 6 PM tonight.  Will keep NPO.  Patient updated of plan.   1121 I updated the patient on plan of care.        Not Applicable    I discussed the plan for discharge with the patient and patient is agreeable. We discussed supportive cares at home and reasons to return to the ER including new or worsening symptoms. All questions and concerns  "addressed to the best of my ability. Strict return precautions discussed. Patient to be discharge by RN.    At the conclusion of the encounter I discussed the results of the tests and the disposition. The questions were answered. The patient or family acknowledged understanding and was agreeable with the care plan.     MEDICATIONS GIVEN IN THE EMERGENCY:  Medications   ondansetron (ZOFRAN) injection 4 mg (4 mg Intravenous $Given 7/8/25 1119)   morphine (PF) injection 4 mg (4 mg Intravenous $Given 7/8/25 1119)   metroNIDAZOLE (FLAGYL) infusion 500 mg (500 mg Intravenous $New Bag 7/8/25 1250)   cefoTEtan (CEFOTAN) 2 g vial to attach to  ml bag (0 g Intravenous Stopped 7/8/25 1249)   sodium chloride 0.9% BOLUS 1,000 mL (1,000 mLs Intravenous $New Bag 7/8/25 1119)       NEW PRESCRIPTIONS STARTED AT TODAY'S ER VISIT  New Prescriptions    No medications on file     Modified Medications    No medications on file       =================================================================    HPI    Angelia Stallings is a 49 year old female with a pertinent history of s/p craniotomy, MCA aneurysm clipping x 2, degeneration of intervertebral disc of cervical region, and fibromyositis, who presents to this ED for evaluation of abdominal pain, nausea, vomiting, diarrhea and outpatient CT abdomen/pelvis scan showing appendicitis.    Use of : N/A    Per chart review, the patient presented to [facility] with complaint of abdominal pain, nausea, vomiting, fatigue, fever and chills on 7/7/2025. The patient reports that she started vomiting on Saturday (07/05/2025), began experiencing abdominal Pain that also began on Saturday (07/05/2025). She noted that she is not fasting. She endorses nausea, fatigue, chills and having a fever and reported that her temperature was 99.9 F. The patient stated that starting two days ago, \"she woke up feeling a fullness and bloating, took a laxative, had a " "bowel movement, but then nausea started, could not have vomiting due to nissen fundoplication surgery (no other abdominal surgeries). She was able to eat some soup last night.\" Yesterday (07/06/2025), her lower abdomen became tender and then she developed chills with fever like symptoms. Reported that she woke up yesterday (07/07/2025) morning with sweating. She had similar symptoms a couple weeks ago. She had a normal colonoscopy May 2023. She had a bowl movement that morning and this was her last BM at that time. Her bowel movements was noted to be more loose. She had nausea that morning. The provider scheduled a stat CT for the patient, which was arranged to be done on 07/08/2025 afternoon. She was advised to eat a bland diet until they have further information from the CT scan.    Per chart review, the patient had a CT abdomen/pelvis scan done at [facility] on 07/08/2025. This scan revealed that the patient has acute appendicitis and a 2.2 cm uterine fibroid.    The patient reports that she had a CT abdomen/pelvis scan completed at [facility], and while she and her  were on their way home from the hospital, she was contacted and informed that her scan showed she had acute appendicitis. The patient explains that she \"abruptly\" started experiencing abdominal pain on Saturday (07/05/2025) that woke her up from her sleep. She also started to having associated vomiting and diarrhea. Yesterday (07/07/2025), she still felt fatigued and nauseated. She was experiencing the same symptoms today (07/08/2025) as she did yesterday, but notes that her symptoms is not as bad currently. She was previously running fevers off and on yesterday. She called her PCP yesterday and was able to get into the clinic and be evaluated that same day. She had a stat CT abdomen/pelvis scan done today and was informed of the results today. She denies having any medical problems including HTN or " "diabetes. Reports that she has no medical problems. She only takes a muscle relaxer daily because she has \"chronic pain\" in her hip, leg and head, and notes that she was diagnosed with fibromyalgia.    States that the last time she ate anything was at 10 AM this morning (07/08/2025) and reports that she ate a little bit of a plain donut at that time. She also mentions that she also ate dry bread at 6:30 AM this morning. She did drink water this morning. She is allergic to penicillins. No additional complaints or concerns reported at this time.      PHYSICAL EXAM    BP (!) 145/86   Pulse 83   Temp 97  F (36.1  C)   Resp 17   Ht 1.727 m (5' 8\")   Wt 83.9 kg (185 lb)   LMP  (LMP Unknown)   SpO2 98%   BMI 28.13 kg/m    Constitutional: Uncomfortable appearing.  Head: Normocephalic, atraumatic, mucous membranes moist, nose normal.   Neck: Supple, gross ROM intact.   Eyes: Pupils mid-range, sclera white.  Respiratory: Clear to auscultation bilaterally, no respiratory distress, no wheezing, speaks full sentences easily.  Cardiovascular: Tachycardic, regular rhythm, no murmurs. No lower extremity edema.   GI: Soft, RLQ tenderness to palpation with guarding.  Musculoskeletal: Moving all 4 extremities intentionally and without pain. No obvious deformity.  Skin: Warm, dry, no rash.  Neurologic: Alert & oriented x 3, speech clear, moving all extremities spontaneously   Psychiatric: Affect normal, cooperative.     LAB:  All pertinent labs reviewed and interpreted.  Results for orders placed or performed during the hospital encounter of 07/08/25   Comprehensive metabolic panel   Result Value Ref Range    Sodium 136 135 - 145 mmol/L    Potassium 3.5 3.4 - 5.3 mmol/L    Carbon Dioxide (CO2) 26 22 - 29 mmol/L    Anion Gap 13 7 - 15 mmol/L    Urea Nitrogen 10.0 6.0 - 20.0 mg/dL    Creatinine 0.88 0.51 - 0.95 mg/dL    GFR Estimate 80 >60 mL/min/1.73m2    Calcium 9.3 8.8 - 10.4 mg/dL    Chloride 97 (L) 98 - 107 mmol/L    Glucose " 123 (H) 70 - 99 mg/dL    Alkaline Phosphatase 62 40 - 150 U/L    AST 25 0 - 45 U/L    ALT 22 0 - 50 U/L    Protein Total 7.6 6.4 - 8.3 g/dL    Albumin 4.4 3.5 - 5.2 g/dL    Bilirubin Total 0.6 <=1.2 mg/dL   UA with Microscopic reflex to Culture    Specimen: Urine, Clean Catch   Result Value Ref Range    Color Urine Light Yellow Colorless, Straw, Light Yellow, Yellow    Appearance Urine Clear Clear    Glucose Urine Negative Negative mg/dL    Bilirubin Urine Negative Negative    Ketones Urine 5 (A) Negative mg/dL    Specific Gravity Urine <1.005 (L) 1.005 - 1.030    Blood Urine 0.2 mg/dL (A) Negative    pH Urine 6.0 5.0 - 7.0    Protein Albumin Urine 30 (A) Negative mg/dL    Urobilinogen Urine 2.0 (A) <2.0 mg/dL    Nitrite Urine Negative Negative    Leukocyte Esterase Urine Negative Negative    Bacteria Urine Few (A) None Seen /HPF    RBC Urine 0 <=2 /HPF    WBC Urine 0 <=5 /HPF    Squamous Epithelials Urine 8 (H) <=1 /HPF   HCG qualitative urine   Result Value Ref Range    hCG Urine Qualitative Negative Negative   CBC with platelets and differential   Result Value Ref Range    WBC Count 4.9 4.0 - 11.0 10e3/uL    RBC Count 4.66 3.80 - 5.20 10e6/uL    Hemoglobin 12.4 11.7 - 15.7 g/dL    Hematocrit 38.2 35.0 - 47.0 %    MCV 82 78 - 100 fL    MCH 26.6 26.5 - 33.0 pg    MCHC 32.5 31.5 - 36.5 g/dL    RDW 13.3 10.0 - 15.0 %    Platelet Count 237 150 - 450 10e3/uL    % Neutrophils 69 %    % Lymphocytes 21 %    % Monocytes 9 %    % Eosinophils 0 %    % Basophils 1 %    % Immature Granulocytes 0 %    NRBCs per 100 WBC 0 <1 /100    Absolute Neutrophils 3.4 1.6 - 8.3 10e3/uL    Absolute Lymphocytes 1.0 0.8 - 5.3 10e3/uL    Absolute Monocytes 0.4 0.0 - 1.3 10e3/uL    Absolute Eosinophils 0.0 0.0 - 0.7 10e3/uL    Absolute Basophils 0.0 0.0 - 0.2 10e3/uL    Absolute Immature Granulocytes 0.0 <=0.4 10e3/uL    Absolute NRBCs 0.0 10e3/uL       RADIOLOGY:  Reviewed all pertinent imaging. Please see official radiology report.  No orders  to display       EKG:    N/A    PROCEDURES:   N/A      Octavio Lynn MD  [facility] EMERGENCY ROOM  [address] [place] [id number] [phone]   =================================================================    BILLING:  Data  Category 1  Non-ED record review, if applicable. External record reviewed: N/A     Clinical information was obtained from an independent historian. History was obtained from: Patient and Significant other provided additional information in the HPI     The following testing was considered but ultimately not selected after discussion with patient/family: N/A     Category 2  My independent interpretation of EKG, rhythm strip, radiology study: CT abdomen and pelvis did not reveal large AAA     Category 3  Discussion of management with other physician/healthcare provider/other source: Spoke to general surgeon regarding patient's ED course and agrees to consult       Risk  Prescription medication was considered, but ultimately not given after discussion with patient/family: N/A     Chronic conditions affecting care: s/p craniotomy, MCA aneurysm clipping x 2, degeneration of intervertebral disc of cervical region, and fibromyositis     Consideration of Admission/Observation: Patient will be sent to the OR        I, Deisi Omer , am serving as a scribe to document services personally performed by Octavio Lynn MD based on my observation and the provider's statements to me. I, Octavio yLnn MD, attest that Deisi Omer is acting in a scribe capacity, has observed my performance of the services and has documented them in accordance with my direction.      Octavio Lynn MD  07/08/25 0701

## 2025-07-08 NOTE — MEDICATION SCRIBE - ADMISSION MEDICATION HISTORY
Medication Scribe Admission Medication History    Admission medication history is complete. The information provided in this note is only as accurate as the sources available at the time of the update.    Information Source(s): Patient and CareEverywhere/SureScripts via in-person    Pertinent Information: Patient reported that she has not taken any medication today 07/08/25 PTA. Yesterday she took 650 mg of acetaminophen in the AM and again at 1700. Yesterday at bedtime she took 4 mg of tizanidine.     Mostly has PRN medication on file. Has not taken vitamin D in a few days due to not feeling well.     Oxycodone last filled 6/16/2025; 20 tablets.     Changes made to PTA medication list:  Added: None  Deleted:   Terbinafine 1% cream - done  Changed:   Vitamin D 4,000 mg --> 5,000 international unit(s)     Allergies reviewed with patient and updates made in EHR: yes    Medication History Completed By: Branden Joyner 7/8/2025 2:57 PM    Current Facility-Administered Medications for the 7/8/25 encounter (Hospital Encounter)   Medication    sodium chloride (PF) 0.9% PF flush 3 mL     PTA Med List   Medication Sig Last Dose/Taking    acetaminophen (TYLENOL) 325 MG tablet Take 325-650 mg by mouth every 6 hours as needed for mild pain. 7/7/2025 at  5:00 PM    carisoprodol (SOMA) 250 MG tablet Take 1 tablet (250 mg) by mouth 4 times daily as needed for muscle spasms. 7/6/2025 Bedtime    hydrOXYzine HCl (ATARAX) 10 MG tablet Take 1-2 tablets (10-20 mg) by mouth 3 times daily as needed for anxiety. Past Month    ibuprofen (ADVIL/MOTRIN) 200 MG capsule Take 200 mg by mouth every 4 hours as needed for fever. 7/6/2025    oxyCODONE-acetaminophen (PERCOCET) 5-325 MG tablet Take 1 tablet by mouth every 6 hours as needed for pain. Past Month    tiZANidine (ZANAFLEX) 4 MG tablet TAKE 1 TO 2 TABLETS BY MOUTH AT BEDTIME AS NEEDED FOR MUSCLE SPASMS 7/7/2025 Bedtime    VITAMIN D PO Take 5,000 Units by mouth daily. Past Week Morning

## 2025-07-08 NOTE — H&P
General Surgery History and Physical  Angelia Stallings MRN# [id number]   Age/Sex: 49 year old female YOB: 1975     Reason for consult: 1. Acute appendicitis with localized peritonitis, without perforation, abscess, or gangrene            Requesting physician: ED                  Assessment and Plan:   Assessment:  Angelia Stallings is a 49 year old female PMH chronic pain here with 4 days of abdominal pain with reassuring labs, HR in 100s and imaging demonstrating acute uncomplicated appendicitis. Plan for laparoscopic appendectomy later today    Has Rx for oxycodone chronic pain used PRN but currently does not have any at home    Plan:  -NPO  -Activity as tolerated  -Plan for laparoscopic appendectomy later today with potential for discharge to home pending patient course and intraoperative findings        Physician Attestation     I saw and evaluated Angelia Stallings as part of a shared APRN/PA visit.     I personally reviewed the vital signs, medications, labs, and imaging.    I personally provided a substantive portion of care for this patient and I approve the care plan as written by the MOLLY.  I was involved with Medical Decision Making includin-year-old female with 4 days of abdominal pain localized to the right and left lower abdominal region with worsening discomfort prompting her to present to the emergency room.  Underwent CT imaging which demonstrated acute appendicitis.  Currently complains of suprapubic and right lower quadrant pain.  Did have fevers and chills.  Had some nausea but was unable to vomit secondary to her previous hiatal hernia repair.  Abdomen-soft, tender to palpation right lower quadrant with positive Rovsing sign  Images reviewed and demonstrates acute appendicitis  Assessment/plan  Acute appendicitis-pathophysiology of appendicitis was explained to the patient and her family.  Risk and benefits of surgery versus nonoperative management strategy were discussed.  The  plan will be for laparoscopic appendectomy with likely discharge to home afterwards.    Leo Quezada,   Date of Service (when I saw the patient): 07/08/25            Chief Complaint:     Chief Complaint   Patient presents with    Abdominal Pain    Abnormal Cxr/ct        History is obtained from the patient and electronic health record    HPI:   Angelia Stallings is a 49 year old female who presents with about 4 days of abdominal pain - starting acutely waking her from sleep and diffuse over the abdomen, better that night. Continued the next few days less diffuse more lower abdominal pain when sitting and centralized to lower mid and right. Had emesis yesterday after seeing doctor, nausea intermittent. Fevers last day or so.   No diarrhea no constipation; initially felt gas/bloating.     States she had a similar feeling episode previously and took about 4 days to go away then, was not seen.  Previously has had ovarian cysts that rupture this does not feel similar. No history of endometriosis.   Endometrial ablation 2015 does not get menses since then.    Denies medical history other than stated or kidney, cardiac, liver issues. She has a history of chronic pain and has received 20 pills of oxycodone 2.5-5mg monthly but uses them intermittently and does not think she has any remaining at home  --PDMP with 6/16 filling 20 pills oxycodone-acetaminophen 5mg, carisoprodol 15 pills on 6/25    Last ate at 10AM this morning nothing since    Previous hiatal hernia repair with Dr. Quezada 2018  Normal colonoscopy in 2023  Previous 10 year ago closed aneurysm repair per patient         Past Medical History:     Past Medical History:   Diagnosis Date    Abnormal involuntary movement of toes 2/10/2024    Allergic rhinitis     Anemia     Anxiety     Brain aneurysm 02/12/2015    s/p clipping,     Dysfunction of both eustachian tubes     Hiatal hernia     Repaired in 2018 with Nissen.    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 2. No evidence of axonal injury to left lumbosacral nerve roots. Note that normal needle electromyography does not exclude clinically symptomatic radiculopathy without substantial motor axonal injury.      History of hemolysis, elevated liver enzymes, and low platelet (HELLP) syndrome     History of miscarriage 2004    History of spontaneous      Hypertensive disorder 2015    Major depression single episode, in partial remission     Menorrhagia     Middle cerebral aneurysm      (normal spontaneous vaginal delivery)     Painful legs and moving toes 2/10/2024    PONV (postoperative nausea and vomiting)     Sepsis (H)     Umbilical hernia               Past Surgical History:     Past Surgical History:   Procedure Laterality Date    CRANIOTOMY, REPAIR ANEURYSM, COMBINED  2/19/15    MCA aneurysm clipping x 2    CRYOCAUTERY OF CERVIX      Description: Cervix Cryosurgery;  Recorded: 2008;  Comments: TRUE I    DILATION AND CURETTAGE, OPERATIVE HYSTEROSCOPY, COMBINED N/A 2015    Procedure: DILATION AND CURETTAGE WITH HYSTEROSCOPY ;  Surgeon: Catarina Lehman DO;  Location: [facility];  Service:     HC DILATION/CURETTAGE DIAG/THER NON OB      Description: Dilation And Curettage;  Proc Date: 2004;  Comments: FOR MISCARRIAGE    HYSTEROSCOPY W/ ENDOMETRIAL ABLATION  12/15/2015    MOUTH SURGERY      WA LAP,ESOPHAGOGAST FUNDOPLASTY N/A 3/1/2018    Procedure: ROBOTIC NISSEN FUNDOPLICATION;  Surgeon: Leo Quezada DO;  Location: [facility];  Service: General             Social History:    reports that she quit smoking about 16 years ago. Her smoking use included cigarettes. She started smoking about 31 years ago. She has a 3.8 pack-year smoking history. She has been exposed to tobacco smoke. She has never used  smokeless tobacco. She reports current alcohol use. She reports that she does not use drugs.           Family History:     Family History   Problem Relation Age of Onset    Other - See Comments Mother         leg symptoms    Hypertension Father     Cerebrovascular Disease Father     Heart Disease Father     Hyperlipidemia Father     Depression Brother     Allergies Brother     No Known Problems Brother     Breast Cancer Paternal Grandmother 90    Heart Disease Paternal Grandfather     Hypertension Paternal Grandfather     Cancer Maternal Uncle         cervical              Allergies:     Allergies   Allergen Reactions    Cat Dander [Animal Dander] Itching and Shortness Of Breath    [id number] Antimigraine Agents [Sumatriptan] Unknown     Hx cerebral aneurysms, s/p clipping    Watermelon [Citrullus Vulgaris] Unknown    Penicillins Hives and Rash    Sulfa (Sulfonamide Antibiotics) [Sulfa Antibiotics] Hives and Rash              Medications:     Prior to Admission medications    Medication Sig Start Date End Date Taking? Authorizing Provider   acetaminophen (TYLENOL) 325 MG tablet Take 325-650 mg by mouth every 6 hours as needed for mild pain.    Reported, Patient   carisoprodol (SOMA) 250 MG tablet Take 1 tablet (250 mg) by mouth 4 times daily as needed for muscle spasms. 6/25/25   Calixto Leblanc PA-C   hydrOXYzine HCl (ATARAX) 10 MG tablet Take 1-2 tablets (10-20 mg) by mouth 3 times daily as needed for anxiety.  Patient not taking: Reported on 7/7/2025 12/20/24   Calixto Leblanc PA-C   ibuprofen (ADVIL/MOTRIN) 200 MG capsule Take 200 mg by mouth every 4 hours as needed for fever.    Reported, Patient   oxyCODONE-acetaminophen (PERCOCET) 5-325 MG tablet Take 1 tablet by mouth every 6 hours as needed for pain.  Patient not taking: Reported on 7/7/2025 6/16/25   Calixto Leblanc PA-C   terbinafine (LAMISIL) 1 % external cream at bedtime.  Patient not taking: Reported on 7/7/2025 5/7/25   Reported, Patient    tiZANidine (ZANAFLEX) 4 MG tablet TAKE 1 TO 2 TABLETS BY MOUTH AT BEDTIME AS NEEDED FOR MUSCLE SPASMS 4/28/25   Calixto Leblanc, BELEM   VITAMIN D PO Take 4,000 mg by mouth daily.  Patient not taking: Reported on 7/7/2025    Reported, Patient              Review of Systems:   The Review of Systems is negative other than noted in the HPI            Physical Exam:   Temp:  [97  F (36.1  C)-97.5  F (36.4  C)] 97  F (36.1  C)  Pulse:  [101-106] 101  Resp:  [12-17] 17  BP: (126-151)/(88-90) 151/90  SpO2:  [97 %-98 %] 98 %    No intake or output data in the 24 hours ending 07/08/25 1159   Constitutional:   Awake, alert, cooperative, no apparent distress, and appears stated age       Eyes:   PERRL, conjunctiva/corneas clear, EOM's intact; no scleral edema or icterus noted        ENT:   Normocephalic, without obvious abnormality, atraumatic, Lips, mucosa, and tongue normal      Lungs:   Normal respiratory effort, no accessory muscle use       Cardiovascular:   Regular rate and rhythm       Abdomen:   Generally soft and nondistended abdomen tender with palpation over the lower midline / suprapubic area and right lower quadrant area. Positive mcburney point tenderness. Otherwise generally nontender no guarding on exam       Musculoskeletal:   No obvious swelling, bruising or deformity on exposed limbs/skin       Skin:   Skin color and texture normal for patient, no rashes or lesions on exposed skin             Data:         All imaging studies reviewed by me.    Recent Results (from the past 24 hours)   CT Abdomen Pelvis w Contrast    Narrative    EXAM: CT ABDOMEN PELVIS W CONTRAST  LOCATION: [facility]  DATE: 7/8/2025    INDICATION: Abdominal pain, left lower quadrant.  COMPARISON: None.  TECHNIQUE: CT scan of the abdomen and pelvis was performed following injection of IV contrast. Multiplanar reformats were obtained. Dose reduction techniques were used.  CONTRAST: Isovue 370 90 mL.    FINDINGS:    LOWER CHEST: Normal.    HEPATOBILIARY: Normal.    PANCREAS: Normal.    SPLEEN: Normal.    ADRENAL GLANDS: Normal.    KIDNEYS/BLADDER: Normal.    BOWEL: No bowel obstruction. The appendix is dilated measuring up to 13 mm in caliber with appendiceal wall thickening and periappendiceal fat stranding. Findings are compatible with acute appendicitis. There is no evidence of perforation or abscess.    LYMPH NODES: Normal.    VASCULATURE: Normal.    PELVIC ORGANS: There is a 2.2 cm fibroid involving the right side of the uterus.    MUSCULOSKELETAL: Normal.      Impression    IMPRESSION:   1.  Acute appendicitis.  2.  A 2.2 cm uterine fibroid.    Findings discussed with Dr. Bañuelos on 07/08/2025 at 10:20 AM.   CBC with Platelets & Differential    Narrative    The following orders were created for panel order CBC with Platelets & Differential.  Procedure                               Abnormality         Status                     ---------                               -----------         ------                     CBC with platelets and ...[[id number]]                      Final result                 Please view results for these tests on the individual orders.   Comprehensive metabolic panel   Result Value Ref Range    Sodium 136 135 - 145 mmol/L    Potassium 3.5 3.4 - 5.3 mmol/L    Carbon Dioxide (CO2) 26 22 - 29 mmol/L    Anion Gap 13 7 - 15 mmol/L    Urea Nitrogen 10.0 6.0 - 20.0 mg/dL    Creatinine 0.88 0.51 - 0.95 mg/dL    GFR Estimate 80 >60 mL/min/1.73m2    Calcium 9.3 8.8 - 10.4 mg/dL    Chloride 97 (L) 98 - 107 mmol/L    Glucose 123 (H) 70 - 99 mg/dL    Alkaline Phosphatase 62 40 - 150 U/L    AST 25 0 - 45 U/L    ALT 22 0 - 50 U/L    Protein Total 7.6 6.4 - 8.3 g/dL    Albumin 4.4 3.5 - 5.2 g/dL    Bilirubin Total 0.6 <=1.2 mg/dL   CBC with platelets and differential   Result Value Ref Range    WBC Count 4.9 4.0 - 11.0 10e3/uL    RBC Count 4.66 3.80 - 5.20 10e6/uL    Hemoglobin 12.4 11.7 - 15.7 g/dL    Hematocrit  38.2 35.0 - 47.0 %    MCV 82 78 - 100 fL    MCH 26.6 26.5 - 33.0 pg    MCHC 32.5 31.5 - 36.5 g/dL    RDW 13.3 10.0 - 15.0 %    Platelet Count 237 150 - 450 10e3/uL    % Neutrophils 69 %    % Lymphocytes 21 %    % Monocytes 9 %    % Eosinophils 0 %    % Basophils 1 %    % Immature Granulocytes 0 %    NRBCs per 100 WBC 0 <1 /100    Absolute Neutrophils 3.4 1.6 - 8.3 10e3/uL    Absolute Lymphocytes 1.0 0.8 - 5.3 10e3/uL    Absolute Monocytes 0.4 0.0 - 1.3 10e3/uL    Absolute Eosinophils 0.0 0.0 - 0.7 10e3/uL    Absolute Basophils 0.0 0.0 - 0.2 10e3/uL    Absolute Immature Granulocytes 0.0 <=0.4 10e3/uL    Absolute NRBCs 0.0 10e3/uL   UA with Microscopic reflex to Culture    Specimen: Urine, Clean Catch   Result Value Ref Range    Color Urine Light Yellow Colorless, Straw, Light Yellow, Yellow    Appearance Urine Clear Clear    Glucose Urine Negative Negative mg/dL    Bilirubin Urine Negative Negative    Ketones Urine 5 (A) Negative mg/dL    Specific Gravity Urine <1.005 (L) 1.005 - 1.030    Blood Urine 0.2 mg/dL (A) Negative    pH Urine 6.0 5.0 - 7.0    Protein Albumin Urine 30 (A) Negative mg/dL    Urobilinogen Urine 2.0 (A) <2.0 mg/dL    Nitrite Urine Negative Negative    Leukocyte Esterase Urine Negative Negative    Bacteria Urine Few (A) None Seen /HPF    RBC Urine 0 <=2 /HPF    WBC Urine 0 <=5 /HPF    Squamous Epithelials Urine 8 (H) <=1 /HPF    Narrative    Urine Culture not indicated  Urine Culture not indicated   HCG qualitative urine   Result Value Ref Range    hCG Urine Qualitative Negative Negative           BELEM Strickland [facility]  [address], [address]  [place] [address]

## 2025-07-08 NOTE — ANESTHESIA PREPROCEDURE EVALUATION
Anesthesia Pre-Procedure Evaluation    Patient: Angelia Stallings   MRN: [id number] : 1975          Procedure : Procedure(s):  APPENDECTOMY, LAPAROSCOPIC         Past Medical History:   Diagnosis Date    Abnormal involuntary movement of toes 2/10/2024    Allergic rhinitis     Anemia     Anxiety     Brain aneurysm 2015    s/p clipping,     Dysfunction of both eustachian tubes     Hiatal hernia     Repaired in 2018 with Nissen.    High risk HPV infection 09/10/2015    History of EMG 2022 2/10/2024    This is an abnormal study, demonstrating electrophysiologic evidence of the followin. 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 2. No evidence of axonal injury to left lumbosacral nerve roots. Note that normal needle electromyography does not exclude clinically symptomatic radiculopathy without substantial motor axonal injury.      History of hemolysis, elevated liver enzymes, and low platelet (HELLP) syndrome     History of miscarriage 2004    History of spontaneous      Hypertensive disorder 2015    Major depression single episode, in partial remission     Menorrhagia     Middle cerebral aneurysm      (normal spontaneous vaginal delivery)     Painful legs and moving toes 2/10/2024    PONV (postoperative nausea and vomiting)     Sepsis (H)     Umbilical hernia       Past Surgical History:   Procedure Laterality Date    CRANIOTOMY, REPAIR ANEURYSM, COMBINED  2/19/15    MCA aneurysm clipping x 2    CRYOCAUTERY OF CERVIX      Description: Cervix Cryosurgery;  Recorded: 2008;  Comments: TRUE I    DILATION AND CURETTAGE, OPERATIVE HYSTEROSCOPY, COMBINED N/A 2015    Procedure: DILATION AND CURETTAGE WITH HYSTEROSCOPY ;  Surgeon: Catarina Lehman DO;  Location: [facility];  Service:     HC DILATION/CURETTAGE DIAG/THER NON OB      Description: Dilation And Curettage;  Proc Date: 2004;  Comments: FOR MISCARRIAGE    HYSTEROSCOPY W/ ENDOMETRIAL ABLATION  12/15/2015     MOUTH SURGERY      CT LAP,ESOPHAGOGAST FUNDOPLASTY N/A 3/1/2018    Procedure: ROBOTIC NISSEN FUNDOPLICATION;  Surgeon: Leo Quezada DO;  Location: [facility] OR;  Service: General      Allergies   Allergen Reactions    Cat Dander [Animal Dander] Itching and Shortness Of Breath    [id number] Antimigraine Agents [Sumatriptan] Unknown     Hx cerebral aneurysms, s/p clipping    Watermelon [Citrullus Vulgaris] Unknown    Penicillins Hives and Rash    Sulfa (Sulfonamide Antibiotics) [Sulfa Antibiotics] Hives and Rash      Social History     Tobacco Use    Smoking status: Former     Current packs/day: 0.00     Average packs/day: 0.3 packs/day for 15.0 years (3.8 ttl pk-yrs)     Types: Cigarettes     Start date: 1994     Quit date: 2009     Years since quittin.5     Passive exposure: Past    Smokeless tobacco: Never    Tobacco comments:     No exposure   Substance Use Topics    Alcohol use: Yes     Comment: Alcoholic Drinks/day: rare      Wt Readings from Last 1 Encounters:   25 83.9 kg (185 lb)        Anesthesia Evaluation   Pt has had prior anesthetic.     History of anesthetic complications  - PONV.      ROS/MED HX  ENT/Pulmonary:  - neg pulmonary ROS     Neurologic: Comment: Hx of cerebral aneurysm s/p clipping     (+)      migraines,                          Cardiovascular:  - neg cardiovascular ROS     METS/Exercise Tolerance:     Hematologic:  - neg hematologic  ROS     Musculoskeletal:  - neg musculoskeletal ROS     GI/Hepatic: Comment: Nausea and vomiting     (+)      hiatal hernia (s/p fundoplasty),   appendicitis,           Renal/Genitourinary:  - neg Renal ROS     Endo:     (+)               Obesity,       Psychiatric/Substance Use:     (+) psychiatric history anxiety and depression  H/O chronic opioid use .     Infectious Disease:  - neg infectious disease ROS     Malignancy:  - neg malignancy ROS     Other:  - neg other ROS            Physical Exam  Airway  Mallampati: II  TM  "distance: >3 FB  Neck ROM: full  Mouth opening: >= 4 cm    Cardiovascular - normal exam  Rhythm: regular  Rate: normal rate     Dental   (+) Minor Abnormalities - some fillings, tiny chips      Pulmonary - normal examBreath sounds clear to auscultation        Neurological - normal exam  She appears awake, alert and oriented x3.    Other Findings       OUTSIDE LABS:  CBC:   Lab Results   Component Value Date    WBC 4.9 07/08/2025    WBC 6.0 11/15/2024    HGB 12.4 07/08/2025    HGB 13.5 11/15/2024    HCT 38.2 07/08/2025    HCT 41.7 11/15/2024     07/08/2025     11/15/2024     BMP:   Lab Results   Component Value Date     07/08/2025     11/15/2024    POTASSIUM 3.5 07/08/2025    POTASSIUM 4.7 11/15/2024    CHLORIDE 97 (L) 07/08/2025    CHLORIDE 101 11/15/2024    CO2 26 07/08/2025    CO2 27 11/15/2024    BUN 10.0 07/08/2025    BUN 19.1 11/15/2024    CR 0.88 07/08/2025    CR 0.99 (H) 11/15/2024     (H) 07/08/2025    GLC 87 11/15/2024     COAGS: No results found for: \"PTT\", \"INR\", \"FIBR\"  POC:   Lab Results   Component Value Date    HCG Negative 07/08/2025     HEPATIC:   Lab Results   Component Value Date    ALBUMIN 4.4 07/08/2025    PROTTOTAL 7.6 07/08/2025    ALT 22 07/08/2025    AST 25 07/08/2025    ALKPHOS 62 07/08/2025    BILITOTAL 0.6 07/08/2025     OTHER:   Lab Results   Component Value Date    A1C 5.4 11/15/2024    SIDNEY 9.3 07/08/2025    MAG 2.0 06/22/2022    TSH 1.41 06/22/2022    T4 0.90 10/12/2021    CRP 0.5 05/27/2021    SED 10 12/05/2022       Anesthesia Plan    ASA Status:  2      NPO Status: Will be NPO Appropriate at ... 7/8/2025 6:00 PM   Anesthesia Type: General.  Airway: oral.  Induction: intravenous, rapid sequence.  Maintenance: TIVA.   Techniques and Equipment:       - Monitoring Plan: standard ASA monitoring     Consents    Anesthesia Plan(s) and associated risks, benefits, and realistic alternatives discussed. Questions answered and patient/representative(s) expressed " "understanding.     - Discussed: anesthesiologist     - Discussed with:  Patient               Postoperative Care    Pain management: plan for postoperative opioid use.     Comments:                   Bebo Galvez MD    I have reviewed the pertinent notes and labs in the chart from the past 30 days and (re)examined the patient.  Any updates or changes from those notes are reflected in this note.    Clinically Significant Risk Factors Present on Admission          # Hypochloremia: Lowest Cl = 97 mmol/L in last 2 days, will monitor as appropriate                    # Overweight: Estimated body mass index is 28.13 kg/m  as calculated from the following:    Height as of this encounter: 1.727 m (5' 8\").    Weight as of this encounter: 83.9 kg (185 lb).                    "

## 2025-07-08 NOTE — DISCHARGE INSTRUCTIONS
From your general surgery team:  It is our practice to have all patients follow up with us 2-3 weeks after their surgery to ensure they are recovering well.  For straightforward laparoscopic procedures, this can be done either in clinic as a scheduled follow up appointment, or over the phone.  If you would like a scheduled follow up appointment in clinic, please call us at [phone] to schedule an appointment at your convenience.  If you would prefer to follow up with us by phone please let us know so that we may contact you 2-3 weeks following your procedure.    PAIN:  Some pain is expected after surgery. This will improve over time. After laparoscopic surgery, some people experience shoulder pain on the first day after surgery. This is from the gas used to inflate the abdomen during the surgery. This sensation usually improves within 48 hours. I recommend using Tylenol 1000 mg every 6 hours and ibuprofen 400-600 mg every 6 hours for your primary pain control. Alternate between the two medications, taking one every three hours. Example schedule below.    9 AM  - Tylenol 1000 mg   12 PM - Ibuprofen 400-600 mg  3 PM - Tylenol 1000 mg   6 PM - Ibuprofen 400-600 mg    Take these medications scheduled for 3 days. Do not exceed the maximum dosage amount over 24 hours as recommended on the medication bottle.   Take the prescribed narcotic medications only if needed in addition to medications above.     Narcotic medications can cause constipation. If you are struggling with constipation, we recommend taking Miralax once daily or stool softener combo (such as Dulcolax or Senna) to help easily pass stool. Do not take these medications if you are having diarrhea or are sensitive / allergic to them.

## 2025-07-08 NOTE — ANESTHESIA PROCEDURE NOTES
Airway         Procedure Start/Stop Times: 7/8/2025 6:11 PM and 7/8/2025 6:18 PM  Staff -        CRNA: Robin Rojas APRN CRNA       Performed By: CRNA  Consent for Airway        Urgency: elective  Indications and Patient Condition       Indications for airway management: frederick-procedural       Induction type:RSI       Mask difficulty assessment: 0 - not attempted    Final Airway Details       Final airway type: endotracheal airway       Successful airway: ETT - single  Endotracheal Airway Details        ETT size (mm): 7.0       Cuffed: yes       Successful intubation technique: direct laryngoscopy       DL Blade Type: Hanna 2       Grade View of Cords: 1       Adjucts: stylet       Position: Right       Measured from: lips       Secured at (cm): 22    Post intubation assessment        Placement verified by: capnometry, equal breath sounds and chest rise        Number of attempts at approach: 1       Secured with: tape       Ease of procedure: easy       Dentition: Unchanged and Intact       Dental guard used and removed. Dental Guard Type: Standard White.    Medication(s) Administered   Medication Administration Time: 7/8/2025 6:11 PM

## 2025-07-09 NOTE — ANESTHESIA CARE TRANSFER NOTE
Patient: Angelia Stallings    Procedure: Procedure(s):  APPENDECTOMY, LAPAROSCOPIC       Diagnosis: Acute appendicitis with localized peritonitis, without perforation, abscess, or gangrene [K35.30]  Diagnosis Additional Information: No value filed.    Anesthesia Type:   General     Note:    Oropharynx: oropharynx clear of all foreign objects and spontaneously breathing  Level of Consciousness: awake  Oxygen Supplementation: face mask  Level of Supplemental Oxygen (L/min / FiO2): 8  Independent Airway: airway patency satisfactory and stable  Dentition: dentition unchanged  Vital Signs Stable: post-procedure vital signs reviewed and stable  Report to RN Given: handoff report given  Patient transferred to: PACU    Handoff Report: Identifed the Patient, Identified the Reponsible Provider, Reviewed the pertinent medical history, Discussed the surgical course, Reviewed Intra-OP anesthesia mangement and issues during anesthesia, Set expectations for post-procedure period and Allowed opportunity for questions and acknowledgement of understanding      Vitals:  Vitals Value Taken Time   /77    Temp 36.3  C (97.34  F) 07/08/25 19:01   Pulse 86 07/08/25 19:01   Resp 21 07/08/25 19:01   SpO2 97 % 07/08/25 19:01   Vitals shown include unfiled device data.    Electronically Signed By: RIKY Vega CRNA  July 8, 2025  7:03 PM

## 2025-07-09 NOTE — ANESTHESIA POSTPROCEDURE EVALUATION
Patient: Angelia Stallings    Procedure: Procedure(s):  APPENDECTOMY, LAPAROSCOPIC       Anesthesia Type:  General    Note:  Disposition: Outpatient   Postop Pain Control: Uneventful            Sign Out: Well controlled pain   PONV: No   Neuro/Psych: Uneventful            Sign Out: Acceptable/Baseline neuro status   Airway/Respiratory: Uneventful            Sign Out: Acceptable/Baseline resp. status   CV/Hemodynamics: Uneventful            Sign Out: Acceptable CV status; No obvious hypovolemia; No obvious fluid overload   Other NRE: NONE   DID A NON-ROUTINE EVENT OCCUR? No           Last vitals:  Vitals Value Taken Time   /79 07/08/25 20:10   Temp 36.3  C (97.34  F) 07/08/25 20:14   Pulse 83 07/08/25 20:14   Resp 17 07/08/25 20:14   SpO2 94 % 07/08/25 20:14   Vitals shown include unfiled device data.    Electronically Signed By: Bebo Galvez MD  July 8, 2025  8:21 PM

## 2025-07-10 ENCOUNTER — MYC MEDICAL ADVICE (OUTPATIENT)
Dept: SURGERY | Facility: CLINIC | Age: 50
End: 2025-07-10
Payer: COMMERCIAL

## 2025-07-10 NOTE — LETTER
July 10, 2025      Angelia Stallings  [address] [facility] [address]        To Whom It May Concern:    Angelia Stallings was under my care for a recent surgery on 07/08/25. To ensure proper healing, please excuse her from work until 07/14/25. She may return to work on 07/14/25 with a 20 pound weight restriction until 07/22/25. Please reach out with any questions or concerns.      Sincerely,        Leo Quezada, DO    Electronically signed

## 2025-07-10 NOTE — TELEPHONE ENCOUNTER
[facility] Post-Op Phone Call                     Surgeon: Leo Quezada     Date of Surgery: 07/08/25  Surgery: Laparoscopic Appendectomy   Discharge Date: 07/08/25    Date/Time Called:   Date: 7/10/2025 Time: 9:09 AM   Attempt: First    Pain Control:  Intensity: Mild (1 - 3)  Duration/Location/Explain: incisional   What makes it better/worse? Getting in and out of bed is sore     Medications:  Narcotic Use - No  Drug type:   Frequency:     Incisions:  Drainage? clean and dry  Any fever type symptoms? No  Comment:       GI:  Nausea? No  Vomiting? No  BM? No- took miralax and stool softeners   Gas? Yes  Voiding Frequency? 4 or more/day   Appetite? Fair    Activity:  Walking activity? Yes  Frequency/Type: as tolerated   Restrictions: No lifting more than 20 pounds for 2 weeks     Return to Work Plans?  Expected date 07/14/25  Do you need anything from us in this regard? Yes- sent rtw letter in [facility]     Post-op appointment made? No          Thank you for your time. Please do not hesitate to call us with any questions or concerns.    Call completed by: Edilma Ornelas RN

## 2025-07-14 DIAGNOSIS — G89.29 OTHER CHRONIC PAIN: ICD-10-CM

## 2025-07-14 RX ORDER — OXYCODONE AND ACETAMINOPHEN 5; 325 MG/1; MG/1
1 TABLET ORAL EVERY 6 HOURS PRN
Qty: 20 TABLET | Refills: 0 | Status: SHIPPED | OUTPATIENT
Start: 2025-07-14

## 2025-07-14 NOTE — TELEPHONE ENCOUNTER
Medication Question or Refill    Contacts       Contact Date/Time Type Contact Phone/Fax    07/14/2025 09:03 AM CDT Phone (Incoming) Angelia Stallings (Self) [phone] (M)            What medication are you calling about (include dose and sig)?: percocet    Preferred Pharmacy:   [id number] - [place], [place] - [address] AT [facility] [address]  [place] [facility] [address]  Phone: [phone] Fax: [phone]      Controlled Substance Agreement on file:   CSA -- Patient Level:     [Media Unavailable] Controlled Substance Agreement - Opioid - Scan on 7/1/2024  9:25 AM   [Media Unavailable] Controlled Substance Agreement - Opioid - Scan on 8/16/2023  1:15 PM   [Media Unavailable] Controlled Substance Agreement - Opioid - Scan on 7/15/2022 12:20 PM   [Media Unavailable] Controlled Substance Agreement - Opioid - Scan on 6/7/2022  8:41 AM       Who prescribed the medication?: Leers    Do you need a refill? Yes    When did you use the medication last? daily    Patient offered an appointment? No    Do you have any questions or concerns?  No      Could we send this information to you in [facility] or would you prefer to receive a phone call?:   Patient would prefer a phone call   Okay to leave a detailed message?: Yes at Cell number on file:    Telephone Information:   Mobile [phone]

## 2025-07-16 LAB
PATH REPORT.COMMENTS IMP SPEC: NORMAL
PATH REPORT.COMMENTS IMP SPEC: NORMAL
PATH REPORT.FINAL DX SPEC: NORMAL
PATH REPORT.GROSS SPEC: NORMAL
PATH REPORT.MICROSCOPIC SPEC OTHER STN: NORMAL
PATH REPORT.RELEVANT HX SPEC: NORMAL
PHOTO IMAGE: NORMAL

## 2025-07-17 ENCOUNTER — E-VISIT (OUTPATIENT)
Dept: URGENT CARE | Facility: CLINIC | Age: 50
End: 2025-07-17
Payer: COMMERCIAL

## 2025-07-17 ENCOUNTER — NURSE TRIAGE (OUTPATIENT)
Dept: FAMILY MEDICINE | Facility: CLINIC | Age: 50
End: 2025-07-17
Payer: COMMERCIAL

## 2025-07-17 ENCOUNTER — OFFICE VISIT (OUTPATIENT)
Dept: URGENT CARE | Facility: URGENT CARE | Age: 50
End: 2025-07-17
Payer: COMMERCIAL

## 2025-07-17 VITALS
SYSTOLIC BLOOD PRESSURE: 133 MMHG | RESPIRATION RATE: 16 BRPM | TEMPERATURE: 98.1 F | HEIGHT: 68 IN | DIASTOLIC BLOOD PRESSURE: 87 MMHG | BODY MASS INDEX: 28.04 KG/M2 | HEART RATE: 88 BPM | OXYGEN SATURATION: 99 % | WEIGHT: 185 LBS

## 2025-07-17 DIAGNOSIS — R30.0 DYSURIA: Primary | ICD-10-CM

## 2025-07-17 DIAGNOSIS — R30.0 DYSURIA: ICD-10-CM

## 2025-07-17 DIAGNOSIS — R10.2 SUPRAPUBIC ABDOMINAL PAIN: Primary | ICD-10-CM

## 2025-07-17 LAB
ALBUMIN UR-MCNC: NEGATIVE MG/DL
APPEARANCE UR: CLEAR
BILIRUB UR QL STRIP: NEGATIVE
COLOR UR AUTO: YELLOW
GLUCOSE UR STRIP-MCNC: NEGATIVE MG/DL
HGB UR QL STRIP: NEGATIVE
KETONES UR STRIP-MCNC: NEGATIVE MG/DL
LEUKOCYTE ESTERASE UR QL STRIP: NEGATIVE
NITRATE UR QL: NEGATIVE
PH UR STRIP: 6 [PH] (ref 5–8)
SP GR UR STRIP: <=1.005 (ref 1–1.03)
UROBILINOGEN UR STRIP-ACNC: 0.2 E.U./DL

## 2025-07-17 RX ORDER — PHENAZOPYRIDINE HYDROCHLORIDE 200 MG/1
200 TABLET, FILM COATED ORAL 3 TIMES DAILY PRN
Qty: 6 TABLET | Refills: 0 | Status: SHIPPED | OUTPATIENT
Start: 2025-07-17

## 2025-07-17 NOTE — TELEPHONE ENCOUNTER
"Nurse Triage SBAR    Is this a 2nd Level Triage? NO    Situation: UTI symptoms    Background: Has had UTIs in the past.  10 days ago had appendectomy and yeast infection.    Assessment: Patient states that she has pressure and burning with urination along with frequency.  She has no fever, no hematuria, no pain just pressure. Did have one sharp pain in urethra a couple days ago.      Protocol Recommended Disposition:   No disposition on file.    Recommendation: Advised to do an e-visit.  ELENA Caldwell RN             Does the patient meet one of the following criteria for ADS visit consideration? 16+ years old, with an [facility] PCP     TIP  Providers, please consider if this condition is appropriate for management at one of our Acute and Diagnostic Services sites.     If patient is a good candidate, please use dotphrase <dot>triageresponse and select Refer to ADS to document.    Answer Assessment - Initial Assessment Questions  1. SYMPTOM: \"What's the main symptom you're concerned about?\" (e.g., frequency, incontinence)      Burning with urination, frequency  2. ONSET: \"When did the symptoms   start?\"      yesterday  3. PAIN: \"Is there any pain?\" If Yes, ask: \"How bad is it?\" (Scale: 1-10; mild, moderate, severe)      One sharp pain in urethra, but none since -   4. CAUSE: \"What do you think is causing the symptoms?\"      UTI  5. OTHER SYMPTOMS: \"Do you have any other symptoms?\" (e.g., blood in urine, fever, flank pain, pain with urination)      no  6. PREGNANCY: \"Is there any chance you are pregnant?\" \"When was your last menstrual period?\"      no    Protocols used: Urinary Symptoms-A-[place]    "

## 2025-07-17 NOTE — PROGRESS NOTES
Urgent Care Clinic Visit    Chief Complaint   Patient presents with    Cystitis     X2 days                 7/17/2025    12:27 PM   Additional Questions   Roomed by Alicia Doty   Accompanied by self         7/17/2025   Forms   Any forms needing to be completed Yes

## 2025-07-17 NOTE — PATIENT INSTRUCTIONS
It does not look like you have a UTI.     Try Pyridium to see if this helps with the discomfort. Can take it 3 times daily for up to 2 days.     You will be able to see urine culture results on MyChart.

## 2025-07-17 NOTE — PROGRESS NOTES
Assessment & Plan     Dysuria  X2 days of dysuria, increased frequency and discomfort. No fevers, nausea or low back/flank pain. Still recovering from appendectomy last week. They had treated her for yeast infection that was seen on UA; denies vaginal discharge, bleeding or pruritus. Vitals and physical exam largely unremarkable. UA clean. Unclear cause for etiology. Patient does not think they placed reid/straight cath during procedure. Will still get culture to make sure it doesn't grow anything out. In meantime, will trial Pyridium. If ongoing symptoms, recommend seeing PCP for re-assessment (Insterstitial cystitis, GSM, etc).   - UA Macroscopic with reflex to Microscopic and Culture - Clinic Collect  - Urine Culture Aerobic Bacterial - lab collect  - phenazopyridine (PYRIDIUM) 200 MG tablet  Dispense: 6 tablet; Refill: 0  - Urine Culture Aerobic Bacterial - lab collect             Return if symptoms worsen or fail to improve.    Joe Lua MD  [facility]RGENT [facility]     Angelia is a 49 year old female who presents to clinic today for the following health issues:  Chief Complaint   Patient presents with    Cystitis     X2 days           7/17/2025    12:27 PM   Additional Questions   Roomed by Alicia Doty   Accompanied by self         7/17/2025   Forms   Any forms needing to be completed Yes     HPI  X2 days of urethra irritation and suprapubic discomfort.  Recovering from appendectomy last Tuesday.   Was treated for yeast infection seen on UA while in hospital.   Increased frequency. Not urgency.   No vaginal bleeding or discharge.   No fevers, nausea or low back/flank pain.       Review of Systems  Constitutional, HEENT, cardiovascular, pulmonary, gi and gu systems are negative, except as otherwise noted.      Objective    /87 (BP Location: Right arm, Patient Position: Sitting, Cuff Size: Adult Regular)   Pulse 88   Temp 98.1  F (36.7  C) (Oral)   Resp 16   Ht 1.727 m  "(5' 8\")   Wt 83.9 kg (185 lb)   LMP  (LMP Unknown)   SpO2 99%   BMI 28.13 kg/m    Physical Exam   GENERAL: alert and no distress  EYES: Eyes grossly normal to inspection  RESP: lungs clear to auscultation - no rales, rhonchi or wheezes  CV: regular rate and rhythm, normal S1 S2, no murmurs  SKIN: no suspicious lesions or rashes on limited exam  NEURO: no focal deficits  BACK: no CVA tenderness  PSYCH: mentation appears normal, affect normal/bright    Recent Results (from the past 24 hours)   UA Macroscopic with reflex to Microscopic and Culture - Clinic Collect    Specimen: Urine, Clean Catch   Result Value Ref Range    Color Urine Yellow Colorless, Straw, Light Yellow, Yellow    Appearance Urine Clear Clear    Glucose Urine Negative Negative mg/dL    Bilirubin Urine Negative Negative    Ketones Urine Negative Negative mg/dL    Specific Gravity Urine <=1.005 1.005 - 1.030    Blood Urine Negative Negative    pH Urine 6.0 5.0 - 8.0    Protein Albumin Urine Negative Negative mg/dL    Urobilinogen Urine 0.2 0.2, 1.0 E.U./dL    Nitrite Urine Negative Negative    Leukocyte Esterase Urine Negative Negative    Narrative    Microscopic not indicated         "

## 2025-07-17 NOTE — PATIENT INSTRUCTIONS
Dear Angelia Stallings,    We are sorry you are not feeling well. Based on the responses you provided, it is recommended that you be seen in-person in urgent care so we can better evaluate your symptoms. Please click here to find the nearest urgent care location to you.   You will not be charged for this Visit. Thank you for trusting us with your care.    Yuko Negrete PA-C

## 2025-08-08 ENCOUNTER — MYC REFILL (OUTPATIENT)
Dept: FAMILY MEDICINE | Facility: CLINIC | Age: 50
End: 2025-08-08
Payer: COMMERCIAL

## 2025-08-08 DIAGNOSIS — G89.29 OTHER CHRONIC PAIN: ICD-10-CM

## 2025-08-10 RX ORDER — OXYCODONE AND ACETAMINOPHEN 5; 325 MG/1; MG/1
1 TABLET ORAL EVERY 6 HOURS PRN
Qty: 20 TABLET | Refills: 0 | Status: SHIPPED | OUTPATIENT
Start: 2025-08-10

## 2025-09-02 ENCOUNTER — E-VISIT (OUTPATIENT)
Dept: URGENT CARE | Facility: CLINIC | Age: 50
End: 2025-09-02
Payer: COMMERCIAL

## 2025-09-02 DIAGNOSIS — B96.89 ACUTE BACTERIAL SINUSITIS: Primary | ICD-10-CM

## 2025-09-02 DIAGNOSIS — J01.90 ACUTE BACTERIAL SINUSITIS: Primary | ICD-10-CM

## 2025-09-02 PROCEDURE — 99207 PR NON-BILLABLE SERV PER CHARTING: CPT | Performed by: EMERGENCY MEDICINE

## 2025-09-02 RX ORDER — DOXYCYCLINE HYCLATE 100 MG
100 TABLET ORAL 2 TIMES DAILY
Qty: 14 TABLET | Refills: 0 | Status: SHIPPED | OUTPATIENT
Start: 2025-09-02 | End: 2025-09-09

## (undated) DEVICE — SUTURE PASSOR W/GUIDE RSG-14F-4-WG

## (undated) DEVICE — BASIN EMESIS STERILE  SSK9005A

## (undated) DEVICE — SU MONOCRYL+ 4-0 18IN PS2 UND MCP496G

## (undated) DEVICE — BLADE KNIFE SURG 11 371111

## (undated) DEVICE — SOLUTION WATER 1000ML BOTTLE R5000-01

## (undated) DEVICE — SU VICRYL+ 0 27 UR6 VLT VCP603H

## (undated) DEVICE — SUCTION STRYKERFLOW II 250-070-500

## (undated) DEVICE — SUCTION MANIFOLD NEPTUNE 2 SYS 1 PORT 702-025-000

## (undated) DEVICE — ENDO POUCH UNIV RETRIEVAL SYSTEM INZII 10MM CD001

## (undated) DEVICE — ENDO TROCAR FIRST ENTRY KII FIOS Z-THRD 05X100MM CTF03

## (undated) DEVICE — ENDO TROCAR SLEEVE KII Z-THREADED 05X100MM CTS02

## (undated) DEVICE — STPL ENDO RELOAD 45X2.5MM VASC TR45W

## (undated) DEVICE — NDL INSUFFLATION 13GA 120MM C2201

## (undated) DEVICE — GLOVE BIOGEL PI ORTHOPRO SZ 7.5 47675

## (undated) DEVICE — ENDO TROCAR FIRST ENTRY KII FIOS Z-THRD 12X100MM CTF73

## (undated) DEVICE — CUSTOM PACK LAP CHOLE SBA5BLCHEA

## (undated) DEVICE — PREP CHLORAPREP 26ML TINTED HI-LITE ORANGE 930815

## (undated) DEVICE — ELECTRODE PATIENT RETURN ADULT L10 FT 2 PLATE CORD 0855C

## (undated) DEVICE — TUBING SMOKE EVAC PNEUMOCLEAR HIGH FLOW 0620050250

## (undated) DEVICE — STPL ENDO LINEAR CUT ARTICULATING 45MM ATS45

## (undated) DEVICE — VIAL DECANTER STERILE WHITE DYNJDEC06

## (undated) DEVICE — STPL ENDO RELOAD 45X3.5MM 6R45B

## (undated) RX ORDER — KETOROLAC TROMETHAMINE 30 MG/ML
INJECTION, SOLUTION INTRAMUSCULAR; INTRAVENOUS
Status: DISPENSED
Start: 2025-07-08

## (undated) RX ORDER — PROPOFOL 10 MG/ML
INJECTION, EMULSION INTRAVENOUS
Status: DISPENSED
Start: 2025-07-08

## (undated) RX ORDER — FENTANYL CITRATE 50 UG/ML
INJECTION, SOLUTION INTRAMUSCULAR; INTRAVENOUS
Status: DISPENSED
Start: 2025-07-08

## (undated) RX ORDER — ONDANSETRON 2 MG/ML
INJECTION INTRAMUSCULAR; INTRAVENOUS
Status: DISPENSED
Start: 2025-07-08